# Patient Record
Sex: FEMALE | Race: WHITE | NOT HISPANIC OR LATINO | Employment: FULL TIME | URBAN - METROPOLITAN AREA
[De-identification: names, ages, dates, MRNs, and addresses within clinical notes are randomized per-mention and may not be internally consistent; named-entity substitution may affect disease eponyms.]

---

## 2017-01-03 ENCOUNTER — ALLSCRIPTS OFFICE VISIT (OUTPATIENT)
Dept: OTHER | Facility: OTHER | Age: 57
End: 2017-01-03

## 2017-01-03 DIAGNOSIS — Z12.31 ENCOUNTER FOR SCREENING MAMMOGRAM FOR MALIGNANT NEOPLASM OF BREAST: ICD-10-CM

## 2017-01-03 DIAGNOSIS — M17.0 PRIMARY OSTEOARTHRITIS OF BOTH KNEES: ICD-10-CM

## 2017-01-04 ENCOUNTER — GENERIC CONVERSION - ENCOUNTER (OUTPATIENT)
Dept: OTHER | Facility: OTHER | Age: 57
End: 2017-01-04

## 2017-01-04 ENCOUNTER — HOSPITAL ENCOUNTER (OUTPATIENT)
Dept: RADIOLOGY | Facility: MEDICAL CENTER | Age: 57
Discharge: HOME | End: 2017-01-04
Payer: COMMERCIAL

## 2017-01-04 ENCOUNTER — TRANSCRIBE ORDERS (OUTPATIENT)
Dept: RADIOLOGY | Facility: MEDICAL CENTER | Age: 57
End: 2017-01-04

## 2017-01-04 DIAGNOSIS — M17.0 PRIMARY OSTEOARTHRITIS OF BOTH KNEES: ICD-10-CM

## 2017-01-04 PROCEDURE — 71020 HB CHEST X-RAY 2VW FRONTAL&LATL: CPT

## 2017-01-04 PROCEDURE — 73562 X-RAY EXAM OF KNEE 3: CPT

## 2017-01-27 ENCOUNTER — ALLSCRIPTS OFFICE VISIT (OUTPATIENT)
Dept: OTHER | Facility: OTHER | Age: 57
End: 2017-01-27

## 2017-03-17 ENCOUNTER — ALLSCRIPTS OFFICE VISIT (OUTPATIENT)
Dept: OTHER | Facility: OTHER | Age: 57
End: 2017-03-17

## 2017-03-28 ENCOUNTER — ALLSCRIPTS OFFICE VISIT (OUTPATIENT)
Dept: OTHER | Facility: OTHER | Age: 57
End: 2017-03-28

## 2017-05-31 ENCOUNTER — ALLSCRIPTS OFFICE VISIT (OUTPATIENT)
Dept: OTHER | Facility: OTHER | Age: 57
End: 2017-05-31

## 2017-08-29 DIAGNOSIS — Z12.31 ENCOUNTER FOR SCREENING MAMMOGRAM FOR MALIGNANT NEOPLASM OF BREAST: ICD-10-CM

## 2017-10-11 ENCOUNTER — GENERIC CONVERSION - ENCOUNTER (OUTPATIENT)
Dept: OTHER | Facility: OTHER | Age: 57
End: 2017-10-11

## 2017-11-17 ENCOUNTER — ALLSCRIPTS OFFICE VISIT (OUTPATIENT)
Dept: OTHER | Facility: OTHER | Age: 57
End: 2017-11-17

## 2017-11-18 NOTE — PROGRESS NOTES
Assessment  1  Low back pain with right-sided sciatica (724 3) (M54 41)    Plan  Acute sinusitis    · Medrol 4 MG Oral Tablet Therapy Pack (MethylPREDNISolone)   · Montelukast Sodium 10 MG Oral Tablet  Low back pain with right-sided sciatica    · MethylPREDNISolone 4 MG Oral Tablet Therapy Pack; take as directed   · Naproxen 500 MG Oral Tablet; take 1 tablet every twelve hours  Rash    · Nystatin Powder  Sciatica    · Medrol 4 MG Oral Tablet Therapy Pack (MethylPREDNISolone)    Discussion/Summary  The patient was counseled regarding instructions for management,-- risk factor reductions,-- prognosis,-- patient and family education,-- risks and benefits of treatment options,-- importance of compliance with treatment  Possible side effects of new medications were reviewed with the patient/guardian today  The treatment plan was reviewed with the patient/guardian  The patient/guardian understands and agrees with the treatment plan      Chief Complaint    1  Back Pain  Joya Rogel is here today with complaints of returning R sided sciatica  She has had this multiple times in the past  She has never tried physical therapy as her insurance does not cover it well-enough for her to afford it  When her sciatica returns, she does well with steroids and she believed Vicodin, however she is willing to try an anti-inflammatory medication instead of the Vicodin  History of Present Illness  HPI: See chief complaint  Review of Systems   Constitutional: no fever-- and-- no chills  Cardiovascular: no chest pain-- and-- no palpitations  Respiratory: no shortness of breath  Gastrointestinal: no abdominal pain  Musculoskeletal: limb pain-- and-- R sided sciatica, but-- as noted in HPI  ROS reviewed  Active Problems  1  Acute sinusitis (461 9) (J01 90)   2  Brain aneurysm (437 3) (I67 1)   3  Colon cancer screening (V76 51) (Z12 11)   4  Depression screening (V79 0) (Z13 89)   5   Herpes zoster without complication (053  9) (B02 9)   6  Hypertension (401 9) (I10)   7  Pap smear for cervical cancer screening (V76 2) (Z12 4)   8  Primary osteoarthritis of both knees (715 16) (M17 0)   9  Rash (782 1) (R21)   10  Sciatica (724 3) (M54 30)   11  Screening for colorectal cancer (V76 51) (Z12 11,Z12 12)   12  Vaginal candidiasis (112 1) (B37 3)   13  Visit for screening mammogram (V76 12) (Z12 31)    Past Medical History  1  History of Acute recurrent maxillary sinusitis (461 0) (J01 01)   2  History of Acute recurrent maxillary sinusitis (461 0) (J01 01)   3  History of Acute recurrent maxillary sinusitis (461 0) (J01 01)   4  History of Acute tracheobronchitis (466 0) (J20 9)   5  Acute upper respiratory infection (465 9) (J06 9)   6  History of Colonoscopy (Fiberoptic) Screening   7  Common cold (460) (J00)   8  History of Headache (784 0) (R51)   9  History of common cold (V12 09) (Z86 19)   10  History of Reactive depression (300 4) (F32 9)   11  History of Sinusitis (473 9) (J32 9)   12  History of Soft Tissue Swelling (Non-joint) (782 3)   13  Sore throat (462) (J02 9)   14  History of Urticarial rash (708 9) (L50 9)   15  History of Vertigo (780 4) (R42)  Active Problems And Past Medical History Reviewed: The active problems and past medical history were reviewed and updated today  Family History  Mother    1  Family history of Coronary artery disease (414 00) (I25 10)  Father    2  Family history of Hodgkin's lymphoma (V16 7) (Z80 7)  Family History Reviewed: The family history was reviewed and updated today  Social History   · Being A Social Drinker   · Denied: Drug use (305 90) (F19 90)   · Former smoker (V15 82) (F59 932)   · Only smoked as a teenager   · No living will  The social history was reviewed and updated today  The social history was reviewed and is unchanged  Surgical History    1  History of Near-Total Thyroidectomy (06 4)  Surgical History Reviewed:    The surgical history was reviewed and updated today  Current Meds   1  Azelastine HCl - 0 15 % Nasal Solution; INSTILL 2 SQUIRT Twice daily; Therapy: 02ZIF2126 to (Last Rx:28Mar2017)  Requested for: 28Mar2017 Ordered   2  Betamethasone Sod Phos & Acet 6 (3-3) MG/ML Injection Suspension; USE AS DIRECTED; To Be Done: 56AAN7792; Status: HOLD FOR - Administration Ordered   3  Calcium 600 TABS; TAKE 2 TABLETS DAILY Recorded   4  Daily Value Multivitamin Oral Tablet; Take 1 tablet daily Recorded   5  HydroCHLOROthiazide 12 5 MG Oral Tablet; Take 1 tablet every 12 hours; Therapy: 28WUN0173 to (Last Rx:05Jan2017)  Requested for: 01KYT0616 Ordered   6  Medrol 4 MG Oral Tablet Therapy Pack; take as directed; Therapy: 11DUP4490 to (Last Rx:28Mar2017)  Requested for: 28Mar2017 Ordered   7  Medrol 4 MG Oral Tablet Therapy Pack; take as directed; Therapy: 22ZGX4894 to (Last Ricke Sluder)  Requested for: 98NAI8936 Ordered   8  Metoprolol Tartrate 25 MG Oral Tablet; take 1 tablet twice a day; Therapy: 17KCQ4683 to (Last Rx:26Jan2017)  Requested for: 26Jan2017 Ordered   9  Montelukast Sodium 10 MG Oral Tablet; TAKE 1 TABLET BY MOUTH ONCE DAILY; Therapy: 81TAO1416 to (Evaluate:29Uzb3092)  Requested for: 09TYJ4004; Last Rx:53Sno3347 Ordered   10  Nystatin Powder; to excoriated brandee rectal area bid prn; Therapy: 59ZVX1103 to (Last Rx:04Oct2017)  Requested for: 52UPQ6045 Ordered   11  Osteo Bi-Flex Regular Strength TABS; Take as directed Recorded   12  Vitamin C 250 MG Oral Tablet; Take 1 tablet daily Recorded   13  Vitamin D3 1000 UNIT Oral Capsule; TAKE ONE CAPSULE BY MOUTH EVERY  MORNING (SUPPLEMENT) Recorded    The medication list was reviewed and updated today  Allergies  1   No Known Drug Allergies    Vitals   Recorded: 24DBM4652 85:73GF   Systolic 486, LUE, Sitting   Diastolic 88, LUE, Sitting   Height 5 ft 4 in   Weight 153 lb 4 0 oz   BMI Calculated 26 31   BSA Calculated 1 75       Physical Exam   Constitutional  General appearance: No acute distress, well appearing and well nourished  Ears, Nose, Mouth, and Throat  External inspection of ears and nose: Normal    Otoscopic examination: Tympanic membranes translucent with normal light reflex  Canals patent without erythema  Pulmonary  Respiratory effort: No increased work of breathing or signs of respiratory distress  Auscultation of lungs: Clear to auscultation  Cardiovascular  Auscultation of heart: Normal rate and rhythm, normal S1 and S2, without murmurs  Abdomen  Abdomen: Non-tender, no masses  Liver and spleen: No hepatomegaly or splenomegaly  Musculoskeletal  Gait and station: Normal    Digits and nails: Normal without clubbing or cyanosis  Inspection/palpation of joints, bones, and muscles: Abnormal  -- R SI joint + TTP with pain extending into posterior R thigh    Psychiatric  Orientation to person, place, and time: Normal    Mood and affect: Normal          Signatures   Electronically signed by : Domenica Rosado, [facility]; Nov 17 2017  8:31AM EST                       (Author)    Electronically signed by : Milo Choudhary DO; Nov 17 2017  8:51AM EST                       (Author)

## 2017-12-05 ENCOUNTER — APPOINTMENT (OUTPATIENT)
Dept: RADIOLOGY | Facility: MEDICAL CENTER | Age: 57
End: 2017-12-05
Payer: COMMERCIAL

## 2017-12-05 ENCOUNTER — ALLSCRIPTS OFFICE VISIT (OUTPATIENT)
Dept: OTHER | Facility: OTHER | Age: 57
End: 2017-12-05

## 2017-12-05 DIAGNOSIS — M17.0 PRIMARY OSTEOARTHRITIS OF BOTH KNEES: ICD-10-CM

## 2017-12-05 DIAGNOSIS — M25.50 PAIN IN JOINT: ICD-10-CM

## 2017-12-05 DIAGNOSIS — M17.12 PRIMARY OSTEOARTHRITIS OF LEFT KNEE: ICD-10-CM

## 2017-12-05 DIAGNOSIS — M65.341 TRIGGER RING FINGER OF RIGHT HAND: ICD-10-CM

## 2017-12-05 PROCEDURE — 73562 X-RAY EXAM OF KNEE 3: CPT

## 2017-12-06 NOTE — PROGRESS NOTES
Assessment    1  Primary osteoarthritis of left knee (715 16) (M17 12)    Plan  Primary osteoarthritis of both knees    · * XR KNEE 3 VW LEFT NON INJURY; Status:Active - Retrospective By ProtocolAuthorization; Requested for:15Ktm3712;    · * XR KNEE 3 VW RIGHT NON INJURY; Status:Active - Retrospective Authorization; Requested for:66Grm9667;   Primary osteoarthritis of left knee    · Betamethasone Sod Phos & Acet 6 (3-3) MG/ML Injection Suspension   · *1 - [facility]  *  Status: Active  Requested for: 53KBK1075  Care Summary provided  : Yes   · Follow-up visit in 6 months Evaluation and Treatment  Follow-up  Status: Hold For -Scheduling  Requested for: 05ERM0332    Discussion/Summary    She has a knee pain due to bilateral knee osteoarthritis  Left knee symptomatic today with an effusion  Discussed treatment option with the patient and   Left knee aspirated and injected with steroid  Patient will count continue with over-the-counter pain medication  Ice and elevate as needed  Physical therapy for both knees  Patient will follow up p r n  Chief Complaint    1  Knee Pain  Left knee pain      History of Present Illness  HPI: 27-year-old with bilateral knee pain due to osteoarthritis  Patient had both knees injected steroid last year  Good relief of symptoms lasting almost 12 months  Patient noticed increasing pain and swelling for the last 3 days in the left knee only  Patient works as a cook  Having difficulty walking and ambulating  Pain mainly in the medial joint line  Worse with activity  Decreased with rest      Review of Systems   Constitutional: No fever, no chills, feels well, no tiredness, no recent weight gain or loss  Eyes: No complaints of eyesight problems, no red eyes  ENT: no loss of hearing, no nosebleeds, no sore throat  Cardiovascular: No complaints of chest pain, no palpitations, no leg claudication or lower extremity edema    Respiratory: no compliants of shortness of breath, no wheezing, no cough  Gastrointestinal: no complaints of abdominal pain, no constipation, no nausea or diarrhea, no vomiting, no bloody stools  Genitourinary: no complaints of dysuria, no incontinence  Musculoskeletal: as noted in HPI  Integumentary: no complaints of skin rash or lesion, no itching or dry skin, no skin wounds  Neurological: no complaints of headache, no confusion, no numbness or tingling, no dizziness  Endocrine: No complaints of muscle weakness, no feelings of weakness, no frequent urination, no excessive thirst   Psychiatric: No suicidal thoughts, no anxiety, no feelings of depression  Active Problems  1  Conjunctivitis (372 30) (H10 9)   2  Hypertension (401 9) (I10)   3  Low back pain with right-sided sciatica (724 3) (M54 41)   4  Primary osteoarthritis of both knees (715 16) (M17 0)   5   Sciatica (724 3) (M54 30)    Past Medical History   · History of Acute recurrent maxillary sinusitis (461 0) (J01 01)   · History of Acute recurrent maxillary sinusitis (461 0) (J01 01)   · History of Acute recurrent maxillary sinusitis (461 0) (J01 01)   · History of Acute tracheobronchitis (466 0) (J20 9)   · Acute upper respiratory infection (465 9) (J06 9)   · History of Colonoscopy (Fiberoptic) Screening   · Common cold (460) (J00)   · History of Headache (784 0) (R51)   · History of Herpes zoster without complication (782 4) (N10 7)   · History of acute sinusitis (V12 69) (Z87 09)   · History of common cold (V12 09) (Z86 19)   · History of intracranial aneurysm (V12 54) (Z86 79)   · History of Rash (782 1) (R21)   · History of Reactive depression (300 4) (F32 9)   · History of Sinusitis (473 9) (J32 9)   · History of Soft Tissue Swelling (Non-joint) (782 3)   · Sore throat (462) (J02 9)   · History of Urticarial rash (708 9) (L50 9)   · History of Vaginal candidiasis (112 1) (B37 3)   · History of Vertigo (780 4) (R42)    Surgical History   · History of Near-Total Thyroidectomy (06 4)    Family History  Mother    · Family history of Coronary artery disease (414 00) (I25 10)  Father    · Family history of Hodgkin's lymphoma (V16 7) (Z80 7)    Social History     · Being A Social Drinker   · Denied: Drug use (305 90) (F19 90)   · Former smoker (V15 82) (R61 361)   · Only smoked as a teenager   · No living will    Current Meds   1  Azelastine HCl - 0 15 % Nasal Solution; INSTILL 2 SQUIRT Twice daily; Therapy: 19XQH1913 to (Last Rx:28Mar2017)  Requested for: 28Mar2017 Ordered   2  Betamethasone Sod Phos & Acet 6 (3-3) MG/ML Injection Suspension; USE AS DIRECTED; To Be Done: 15ZVL3956; Status: HOLD FOR - Administration Ordered   3  Calcium 600 TABS; TAKE 2 TABLETS DAILY Recorded   4  Daily Value Multivitamin Oral Tablet; Take 1 tablet daily Recorded   5  HydroCHLOROthiazide 12 5 MG Oral Tablet; Take 1 tablet every 12 hours; Therapy: 95WXC9988 to (Last Rx:05Jan2017)  Requested for: 29AVX1750 Ordered   6  MethylPREDNISolone 4 MG Oral Tablet Therapy Pack; take as directed; Therapy: 08QSA8532 to (Last Rx:17Nov2017)  Requested for: 01RLK6082 Ordered   7  Metoprolol Tartrate 25 MG Oral Tablet; take 1 tablet twice a day; Therapy: 09KOE6241 to (Last Rx:26Jan2017)  Requested for: 26Jan2017 Ordered   8  Naproxen 500 MG Oral Tablet; take 1 tablet every twelve hours; Therapy: 52ITJ6451 to (Charly Abdi)  Requested for: 92DJO7193; Last Rx:17Nov2017 Ordered   9  Ofloxacin 0 3 % Ophthalmic Solution; APPLY 2 DROP 3 times daily in effected eye; Therapy: 46TJE1138 to (Last Rx:28Nov2017)  Requested for: 37HME5107 Ordered   10  Osteo Bi-Flex Regular Strength TABS; Take as directed Recorded   11  Vitamin C 250 MG Oral Tablet; Take 1 tablet daily Recorded   12  Vitamin D3 1000 UNIT Oral Capsule; TAKE ONE CAPSULE BY MOUTH EVERY  MORNING (SUPPLEMENT) Recorded    Allergies  1   No Known Drug Allergies    Vitals  Signs     Heart Rate: 77  Systolic: 379  Diastolic: 85  Height: 5 ft 4 in  Weight: 152 lb 4 00 oz  BMI Calculated: 26 13  BSA Calculated: 1 75    Physical Exam    Right Knee: Appearance: Normal except genu varum, but-- no effusion  Tenderness: None except the medial joint line  ROM: Full except as noted: Flexion: painful  Left Knee: Appearance: Normal except genu varum, but-- no effusion  Tenderness: None except the medial joint line  ROM: Full except as noted: Flexion: painful  Appearance: an effusion grade 2  Constitutional - General appearance: Normal   Musculoskeletal - Gait and station: Normal -- Digits and nails: Normal -- Muscle strength/tone: Normal   Cardiovascular - Pulses: Normal -- Examination of extremities for edema and/or varicosities: Normal   Skin - Skin and subcutaneous tissue: Normal   Neurologic - Sensation: Normal   Psychiatric - Orientation to person, place, and time: Normal -- Mood and affect: Normal   Eyes  Conjunctiva and lids: Normal    Pupils and irises: Normal        Procedure    Procedure: Aspiration Injection of the left knee joint  Indication:  Effusion,-- Inflammation-- and-- Osteoarthritis  Potential complications include bleeding,-- infection-- and-- allergic reaction  Risk, benefits and alternatives were discussed with the patient  Verbal consent was obtained prior to the procedure  Betadine was used to prep the area  ethyl chloride spray was used as a topical anesthetic  Using sterile technique, the aspiration/injection needle was then directed from a Anterolateral aspect  50 mL of yellow, clear fluid was aspirated with an 18-gauge  The syringe was changed and the same needle was left in place and was used to inject 4cc of 1% Lidocaine-- and-- 1mL of 4 mg/mL dexamethasone  A bandage was applied  the patient tolerated the procedure well  Complications: none  Future Appointments    Date/Time Provider Specialty Site   12/19/2017 01:10 PM GREGG Alexander   Orthopedic Surgery [address]        Signatures   Electronically signed by : GREGG Crytsal ; Dec 5 2017  3:50PM EST                       (Author)

## 2017-12-19 ENCOUNTER — GENERIC CONVERSION - ENCOUNTER (OUTPATIENT)
Dept: OTHER | Facility: OTHER | Age: 57
End: 2017-12-19

## 2017-12-19 ENCOUNTER — APPOINTMENT (OUTPATIENT)
Dept: RADIOLOGY | Facility: MEDICAL CENTER | Age: 57
End: 2017-12-19
Payer: COMMERCIAL

## 2017-12-19 DIAGNOSIS — M25.50 PAIN IN JOINT: ICD-10-CM

## 2017-12-19 PROCEDURE — 73130 X-RAY EXAM OF HAND: CPT

## 2018-01-12 ENCOUNTER — GENERIC CONVERSION - ENCOUNTER (OUTPATIENT)
Dept: OTHER | Facility: OTHER | Age: 58
End: 2018-01-12

## 2018-01-12 VITALS
DIASTOLIC BLOOD PRESSURE: 83 MMHG | SYSTOLIC BLOOD PRESSURE: 155 MMHG | HEART RATE: 84 BPM | WEIGHT: 158 LBS | BODY MASS INDEX: 26.98 KG/M2 | HEIGHT: 64 IN

## 2018-01-12 DIAGNOSIS — M17.12 PRIMARY OSTEOARTHRITIS OF LEFT KNEE: ICD-10-CM

## 2018-01-13 VITALS
SYSTOLIC BLOOD PRESSURE: 132 MMHG | HEIGHT: 64 IN | WEIGHT: 174.43 LBS | TEMPERATURE: 98.6 F | DIASTOLIC BLOOD PRESSURE: 84 MMHG | BODY MASS INDEX: 29.78 KG/M2

## 2018-01-13 VITALS
DIASTOLIC BLOOD PRESSURE: 88 MMHG | BODY MASS INDEX: 26.16 KG/M2 | WEIGHT: 153.25 LBS | SYSTOLIC BLOOD PRESSURE: 138 MMHG | HEIGHT: 64 IN

## 2018-01-13 VITALS
HEART RATE: 90 BPM | HEIGHT: 64 IN | SYSTOLIC BLOOD PRESSURE: 138 MMHG | WEIGHT: 172.44 LBS | BODY MASS INDEX: 29.44 KG/M2 | DIASTOLIC BLOOD PRESSURE: 84 MMHG

## 2018-01-14 VITALS
SYSTOLIC BLOOD PRESSURE: 124 MMHG | WEIGHT: 172.4 LBS | HEIGHT: 64 IN | BODY MASS INDEX: 29.43 KG/M2 | DIASTOLIC BLOOD PRESSURE: 82 MMHG

## 2018-01-14 VITALS
TEMPERATURE: 98 F | WEIGHT: 172 LBS | BODY MASS INDEX: 29.37 KG/M2 | DIASTOLIC BLOOD PRESSURE: 90 MMHG | HEIGHT: 64 IN | SYSTOLIC BLOOD PRESSURE: 144 MMHG

## 2018-01-14 NOTE — RESULT NOTES
Verified Results  * XR CHEST PA & LATERAL 54XTM0922 10:05AM Esme Asya Order Number: [id number]     Test Name Result Flag Reference   XR CHEST PA & LATERAL (Report)     CHEST     INDICATION: Congestion and wheezing  COMPARISON: February 27, 2007     VIEWS: PA and lateral; 2 images     FINDINGS:     The cardiomediastinal silhouette is unremarkable  The lungs are clear  No pleural effusions  Bony thorax unremarkable  IMPRESSION:     No active pulmonary disease          Workstation performed: [id number]     Signed by:   Ingrid Pennington MD   1/4/17     * XR KNEE 3 VW LEFT NON INJURY 39YIK7320 10:05AM Esme Asya Order Number: [id number]     Test Name Result Flag Reference   XR KNEE 3 VW LEFT (Report)     LEFT KNEE     INDICATION: Left knee pain  COMPARISON: December 12, 2007     VIEWS: AP, lateral and sunrise ; 3 images     FINDINGS:     There is no acute fracture or dislocation  There is no joint effusion  Medial tibiofemoral compartment; small to moderate periarticular osteophytes, mild to moderate joint space narrowing, no subchondral sclerosis, no subchondral cysts  Lateral tibiofemoral compartment; no periarticular osteophytes, no joint space narrowing, no subchondral sclerosis, no subchondral cysts  Patellofemoral compartment; small periarticular osteophytes, moderate joint space narrowing, no subchondral sclerosis, no subchondral cysts  There is no joint subluxation  No lytic or blastic lesions are seen  Small calcific/ossific density is noted superior to the patella suspicious for a small osteochondroma, new in the interval        IMPRESSION:     Degenerative changes of the medial compartment and patellofemoral joint  Osteochondroma/joint mouse, suprapatellar bursa  Workstation performed: [id number]     Signed by:   Ingrid Pennington MD   1/4/17     * XR KNEE 3 VW RIGHT NON INJURY 50QLY1850 10:05AM Sharonr[place]   TW Order Number: [id number]     Test Name Result Flag Reference   XR KNEE 3 VW RIGHT (Report)     RIGHT KNEE     INDICATION: Right knee pain  COMPARISON: None     VIEWS: AP, lateral and sunrise ; 3 images     FINDINGS:     There is no acute fracture or dislocation  There is no joint effusion  Medial tibiofemoral compartment; small periarticular osteophytes, mild joint space narrowing, no subchondral sclerosis, no subchondral cysts  Lateral tibiofemoral compartment; small periarticular osteophytes, mild joint space narrowing, no subchondral sclerosis, no subchondral cysts  Patellofemoral compartment; moderate periarticular osteophytes, moderately severe joint space narrowing, no subchondral sclerosis, no subchondral cysts  There is no joint subluxation  No lytic or blastic lesions are seen  Soft tissues are unremarkable  IMPRESSION:     Tricompartmental degenerative changes, most prominent at the patellofemoral joint  Workstation performed: [id number]     Signed by:   Tito Mendez MD   1/4/17

## 2018-01-22 VITALS
DIASTOLIC BLOOD PRESSURE: 85 MMHG | HEIGHT: 64 IN | BODY MASS INDEX: 25.99 KG/M2 | HEART RATE: 77 BPM | SYSTOLIC BLOOD PRESSURE: 154 MMHG | WEIGHT: 152.25 LBS

## 2018-01-24 VITALS
HEIGHT: 64 IN | SYSTOLIC BLOOD PRESSURE: 138 MMHG | RESPIRATION RATE: 16 BRPM | BODY MASS INDEX: 25.95 KG/M2 | DIASTOLIC BLOOD PRESSURE: 82 MMHG | WEIGHT: 152 LBS | HEART RATE: 88 BPM

## 2018-01-24 VITALS
DIASTOLIC BLOOD PRESSURE: 81 MMHG | HEIGHT: 64 IN | WEIGHT: 152 LBS | RESPIRATION RATE: 16 BRPM | BODY MASS INDEX: 25.95 KG/M2 | HEART RATE: 81 BPM | SYSTOLIC BLOOD PRESSURE: 145 MMHG

## 2018-02-05 ENCOUNTER — HOSPITAL ENCOUNTER (EMERGENCY)
Facility: HOSPITAL | Age: 58
Discharge: HOME | End: 2018-02-05
Attending: EMERGENCY MEDICINE
Payer: COMMERCIAL

## 2018-02-05 VITALS
DIASTOLIC BLOOD PRESSURE: 80 MMHG | SYSTOLIC BLOOD PRESSURE: 151 MMHG | BODY MASS INDEX: 26.12 KG/M2 | HEART RATE: 77 BPM | OXYGEN SATURATION: 100 % | RESPIRATION RATE: 18 BRPM | WEIGHT: 153 LBS | TEMPERATURE: 98.4 F | HEIGHT: 64 IN

## 2018-02-05 DIAGNOSIS — S61.219A FINGER LACERATION: Primary | ICD-10-CM

## 2018-02-05 PROCEDURE — 90471 IMMUNIZATION ADMIN: CPT

## 2018-02-05 PROCEDURE — 90715 TDAP VACCINE 7 YRS/> IM: CPT | Performed by: PHYSICIAN ASSISTANT

## 2018-02-05 PROCEDURE — 99282 EMERGENCY DEPT VISIT SF MDM: CPT

## 2018-02-05 RX ADMIN — TETANUS TOXOID, REDUCED DIPHTHERIA TOXOID AND ACELLULAR PERTUSSIS VACCINE, ADSORBED 0.5 ML: 5; 2.5; 8; 8; 2.5 SUSPENSION INTRAMUSCULAR at 18:58

## 2018-02-05 NOTE — ED PROVIDER NOTES
History  Chief Complaint   Patient presents with    Finger Laceration     sustained a laceration to mip on right thumb with a sharp knife  Patient presents to the emergency department today offering a chief complaint of a right thumb laceration that she sustained about an hour ago washing the dishes, cutting herself on a knife  Patient is bleeding was controlled with direct pressure  Denies range of motion or sensation deficits  Patient's last tetanus somewhere between 7 and 10 years ago however she is unsure therefore we will update  None       Past Medical History:   Diagnosis Date    Hypertension        Past Surgical History:   Procedure Laterality Date    THYROID SURGERY         History reviewed  No pertinent family history  I have reviewed and agree with the history as documented  Social History   Substance Use Topics    Smoking status: Never Smoker    Smokeless tobacco: Never Used    Alcohol use Yes      Comment: social        Review of Systems   Constitutional: Negative  HENT: Negative  Eyes: Negative  Respiratory: Negative  Cardiovascular: Negative  Gastrointestinal: Negative  Endocrine: Negative  Genitourinary: Negative  Musculoskeletal: Negative  Skin: Positive for wound  Allergic/Immunologic: Negative  Neurological: Negative  Hematological: Negative  Psychiatric/Behavioral: Negative  All other systems reviewed and are negative  Physical Exam  ED Triage Vitals [02/05/18 1824]   Temperature Pulse Respirations Blood Pressure SpO2   98 4 °F (36 9 °C) 77 18 151/80 100 %      Temp Source Heart Rate Source Patient Position - Orthostatic VS BP Location FiO2 (%)   Tympanic Monitor Sitting Right arm --      Pain Score       7           Orthostatic Vital Signs  Vitals:    02/05/18 1824   BP: 151/80   Pulse: 77   Patient Position - Orthostatic VS: Sitting       Physical Exam   Constitutional: She is oriented to person, place, and time   She appears well-developed and well-nourished  No distress  HENT:   Head: Normocephalic  Eyes: Pupils are equal, round, and reactive to light  Neck: Normal range of motion  Cardiovascular: Normal rate  Pulmonary/Chest: Effort normal    Musculoskeletal: Normal range of motion  Neurological: She is alert and oriented to person, place, and time  Skin: Capillary refill takes less than 2 seconds  She is not diaphoretic  Patient has a 1 5 cm linear laceration on the palmar aspect of the right 1st finger  No active bleeding  Patient exhibits full range of motion with normal sensation   Psychiatric: She has a normal mood and affect  Vitals reviewed  ED Medications  Medications   tetanus-diphtheria-acellular pertussis (BOOSTRIX) IM injection 0 5 mL (0 5 mL Intramuscular Given 2/5/18 1858)       Diagnostic Studies  Results Reviewed     None                 No orders to display              Procedures  Procedures       Phone Contacts  ED Phone Contact    ED Course  ED Course as of Feb 05 1920   Mon Feb 05, 2018   1840 Blood Pressure: 151/80   1841 Temperature: 98 4 °F (36 9 °C)   1842 Pulse: 77   1842 Respirations: 18   1842 SpO2: 100 %                               MDM  CritCare Time    Disposition  Final diagnoses:   Finger laceration     Time reflects when diagnosis was documented in both MDM as applicable and the Disposition within this note     Time User Action Codes Description Comment    2/5/2018  7:18 PM Michael Juarez Add [H76 167L] Finger laceration       ED Disposition     ED Disposition Condition Comment    Discharge  [address] discharge to home/self care      Condition at discharge: Good        Follow-up Information     Follow up With Specialties Details Why [address] Emergency Department Emergency Medicine  If symptoms worsen [address] [id number] [phone] [place] [facility], Donna Hagen [place], [address] [place] [address], DO Family Medicine Schedule an appointment as soon as possible for a visit  [address]  [phone]  [phone]           Patient's Medications    No medications on file     No discharge procedures on file      ED Provider  Electronically Signed by           Luli Dwyer PA-C  02/05/18 [id number]

## 2018-02-05 NOTE — ED ATTENDING ATTESTATION
Alka Veronica DO, saw and evaluated the patient  I have discussed the patient with the resident/non-physician practitioner and agree with the resident's/non-physician practitioner's findings, Plan of Care, and MDM as documented in the resident's/non-physician practitioner's note, except where noted  All available labs and Radiology studies were reviewed  At this point I agree with the current assessment done in the Emergency Department  I have conducted an independent evaluation of this patient a history and physical is as follows:      Critical Care Time  CritCare Time    Procedures   Patient seen examined  Patient with very superficial laceration to the right lateral thumb  Patient with intact flexion extension of the thumb    We will update her tetanus

## 2018-02-06 NOTE — ED PROCEDURE NOTE
Procedure  Lac Repair  Date/Time: 2/5/2018 7:16 PM  Performed by: Shakir Johnson  Authorized by: Ciera Guevara   Consent: Verbal consent obtained    Consent given by: patient  Patient understanding: patient states understanding of the procedure being performed  Patient identity confirmed: verbally with patient and arm band  Body area: upper extremity  Location details: right thumb  Laceration length: 1 5 cm  Foreign bodies: no foreign bodies  Tendon involvement: none  Nerve involvement: none  Vascular damage: no    Sedation:  Patient sedated: no    Wound Dehiscence:  Superficial Wound Dehiscence: simple closure      Procedure Details:  Irrigation solution: saline  Irrigation method: jet lavage  Amount of cleaning: standard  Debridement: none  Degree of undermining: none  Skin closure: glue  Approximation: close  Dressing: splint                       Kanu Ledesma PA-C  02/05/18 [id number]

## 2018-02-06 NOTE — DISCHARGE INSTRUCTIONS
Skin Adhesive Care   WHAT YOU NEED TO KNOW:   What is skin adhesive? Skin adhesive is medical glue used to close wounds  It is a substitute for staples and stitches  Skin adhesive wound closures take less time and do not require anesthesia  You have less pain and a lower risk of infection than with staples or stitches  Skin adhesive will fall off after the wound is healed  How do I care for the skin adhesive that covers my wound? · Keep your wound clean and dry  for 1 to 5 days  You can shower 24 hours after the skin adhesive is applied  Lightly pat your wound dry after you shower  · Do not soak  your wound in water, such as in a bath or hot tub  · Do not scrub  your wound or pick at the adhesive  This can make your wound reopen  · Do not apply ointments  to your wound  These include antibiotic and other ointments that contain petroleum jelly  These products will remove skin adhesive and reopen your wound  When should I contact my healthcare provider? · You have a fever  · Your wound is red, swollen, and warm to touch  · You have questions or concerns about your condition or care  When should I seek immediate care? · Your wound is draining pus  · Your wound opens  CARE AGREEMENT:   You have the right to help plan your care  Learn about your health condition and how it may be treated  Discuss treatment options with your caregivers to decide what care you want to receive  You always have the right to refuse treatment  The above information is an  only  It is not intended as medical advice for individual conditions or treatments  Talk to your doctor, nurse or pharmacist before following any medical regimen to see if it is safe and effective for you  © 2017 [address] [employer] Information is for End User's use only and may not be sold, redistributed or otherwise used for commercial purposes   All illustrations and images included in CareNotes® are the copyrighted property of [employer]  or Neymar Mayen  Skin Adhesive Care   WHAT YOU NEED TO KNOW:   Skin adhesive is medical glue used to close wounds  It is a substitute for staples and stitches  Skin adhesive wound closures take less time and do not require anesthesia  You have less pain and a lower risk of infection than with staples or stitches  Skin adhesive will fall off after the wound is healed  DISCHARGE INSTRUCTIONS:   Self-care:   · Keep your wound clean and dry  for 1 to 5 days  You can shower 24 hours after the skin adhesive is applied  Lightly pat your wound dry after you shower  · Do not soak  your wound in water, such as in a bath or hot tub  · Do not scrub  your wound or pick at the adhesive  This can make your wound reopen  · Do not apply ointments  to your wound  These include antibiotic and other ointments that contain petroleum jelly  These products will remove skin adhesive and reopen your wound  Follow up with your healthcare provider as directed:  Write down your questions so you remember to ask them during your visits  Contact your healthcare provider if:   · You have a fever  · Your wound is red and warm to touch  · You have questions or concerns about your condition or care  Seek care immediately if:   · Your wound has fluid draining from it  · Your wound opens  © 2017 [address]  Information is for End User's use only and may not be sold, redistributed or otherwise used for commercial purposes  All illustrations and images included in CareNotes® are the copyrighted property of [employer] M [employer]  or Neymar Mayen  The above information is an  only  It is not intended as medical advice for individual conditions or treatments  Talk to your doctor, nurse or pharmacist before following any medical regimen to see if it is safe and effective for you

## 2018-02-15 DIAGNOSIS — I10 HYPERTENSION, UNSPECIFIED TYPE: Primary | ICD-10-CM

## 2018-02-15 RX ORDER — IBUPROFEN 200 MG
2 CAPSULE ORAL DAILY
COMMUNITY

## 2018-02-15 RX ORDER — NAPROXEN 500 MG/1
1 TABLET ORAL
COMMUNITY
Start: 2017-11-17 | End: 2018-04-24

## 2018-02-15 RX ORDER — BIOTIN 1 MG
TABLET ORAL
COMMUNITY

## 2018-02-15 RX ORDER — HYDROCHLOROTHIAZIDE 12.5 MG/1
1 TABLET ORAL EVERY 12 HOURS
COMMUNITY
Start: 2014-08-05 | End: 2018-02-15 | Stop reason: SDUPTHER

## 2018-02-15 RX ORDER — ASCORBIC ACID 250 MG
1 TABLET,CHEWABLE ORAL DAILY
COMMUNITY

## 2018-02-15 RX ORDER — HYDROCHLOROTHIAZIDE 12.5 MG/1
12.5 TABLET ORAL EVERY 12 HOURS
Qty: 180 TABLET | Refills: 1 | Status: SHIPPED | OUTPATIENT
Start: 2018-02-15 | End: 2018-07-13 | Stop reason: SDUPTHER

## 2018-03-16 ENCOUNTER — OFFICE VISIT (OUTPATIENT)
Dept: OBGYN CLINIC | Facility: CLINIC | Age: 58
End: 2018-03-16
Payer: COMMERCIAL

## 2018-03-16 VITALS
DIASTOLIC BLOOD PRESSURE: 82 MMHG | HEIGHT: 64 IN | HEART RATE: 76 BPM | BODY MASS INDEX: 26.98 KG/M2 | SYSTOLIC BLOOD PRESSURE: 154 MMHG | WEIGHT: 158 LBS

## 2018-03-16 DIAGNOSIS — M19.032 CMC DJD(CARPOMETACARPAL DEGENERATIVE JOINT DISEASE), LOCALIZED PRIMARY, LEFT: Primary | ICD-10-CM

## 2018-03-16 PROCEDURE — 20600 DRAIN/INJ JOINT/BURSA W/O US: CPT | Performed by: ORTHOPAEDIC SURGERY

## 2018-03-16 PROCEDURE — 99213 OFFICE O/P EST LOW 20 MIN: CPT | Performed by: ORTHOPAEDIC SURGERY

## 2018-03-16 RX ORDER — BETAMETHASONE SODIUM PHOSPHATE AND BETAMETHASONE ACETATE 3; 3 MG/ML; MG/ML
3 INJECTION, SUSPENSION INTRA-ARTICULAR; INTRALESIONAL; INTRAMUSCULAR; SOFT TISSUE
Status: COMPLETED | OUTPATIENT
Start: 2018-03-16 | End: 2018-03-16

## 2018-03-16 RX ORDER — BUPIVACAINE HYDROCHLORIDE 2.5 MG/ML
1 INJECTION, SOLUTION INFILTRATION; PERINEURAL
Status: COMPLETED | OUTPATIENT
Start: 2018-03-16 | End: 2018-03-16

## 2018-03-16 RX ADMIN — BETAMETHASONE SODIUM PHOSPHATE AND BETAMETHASONE ACETATE 3 MG: 3; 3 INJECTION, SUSPENSION INTRA-ARTICULAR; INTRALESIONAL; INTRAMUSCULAR; SOFT TISSUE at 14:43

## 2018-03-16 RX ADMIN — BUPIVACAINE HYDROCHLORIDE 1 ML: 2.5 INJECTION, SOLUTION INFILTRATION; PERINEURAL at 14:43

## 2018-03-16 NOTE — PROGRESS NOTES
Assessment:       1  [facility] DJD(carpometacarpal degenerative joint disease), localized primary, left          Plan:    Left thumb CMC joint injected with steroid and local anesthetic  Patient will follow up in 4 months            Subjective:     Patient ID: Sebastian Ward is a 56 y o  female  Chief Complaint:  Left thumb pain    HPI  Patient works as a  at [employer]     Patient has a left thumb pain due to [facility] arthritis  Patient had injection at the last visit  Patient is using the brace and has also done physical therapy  Patient comes back with the recurrence of the pain  Social History     Occupational History    Not on file  Social History Main Topics    Smoking status: Never Smoker    Smokeless tobacco: Never Used    Alcohol use Yes      Comment: social    Drug use: No    Sexual activity: Not on file      Review of Systems        Objective:     Ortho ExamPhysical Exam    Left thumb slight prominence of the CMC joint  Tenderness of the CMC joint with crepitus  Grinding of the thumb reproduces symptoms  Left thumb pain due to [facility] joint arthritis      I have personally reviewed pertinent films in PACS     Small joint arthrocentesis  Date/Time: 3/16/2018 2:43 PM  Consent given by: patient and parent  Site marked: site marked  Timeout: Immediately prior to procedure a time out was called to verify the correct patient, procedure, equipment, support staff and site/side marked as required   Supporting Documentation  Indications: pain and diagnostic evaluation   Procedure Details  Location: thumb - L thumb CMC  Preparation: Patient was prepped and draped in the usual sterile fashion  Needle size: 25 G  Ultrasound guidance: no  Approach: lateral  Medications administered: 1 mL bupivacaine 0 25 %; 3 mg betamethasone acetate-betamethasone sodium phosphate 6 (3-3) mg/mL    Patient tolerance: patient tolerated the procedure well with no immediate complications  Dressing:  Sterile dressing applied

## 2018-03-16 NOTE — PATIENT INSTRUCTIONS
Thumb Arthroplasty   WHAT YOU NEED TO KNOW:   What do I need to know about thumb arthroplasty? Thumb arthroplasty is surgery to replace part or all of the joint at the base of your thumb  This joint is where your thumb bone and wrist bone meet  How do I prepare for thumb arthroplasty? Your healthcare provider will talk to you about how to prepare for surgery  He may tell you not to eat or drink anything after midnight on the day of your surgery  He will tell you what medicines to take or not take on the day of your surgery  What will happen during thumb arthroplasty? General or regional anesthesia will be given to keep you free from pain during the surgery  Your surgeon will make an incision on the skin over your thumb joint  He will remove part or all of your wrist bone  He may also remove part of your thumb bone  He will reconstruct your joint using cartilage, a tendon taken from your forearm, or an artificial implant  Your surgeon will close your incision with stitches and cover it with bandages  What are the risks of thumb arthroplasty? You may have stiffness in your thumb after surgery  You may bleed more than expected or get an infection  If you received an implant, the implant may get loose, break, or become unstable  CARE AGREEMENT:   You have the right to help plan your care  Learn about your health condition and how it may be treated  Discuss treatment options with your caregivers to decide what care you want to receive  You always have the right to refuse treatment  The above information is an  only  It is not intended as medical advice for individual conditions or treatments  Talk to your doctor, nurse or pharmacist before following any medical regimen to see if it is safe and effective for you  © 2017 [address] Information is for End User's use only and may not be sold, redistributed or otherwise used for commercial purposes   All illustrations and images included in [facility] are the copyrighted property of A D A M , Inc  or [address]

## 2018-03-30 LAB — SURGICAL PATHOLOGY (HISTORICAL): NORMAL

## 2018-04-19 ENCOUNTER — TELEPHONE (OUTPATIENT)
Dept: FAMILY MEDICINE CLINIC | Facility: CLINIC | Age: 58
End: 2018-04-19

## 2018-04-19 DIAGNOSIS — J20.9 ACUTE INFECTIVE TRACHEOBRONCHITIS: Primary | ICD-10-CM

## 2018-04-19 RX ORDER — AZITHROMYCIN 250 MG/1
TABLET, FILM COATED ORAL
Qty: 6 TABLET | Refills: 0 | Status: SHIPPED | OUTPATIENT
Start: 2018-04-19 | End: 2018-04-22

## 2018-04-19 NOTE — TELEPHONE ENCOUNTER
CC: KAVITA - Same S/S as daughter who was seen yesterday    Requesting antibiotic    Allergies: ARTIE    Pharm: [facility]

## 2018-04-24 ENCOUNTER — OFFICE VISIT (OUTPATIENT)
Dept: FAMILY MEDICINE CLINIC | Facility: CLINIC | Age: 58
End: 2018-04-24
Payer: COMMERCIAL

## 2018-04-24 VITALS
HEIGHT: 64 IN | BODY MASS INDEX: 27.28 KG/M2 | DIASTOLIC BLOOD PRESSURE: 92 MMHG | SYSTOLIC BLOOD PRESSURE: 144 MMHG | WEIGHT: 159.8 LBS

## 2018-04-24 DIAGNOSIS — J32.9 SINUSITIS, UNSPECIFIED CHRONICITY, UNSPECIFIED LOCATION: Primary | ICD-10-CM

## 2018-04-24 DIAGNOSIS — J40 BRONCHITIS: ICD-10-CM

## 2018-04-24 PROBLEM — M65.331 TRIGGER MIDDLE FINGER OF RIGHT HAND: Status: ACTIVE | Noted: 2017-12-19

## 2018-04-24 PROBLEM — M54.41 LOW BACK PAIN WITH RIGHT-SIDED SCIATICA: Status: ACTIVE | Noted: 2017-11-17

## 2018-04-24 PROBLEM — M18.12 PRIMARY OSTEOARTHRITIS OF FIRST CARPOMETACARPAL JOINT OF LEFT HAND: Status: ACTIVE | Noted: 2017-12-19

## 2018-04-24 PROBLEM — M17.0 PRIMARY OSTEOARTHRITIS OF BOTH KNEES: Status: ACTIVE | Noted: 2017-01-03

## 2018-04-24 PROCEDURE — 99213 OFFICE O/P EST LOW 20 MIN: CPT | Performed by: PHYSICIAN ASSISTANT

## 2018-04-24 RX ORDER — AMOXICILLIN 500 MG/1
500 CAPSULE ORAL EVERY 8 HOURS SCHEDULED
Qty: 30 CAPSULE | Refills: 0 | Status: SHIPPED | OUTPATIENT
Start: 2018-04-24 | End: 2018-05-04

## 2018-04-24 NOTE — PROGRESS NOTES
Assessment/Plan:         Diagnoses and all orders for this visit:    Sinusitis, unspecified chronicity, unspecified location  -     amoxicillin (AMOXIL) 500 mg capsule; Take 1 capsule (500 mg total) by mouth every 8 (eight) hours for 10 days    Bronchitis  -     amoxicillin (AMOXIL) 500 mg capsule; Take 1 capsule (500 mg total) by mouth every 8 (eight) hours for 10 days          Subjective:      Patient ID: Patricia Varner is a 56 y o  female  Cough   This is a new problem  The current episode started 1 to 4 weeks ago  The problem has been unchanged  The cough is productive of sputum  Associated symptoms include nasal congestion, postnasal drip, rhinorrhea and a sore throat  Pertinent negatives include no chest pain, chills, ear congestion, ear pain, fever, headaches, myalgias, rash, shortness of breath, sweats, weight loss or wheezing  Nothing aggravates the symptoms  Treatments tried: Zithromax  The treatment provided no relief  The following portions of the patient's history were reviewed and updated as appropriate:   She  has a past medical history of Hypertension; Intracranial aneurysm; and Reactive depression  She   Patient Active Problem List    Diagnosis Date Noted    Primary osteoarthritis of first carpometacarpal joint of left hand 12/19/2017    Trigger middle finger of right hand 12/19/2017    Low back pain with right-sided sciatica 11/17/2017    Primary osteoarthritis of both knees 01/03/2017    Hypertension 10/23/2012     She  has a past surgical history that includes Thyroid surgery  Her family history includes Arthritis in her father and mother; Cancer in her father; Heart disease in her mother; Lymphoma in her father  She  reports that she has never smoked  She has never used smokeless tobacco  She reports that she drinks alcohol  She reports that she does not use drugs    Current Outpatient Prescriptions   Medication Sig Dispense Refill    Ascorbic Acid (VITAMIN C) 250 MG CHEW Chew 1 tablet daily      Calcium 600 MG tablet Take 2 tablets by mouth daily      Cholecalciferol (VITAMIN D3) 1000 units CAPS Take by mouth      hydrochlorothiazide (HYDRODIURIL) 12 5 mg tablet Take 1 tablet (12 5 mg total) by mouth every 12 (twelve) hours (Patient taking differently: Take 12 5 mg by mouth daily  ) 180 tablet 1    metoprolol tartrate (LOPRESSOR) 25 mg tablet Take 1 tablet by mouth daily        Multiple Vitamin (DAILY VALUE MULTIVITAMIN PO) Take 1 tablet by mouth daily      amoxicillin (AMOXIL) 500 mg capsule Take 1 capsule (500 mg total) by mouth every 8 (eight) hours for 10 days 30 capsule 0     No current facility-administered medications for this visit  Current Outpatient Prescriptions on File Prior to Visit   Medication Sig    Ascorbic Acid (VITAMIN C) 250 MG CHEW Chew 1 tablet daily    Calcium 600 MG tablet Take 2 tablets by mouth daily    Cholecalciferol (VITAMIN D3) 1000 units CAPS Take by mouth    hydrochlorothiazide (HYDRODIURIL) 12 5 mg tablet Take 1 tablet (12 5 mg total) by mouth every 12 (twelve) hours (Patient taking differently: Take 12 5 mg by mouth daily  )    metoprolol tartrate (LOPRESSOR) 25 mg tablet Take 1 tablet by mouth daily      Multiple Vitamin (DAILY VALUE MULTIVITAMIN PO) Take 1 tablet by mouth daily    [DISCONTINUED] naproxen (NAPROSYN) 500 mg tablet Take 1 tablet by mouth     No current facility-administered medications on file prior to visit  She has No Known Allergies       Review of Systems   Constitutional: Negative for activity change, appetite change, chills, fatigue, fever and weight loss  HENT: Positive for postnasal drip, rhinorrhea and sore throat  Negative for congestion, ear pain, sinus pain and sinus pressure  Respiratory: Positive for cough  Negative for shortness of breath and wheezing  Cardiovascular: Negative for chest pain and palpitations     Gastrointestinal: Negative for abdominal pain, constipation, diarrhea, nausea and vomiting  Genitourinary: Negative for dysuria  Musculoskeletal: Negative for arthralgias and myalgias  Skin: Negative for rash  Neurological: Negative for dizziness, light-headedness and headaches  Objective:      /92 (BP Location: Left arm, Patient Position: Sitting)   Ht 5' 4" (1 626 m)   Wt 72 5 kg (159 lb 12 8 oz)   BMI 27 43 kg/m²          Physical Exam   Constitutional: She is oriented to person, place, and time  She appears well-developed and well-nourished  No distress  HENT:   Head: Normocephalic and atraumatic  Right Ear: Hearing, external ear and ear canal normal  Tympanic membrane is injected  Left Ear: Hearing, external ear and ear canal normal  Tympanic membrane is injected  Mouth/Throat: Uvula is midline and mucous membranes are normal  Posterior oropharyngeal erythema present  No oropharyngeal exudate, posterior oropharyngeal edema or tonsillar abscesses  Eyes: Conjunctivae are normal    Neck: Neck supple  No thyromegaly present  Cardiovascular: Normal rate, regular rhythm and normal heart sounds  Pulmonary/Chest: Effort normal  No respiratory distress  She has no wheezes  +coarse BS   Lymphadenopathy:     She has no cervical adenopathy  Neurological: She is alert and oriented to person, place, and time  Skin: Skin is warm and dry  No rash noted  She is not diaphoretic  No erythema  Psychiatric: She has a normal mood and affect  Her behavior is normal  Judgment and thought content normal    Vitals reviewed

## 2018-06-06 ENCOUNTER — OFFICE VISIT (OUTPATIENT)
Dept: FAMILY MEDICINE CLINIC | Facility: CLINIC | Age: 58
End: 2018-06-06
Payer: COMMERCIAL

## 2018-06-06 VITALS
HEIGHT: 64 IN | BODY MASS INDEX: 27.35 KG/M2 | WEIGHT: 160.2 LBS | DIASTOLIC BLOOD PRESSURE: 80 MMHG | SYSTOLIC BLOOD PRESSURE: 124 MMHG

## 2018-06-06 DIAGNOSIS — M54.31 RIGHT SIDED SCIATICA: Primary | ICD-10-CM

## 2018-06-06 PROCEDURE — 99214 OFFICE O/P EST MOD 30 MIN: CPT | Performed by: PHYSICIAN ASSISTANT

## 2018-06-06 RX ORDER — MELOXICAM 15 MG/1
15 TABLET ORAL DAILY
Qty: 10 TABLET | Refills: 0 | Status: SHIPPED | OUTPATIENT
Start: 2018-06-06 | End: 2018-07-31 | Stop reason: HOSPADM

## 2018-06-06 NOTE — PROGRESS NOTES
Assessment/Plan:         Diagnoses and all orders for this visit:    Right sided sciatica  -     meloxicam (MOBIC) 15 mg tablet; Take 1 tablet (15 mg total) by mouth daily          Subjective:      Patient ID: Leslie Jones is a 56 y o  female  Marshel Serum is here today due to a R sciatica flare x 1 week  Denies specific trauma/injury to her back  This happens to her approximately twice yearly  Pain radiates from R SI joint, down R leg into R foot  The following portions of the patient's history were reviewed and updated as appropriate:   She  has a past medical history of Hypertension; Intracranial aneurysm; and Reactive depression  She   Patient Active Problem List    Diagnosis Date Noted    Primary osteoarthritis of first carpometacarpal joint of left hand 12/19/2017    Trigger middle finger of right hand 12/19/2017    Low back pain with right-sided sciatica 11/17/2017    Primary osteoarthritis of both knees 01/03/2017    Hypertension 10/23/2012     She  has a past surgical history that includes Thyroid surgery  Her family history includes Arthritis in her father and mother; Cancer in her father; Heart disease in her mother; Lymphoma in her father  She  reports that she has never smoked  She has never used smokeless tobacco  She reports that she drinks alcohol  She reports that she does not use drugs    Current Outpatient Prescriptions   Medication Sig Dispense Refill    Ascorbic Acid (VITAMIN C) 250 MG CHEW Chew 1 tablet daily      Calcium 600 MG tablet Take 2 tablets by mouth daily      Cholecalciferol (VITAMIN D3) 1000 units CAPS Take by mouth      hydrochlorothiazide (HYDRODIURIL) 12 5 mg tablet Take 1 tablet (12 5 mg total) by mouth every 12 (twelve) hours (Patient taking differently: Take 12 5 mg by mouth daily  ) 180 tablet 1    metoprolol tartrate (LOPRESSOR) 25 mg tablet Take 1 tablet by mouth daily        Multiple Vitamin (DAILY VALUE MULTIVITAMIN PO) Take 1 tablet by mouth daily  meloxicam (MOBIC) 15 mg tablet Take 1 tablet (15 mg total) by mouth daily 10 tablet 0     No current facility-administered medications for this visit  Current Outpatient Prescriptions on File Prior to Visit   Medication Sig    Ascorbic Acid (VITAMIN C) 250 MG CHEW Chew 1 tablet daily    Calcium 600 MG tablet Take 2 tablets by mouth daily    Cholecalciferol (VITAMIN D3) 1000 units CAPS Take by mouth    hydrochlorothiazide (HYDRODIURIL) 12 5 mg tablet Take 1 tablet (12 5 mg total) by mouth every 12 (twelve) hours (Patient taking differently: Take 12 5 mg by mouth daily  )    metoprolol tartrate (LOPRESSOR) 25 mg tablet Take 1 tablet by mouth daily      Multiple Vitamin (DAILY VALUE MULTIVITAMIN PO) Take 1 tablet by mouth daily     No current facility-administered medications on file prior to visit  She has No Known Allergies       Review of Systems   Constitutional: Negative for chills, fatigue and fever  HENT: Negative for congestion, postnasal drip, rhinorrhea, sinus pain, sinus pressure and sore throat  Respiratory: Negative for cough, shortness of breath and wheezing  Cardiovascular: Negative for chest pain and palpitations  Gastrointestinal: Negative for abdominal pain, constipation, diarrhea, nausea and vomiting  Genitourinary: Negative for dysuria, frequency and urgency  Musculoskeletal: Positive for arthralgias and back pain  Negative for gait problem  Skin: Negative for rash  Neurological: Negative for dizziness, weakness, light-headedness, numbness and headaches  Objective:      /80 (BP Location: Left arm, Patient Position: Sitting)   Ht 5' 4" (1 626 m)   Wt 72 7 kg (160 lb 3 2 oz)   BMI 27 50 kg/m²          Physical Exam   Constitutional: She is oriented to person, place, and time  She appears well-developed and well-nourished  No distress  HENT:   Head: Normocephalic and atraumatic     Right Ear: Hearing, tympanic membrane, external ear and ear canal normal    Left Ear: Hearing, tympanic membrane, external ear and ear canal normal    Mouth/Throat: Uvula is midline, oropharynx is clear and moist and mucous membranes are normal    Eyes: Conjunctivae are normal  Right eye exhibits no discharge  Left eye exhibits no discharge  No scleral icterus  Neck: Neck supple  No thyromegaly present  Cardiovascular: Normal rate, regular rhythm and normal heart sounds  Pulmonary/Chest: Effort normal and breath sounds normal  No respiratory distress  She has no wheezes  Musculoskeletal:   +TTP along R SI joint, pain radiating down R leg into R foot  Lymphadenopathy:     She has no cervical adenopathy  Neurological: She is alert and oriented to person, place, and time  Skin: Skin is warm and dry  No rash noted  She is not diaphoretic  No erythema  Psychiatric: She has a normal mood and affect  Her behavior is normal  Judgment and thought content normal    Vitals reviewed

## 2018-07-03 ENCOUNTER — TELEPHONE (OUTPATIENT)
Dept: FAMILY MEDICINE CLINIC | Facility: CLINIC | Age: 58
End: 2018-07-03

## 2018-07-03 NOTE — TELEPHONE ENCOUNTER
She came to see you on June 6th with sciatic pain and it still hurts and wants to know what she can do

## 2018-07-13 DIAGNOSIS — I10 HYPERTENSION, UNSPECIFIED TYPE: ICD-10-CM

## 2018-07-13 DIAGNOSIS — I10 ESSENTIAL HYPERTENSION: Primary | ICD-10-CM

## 2018-07-13 RX ORDER — HYDROCHLOROTHIAZIDE 12.5 MG/1
12.5 TABLET ORAL DAILY
Qty: 90 TABLET | Refills: 1 | Status: SHIPPED | OUTPATIENT
Start: 2018-07-13 | End: 2019-01-18 | Stop reason: SDUPTHER

## 2018-07-17 ENCOUNTER — OFFICE VISIT (OUTPATIENT)
Dept: OBGYN CLINIC | Facility: CLINIC | Age: 58
End: 2018-07-17
Payer: COMMERCIAL

## 2018-07-17 VITALS
HEART RATE: 79 BPM | HEIGHT: 64 IN | BODY MASS INDEX: 27.14 KG/M2 | DIASTOLIC BLOOD PRESSURE: 82 MMHG | SYSTOLIC BLOOD PRESSURE: 123 MMHG | WEIGHT: 159 LBS

## 2018-07-17 DIAGNOSIS — M19.032 CMC DJD(CARPOMETACARPAL DEGENERATIVE JOINT DISEASE), LOCALIZED PRIMARY, LEFT: Primary | ICD-10-CM

## 2018-07-17 PROCEDURE — 99213 OFFICE O/P EST LOW 20 MIN: CPT | Performed by: ORTHOPAEDIC SURGERY

## 2018-07-17 RX ORDER — CHLORHEXIDINE GLUCONATE 4 G/100ML
SOLUTION TOPICAL DAILY PRN
Status: CANCELLED | OUTPATIENT
Start: 2018-07-17 | End: 2019-07-17

## 2018-07-17 NOTE — PROGRESS NOTES
Assessment:    1  [facility] DJD(carpometacarpal degenerative joint disease), localized primary, left      Plan:  After having a nick been explained risks and benefits of surgery patient elected to proceed with left thumb CMC many tight rope and trapezial resection  Consent was obtained in the office today by Dr Meeta Blanchard  All questions were answered  We will see the patient back postoperatively  She may take her metoprolol the day of surgery but hold other medications the day of surgery  Nothing to drink after midnight on the night prior to her surgery  Chief Complaint:  Left base of thumb pain    HPI  Ana Rosado is a 66 y o  female right hand dominant with continued discomfort left base of thumb  She has had 2 previous cortisone injections in the [facility] joint  She is contemplating surgical intervention  She works as the  at [employer] and is interested in getting this done next Thursday  She denies any numbness or tingling      Past Medical History:   Diagnosis Date    Hypertension     Intracranial aneurysm     resolved 11/17/17 - possible R P-comm region aneurysm measuring 3mm found on MRI results    Reactive depression     last assessed 12/15/15     Past Surgical History:   Procedure Laterality Date    THYROID SURGERY      near total thyroidectomy     No Known Allergies    Current Outpatient Prescriptions:     Ascorbic Acid (VITAMIN C) 250 MG CHEW, Chew 1 tablet daily, Disp: , Rfl:     Calcium 600 MG tablet, Take 2 tablets by mouth daily, Disp: , Rfl:     Cholecalciferol (VITAMIN D3) 1000 units CAPS, Take by mouth, Disp: , Rfl:     hydrochlorothiazide (HYDRODIURIL) 12 5 mg tablet, Take 1 tablet (12 5 mg total) by mouth daily, Disp: 90 tablet, Rfl: 1    meloxicam (MOBIC) 15 mg tablet, Take 1 tablet (15 mg total) by mouth daily, Disp: 10 tablet, Rfl: 0    metoprolol tartrate (LOPRESSOR) 25 mg tablet, Take 1 tablet (25 mg total) by mouth daily, Disp: 90 tablet, Rfl: 1    Multiple Vitamin (DAILY VALUE MULTIVITAMIN PO), Take 1 tablet by mouth daily, Disp: , Rfl:     Social History     Occupational History    Not on file  Social History Main Topics    Smoking status: Never Smoker    Smokeless tobacco: Never Used      Comment: only smoked as a teenager    Alcohol use Yes      Comment: social    Drug use: No    Sexual activity: Not on file     Review of Systems   Constitutional: Negative  HENT: Negative  Eyes: Negative  Respiratory: Negative  Cardiovascular: Negative  Gastrointestinal: Negative  Endocrine: Negative  Genitourinary: Negative  Musculoskeletal: Positive for arthralgias  Left thumb as HPI   Skin: Negative  Allergic/Immunologic: Negative  Neurological: Negative  Hematological: Negative  Psychiatric/Behavioral: Negative  Objective:  Blood pressure 123/82, pulse 79, height 5' 4" (1 626 m), weight 72 1 kg (159 lb)  Body mass index is 27 29 kg/m²  Physical Exam   Constitutional: Patient is oriented to person, place, and time  Patient appears well-developed and well-nourished  No distress  HENT:   Head: Normocephalic  Eyes: Conjunctivae are normal  Right eye exhibits no discharge  Left eye exhibits no discharge  No scleral icterus  Cardiovascular:  Regular rate and rhythm without murmur  Pulmonary/Chest:  Clear to auscultation bilaterally without wheeze  Neurological: Patient is alert and oriented to person, place, and time  Skin: Skin is warm and dry  No rash noted  Patient is not diaphoretic  No erythema  No pallor  Psychiatric: Patient has a normal mood and affect  Patient's behavior is normal  Judgment and thought content normal      Ortho Exam   left thumb without cutaneous lesions  There is slight palmar subluxation at the [facility] joint  There is tenderness with palpation at the [facility] joint  She is able oppose all fingers  Capillary refill is brisk at less than 2 sec  Radial pulse 4/4    She is grossly neurovascular intact    I have personally reviewed pertinent films in PACS  The patient has failed non operative measures and has opted for surgical intervention  Risks and benefits of the treatment options and surgery were discussed in detail  The risks of surgery including infection, bleeding, injury to nerves, injury to the vessels, risk of failure of the procedure, the possible need for further surgery, and potential risk of loss of limb and life  After weighing all the treatment options available, the patient has opted for surgical intervention and informed consent was obtained  We will schedule the patient to be seen back postoperatively  Ge Guillermo PA-C  Portions of the record may have been created with voice recognition software   Occasional wrong word or "sound a like" substitutions may have occurred due to the inherent limitations of voice recognition software

## 2018-07-17 NOTE — PATIENT INSTRUCTIONS
After having a nick been explained risks and benefits of surgery patient elected to proceed with left thumb CMC many tight rope and trapezial resection  Consent was obtained in the office today by Dr Giovany Collazo  All questions were answered  We will see the patient back postoperatively  She may take her metoprolol the day of surgery but hold other medications the day of surgery  Nothing to drink after midnight on the night prior to her surgery      No outpatient prescriptions have been marked as taking for the 7/17/18 encounter (Office Visit) with Niya Hastings MD

## 2018-07-17 NOTE — H&P
Assessment:    1  Aia 16 DJD(carpometacarpal degenerative joint disease), localized primary, left      Plan:  After having a nick been explained risks and benefits of surgery patient elected to proceed with left thumb CMC many tight rope and trapezial resection  Consent was obtained in the office today by Dr Lucía Monet  All questions were answered  We will see the patient back postoperatively  She may take her metoprolol the day of surgery but hold other medications the day of surgery  Nothing to drink after midnight on the night prior to her surgery  Chief Complaint:  Left base of thumb pain    VARGHESE Herman is a 66 y o  female right hand dominant with continued discomfort left base of thumb  She has had 2 previous cortisone injections in the Aia 16 joint  She is contemplating surgical intervention  She works as the  at [employer] and is interested in getting this done next Thursday  She denies any numbness or tingling      Past Medical History:   Diagnosis Date    Hypertension     Intracranial aneurysm     resolved 11/17/17 - possible R P-comm region aneurysm measuring 3mm found on MRI results    Reactive depression     last assessed 12/15/15     Past Surgical History:   Procedure Laterality Date    THYROID SURGERY      near total thyroidectomy     No Known Allergies    Current Outpatient Prescriptions:     Ascorbic Acid (VITAMIN C) 250 MG CHEW, Chew 1 tablet daily, Disp: , Rfl:     Calcium 600 MG tablet, Take 2 tablets by mouth daily, Disp: , Rfl:     Cholecalciferol (VITAMIN D3) 1000 units CAPS, Take by mouth, Disp: , Rfl:     hydrochlorothiazide (HYDRODIURIL) 12 5 mg tablet, Take 1 tablet (12 5 mg total) by mouth daily, Disp: 90 tablet, Rfl: 1    meloxicam (MOBIC) 15 mg tablet, Take 1 tablet (15 mg total) by mouth daily, Disp: 10 tablet, Rfl: 0    metoprolol tartrate (LOPRESSOR) 25 mg tablet, Take 1 tablet (25 mg total) by mouth daily, Disp: 90 tablet, Rfl: 1    Multiple Vitamin (DAILY VALUE MULTIVITAMIN PO), Take 1 tablet by mouth daily, Disp: , Rfl:     Social History     Occupational History    Not on file  Social History Main Topics    Smoking status: Never Smoker    Smokeless tobacco: Never Used      Comment: only smoked as a teenager    Alcohol use Yes      Comment: social    Drug use: No    Sexual activity: Not on file     Review of Systems   Constitutional: Negative  HENT: Negative  Eyes: Negative  Respiratory: Negative  Cardiovascular: Negative  Gastrointestinal: Negative  Endocrine: Negative  Genitourinary: Negative  Musculoskeletal: Positive for arthralgias  Left thumb as HPI   Skin: Negative  Allergic/Immunologic: Negative  Neurological: Negative  Hematological: Negative  Psychiatric/Behavioral: Negative  Objective:  Blood pressure 123/82, pulse 79, height 5' 4" (1 626 m), weight 72 1 kg (159 lb)  Body mass index is 27 29 kg/m²  Physical Exam   Constitutional: Patient is oriented to person, place, and time  Patient appears well-developed and well-nourished  No distress  HENT:   Head: Normocephalic  Eyes: Conjunctivae are normal  Right eye exhibits no discharge  Left eye exhibits no discharge  No scleral icterus  Cardiovascular:  Regular rate and rhythm without murmur  Pulmonary/Chest:  Clear to auscultation bilaterally without wheeze  Neurological: Patient is alert and oriented to person, place, and time  Skin: Skin is warm and dry  No rash noted  Patient is not diaphoretic  No erythema  No pallor  Psychiatric: Patient has a normal mood and affect  Patient's behavior is normal  Judgment and thought content normal      Ortho Exam   left thumb without cutaneous lesions  There is slight palmar subluxation at the [facility] joint  There is tenderness with palpation at the [facility] joint  She is able oppose all fingers  Capillary refill is brisk at less than 2 sec  Radial pulse 4/4    She is grossly neurovascular intact    I have personally reviewed pertinent films in PACS  The patient has failed non operative measures and has opted for surgical intervention  Risks and benefits of the treatment options and surgery were discussed in detail  The risks of surgery including infection, bleeding, injury to nerves, injury to the vessels, risk of failure of the procedure, the possible need for further surgery, and potential risk of loss of limb and life  After weighing all the treatment options available, the patient has opted for surgical intervention and informed consent was obtained  We will schedule the patient to be seen back postoperatively  Sampson Russ PA-C  Portions of the record may have been created with voice recognition software   Occasional wrong word or "sound a like" substitutions may have occurred due to the inherent limitations of voice recognition software

## 2018-07-20 ENCOUNTER — APPOINTMENT (OUTPATIENT)
Dept: LAB | Facility: MEDICAL CENTER | Age: 58
End: 2018-07-20
Payer: COMMERCIAL

## 2018-07-20 DIAGNOSIS — M19.032 CMC DJD(CARPOMETACARPAL DEGENERATIVE JOINT DISEASE), LOCALIZED PRIMARY, LEFT: ICD-10-CM

## 2018-07-20 LAB
ANION GAP SERPL CALCULATED.3IONS-SCNC: 7 MMOL/L (ref 4–13)
APTT PPP: 29 SECONDS (ref 24–36)
BUN SERPL-MCNC: 16 MG/DL (ref 5–25)
CALCIUM SERPL-MCNC: 9.4 MG/DL (ref 8.3–10.1)
CHLORIDE SERPL-SCNC: 106 MMOL/L (ref 100–108)
CO2 SERPL-SCNC: 27 MMOL/L (ref 21–32)
CREAT SERPL-MCNC: 0.87 MG/DL (ref 0.6–1.3)
ERYTHROCYTE [DISTWIDTH] IN BLOOD BY AUTOMATED COUNT: 12.5 % (ref 11.6–15.1)
GFR SERPL CREATININE-BSD FRML MDRD: 75 ML/MIN/1.73SQ M
GLUCOSE P FAST SERPL-MCNC: 89 MG/DL (ref 65–99)
HCT VFR BLD AUTO: 42.5 % (ref 34.8–46.1)
HGB BLD-MCNC: 14.7 G/DL (ref 11.5–15.4)
INR PPP: 0.96 (ref 0.86–1.17)
MCH RBC QN AUTO: 31.3 PG (ref 26.8–34.3)
MCHC RBC AUTO-ENTMCNC: 34.6 G/DL (ref 31.4–37.4)
MCV RBC AUTO: 90 FL (ref 82–98)
PLATELET # BLD AUTO: 284 THOUSANDS/UL (ref 149–390)
PMV BLD AUTO: 10.8 FL (ref 8.9–12.7)
POTASSIUM SERPL-SCNC: 3.6 MMOL/L (ref 3.5–5.3)
PROTHROMBIN TIME: 12.9 SECONDS (ref 11.8–14.2)
RBC # BLD AUTO: 4.7 MILLION/UL (ref 3.81–5.12)
SODIUM SERPL-SCNC: 140 MMOL/L (ref 136–145)
WBC # BLD AUTO: 5.57 THOUSAND/UL (ref 4.31–10.16)

## 2018-07-20 PROCEDURE — 36415 COLL VENOUS BLD VENIPUNCTURE: CPT

## 2018-07-20 PROCEDURE — 85730 THROMBOPLASTIN TIME PARTIAL: CPT

## 2018-07-20 PROCEDURE — 80048 BASIC METABOLIC PNL TOTAL CA: CPT

## 2018-07-20 PROCEDURE — 85027 COMPLETE CBC AUTOMATED: CPT

## 2018-07-20 PROCEDURE — 85610 PROTHROMBIN TIME: CPT

## 2018-07-25 NOTE — PRE-PROCEDURE INSTRUCTIONS
Pre-Surgery Instructions:   Medication Instructions    Ascorbic Acid (VITAMIN C) 250 MG CHEW Instructed patient per Anesthesia Guidelines   Calcium 600 MG tablet Instructed patient per Anesthesia Guidelines   Cholecalciferol (VITAMIN D3) 1000 units CAPS Instructed patient per Anesthesia Guidelines   hydrochlorothiazide (HYDRODIURIL) 12 5 mg tablet Instructed patient per Anesthesia Guidelines   metoprolol tartrate (LOPRESSOR) 25 mg tablet Instructed patient per Anesthesia Guidelines   Multiple Vitamin (DAILY VALUE MULTIVITAMIN PO) Instructed patient per Anesthesia Guidelines

## 2018-07-29 ENCOUNTER — ANESTHESIA EVENT (OUTPATIENT)
Dept: PERIOP | Facility: HOSPITAL | Age: 58
End: 2018-07-29
Payer: COMMERCIAL

## 2018-07-30 ENCOUNTER — APPOINTMENT (OUTPATIENT)
Dept: RADIOLOGY | Facility: HOSPITAL | Age: 58
End: 2018-07-30
Payer: COMMERCIAL

## 2018-07-30 ENCOUNTER — ANESTHESIA (OUTPATIENT)
Dept: PERIOP | Facility: HOSPITAL | Age: 58
End: 2018-07-30
Payer: COMMERCIAL

## 2018-07-30 ENCOUNTER — HOSPITAL ENCOUNTER (OUTPATIENT)
Facility: HOSPITAL | Age: 58
Setting detail: OUTPATIENT SURGERY
Discharge: HOME | End: 2018-07-30
Attending: ORTHOPAEDIC SURGERY | Admitting: ORTHOPAEDIC SURGERY
Payer: COMMERCIAL

## 2018-07-30 VITALS
WEIGHT: 159 LBS | SYSTOLIC BLOOD PRESSURE: 157 MMHG | RESPIRATION RATE: 18 BRPM | HEIGHT: 64 IN | TEMPERATURE: 97.5 F | HEART RATE: 76 BPM | OXYGEN SATURATION: 94 % | BODY MASS INDEX: 27.14 KG/M2 | DIASTOLIC BLOOD PRESSURE: 74 MMHG

## 2018-07-30 DIAGNOSIS — M19.032 CMC DJD(CARPOMETACARPAL DEGENERATIVE JOINT DISEASE), LOCALIZED PRIMARY, LEFT: Primary | ICD-10-CM

## 2018-07-30 PROCEDURE — 25447 ARTHRP NTRCRP/CRP/MTCR NTRPS: CPT | Performed by: ORTHOPAEDIC SURGERY

## 2018-07-30 PROCEDURE — 25447 ARTHRP NTRCRP/CRP/MTCR NTRPS: CPT | Performed by: PHYSICIAN ASSISTANT

## 2018-07-30 PROCEDURE — 73140 X-RAY EXAM OF FINGER(S): CPT

## 2018-07-30 PROCEDURE — C1713 ANCHOR/SCREW BN/BN,TIS/BN: HCPCS | Performed by: ORTHOPAEDIC SURGERY

## 2018-07-30 DEVICE — IMPLANTABLE DEVICE: Type: IMPLANTABLE DEVICE | Site: FINGER | Status: FUNCTIONAL

## 2018-07-30 RX ORDER — FENTANYL CITRATE 50 UG/ML
INJECTION, SOLUTION INTRAMUSCULAR; INTRAVENOUS AS NEEDED
Status: DISCONTINUED | OUTPATIENT
Start: 2018-07-30 | End: 2018-07-30 | Stop reason: SURG

## 2018-07-30 RX ORDER — BUPIVACAINE HYDROCHLORIDE 5 MG/ML
INJECTION, SOLUTION PERINEURAL AS NEEDED
Status: DISCONTINUED | OUTPATIENT
Start: 2018-07-30 | End: 2018-07-30 | Stop reason: HOSPADM

## 2018-07-30 RX ORDER — MIDAZOLAM HYDROCHLORIDE 1 MG/ML
INJECTION INTRAMUSCULAR; INTRAVENOUS AS NEEDED
Status: DISCONTINUED | OUTPATIENT
Start: 2018-07-30 | End: 2018-07-30 | Stop reason: SURG

## 2018-07-30 RX ORDER — SODIUM CHLORIDE, SODIUM LACTATE, POTASSIUM CHLORIDE, CALCIUM CHLORIDE 600; 310; 30; 20 MG/100ML; MG/100ML; MG/100ML; MG/100ML
125 INJECTION, SOLUTION INTRAVENOUS CONTINUOUS
Status: DISCONTINUED | OUTPATIENT
Start: 2018-07-30 | End: 2018-07-30

## 2018-07-30 RX ORDER — ONDANSETRON 2 MG/ML
INJECTION INTRAMUSCULAR; INTRAVENOUS AS NEEDED
Status: DISCONTINUED | OUTPATIENT
Start: 2018-07-30 | End: 2018-07-30 | Stop reason: SURG

## 2018-07-30 RX ORDER — ONDANSETRON 2 MG/ML
4 INJECTION INTRAMUSCULAR; INTRAVENOUS EVERY 6 HOURS PRN
Status: DISCONTINUED | OUTPATIENT
Start: 2018-07-30 | End: 2018-07-31 | Stop reason: HOSPADM

## 2018-07-30 RX ORDER — CHLORHEXIDINE GLUCONATE 4 G/100ML
SOLUTION TOPICAL DAILY PRN
Status: DISCONTINUED | OUTPATIENT
Start: 2018-07-30 | End: 2018-07-31 | Stop reason: HOSPADM

## 2018-07-30 RX ORDER — LIDOCAINE HYDROCHLORIDE 10 MG/ML
INJECTION, SOLUTION INFILTRATION; PERINEURAL AS NEEDED
Status: DISCONTINUED | OUTPATIENT
Start: 2018-07-30 | End: 2018-07-30 | Stop reason: SURG

## 2018-07-30 RX ORDER — MEPERIDINE HYDROCHLORIDE 25 MG/ML
12.5 INJECTION INTRAMUSCULAR; INTRAVENOUS; SUBCUTANEOUS ONCE AS NEEDED
Status: DISCONTINUED | OUTPATIENT
Start: 2018-07-30 | End: 2018-07-30 | Stop reason: HOSPADM

## 2018-07-30 RX ORDER — PROPOFOL 10 MG/ML
INJECTION, EMULSION INTRAVENOUS AS NEEDED
Status: DISCONTINUED | OUTPATIENT
Start: 2018-07-30 | End: 2018-07-30 | Stop reason: SURG

## 2018-07-30 RX ORDER — SODIUM CHLORIDE, SODIUM LACTATE, POTASSIUM CHLORIDE, CALCIUM CHLORIDE 600; 310; 30; 20 MG/100ML; MG/100ML; MG/100ML; MG/100ML
100 INJECTION, SOLUTION INTRAVENOUS CONTINUOUS
Status: DISCONTINUED | OUTPATIENT
Start: 2018-07-30 | End: 2018-07-31 | Stop reason: HOSPADM

## 2018-07-30 RX ORDER — OXYCODONE HYDROCHLORIDE AND ACETAMINOPHEN 5; 325 MG/1; MG/1
2 TABLET ORAL EVERY 4 HOURS PRN
Status: DISCONTINUED | OUTPATIENT
Start: 2018-07-30 | End: 2018-07-31 | Stop reason: HOSPADM

## 2018-07-30 RX ORDER — ONDANSETRON 2 MG/ML
4 INJECTION INTRAMUSCULAR; INTRAVENOUS ONCE AS NEEDED
Status: DISCONTINUED | OUTPATIENT
Start: 2018-07-30 | End: 2018-07-30 | Stop reason: HOSPADM

## 2018-07-30 RX ORDER — KETOROLAC TROMETHAMINE 30 MG/ML
INJECTION, SOLUTION INTRAMUSCULAR; INTRAVENOUS AS NEEDED
Status: DISCONTINUED | OUTPATIENT
Start: 2018-07-30 | End: 2018-07-30 | Stop reason: SURG

## 2018-07-30 RX ORDER — FENTANYL CITRATE/PF 50 MCG/ML
25 SYRINGE (ML) INJECTION
Status: COMPLETED | OUTPATIENT
Start: 2018-07-30 | End: 2018-07-30

## 2018-07-30 RX ORDER — OXYCODONE HYDROCHLORIDE AND ACETAMINOPHEN 5; 325 MG/1; MG/1
1 TABLET ORAL EVERY 4 HOURS PRN
Qty: 30 TABLET | Refills: 0 | Status: SHIPPED | OUTPATIENT
Start: 2018-07-30 | End: 2018-11-19 | Stop reason: ALTCHOICE

## 2018-07-30 RX ADMIN — KETOROLAC TROMETHAMINE 30 MG: 30 INJECTION, SOLUTION INTRAMUSCULAR at 09:38

## 2018-07-30 RX ADMIN — DEXAMETHASONE SODIUM PHOSPHATE 4 MG: 10 INJECTION INTRAMUSCULAR; INTRAVENOUS at 08:30

## 2018-07-30 RX ADMIN — FENTANYL CITRATE 25 MCG: 50 INJECTION, SOLUTION INTRAMUSCULAR; INTRAVENOUS at 10:43

## 2018-07-30 RX ADMIN — FENTANYL CITRATE 50 MCG: 50 INJECTION, SOLUTION INTRAMUSCULAR; INTRAVENOUS at 09:20

## 2018-07-30 RX ADMIN — CEFAZOLIN SODIUM 2000 MG: 2 SOLUTION INTRAVENOUS at 08:18

## 2018-07-30 RX ADMIN — PROPOFOL 200 MG: 10 INJECTION, EMULSION INTRAVENOUS at 08:23

## 2018-07-30 RX ADMIN — LIDOCAINE HYDROCHLORIDE 100 MG: 10 INJECTION, SOLUTION INFILTRATION; PERINEURAL at 08:23

## 2018-07-30 RX ADMIN — FENTANYL CITRATE 50 MCG: 50 INJECTION, SOLUTION INTRAMUSCULAR; INTRAVENOUS at 08:23

## 2018-07-30 RX ADMIN — MIDAZOLAM HYDROCHLORIDE 2 MG: 1 INJECTION, SOLUTION INTRAMUSCULAR; INTRAVENOUS at 08:18

## 2018-07-30 RX ADMIN — ONDANSETRON HYDROCHLORIDE 4 MG: 2 INJECTION, SOLUTION INTRAVENOUS at 08:30

## 2018-07-30 RX ADMIN — FENTANYL CITRATE 25 MCG: 50 INJECTION, SOLUTION INTRAMUSCULAR; INTRAVENOUS at 10:46

## 2018-07-30 RX ADMIN — HYDROMORPHONE HYDROCHLORIDE 0.4 MG: 1 INJECTION, SOLUTION INTRAMUSCULAR; INTRAVENOUS; SUBCUTANEOUS at 10:55

## 2018-07-30 RX ADMIN — ONDANSETRON 4 MG: 2 INJECTION INTRAMUSCULAR; INTRAVENOUS at 11:49

## 2018-07-30 RX ADMIN — SODIUM CHLORIDE, SODIUM LACTATE, POTASSIUM CHLORIDE, AND CALCIUM CHLORIDE 125 ML/HR: .6; .31; .03; .02 INJECTION, SOLUTION INTRAVENOUS at 07:44

## 2018-07-30 RX ADMIN — FENTANYL CITRATE 25 MCG: 50 INJECTION, SOLUTION INTRAMUSCULAR; INTRAVENOUS at 08:40

## 2018-07-30 RX ADMIN — FENTANYL CITRATE 25 MCG: 50 INJECTION, SOLUTION INTRAMUSCULAR; INTRAVENOUS at 10:49

## 2018-07-30 RX ADMIN — FENTANYL CITRATE 25 MCG: 50 INJECTION, SOLUTION INTRAMUSCULAR; INTRAVENOUS at 08:45

## 2018-07-30 RX ADMIN — FENTANYL CITRATE 25 MCG: 50 INJECTION, SOLUTION INTRAMUSCULAR; INTRAVENOUS at 10:40

## 2018-07-30 NOTE — ANESTHESIA POSTPROCEDURE EVALUATION
Post-Op Assessment Note      CV Status:  Stable    Mental Status:  Alert and awake    Hydration Status:  Euvolemic    PONV Controlled:  Controlled    Airway Patency:  Patent    Post Op Vitals Reviewed: Yes          Staff: Anesthesiologist, CRNA           BP  131/71   Temp   97 1   Pulse  74   Resp   16   SpO2   95%

## 2018-07-30 NOTE — OP NOTE
OPERATIVE REPORT  PATIENT NAME: Janki Guadarrama    :  1960  MRN: [id number]  Pt Location: [facility] OR ROOM 02    SURGERY DATE: 2018    Surgeon(s) and Role:     * Kayode Wu MD - Primary     * Maribel Christopher PA-C - Assisting  no qualified resident available    Preop Diagnosis:  [facility] DJD(carpometacarpal degenerative joint disease), localized primary, left [M19 032]    Post-Op Diagnosis Codes:     [facility] AND [facility] DJD(carpometacarpal degenerative joint disease), localized primary, left [M19 032]    Procedure(s) (LRB):  Thumb Arthrex mini tight rope suspension arthroplasty with trapezial resection left thumb (Left)    Specimen(s):  * No specimens in log *    Estimated Blood Loss:   Minimal    Drains:       Anesthesia Type:   General    Operative Indications:  [facility] DJD(carpometacarpal degenerative joint disease), localized primary, left [M19 032]      Operative Findings:  Severe DJD of the [facility] joint subluxation of the 1st metacarpal  Excision of trapezium and suspension arthroplasty with mini tightrope device    Complications:   None    Procedure and Technique: All treatment options were discussed with the patient including non-operative and operative treatment options  Patient has failed non-perative treatment and has opted for surgical intervention  Risks, complications and benefits of all treatment options were discussed in detail  The risks of surgical intervention including infection, injury to vessels and nerves  risk of failure to achieve desired results, risk of need for further procedures, potential risk of loss of life and limb were discussed with the patient  Informed consent was obtained from the patient  The operative site was marked and signed  A timeout was performed prior to the procedure  The patient was re-identified ,including name, date of birth, procedure, consent form reviewed, site and laterality    Appropriate antibiotics were administered preoperatively    The Physician Assistant was present for the entire case and provided essential assistance with patient positioning, prep and draping, wound closure, sterile dressing and splint application, all under my direct supervision(there was no resident available to assist with this case)        Left hand was prepared and draped in sterile fashion  Standard incision was placed over the centered over the [facility] joint the dorsal radial aspect of the left thumb  Superficial nerves were protected thick flaps were raised  First compartment released and moved dorsally  Larger incision in the capsule  [facility] joint entered  Joint is subluxed out laterally  Capsule released medially and laterally  Appears him identified and fluoroscopy to double check  The trapezium excised completely without any difficulty  Joint was checked ensure that no further loose bodies osteophytes in the joint  Fluoroscopy control guide was inserted the base of the 1st metacarpal to exit in the mid aspect of the 2nd metacarpal   Tightrope device was then passed and placed here  Second drill hole was made in the middle of the middle 2nd metacarpal radial to ulnar in the middle and the tightrope was passed through, the device was placed and loose knot placed  Under fluoroscopy this was then secured with the thumb in full AB duction to ensure that it was  not too tight and not too loose  Wound was irrigated local anesthetic was instilled capsule was closed layered closure was performed with nylon to skin sterile dressing splint applied  No complications encountered   Patient tolerated procedure well   I was present for the entire procedure    Patient Disposition:  PACU     SIGNATURE: Ferdinand Norton MD  DATE: July 30, 2018  TIME: 9:34 AM

## 2018-07-30 NOTE — H&P (VIEW-ONLY)
Assessment:    1  [facility] DJD(carpometacarpal degenerative joint disease), localized primary, left      Plan:  After having a nick been explained risks and benefits of surgery patient elected to proceed with left thumb CMC many tight rope and trapezial resection  Consent was obtained in the office today by Dr Lea Carranza  All questions were answered  We will see the patient back postoperatively  She may take her metoprolol the day of surgery but hold other medications the day of surgery  Nothing to drink after midnight on the night prior to her surgery  Chief Complaint:  Left base of thumb pain    HPI Anselmo Ormond is a 66 y o  female right hand dominant with continued discomfort left base of thumb  She has had 2 previous cortisone injections in the [facility] joint  She is contemplating surgical intervention  She works as the  at [employer] and is interested in getting this done next Thursday  She denies any numbness or tingling      Past Medical History:   Diagnosis Date    Hypertension     Intracranial aneurysm     resolved 11/17/17 - possible R P-comm region aneurysm measuring 3mm found on MRI results    Reactive depression     last assessed 12/15/15     Past Surgical History:   Procedure Laterality Date    THYROID SURGERY      near total thyroidectomy     No Known Allergies    Current Outpatient Prescriptions:     Ascorbic Acid (VITAMIN C) 250 MG CHEW, Chew 1 tablet daily, Disp: , Rfl:     Calcium 600 MG tablet, Take 2 tablets by mouth daily, Disp: , Rfl:     Cholecalciferol (VITAMIN D3) 1000 units CAPS, Take by mouth, Disp: , Rfl:     hydrochlorothiazide (HYDRODIURIL) 12 5 mg tablet, Take 1 tablet (12 5 mg total) by mouth daily, Disp: 90 tablet, Rfl: 1    meloxicam (MOBIC) 15 mg tablet, Take 1 tablet (15 mg total) by mouth daily, Disp: 10 tablet, Rfl: 0    metoprolol tartrate (LOPRESSOR) 25 mg tablet, Take 1 tablet (25 mg total) by mouth daily, Disp: 90 tablet, Rfl: 1    Multiple Vitamin (DAILY VALUE MULTIVITAMIN PO), Take 1 tablet by mouth daily, Disp: , Rfl:     Social History     Occupational History    Not on file  Social History Main Topics    Smoking status: Never Smoker    Smokeless tobacco: Never Used      Comment: only smoked as a teenager    Alcohol use Yes      Comment: social    Drug use: No    Sexual activity: Not on file     Review of Systems   Constitutional: Negative  HENT: Negative  Eyes: Negative  Respiratory: Negative  Cardiovascular: Negative  Gastrointestinal: Negative  Endocrine: Negative  Genitourinary: Negative  Musculoskeletal: Positive for arthralgias  Left thumb as HPI   Skin: Negative  Allergic/Immunologic: Negative  Neurological: Negative  Hematological: Negative  Psychiatric/Behavioral: Negative  Objective:  Blood pressure 123/82, pulse 79, height 5' 4" (1 626 m), weight 72 1 kg (159 lb)  Body mass index is 27 29 kg/m²  Physical Exam   Constitutional: Patient is oriented to person, place, and time  Patient appears well-developed and well-nourished  No distress  HENT:   Head: Normocephalic  Eyes: Conjunctivae are normal  Right eye exhibits no discharge  Left eye exhibits no discharge  No scleral icterus  Cardiovascular:  Regular rate and rhythm without murmur  Pulmonary/Chest:  Clear to auscultation bilaterally without wheeze  Neurological: Patient is alert and oriented to person, place, and time  Skin: Skin is warm and dry  No rash noted  Patient is not diaphoretic  No erythema  No pallor  Psychiatric: Patient has a normal mood and affect  Patient's behavior is normal  Judgment and thought content normal      Ortho Exam   left thumb without cutaneous lesions  There is slight palmar subluxation at the [facility] joint  There is tenderness with palpation at the [facility] joint  She is able oppose all fingers  Capillary refill is brisk at less than 2 sec  Radial pulse 4/4    She is grossly neurovascular intact    I have personally reviewed pertinent films in PACS  The patient has failed non operative measures and has opted for surgical intervention  Risks and benefits of the treatment options and surgery were discussed in detail  The risks of surgery including infection, bleeding, injury to nerves, injury to the vessels, risk of failure of the procedure, the possible need for further surgery, and potential risk of loss of limb and life  After weighing all the treatment options available, the patient has opted for surgical intervention and informed consent was obtained  We will schedule the patient to be seen back postoperatively  Irlanda Thomson PA-C  Portions of the record may have been created with voice recognition software   Occasional wrong word or "sound a like" substitutions may have occurred due to the inherent limitations of voice recognition software

## 2018-08-10 ENCOUNTER — OFFICE VISIT (OUTPATIENT)
Dept: OBGYN CLINIC | Facility: CLINIC | Age: 58
End: 2018-08-10

## 2018-08-10 VITALS
SYSTOLIC BLOOD PRESSURE: 142 MMHG | WEIGHT: 162 LBS | BODY MASS INDEX: 27.66 KG/M2 | HEART RATE: 79 BPM | HEIGHT: 64 IN | DIASTOLIC BLOOD PRESSURE: 105 MMHG

## 2018-08-10 DIAGNOSIS — Z98.890 H/O HAND SURGERY: Primary | ICD-10-CM

## 2018-08-10 PROCEDURE — 99024 POSTOP FOLLOW-UP VISIT: CPT | Performed by: ORTHOPAEDIC SURGERY

## 2018-08-10 NOTE — PROGRESS NOTES
66 y o female presents for  11 days postoperative visit status post left  Hand CMC arthroplasty with trapezial resection done on 07/30/2018  Patient notes very minimal discomfort in the hand  She had some initial hand swelling and tightness which is improving  She denies any numbness or tingling  She has not done any formal physical therapy as yet  Review of Systems  Review of systems negative unless otherwise specified in HPI    Past Medical History  Past Medical History:   Diagnosis Date    Hypertension     Intracranial aneurysm     resolved 11/17/17 - possible R P-comm region aneurysm measuring 3mm found on MRI results    Reactive depression     last assessed 12/15/15     Past Surgical History  Past Surgical History:   Procedure Laterality Date    IL REPAIR INTERCARP/CARP-METACARP JT Left 7/30/2018    Procedure: Thumb Arthrex mini tight rope suspension arthroplasty with trapezial resection left thumb;  Surgeon: Amparo Ponce MD;  Location: [facility];  Service: Orthopedics    THYROID SURGERY      near total thyroidectomy     Current Medications  Current Outpatient Prescriptions on File Prior to Visit   Medication Sig Dispense Refill    Ascorbic Acid (VITAMIN C) 250 MG CHEW Chew 1 tablet daily      Calcium 600 MG tablet Take 2 tablets by mouth daily      Cholecalciferol (VITAMIN D3) 1000 units CAPS Take by mouth      hydrochlorothiazide (HYDRODIURIL) 12 5 mg tablet Take 1 tablet (12 5 mg total) by mouth daily 90 tablet 1    metoprolol tartrate (LOPRESSOR) 25 mg tablet Take 1 tablet (25 mg total) by mouth daily 90 tablet 1    Multiple Vitamin (DAILY VALUE MULTIVITAMIN PO) Take 1 tablet by mouth daily      oxyCODONE-acetaminophen (PERCOCET) 5-325 mg per tablet Take 1 tablet by mouth every 4 (four) hours as needed for moderate pain for up to 30 doses Max Daily Amount: 6 tablets 30 tablet 0     No current facility-administered medications on file prior to visit        Recent Labs (HCT,HGB,PT,INR,ESR,CRP,GLU,HgA1C)    0  Lab Value Date/Time   HCT 42 5 07/20/2018 0921   HGB 14 7 07/20/2018 0921   WBC 5 57 07/20/2018 0921   INR 0 96 07/20/2018 0776     Physical exam    General:  Alert and oriented in good spirits  Lungs:  Respirations normal without wheeze  Cardiac:  Regular rate radial pulse 4/4  Abdomen:  Soft nontender  Left upper extremity:  Sutures removed by MA in office and Steri-Strips applied  There is no signs of infection  Wounds are healing nicely  1 cm area of ecchymosis over the base of the index finger  Light touch sensation intact throughout the left upper extremity and finger tips with brisk capillary refill  No pain with passive motion of the thumb which is limited in all planes  Mild discomfort with active thumb flexion which is limited to approximately 15 degrees  Imaging    None    Procedure   07/30/2018 left thumb trapezial resection and mini type rope suspension arthroplasty    Assessment:  11 days postop doing well    Plan: light duty at work starting 08/23/2018  Start physical therapy  Recheck in 6 weeks  Wear thumb spica brace until seen back  Ice and elevate as needed  May shower  Maintain Steri-Strips for another 5-7 days

## 2018-08-10 NOTE — PATIENT INSTRUCTIONS
light duty at work starting 08/23/2018  Start physical therapy  Recheck in 6 weeks  Wear thumb spica brace until seen back  Ice and elevate as needed  May shower  Maintain Steri-Strips for another 5-7 days

## 2018-08-10 NOTE — LETTER
August 14, 2018     Patient: Ravinder Barnes   YOB: 1960   Date of Visit: 8/10/2018       To Whom It May Concern: It is my medical opinion that Johnathon Zelaya may return to work on  08/22/2018  May return to work light duty on 08/23/2018  She is to have very limited use left hand and no lifting using the left hand and avoid repetitive motion  This will be re-evaluate in 6 weeks from today's visit  If you have any questions or concerns, please don't hesitate to call           Sincerely,         Mick Quijano PA-C  CC: No Recipients

## 2018-08-10 NOTE — LETTER
August 10, 2018     Patient: Janki Guadarrama   YOB: 1960   Date of Visit: 8/10/2018       To Whom It May Concern: It is my medical opinion that Isatu Herman should remain out of work until   08/22/2018  May return to work light duty on 08/23/2018  She is to have very limited use of the left hand with a 10 lb lifting restriction of that hand and avoiding repetitive use of that hand  We will be re-evaluated in 6 weeks from today's visit  If you have any questions or concerns, please don't hesitate to call           Sincerely,         Maribel Christopher PA-C  CC: No Recipients

## 2018-08-14 ENCOUNTER — EVALUATION (OUTPATIENT)
Dept: PHYSICAL THERAPY | Facility: CLINIC | Age: 58
End: 2018-08-14
Payer: COMMERCIAL

## 2018-08-14 DIAGNOSIS — Z98.890 H/O HAND SURGERY: Primary | ICD-10-CM

## 2018-08-14 PROCEDURE — G8987 SELF CARE CURRENT STATUS: HCPCS | Performed by: PHYSICAL THERAPIST

## 2018-08-14 PROCEDURE — G8988 SELF CARE GOAL STATUS: HCPCS | Performed by: PHYSICAL THERAPIST

## 2018-08-14 PROCEDURE — 97035 APP MDLTY 1+ULTRASOUND EA 15: CPT | Performed by: PHYSICAL THERAPIST

## 2018-08-14 PROCEDURE — 97535 SELF CARE MNGMENT TRAINING: CPT | Performed by: PHYSICAL THERAPIST

## 2018-08-14 PROCEDURE — 97110 THERAPEUTIC EXERCISES: CPT | Performed by: PHYSICAL THERAPIST

## 2018-08-14 PROCEDURE — 97162 PT EVAL MOD COMPLEX 30 MIN: CPT | Performed by: PHYSICAL THERAPIST

## 2018-08-14 PROCEDURE — 97140 MANUAL THERAPY 1/> REGIONS: CPT | Performed by: PHYSICAL THERAPIST

## 2018-08-14 PROCEDURE — 97112 NEUROMUSCULAR REEDUCATION: CPT | Performed by: PHYSICAL THERAPIST

## 2018-08-14 PROCEDURE — 97010 HOT OR COLD PACKS THERAPY: CPT | Performed by: PHYSICAL THERAPIST

## 2018-08-14 NOTE — PROGRESS NOTES
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  Pt will RTW no lifting, repetitive duty  Dominant- right; Involved-left      Goals  ST  Decrease pain to 3-4 in 4 weeks            2  Decrease swelling            3  Increase AROM to [employer] [place] in 8 weeks            4  Maintain clean wound and promote healing            5   Provide orthotic for protection  LT  Increase functional motion and strength for independence with ADL and self care by DC            2  Ability to RTW and recreational activity by DC    Plan  Frequency: 2x week  Duration in weeks: 4  Treatment plan discussed with: patient        Subjective Evaluation    History of Present Illness  Date of surgery: 2018  Mechanism of injury: Progressive worsening of pain, instability and inability to use her hand functionally  Pain  Current pain ratin  At best pain ratin  At worst pain ratin  Location: left thumb and hand    Treatments  Current treatment: physical therapy  Patient Goals  Patient goals for therapy: decreased edema, decreased pain, increased motion, increased strength and independence with ADLs/IADLs          Objective     General Comments     Wrist/Hand Comments  AROM left wrist E/F- 50/25; Thumb MP- 0/15; IP- 0/30  Strenght- Un-assessed  Inspection- pr wearing thumb spica; steri-strips in place  Circumference at wrist- 16 0 cm;; MPs- 17 0 cm;  Thumb P1- 5 8 cm  Sensation- intact to LT      Flowsheet Rows      Most Recent Value PT/OT G-Codes   Current Score  psfs-80   Projected Score  psfs-20   FOTO information reviewed  N/A   Assessment Type  Evaluation   G code set  Self-Care   Self Care Current Status ()  CM   Self Care Goal Status ()  CJ          Precautions: use orthosis for protection    Daily Treatment Diary     Manual  8/14            STM 15                                                   tubigrip E                Exercise Diary  8/14            Wrist AROM 2/5            Thumb MP/IP 2/5                                                                                                                                                                                                                                                          Modalities  8/14            HP/biphasic 15            US 3 mz 12            CP 15

## 2018-08-16 ENCOUNTER — OFFICE VISIT (OUTPATIENT)
Dept: PHYSICAL THERAPY | Facility: CLINIC | Age: 58
End: 2018-08-16
Payer: COMMERCIAL

## 2018-08-16 DIAGNOSIS — Z98.890 H/O HAND SURGERY: Primary | ICD-10-CM

## 2018-08-16 PROCEDURE — 97035 APP MDLTY 1+ULTRASOUND EA 15: CPT

## 2018-08-16 PROCEDURE — 97140 MANUAL THERAPY 1/> REGIONS: CPT

## 2018-08-16 PROCEDURE — 97110 THERAPEUTIC EXERCISES: CPT

## 2018-08-16 PROCEDURE — 97112 NEUROMUSCULAR REEDUCATION: CPT

## 2018-08-16 NOTE — PROGRESS NOTES
Daily Note     Today's date: 2018  Patient name: Angelica Jimenez  : 1960  MRN: [id number]  Referring provider: Stef Gutierrez PA-C  Dx:   Encounter Diagnosis     ICD-10-CM    1  H/O hand surgery Z98 890                   Subjective: Pt reports soreness around surgical sites and continued pain in thumb  Pt continues to perform AROM exercises at home and wears thumb spica daily for support  Objective: See treatment diary below    AROM left wrist E/F- 50/25; Thumb MP- 0/15; IP- 0/30  Strenght- Un-assessed  Inspection- pr wearing thumb spica; steri-strips in place  Circumference at wrist- 16 0 cm;; MPs- 17 0 cm; Thumb P1- 5 8 cm  Sensation- intact to LT    Manual                     STM 15  15                                                                                           tubigrip E                           Exercise Diary                      Wrist AROM 2/5  2/5                   Thumb MP/IP 2/5  2/5                                                                                                                                                                                                                                                                                                                                                                                                                                                                         Modalities                     HP/biphasic 15  15                   US 3 mz 12  12                   CP 15  15                               Assessment: Tolerated treatment well  Patient would benefit from continued PT      Plan: Continue per plan of care

## 2018-08-21 ENCOUNTER — OFFICE VISIT (OUTPATIENT)
Dept: PHYSICAL THERAPY | Facility: CLINIC | Age: 58
End: 2018-08-21
Payer: COMMERCIAL

## 2018-08-21 DIAGNOSIS — Z98.890 H/O HAND SURGERY: Primary | ICD-10-CM

## 2018-08-21 PROCEDURE — 97035 APP MDLTY 1+ULTRASOUND EA 15: CPT

## 2018-08-21 PROCEDURE — 97110 THERAPEUTIC EXERCISES: CPT

## 2018-08-21 PROCEDURE — 97140 MANUAL THERAPY 1/> REGIONS: CPT

## 2018-08-21 PROCEDURE — 97010 HOT OR COLD PACKS THERAPY: CPT

## 2018-08-21 PROCEDURE — 97112 NEUROMUSCULAR REEDUCATION: CPT

## 2018-08-21 NOTE — PROGRESS NOTES
Daily Note     Today's date: 2018  Patient name: Cali Doe  : 1960  MRN: [id number]  Referring provider: Erlin Brennan PA-C  Dx:   Encounter Diagnosis     ICD-10-CM    1  H/O hand surgery Z98 890                   Subjective: Pt notes L thumb is very sore today  Pt notes she performs exercises t/o the day and applies ice as needed for pain and edema  Objective: See treatment diary below    AROM left wrist E/F- 50/25; Thumb MP- 0/15; IP- 0/30  Strenght- Un-assessed  Inspection- pr wearing thumb spica; steri-strips in place  Circumference at wrist- 16 0 cm;; MPs- 17 0 cm; Thumb P1- 5 8 cm  Sensation- intact to LT     Manual                   STM 15  15  15                                                                                         tubigrip E                           Exercise Diary                   Wrist AROM 2/5  2/5  2/5                 Thumb MP/IP 2/5  2/5  2/5                                                                                                                                                                                                                                                                                                                                                                                                                                                                       Modalities                   HP/biphasic 15  15  15                 US 3 mz 12  12  12                 CP 15  15  15                                Assessment: Tolerated treatment well  Patient would benefit from continued PT      Plan: Continue per plan of care

## 2018-08-23 ENCOUNTER — OFFICE VISIT (OUTPATIENT)
Dept: PHYSICAL THERAPY | Facility: CLINIC | Age: 58
End: 2018-08-23
Payer: COMMERCIAL

## 2018-08-23 DIAGNOSIS — Z98.890 H/O HAND SURGERY: Primary | ICD-10-CM

## 2018-08-23 PROCEDURE — 97112 NEUROMUSCULAR REEDUCATION: CPT

## 2018-08-23 PROCEDURE — 97010 HOT OR COLD PACKS THERAPY: CPT

## 2018-08-23 PROCEDURE — 97035 APP MDLTY 1+ULTRASOUND EA 15: CPT

## 2018-08-23 PROCEDURE — 97140 MANUAL THERAPY 1/> REGIONS: CPT

## 2018-08-23 PROCEDURE — 97760 ORTHOTIC MGMT&TRAING 1ST ENC: CPT

## 2018-08-23 PROCEDURE — 97110 THERAPEUTIC EXERCISES: CPT

## 2018-08-23 NOTE — PROGRESS NOTES
Daily Note     Today's date: 2018  Patient name: Lena Sorensen  : 1960  MRN: [id number]  Referring provider: Alhaji Thomson PA-C  Dx:   Encounter Diagnosis     ICD-10-CM    1  H/O hand surgery Z98 890                   Subjective: Pt reports continued soreness in L thumb and noted that brace was rubbing around surgical site  Pt notes that she performs thumb motions at home and applies ice as needed for edema and pain management  Objective: See treatment diary below    AROM left wrist E/F- 50/25; Thumb MP- 0/15; IP- 0/30  Strenght- Un-assessed  Inspection- pr wearing thumb spica; steri-strips in place  Circumference at wrist- 16 0 cm;; MPs- 17 0 cm; Thumb P1- 5 8 cm  Sensation- intact to LT     Manual                 STM 15  15  15  15                                                                                       tubigrip E                           Exercise Diary                 Wrist AROM 2/5  2/5  2/5  2/5               Thumb MP/IP 2/5  2/5  2/5  2/5                                                                                                                                                                                                                                                                                                                                                                                                                                                                     Modalities                 HP/biphasic 15  15  15  15               US 3 mz 12  12  12  12               CP 15  15  15  15                      Assessment: Tolerated treatment well  Patient would benefit from continued PT  Thumb spica was padded for comfort  Plan: Continue per plan of care

## 2018-08-28 ENCOUNTER — OFFICE VISIT (OUTPATIENT)
Dept: PHYSICAL THERAPY | Facility: CLINIC | Age: 58
End: 2018-08-28
Payer: COMMERCIAL

## 2018-08-28 DIAGNOSIS — Z98.890 H/O HAND SURGERY: Primary | ICD-10-CM

## 2018-08-28 PROCEDURE — 97112 NEUROMUSCULAR REEDUCATION: CPT

## 2018-08-28 PROCEDURE — 97035 APP MDLTY 1+ULTRASOUND EA 15: CPT

## 2018-08-28 PROCEDURE — 97140 MANUAL THERAPY 1/> REGIONS: CPT

## 2018-08-28 PROCEDURE — 97110 THERAPEUTIC EXERCISES: CPT

## 2018-08-28 PROCEDURE — 97010 HOT OR COLD PACKS THERAPY: CPT

## 2018-08-28 NOTE — PROGRESS NOTES
Daily Note     Today's date: 2018  Patient name: Ravinder Barnes  : 1960  MRN: [id number]  Referring provider: Scottie Tello PA-C  Dx:   Encounter Diagnosis     ICD-10-CM    1  H/O hand surgery Z98 890                   Subjective: Pt reports increased soreness in the thumb today  Pt instructed on elroy time of splint to possibly alleviate symptoms of tightness  Objective:AROM left wrist E/F- 50/25; Thumb MP- 0/15; IP- 0/30  Strenght- Un-assessed  Inspection- pr wearing thumb spica; steri-strips in place  Circumference at wrist- 16 0 cm;; MPs- 17 0 cm; Thumb P1- 5 8 cm  Sensation- intact to LT     Manual               STM 15  15  15  15  15                                                                                     tubigrip E                           Exercise Diary               Wrist AROM 2/5  2/5  2/5  2/5  2/5             Thumb MP/IP 2/5  2/5  2/5  2/5  2/5                                                                                                                                                                                                                                                                                                                                                                                                                                                                   Modalities               HP/biphasic 15  15  15  15  15             US 3 mz 12  12  12  12  12             CP 15  15  15  15  15                      Assessment: Tolerated treatment well  Patient would benefit from continued PT    Thumb spica was padded for comfort last session and provided relief         Plan: Continue per plan of care

## 2018-08-30 ENCOUNTER — OFFICE VISIT (OUTPATIENT)
Dept: PHYSICAL THERAPY | Facility: CLINIC | Age: 58
End: 2018-08-30
Payer: COMMERCIAL

## 2018-08-30 DIAGNOSIS — Z98.890 H/O HAND SURGERY: Primary | ICD-10-CM

## 2018-08-30 PROCEDURE — 97110 THERAPEUTIC EXERCISES: CPT

## 2018-08-30 PROCEDURE — 97112 NEUROMUSCULAR REEDUCATION: CPT

## 2018-08-30 PROCEDURE — 97035 APP MDLTY 1+ULTRASOUND EA 15: CPT

## 2018-08-30 PROCEDURE — 97140 MANUAL THERAPY 1/> REGIONS: CPT

## 2018-08-30 PROCEDURE — 97010 HOT OR COLD PACKS THERAPY: CPT

## 2018-09-04 ENCOUNTER — OFFICE VISIT (OUTPATIENT)
Dept: PHYSICAL THERAPY | Facility: CLINIC | Age: 58
End: 2018-09-04
Payer: COMMERCIAL

## 2018-09-04 DIAGNOSIS — Z98.890 H/O HAND SURGERY: Primary | ICD-10-CM

## 2018-09-04 PROCEDURE — 97140 MANUAL THERAPY 1/> REGIONS: CPT

## 2018-09-04 PROCEDURE — 97112 NEUROMUSCULAR REEDUCATION: CPT

## 2018-09-04 PROCEDURE — 97035 APP MDLTY 1+ULTRASOUND EA 15: CPT

## 2018-09-04 PROCEDURE — 97110 THERAPEUTIC EXERCISES: CPT

## 2018-09-04 PROCEDURE — 97010 HOT OR COLD PACKS THERAPY: CPT

## 2018-09-04 NOTE — PROGRESS NOTES
Daily Note     Today's date: 2018  Patient name: Airam Wright  : 1960  MRN: [id number]  Referring provider: Ely Polo PA-C  Dx:   Encounter Diagnosis     ICD-10-CM    1  H/O hand surgery Z98 890                   Subjective: Pt reports her thumb is improving  but less sore"      Objective:AROM left wrist E/F- 50/25; Thumb MP- 0/15; IP- 0/30  Strenght- Un-assessed  Inspection- pr wearing thumb spica; steri-strips in place  Circumference at wrist- 16 0 cm;; MPs- 17 0 cm; Thumb P1- 5 8 cm  Sensation- intact to LT     Manual    9         STM 15  15  15  15  15  15  15                                                                                 tubigrip E                           Exercise Diary    9/4         Wrist AROM 2/5  2/5  2/5  2/5  2/5  2/5  2/5         Thumb MP/IP 2/5  2/5  2/5  2/5  2/5  2/5  2/5                                                                                                                                                                                                                                                                                                                                                                                                                                                               Modalities    9/4         HP/biphasic 15  15  15  15  15  15  15         US 3 mz 12  12  12  12  12  12  12         CP 15  15  15  15  15  15  15                       Assessment: Tolerated treatment well  Pt with less sensitivity during STM  Pt performing her HEP with compliance   Patient would benefit from continued PT        Plan: Continue per plan of care

## 2018-09-06 ENCOUNTER — OFFICE VISIT (OUTPATIENT)
Dept: PHYSICAL THERAPY | Facility: CLINIC | Age: 58
End: 2018-09-06
Payer: COMMERCIAL

## 2018-09-06 DIAGNOSIS — Z98.890 H/O HAND SURGERY: Primary | ICD-10-CM

## 2018-09-06 PROCEDURE — 97140 MANUAL THERAPY 1/> REGIONS: CPT

## 2018-09-06 PROCEDURE — 97010 HOT OR COLD PACKS THERAPY: CPT

## 2018-09-06 PROCEDURE — 97035 APP MDLTY 1+ULTRASOUND EA 15: CPT

## 2018-09-06 PROCEDURE — 97110 THERAPEUTIC EXERCISES: CPT

## 2018-09-06 PROCEDURE — 97112 NEUROMUSCULAR REEDUCATION: CPT

## 2018-09-06 NOTE — PROGRESS NOTES
Daily Note     Today's date: 2018  Patient name: Angelica Jimenez  : 1960  MRN: [id number]  Referring provider: Stef Gutierrez PA-C  Dx:   Encounter Diagnosis     ICD-10-CM    1  H/O hand surgery Z98 890                   Subjective: Pt notes some continued soreness in L thumb and mild stiffness d/t wearing SA wrist support for long periods of time while at work  Objective: See treatment diary below    AROM left wrist E/F- 50/25; Thumb MP- 0/15; IP- 030  Strenght- Un-assessed  Inspection- pr wearing thumb spica; steri-strips in place  Circumference at wrist- 16 0 cm;; MPs- 17 0 cm; Thumb P1- 5 8 cm  Sensation- intact to LT     Manual    9/4  9/6       STM 15  15  15  15  15  15  15  15                                                                               tubigrip E                           Exercise Diary    9  9/6       Wrist AROM 2/5  2/5  2/5  2/5  2/5  2/5  2/5  2/5       Thumb MP/IP 2/5  2/5  2/5  2/5  2/5  2/5  2/5  2/5                                                                                                                                                                                                                                                                                                                                                                                                                                                             Modalities    9  9/6       HP/biphasic 15  15  15  15  15  15  15  15       US 3 mz 12  12  12  12  12  12  12  12       CP 15  15  15  15  15  15  15  15                Assessment: Tolerated treatment well  Patient would benefit from continued PT      Plan: Continue per plan of care

## 2018-09-11 ENCOUNTER — OFFICE VISIT (OUTPATIENT)
Dept: PHYSICAL THERAPY | Facility: CLINIC | Age: 58
End: 2018-09-11
Payer: COMMERCIAL

## 2018-09-11 DIAGNOSIS — Z98.890 H/O HAND SURGERY: Primary | ICD-10-CM

## 2018-09-11 PROCEDURE — 97140 MANUAL THERAPY 1/> REGIONS: CPT | Performed by: PHYSICAL THERAPIST

## 2018-09-11 PROCEDURE — 97535 SELF CARE MNGMENT TRAINING: CPT | Performed by: PHYSICAL THERAPIST

## 2018-09-11 PROCEDURE — G8988 SELF CARE GOAL STATUS: HCPCS | Performed by: PHYSICAL THERAPIST

## 2018-09-11 PROCEDURE — 97110 THERAPEUTIC EXERCISES: CPT | Performed by: PHYSICAL THERAPIST

## 2018-09-11 PROCEDURE — G8987 SELF CARE CURRENT STATUS: HCPCS | Performed by: PHYSICAL THERAPIST

## 2018-09-11 PROCEDURE — 97010 HOT OR COLD PACKS THERAPY: CPT | Performed by: PHYSICAL THERAPIST

## 2018-09-11 PROCEDURE — 97035 APP MDLTY 1+ULTRASOUND EA 15: CPT | Performed by: PHYSICAL THERAPIST

## 2018-09-11 PROCEDURE — 97112 NEUROMUSCULAR REEDUCATION: CPT | Performed by: PHYSICAL THERAPIST

## 2018-09-11 NOTE — PROGRESS NOTES
PT Re-Evaluation     Today's date: 2018  Patient name: Kvng Madrigal  : 1960  MRN: [id number]  Referring provider: Luis Alberto Henderson PA-C  Dx:   Encounter Diagnosis     ICD-10-CM    1  H/O hand surgery Z98 890                   Assessment    Assessment details: Pt is a 67 YO female presenting to PT with pain, decreased AROM, strength and tolerance to activity  Pt would benefit from skilled intervention to adddress these issues and maximize overall function  Occupation-manager of high school cafeteria  Pt will RTW no lifting, repetitive duty  Dominant- right; Involved-left  Pt is 5 weeks post operative and has begun AROM with removal of her brace for exercise and hygiene    Goals  ST  Decrease pain to 3-4 in 4 weeks            2  Decrease swelling            3  Increase AROM to [facility] in 8 weeks            4  Maintain clean wound and promote healing            5   Provide orthotic for protection  LT  Increase functional motion and strength for independence with ADL and self care by DC            2  Ability to RTW and recreational activity by DC    Plan  Frequency: 2x week  Duration in weeks: 4  Treatment plan discussed with: patient        Subjective Evaluation    History of Present Illness  Date of surgery: 2018  Mechanism of injury: Progressive worsening of pain, instability and inability to use her hand functionally  Pain  Current pain rating: 3  At best pain ratin  At worst pain ratin  Location: left thumb and hand    Treatments  Current treatment: physical therapy  Patient Goals  Patient goals for therapy: decreased edema, decreased pain, increased motion, increased strength and independence with ADLs/IADLs          Objective     General Comments     Wrist/Hand Comments  AROM left wrist E/F- 50/30;  Thumb MP- 0/25; IP- 0/60; opposition -4 0 cm  Strenght- Un-assessed  Inspection- pr wearing thumb spica; steri-strips in place  Circumference at wrist- 16 0 cm;; MPs- 17 0 cm; Thumb P1- 5 8 cm  Sensation- intact to LT      Flowsheet Rows      Most Recent Value   PT/OT G-Codes   Current Score  psfs-62   Projected Score  psfs-20   FOTO information reviewed  N/A   Assessment Type  Re-evaluation   G code set  Self-Care   Self Care Current Status ()  CL   Self Care Goal Status ()  CJ          Precautions: use orthosis for protection    Daily Treatment Diary     Manual  8/14 8/16 8/21 8/23 8/28 8/30  9/4  9/6 9/11     STM 15  15  15  15  15  15  15  15  15                                                                             tubigrip E                           Exercise Diary  8/14 8/16 8/21 8/23 8/28 8/30  9/4  9/6  9/12     Wrist AROM 2/5  2/5  2/5  2/5  2/5  2/5  2/5  2/5  2/10     Thumb MP/IP 2/5  2/5  2/5  2/5  2/5  2/5  2/5  2/5  2/10      Circumduction                  10/10      ABD                  10/10      Tip-Tip                 4x                                                                                                                                                                                                                                                                                                                                                                                   Modalities  8/14 8/16 8/21 8/23 8/28 8/30  9/4  9/6  9/11     HP/biphasic 15  15  15  15  15  15  15  15  15     US 3 mz 12  12  12  12  12  12  12  12  12     CP 15  15  15  15  15  15  15  15  15

## 2018-09-13 ENCOUNTER — OFFICE VISIT (OUTPATIENT)
Dept: PHYSICAL THERAPY | Facility: CLINIC | Age: 58
End: 2018-09-13
Payer: COMMERCIAL

## 2018-09-13 DIAGNOSIS — Z98.890 H/O HAND SURGERY: Primary | ICD-10-CM

## 2018-09-13 PROCEDURE — 97010 HOT OR COLD PACKS THERAPY: CPT

## 2018-09-13 PROCEDURE — 97110 THERAPEUTIC EXERCISES: CPT

## 2018-09-13 PROCEDURE — 97140 MANUAL THERAPY 1/> REGIONS: CPT

## 2018-09-13 PROCEDURE — 97035 APP MDLTY 1+ULTRASOUND EA 15: CPT

## 2018-09-13 PROCEDURE — 97112 NEUROMUSCULAR REEDUCATION: CPT

## 2018-09-13 NOTE — PROGRESS NOTES
Daily Note     Today's date: 2018  Patient name: Hari Fierro  : 1960  MRN: [id number]  Referring provider: Cornelius Vogel PA-C  Dx:   Encounter Diagnosis     ICD-10-CM    1  H/O hand surgery Z98 890                   Subjective: Pt noted new exercises initiated the day before made L thumb a little sore, however pt noted she took 2 Tylenol and applied ice which helped decrease sx  Objective: See treatment diary below    AROM left wrist E/F- 50/30; Thumb MP- 0/25; IP- 0/60; opposition -4 0 cm  Strenght- Un-assessed  Inspection- pr wearing thumb spica; steri-strips in place  Circumference at wrist- 16 0 cm;; MPs- 17 0 cm;  Thumb P1- 5 8 cm  Sensation- intact to LT     Precautions: use orthosis for protection     Daily Treatment Diary      Manual  /  9/   STM 15  15  15  15  15  15  15  15  15  15                                                                           tubigrip E                           Exercise Diary    9/4  9/6     Wrist AROM 2/5  2/5  2/5  2/5  2/5  2/5  2/5  2/5  2/10  2/10   Thumb MP/IP 2/5  2/5  2/5  2/5  2/5  2/5  2/5  2/5  2/10  2/10    Circumduction                  10/10  10/10    ABD                  10/10  1010    Tip-Tip                 4x  5x                                                                                                                                                                                                                                                                                                                                                                                 Modalities    9/4  9/6  11     HP/biphasic 15  15  15  15  15  15  15  15  15  15   US 3 mz 12  12  12  12  12  12  12  12  12  12   CP 15  15  15  15  15  15  15  15  15  15             Assessment: Pt tolerated tx well, however pt noted exercises made L thumb a little sore d/t stiffness  Pt would further benefit from continued PT  Plan: Continue per plan of care

## 2018-09-18 ENCOUNTER — OFFICE VISIT (OUTPATIENT)
Dept: PHYSICAL THERAPY | Facility: CLINIC | Age: 58
End: 2018-09-18
Payer: COMMERCIAL

## 2018-09-18 DIAGNOSIS — Z98.890 H/O HAND SURGERY: Primary | ICD-10-CM

## 2018-09-18 PROCEDURE — 97760 ORTHOTIC MGMT&TRAING 1ST ENC: CPT

## 2018-09-18 PROCEDURE — 97112 NEUROMUSCULAR REEDUCATION: CPT

## 2018-09-18 PROCEDURE — 97110 THERAPEUTIC EXERCISES: CPT

## 2018-09-18 PROCEDURE — 97010 HOT OR COLD PACKS THERAPY: CPT

## 2018-09-18 PROCEDURE — 97035 APP MDLTY 1+ULTRASOUND EA 15: CPT

## 2018-09-18 PROCEDURE — 97140 MANUAL THERAPY 1/> REGIONS: CPT

## 2018-09-18 NOTE — PROGRESS NOTES
Daily Note     Today's date: 2018  Patient name: Ravinder Barnes  : 1960  MRN: [id number]  Referring provider: Scottie Tello PA-C  Dx:   Encounter Diagnosis     ICD-10-CM    1  H/O hand surgery Z98 890                   Subjective: Pt reports soreness in thumb and dorsal hand  Pt did report increased activities  Pt compliant with HEP  Objective: AROM left wrist E/F- 50/30; Thumb MP- 0/25; IP- 0/60; opposition -4 0 cm  Strenght- Un-assessed  Inspection- pr wearing thumb spica; steri-strips in place  Circumference at wrist- 16 0 cm;; MPs- 17 0 cm; Thumb P1- 5 8 cm  Sensation- intact to LT     Precautions: use orthosis for protection     Daily Treatment Diary      Manual              STM 15                                                                                    tubigrip E                           Exercise Diary              Wrist AROM 2/5            Thumb MP/IP 2/5             Circumduction  10/10             ABD  10/10             Tip-Tip  5x                                                                                                                                                                                                                                                                                                                                                                                          Modalities              HP/biphasic 15            US 3 mz 12            CP 15                        Assessment: Pt tolerated treatment well with the exception of  exercises making thumb a little sore and stiffness    Pt would further benefit from continued PT         Plan: Continue per plan of care

## 2018-09-20 ENCOUNTER — OFFICE VISIT (OUTPATIENT)
Dept: PHYSICAL THERAPY | Facility: CLINIC | Age: 58
End: 2018-09-20
Payer: COMMERCIAL

## 2018-09-20 DIAGNOSIS — Z98.890 H/O HAND SURGERY: Primary | ICD-10-CM

## 2018-09-20 PROCEDURE — 97035 APP MDLTY 1+ULTRASOUND EA 15: CPT

## 2018-09-20 PROCEDURE — 97010 HOT OR COLD PACKS THERAPY: CPT

## 2018-09-20 PROCEDURE — 97140 MANUAL THERAPY 1/> REGIONS: CPT

## 2018-09-20 PROCEDURE — 97110 THERAPEUTIC EXERCISES: CPT

## 2018-09-20 PROCEDURE — 97112 NEUROMUSCULAR REEDUCATION: CPT

## 2018-09-20 NOTE — PROGRESS NOTES
Daily Note     Today's date: 2018  Patient name: Leslie Jones  : 1960  MRN: [id number]  Referring provider: Linnea Gutierres PA-C  Dx:   Encounter Diagnosis     ICD-10-CM    1  H/O hand surgery Z98 890                   Subjective: Pt notes L thumb "feels good today" with decreased stiffness noted as well  Pt has a f/u with ortho on 18  Objective: See treatment diary below    AROM left wrist E/F- 50/30; Thumb MP- 0/25; IP- 0/60; opposition -4 0 cm  Strenght- Un-assessed  Inspection- pr wearing thumb spica; steri-strips in place  Circumference at wrist- 16 0 cm;; MPs- 17 0 cm; Thumb P1- 5 8 cm  Sensation- intact to LT     Precautions: use orthosis for protection     Daily Treatment Diary      Manual                     STM 15  15                                                                                           tubigrip E                           Exercise Diary                     Wrist AROM 2/5  2/5                   Thumb MP/IP 2/5  2/5                    Circumduction  10/10  10/10                    ABD  10/10  1010                    Tip-Tip  5x  5x                                                                                                                                                                                                                                                                                                                                                                                                 Modalities                     HP/biphasic 15  15                   US 3 mz 12  12                   CP 15  15                          Assessment: Tolerated treatment well  Patient would benefit from continued PT      Plan: Continue per plan of care

## 2018-09-24 ENCOUNTER — OFFICE VISIT (OUTPATIENT)
Dept: PHYSICAL THERAPY | Facility: CLINIC | Age: 58
End: 2018-09-24
Payer: COMMERCIAL

## 2018-09-24 DIAGNOSIS — Z98.890 H/O HAND SURGERY: Primary | ICD-10-CM

## 2018-09-24 PROCEDURE — 97110 THERAPEUTIC EXERCISES: CPT

## 2018-09-24 PROCEDURE — 97010 HOT OR COLD PACKS THERAPY: CPT

## 2018-09-24 PROCEDURE — 97112 NEUROMUSCULAR REEDUCATION: CPT

## 2018-09-24 PROCEDURE — 97035 APP MDLTY 1+ULTRASOUND EA 15: CPT

## 2018-09-24 PROCEDURE — 97140 MANUAL THERAPY 1/> REGIONS: CPT

## 2018-09-24 NOTE — PROGRESS NOTES
Daily Note     Today's date: 2018  Patient name: Suad Stubbs  : 1960  MRN: [id number]  Referring provider: Buster Lund PA-C  Dx:   Encounter Diagnosis     ICD-10-CM    1  H/O hand surgery Z98 890                   Subjective: Pt reports improved mobility of her thumb but soreness remains  Objective: AROM left wrist E/F- 50/30; Thumb MP- 0/25; IP- 0/60; opposition -4 0 cm  Strenght- Un-assessed  Inspection- pr wearing thumb spica; steri-strips in place  Circumference at wrist- 16 0 cm;; MPs- 17 0 cm; Thumb P1- 5 8 cm  Sensation- intact to LT     Precautions: use orthosis for protection     Daily Treatment Diary      Manual                   STM 15  15  15                                                                                         tubigrip E                           Exercise Diary                   Wrist AROM 2/5  2/5  2/5                 Thumb MP/IP 2/5  2/5  2/5                  Circumduction  10/10  1010  1010                  ABD  10/10  10/10  10/10                  Tip-Tip  5x  5x  10/10                                                                                                                                                                                                                                                                                                                                                                                               Modalities                   HP/biphasic 15  15  15                 US 3 mz 12  12  12                 CP 15  15  15                          Assessment: Tolerated treatment well  Patient would benefit from continued PT  Pt with increased AROM observed today         Plan: Continue per plan of care   Pt to see M/D tomorrow

## 2018-09-25 ENCOUNTER — OFFICE VISIT (OUTPATIENT)
Dept: OBGYN CLINIC | Facility: CLINIC | Age: 58
End: 2018-09-25

## 2018-09-25 VITALS
DIASTOLIC BLOOD PRESSURE: 83 MMHG | HEART RATE: 83 BPM | HEIGHT: 64 IN | SYSTOLIC BLOOD PRESSURE: 143 MMHG | WEIGHT: 167 LBS | BODY MASS INDEX: 28.51 KG/M2

## 2018-09-25 DIAGNOSIS — Z98.890 H/O HAND SURGERY: Primary | ICD-10-CM

## 2018-09-25 PROCEDURE — 99024 POSTOP FOLLOW-UP VISIT: CPT | Performed by: ORTHOPAEDIC SURGERY

## 2018-09-25 NOTE — PATIENT INSTRUCTIONS
Hand Exercises   What you need to know about carpal tunnel hand exercises:  Hand and finger exercises can help relieve pain and increase your range of motion  Your healthcare provider or physical therapist can tell you how often to do the exercises  Hand exercises:   · Finger extensions:  Hold your fingers and thumb close together  Keep them straight  Put a rubber band around the outside of your fingers and thumb  Spread your fingers apart  Then slowly bring them together without letting the rubber band fall off  Repeat 40 times  · Wrist flexor stretch:  Hold your arm out straight  Grasp your fingers with your other hand  Slowly bend the fingers back (palm facing away) until you feel a stretch in your wrist  Hold for 10 seconds  Repeat 5 times  · Wrist extensor stretch:  Hold your arm out straight  Grasp your fingers with your other hand  Slowly bend the fingers and hand down (palm facing you) until you feel a stretch on top of your hand  Hold for 10 seconds  Repeat 5 times  · Wrist curls without weights:  Sit in a chair with your forearm resting on your thigh or a table  With your palm facing down, flex your wrist up 3 inches and slowly lower it  Turn your forearm over and repeat with your palm facing up  Do each exercise 20 times  · Shrugs:  Stand with your arms by your side  Lift your shoulders up to your ears and hold for 1 second  Then pull your shoulders back, pinching your shoulder blades together  Hold for 1 second  Then relax your shoulders  Repeat 20 times  A     © 2017 [employer] Information is for End User's use only and may not be sold, redistributed or otherwise used for commercial purposes  All illustrations and images included in CareNotes® are the copyrighted property of [employer]  or Neymar Mayen  The above information is an  only  It is not intended as medical advice for individual conditions or treatments  Talk to your doctor, nurse or pharmacist before following any medical regimen to see if it is safe and effective for you

## 2018-09-25 NOTE — LETTER
September 25, 2018     Patient: Lloyd Geronimo   YOB: 1960   Date of Visit: 9/25/2018       To Whom It May Concern: It is my medical opinion that Xiomara Sosa may return to light duty immediately with the following restrictions: No lifting over 5 lb with left upper extremity  If you have any questions or concerns, please don't hesitate to call           Sincerely,        Mendez Meyer MD    CC: Lloyd Geronimo

## 2018-09-25 NOTE — PROGRESS NOTES
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      Current Medications  Current Outpatient Prescriptions   Medication Sig Dispense Refill    Ascorbic Acid (VITAMIN C) 250 MG CHEW Chew 1 tablet daily      Calcium 600 MG tablet Take 2 tablets by mouth daily      Cholecalciferol (VITAMIN D3) 1000 units CAPS Take by mouth      hydrochlorothiazide (HYDRODIURIL) 12 5 mg tablet Take 1 tablet (12 5 mg total) by mouth daily 90 tablet 1    metoprolol tartrate (LOPRESSOR) 25 mg tablet Take 1 tablet (25 mg total) by mouth daily 90 tablet 1    Multiple Vitamin (DAILY VALUE MULTIVITAMIN PO) Take 1 tablet by mouth daily      oxyCODONE-acetaminophen (PERCOCET) 5-325 mg per tablet Take 1 tablet by mouth every 4 (four) hours as needed for moderate pain for up to 30 doses Max Daily Amount: 6 tablets 30 tablet 0     No current facility-administered medications for this visit          Current Problems    Active Problems:   Patient Active Problem List    Diagnosis Date Noted    [facility] DJD(carpometacarpal degenerative joint disease), localized primary, left 07/17/2018    Primary osteoarthritis of first carpometacarpal joint of left hand 12/19/2017    Trigger middle finger of right hand 12/19/2017    Low back pain with right-sided sciatica 11/17/2017    Primary osteoarthritis of both knees 01/03/2017    Hypertension 10/23/2012         Review of Systems:    General: negative for - chills, fatigue, fever,  weight gain or weight loss  Psychological: negative for - anxiety, behavioral disorder, concentration difficulties    Musculoskeletal: see HPI  Dermatological: negative fo  rash and skin lesion changes  Neurological: negative for confusion, impaired coordination/balance, memory loss, numbness/tingling, seizures    Past Medical History:   Past Medical History:   Diagnosis Date    Hypertension     Intracranial aneurysm     resolved 11/17/17 - possible R P-comm region aneurysm measuring 3mm found on MRI results    Reactive depression     last assessed 12/15/15       Past Surgical History:   Past Surgical History:   Procedure Laterality Date    ND REPAIR INTERCARP/CARP-METACARP JT Left 7/30/2018    Procedure:  Thumb Arthrex mini tight rope suspension arthroplasty with trapezial resection left thumb;  Surgeon: Javi Maya MD;  Location: [facility];  Service: Orthopedics    THYROID SURGERY      near total thyroidectomy       Family History:  Family history reviewed and non-contributory  Family History   Problem Relation Age of Onset    Arthritis Mother     Heart disease Mother         CAD    Arthritis Father     Cancer Father     Lymphoma Father         Hodgkin's       Social History:  Social History     Social History    Marital status: /Civil Union     Spouse name: N/A    Number of children: N/A    Years of education: N/A     Social History Main Topics    Smoking status: Never Smoker    Smokeless tobacco: Never Used      Comment: only smoked as a teenager    Alcohol use Yes      Comment: social    Drug use: No    Sexual activity: Not Asked     Other Topics Concern    None     Social History Narrative    No living will           Allergies:   No Known Allergies        Physical ExaminationBP 143/83   Pulse 83   Ht 5' 4" (1 626 m)   Wt 75 8 kg (167 lb)   BMI 28 67 kg/m²   Gen: Alert and oriented to person, place, time      Orthopedic Exam  Left thumb incisions healed  Slight tenderness over both incisions especially over the site of the button  FPL strength maintained at 5/5  No distal motor or sensory deficits          Impression  Stable status post left thumb [facility] arthroplasty          Plan    Patient will need further strengthening before being allowed to return to work full duty  Patient can continue with light duty  Patient will continue with intensive physical therapy with strengthening and modalities  In 4 weeks x-ray left thumb on arrival    Rigo Davis MD        Portions of the record may have been created with voice recognition software   Occasional wrong word or "sound a like" substitutions may have occurred due to the inherent limitations of voice recognition software   Read the chart carefully and recognize, using context, where substitutions have occurred

## 2018-09-27 ENCOUNTER — OFFICE VISIT (OUTPATIENT)
Dept: PHYSICAL THERAPY | Facility: CLINIC | Age: 58
End: 2018-09-27
Payer: COMMERCIAL

## 2018-09-27 DIAGNOSIS — Z98.890 H/O HAND SURGERY: Primary | ICD-10-CM

## 2018-09-27 PROCEDURE — L3809 WHFO W/O JOINTS PRE OTS: HCPCS

## 2018-09-27 PROCEDURE — 97110 THERAPEUTIC EXERCISES: CPT

## 2018-09-27 PROCEDURE — 97112 NEUROMUSCULAR REEDUCATION: CPT

## 2018-09-27 PROCEDURE — 97035 APP MDLTY 1+ULTRASOUND EA 15: CPT

## 2018-09-27 PROCEDURE — 97010 HOT OR COLD PACKS THERAPY: CPT

## 2018-09-27 PROCEDURE — 97140 MANUAL THERAPY 1/> REGIONS: CPT

## 2018-09-27 NOTE — PROGRESS NOTES
Daily Note     Today's date: 2018  Patient name: Angelica Jimenez  : 1960  MRN: [id number]  Referring provider: Stef Gutierrez PA-C  Dx:   Encounter Diagnosis     ICD-10-CM    1  H/O hand surgery Z98 890                   Subjective: Pt notes L thumb is feeling good today and had f/u with ortho and pt was advised to start weaning off of spica while at home but to continue to don spica while at work  Objective: See treatment diary below    AROM left wrist E/F- 50/30; Thumb MP- 0/25; IP- 0/60; opposition -4 0 cm  Strenght- Un-assessed  Inspection- pr wearing thumb spica; steri-strips in place  Circumference at wrist- 16 0 cm;; MPs- 17 0 cm; Thumb P1- 5 8 cm  Sensation- intact to LT     Precautions: use orthosis for protection     Daily Treatment Diary      Manual                 STM 15  15  15  15                                                                                       tubigrip E                           Exercise Diary                 Wrist AROM 2/5  2/5  2/5  2/5               Thumb MP/IP 2/5  2/5  2/5  2/5                Circumduction  10/10  10/10  10/10  10/10                ABD  10/10  10/10  10/10  10/10                Tip-Tip  5x  5x  10/10  10/10                TP         T 2'                5 Finger        T 2/10                Varigrip        IP MP  2/10                DB E/F        3 2/10                                                                                                                                                                                                                                                                                             Modalities                   HP/biphasic 15  15  15                 US 3 mz 12  12  12                 CP 15  15  15                           Assessment: Pt tolerated exercise progression well w/o I ncreased pain sx   Pt would further benefit from continued PT  Pt was given Aia 16 Small comfort cool  Plan: Continue per plan of care

## 2018-10-02 ENCOUNTER — OFFICE VISIT (OUTPATIENT)
Dept: PHYSICAL THERAPY | Facility: CLINIC | Age: 58
End: 2018-10-02
Payer: COMMERCIAL

## 2018-10-02 DIAGNOSIS — Z98.890 H/O HAND SURGERY: Primary | ICD-10-CM

## 2018-10-02 PROCEDURE — 97035 APP MDLTY 1+ULTRASOUND EA 15: CPT

## 2018-10-02 PROCEDURE — 97140 MANUAL THERAPY 1/> REGIONS: CPT

## 2018-10-02 PROCEDURE — 97010 HOT OR COLD PACKS THERAPY: CPT

## 2018-10-02 PROCEDURE — 97112 NEUROMUSCULAR REEDUCATION: CPT

## 2018-10-02 PROCEDURE — 97110 THERAPEUTIC EXERCISES: CPT

## 2018-10-02 NOTE — PROGRESS NOTES
Daily Note     Today's date: 10/2/2018  Patient name: Ravi Mays  : 1960  MRN: [id number]  Referring provider: Marcus Glover PA-C  Dx:   Encounter Diagnosis     ICD-10-CM    1  H/O hand surgery Z98 890                   Subjective: Pt reports continued soreness but realizes increased activity may be the cause  Objective: AROM left wrist E/F- 50/30; Thumb MP- 0/25; IP- 0/60; opposition -4 0 cm  Strenght- Un-assessed  Inspection- pr wearing thumb spica; steri-strips in place  Circumference at wrist- 16 0 cm;; MPs- 17 0 cm; Thumb P1- 5 8 cm  Sensation- intact to LT     Precautions: use orthosis for protection     Daily Treatment Diary      Manual    10/2             STM 15  15  15  15  15                                                                                     tubigrip E                           Exercise Diary    10/2             Wrist AROM 2/5  2/5  2/5  2/5  2/5             Thumb MP/IP 2/5  2/5  2/5  2/5  2/5              Circumduction  10/10  10/10  10/10  10/10  10/10              ABD  10/10  10/10  10/10  10/10  10/10              Tip-Tip  5x  5x  10/10  10/10  10/10              TP         T 2'  T 2'              5 Finger        T 2/10  T 2/10              Varigrip        IP MP  2/10  IP/MP 2/10              DB E/F        3 2/10  3 2/10              Twister                                                                                                                                                                                                                                                                             Modalities    10/2               HP/biphasic 15  15  15  15               US 3 mz 12  12  12  15               CP 15  15  15  15                           Assessment: Pt tolerated exercise progression well   Pt with increased AROM  Geronimo Side  Pt would further benefit from continued PT           Plan: Continue per plan of care

## 2018-10-04 ENCOUNTER — OFFICE VISIT (OUTPATIENT)
Dept: PHYSICAL THERAPY | Facility: CLINIC | Age: 58
End: 2018-10-04
Payer: COMMERCIAL

## 2018-10-04 DIAGNOSIS — Z98.890 H/O HAND SURGERY: Primary | ICD-10-CM

## 2018-10-04 PROCEDURE — 97010 HOT OR COLD PACKS THERAPY: CPT

## 2018-10-04 PROCEDURE — 97035 APP MDLTY 1+ULTRASOUND EA 15: CPT

## 2018-10-04 PROCEDURE — 97140 MANUAL THERAPY 1/> REGIONS: CPT

## 2018-10-04 PROCEDURE — 97112 NEUROMUSCULAR REEDUCATION: CPT

## 2018-10-04 PROCEDURE — 97110 THERAPEUTIC EXERCISES: CPT

## 2018-10-04 NOTE — PROGRESS NOTES
Daily Note     Today's date: 10/4/2018  Patient name: Mara Fernandez  : 1960  MRN: [id number]  Referring provider: Helena Rodrigues PA-C  Dx:   Encounter Diagnosis     ICD-10-CM    1  H/O hand surgery Z98 890                   Subjective: Pt notes L wrist continues to improve in strength and notes pain has decreased as well  Objective: See treatment diary below    AROM left wrist E/F- 50/30; Thumb MP- 0/25; IP- 0/60; opposition -4 0 cm  Strenght- Un-assessed  Inspection- pr wearing thumb spica; steri-strips in place  Circumference at wrist- 16 0 cm;; MPs- 17 0 cm;  Thumb P1- 5 8 cm  Sensation- intact to LT    Precautions: use orthosis for protection     Daily Treatment Diary      Manual    10/  10/           STM 15  15  15  15  15  15                                                                                   tubigrip E                           Exercise Diary    10/2  10/4           Wrist AROM 2/5  2/5  2/5  2/5  2/5  2/5           Thumb MP/IP 2/  2/  2/  2/  2/5  2/5            Circumduction  10/10  10/10  10/10  10/10  10/10  10/10            ABD  10/10  10/10  10/10  10/10  10/10  1010            Tip-Tip  5x  5x  10/10  10/10  10/10  10/10            TP         T 2'  T 2'  T 2'            5 Finger        T 2/10  T 2/10  T 2/10            Varigrip        IP MP  2/10  IP/MP 2/10  IP/MP  2/10            DB E/F        3 2/10  3 2/10 3 2/10             Twister            20/20                                                                                                                                                                                                                                                                 Modalities  9/18  9/20  9/24  10/2  10/4             HP/biphasic 15  15  15  15  15             US 3 mz 12  12  12  12  12             CP 15  15  15  15  15                      Assessment: Tolerated treatment well  Patient would benefit from continued PT      Plan: Continue per plan of care

## 2018-10-09 ENCOUNTER — OFFICE VISIT (OUTPATIENT)
Dept: PHYSICAL THERAPY | Facility: CLINIC | Age: 58
End: 2018-10-09
Payer: COMMERCIAL

## 2018-10-09 DIAGNOSIS — Z98.890 H/O HAND SURGERY: Primary | ICD-10-CM

## 2018-10-09 PROCEDURE — G8988 SELF CARE GOAL STATUS: HCPCS | Performed by: PHYSICAL THERAPIST

## 2018-10-09 PROCEDURE — 97140 MANUAL THERAPY 1/> REGIONS: CPT

## 2018-10-09 PROCEDURE — 97112 NEUROMUSCULAR REEDUCATION: CPT

## 2018-10-09 PROCEDURE — G8987 SELF CARE CURRENT STATUS: HCPCS | Performed by: PHYSICAL THERAPIST

## 2018-10-09 PROCEDURE — 97010 HOT OR COLD PACKS THERAPY: CPT

## 2018-10-09 PROCEDURE — 97035 APP MDLTY 1+ULTRASOUND EA 15: CPT

## 2018-10-09 PROCEDURE — 97110 THERAPEUTIC EXERCISES: CPT

## 2018-10-09 NOTE — PROGRESS NOTES
PT Re-Evaluation     Today's date: 10/9/2018  Patient name: Dayan Chapman  : 1960  MRN: [id number]  Referring provider: Nestor Mercedes PA-C  Dx:   Encounter Diagnosis     ICD-10-CM    1  H/O hand surgery Z98 890        Start Time: 1448  Stop Time: 7240  Total time in clinic (min): 76 minutes    Assessment    Assessment details: Pt is a 67 YO female presenting to PT with pain, decreased AROM, strength and tolerance to activity  Pt would benefit from skilled intervention to adddress these issues and maximize overall function  Occupation-manager of high school cafeteria  Pt will RTW no lifting, repetitive duty  Dominant- right; Involved-left  Pt is has continued AROM  And light functional strengthening  She wears her brace at work but removes at home for ADL    Goals  ST  Decrease pain to 3-4 in 4 weeks            2  Decrease swelling            3  Increase AROM to [facility] in 8 weeks            4  Maintain clean wound and promote healing            5   Provide orthotic for protection  LT  Increase functional motion and strength for independence with ADL and self care by DC            2  Ability to RTW and recreational activity by DC    Plan  Frequency: 2x week  Duration in weeks: 4  Treatment plan discussed with: patient        Subjective Evaluation    History of Present Illness  Date of surgery: 2018  Mechanism of injury: Progressive worsening of pain, instability and inability to use her hand functionally  Pain  Current pain ratin  At best pain ratin  At worst pain rating: 3  Location: left thumb and hand    Treatments  Current treatment: physical therapy  Patient Goals  Patient goals for therapy: decreased edema, decreased pain, increased motion, increased strength and independence with ADLs/IADLs          Objective     General Comments     Wrist/Hand Comments  AROM left wrist E/F- 50/30;  Thumb MP- 0/25; IP- 0/60; opposition -4 0 cm  Strength  II- 25/40; 3jc - 6/10; key - 6/11  Inspection- pt wearing her brace at work- removes at home for ADL  Circumference at wrist- 16 0 cm;; MPs- 17 0 cm;  Thumb P1- 5 8 cm  Sensation- intact to LT         Flowsheet Rows      Most Recent Value   PT/OT G-Codes   Current Score  psfs-52   Projected Score  psfs-20   FOTO information reviewed  N/A   Assessment Type  Re-evaluation   G code set  Self-Care   Self Care Current Status ()  CK   Self Care Goal Status ()  CJ          Precautions: use orthosis for protection    Daily Treatment Diary     Manual  9/18  9/20  9/24  9/27  10/2  10/4  10/9         STM 15  15  15  15  15  15  15                                                                                 tubigrip E                           Exercise Diary  9/18  9/20  9/24  9/27  10/2  10/4  10/9         Wrist AROM 2/5  2/5  2/5  2/5  2/5  2/5  2/5         Thumb MP/IP 2/5  2/5  2/5  2/5 2/5 2/5 2/5          Circumduction  10/10  10/10  10/10  10/10  10/10  10/10  10/10          ABD  10/10  10/10  10/10  10/10  10/10  10/10  10/10          Tip-Tip  5x  5x  10/10  10/10  10/10  10/10  10/10          TP         T 2'  T 2'  T 2'  T 2'          5 Finger        T 2/10  T 2/10  T 2/10  T 2/10          Varigrip        IP MP  2/10  IP/MP 2/10  IP/MP  2/10  IP/MP  2/10          DB E/F        3 2/10  3 2/10 3 2/10  3 2/10           Twister          20/20  20/20 20/20                                                                                                                                                                                                                                                                Modalities  9/18  9/20  9/24  10/2  10/4  10/9           HP/biphasic 15  15  15  15  15  15           US 3 mz 12  12  12  12  12  12           CP 15  15  15  15  15  15

## 2018-10-09 NOTE — PROGRESS NOTES
Daily Note     Today's date: 10/9/2018  Patient name: Buzz Sprague  : 1960  MRN: [id number]  Referring provider: Gerald Sevilla PA-C  Dx:   Encounter Diagnosis     ICD-10-CM    1  H/O hand surgery Z98 890                   Subjective: Pt reports L thumb is feeling good, however pt noted some minor swelling in L wrist       Objective: See treatment diary below    AROM left wrist E/F- 50/30; Thumb MP- 0/25; IP- 0/60; opposition -4 0 cm  Strength  II- ; 3jc - 6/10; key -   Inspection- pr wearing thumb spica; steri-strips in place  Circumference at wrist- 16 0 cm;; MPs- 17 0 cm;  Thumb P1- 5 8 cm  Sensation- intact to LT     Precautions: use orthosis for protection     Daily Treatment Diary      Manual  9/18  9/20  9/24  9/27  10/2  10/4  10/9         STM 15  15  15  15  15  15  15                                                                                 tubigrip E                           Exercise Diary  9/18  9/20  9/24  9/27  10/2  10/4  10         Wrist AROM 2/5  2/5  2/5  2/5  2/5  2/5  2/5         Thumb MP/IP 2/5  2/5  2/5  2/5  2/5  2/5  2/5          Circumduction  10/10  10/10  10/10  10/10  10/10  10/10  10/10          ABD  10/10  10/10  10/10  10/10  10/10  10/10  10/10          Tip-Tip  5x  5x  10/10  10/10  10/10  10/10  10/10          TP         T 2'  T 2'  T 2'  T 2'          5 Finger        T 210  T 2/10  T 10  T 2/10          Varigrip        IP MP  2/10  IP/MP 2/10  IP/MP  2/10  IP/MP  2/10          DB E/F        3 2/10  3 2/10 3 2/10  3 2/10           Twister            20/ 20/                                                                                                                                                                                                                                                                Modalities  9/18  9/20  9/24  10/2  10/  10           HP/biphasic 15  15  15  15  15  15           US 3 mz 12  12  12  12  12  12           CP 15  15  15  15  15  15                    Assessment: Tolerated treatment well  Patient would benefit from continued PT      Plan: Continue per plan of care

## 2018-10-11 ENCOUNTER — APPOINTMENT (OUTPATIENT)
Dept: PHYSICAL THERAPY | Facility: CLINIC | Age: 58
End: 2018-10-11
Payer: COMMERCIAL

## 2018-10-16 ENCOUNTER — OFFICE VISIT (OUTPATIENT)
Dept: PHYSICAL THERAPY | Facility: CLINIC | Age: 58
End: 2018-10-16
Payer: COMMERCIAL

## 2018-10-16 DIAGNOSIS — Z98.890 H/O HAND SURGERY: Primary | ICD-10-CM

## 2018-10-16 PROCEDURE — 97110 THERAPEUTIC EXERCISES: CPT

## 2018-10-16 PROCEDURE — 97010 HOT OR COLD PACKS THERAPY: CPT

## 2018-10-16 PROCEDURE — 97035 APP MDLTY 1+ULTRASOUND EA 15: CPT

## 2018-10-16 PROCEDURE — 97140 MANUAL THERAPY 1/> REGIONS: CPT

## 2018-10-16 PROCEDURE — 97112 NEUROMUSCULAR REEDUCATION: CPT

## 2018-10-16 NOTE — PROGRESS NOTES
Daily Note     Today's date: 10/16/2018  Patient name: Jacqueline Maciel  : 1960  MRN: [id number]  Referring provider: Harvis Ahumada, PA-C  Dx:   Encounter Diagnosis     ICD-10-CM    1  H/O hand surgery Z98 890                   Subjective: Pt noted some mild pain in L wrist, however pt notes she no longer wears brace at night but continues to wear it during the day  Objective: See treatment diary below    AROM left wrist E/F- 50/30; Thumb MP- 0/25; IP- 0/60; opposition -4 0 cm  Strength  II- 25; 3jc - 6/10; key -   Inspection- pt wearing her brace at work- removes at home for ADL  Circumference at wrist- 16 0 cm;; MPs- 17 0 cm;  Thumb P1- 5 8 cm  Sensation- intact to LT      Precautions: use orthosis for protection     Daily Treatment Diary      Manual  9/18  9/20  9/24  9/27  10/2  10/4  10/9  10/16       STM 15  15  15  15  15  15  15  15                                                                               tubigrip E                           Exercise Diary  9/18  9/20  9/24  9/27  10/2  10/4  10/9  10/16       Wrist AROM 2/5  2/5  2/5  2/5  2/5  2/5  2/5  2/5       Thumb MP/IP 2/5  2/5  2/5  2/5  2/5  2/5  2/5  2/5        Circumduction  10/10  10/10  10/10  10/10  10/10  10/10  10/10  10/10        ABD  10/10  10/10  10/10  10/10  10/10  10/10  10/10  10/10        Tip-Tip  5x  5x  10/10  10/10  10/10  10/10  10/10  10/10        TP         T 2'  T 2'  T 2'  T 2'  T 2'        5 Finger        T 10  T 10  T 10  T 10  T 10        Varigrip        IP MP  2/10  IP/MP 2/10  IP/MP  2/10  IP/MP  /10 IP/MP  2/10        DB E/F        3 2/10  3 2/10 3 2/10  3 2/10  3 2/10         Twister          /  20/ 20/  20/                                                                                                                                                                                                                                                              Modalities 9/18  9/20  9/24  10/2  10/4  10/9  10/16         HP/biphasic 15  15  15  15  15  15  15         US 3 mz 12  12  12  12  12  12  12         CP 15  15  15  15  15  15  15                 Assessment: Tolerated treatment well  Patient would benefit from continued PT      Plan: Continue per plan of care

## 2018-10-18 ENCOUNTER — OFFICE VISIT (OUTPATIENT)
Dept: PHYSICAL THERAPY | Facility: CLINIC | Age: 58
End: 2018-10-18
Payer: COMMERCIAL

## 2018-10-18 DIAGNOSIS — Z98.890 H/O HAND SURGERY: Primary | ICD-10-CM

## 2018-10-18 PROCEDURE — 97112 NEUROMUSCULAR REEDUCATION: CPT

## 2018-10-18 PROCEDURE — 97140 MANUAL THERAPY 1/> REGIONS: CPT

## 2018-10-18 PROCEDURE — 97110 THERAPEUTIC EXERCISES: CPT

## 2018-10-18 PROCEDURE — 97035 APP MDLTY 1+ULTRASOUND EA 15: CPT

## 2018-10-18 PROCEDURE — 97010 HOT OR COLD PACKS THERAPY: CPT

## 2018-10-18 NOTE — PROGRESS NOTES
Daily Note     Today's date: 10/18/2018  Patient name: Dayan Chapman  : 1960  MRN: [id number]  Referring provider: Nestor Mercedes PA-C  Dx:   Encounter Diagnosis     ICD-10-CM    1  H/O hand surgery Z98 890                   Subjective: Pt notes L hand is still very weak, however pt notes it is improving slowly with continued therapy sessions  Objective: See treatment diary below    AROM left wrist E/F- 50/30; Thumb MP- 0/25; IP- 0/60; opposition -4 0 cm  Strength  II- 25/40; 3jc - 6/10; key -   Inspection- pt wearing her brace at work- removes at home for ADL  Circumference at wrist- 16 0 cm;; MPs- 17 0 cm;  Thumb P1- 5 8 cm  Sensation- intact to LT      Precautions: use orthosis for protection     Daily Treatment Diary      Manual  9/18  9/20  9/24  9/27  10/2  10/4  10/9  10/16  10/18     STM 15  15  15  15  15  15  15  15  15                                                                             tubigrip E                           Exercise Diary  9/18  9/20  9/24  9/27  10/2  10/4  10/9  10/16  10/18     Wrist AROM 2/5  2/5  2/5  2/5  2/5  2/5  2/5  2/5  2/5     Thumb MP/IP 2/5  2/5  2/5  2/5  2/5  2/5  2/5  2/5  2/5      Circumduction  10/10  10/10  10/10  10/10  10/10  10/10  10/10  10/10  10/10      ABD  10/10  10/10  10/10  10/10  10/10  10/10  10/10  10/10  10/10      Tip-Tip  5x  5x  10/10  10/10  10/10  10/10  10/10  10/10  10/10      TP         T 2'  T 2'  T 2'  T 2'  T 2'  T 2'      5 Finger        T 2/10  T 2/10  T 2/10  T 2/10  T 2/10  T 2/10      Varigrip        IP MP  10  IP/MP 10  IP/MP  10  IP/MP  10 IP/MP  /10 IP/MP  2/10       DB E/F        3 10  3 2/10 3 2/10  3 2/10  3 2/10  3 2/10       Twister          20/20  20/20 20/20  20/20  20/20                                                                                                                                                                                                                                                            Modalities  9/18  9/20  9/24  10/2  10/4  10/9  10/16  10/18       HP/biphasic 15  15  15  15  15  15  15  15       US 3 mz 12  12  12  12  12  12  12  12       CP 15  15  15  15  15  15  15  15                Assessment: Tolerated treatment well  Patient would benefit from continued PT      Plan: Continue per plan of care

## 2018-10-23 ENCOUNTER — OFFICE VISIT (OUTPATIENT)
Dept: PHYSICAL THERAPY | Facility: CLINIC | Age: 58
End: 2018-10-23
Payer: COMMERCIAL

## 2018-10-23 DIAGNOSIS — Z98.890 H/O HAND SURGERY: Primary | ICD-10-CM

## 2018-10-23 PROCEDURE — 97110 THERAPEUTIC EXERCISES: CPT | Performed by: PHYSICAL THERAPIST

## 2018-10-23 PROCEDURE — G8988 SELF CARE GOAL STATUS: HCPCS | Performed by: PHYSICAL THERAPIST

## 2018-10-23 PROCEDURE — 97140 MANUAL THERAPY 1/> REGIONS: CPT | Performed by: PHYSICAL THERAPIST

## 2018-10-23 PROCEDURE — 97112 NEUROMUSCULAR REEDUCATION: CPT | Performed by: PHYSICAL THERAPIST

## 2018-10-23 PROCEDURE — 97035 APP MDLTY 1+ULTRASOUND EA 15: CPT | Performed by: PHYSICAL THERAPIST

## 2018-10-23 PROCEDURE — 97010 HOT OR COLD PACKS THERAPY: CPT | Performed by: PHYSICAL THERAPIST

## 2018-10-23 PROCEDURE — G8987 SELF CARE CURRENT STATUS: HCPCS | Performed by: PHYSICAL THERAPIST

## 2018-10-23 NOTE — PROGRESS NOTES
PT Re-Evaluation     Today's date: 10/23/2018  Patient name: Joshua Jean  : 1960  MRN: [id number]  Referring provider: Theo Pizarro PA-C  Dx:   Encounter Diagnosis     ICD-10-CM    1  H/O hand surgery Z98 890                   Assessment    Assessment details: Pt is a 67 YO female presenting to PT with pain, decreased AROM, strength and tolerance to activity  Pt would benefit from skilled intervention to adddress these issues and maximize overall function  Occupation-manager of high school cafeteria  Pt will RTW no lifting, repetitive duty  Dominant- right; Involved-left  Pt is has continued AROM  and light functional strengthening  She wears her comfort cool at work and for ADL support    Goals  ST  Decrease pain to 3-4 in 4 weeks            2  Decrease swelling            3  Increase AROM to [facility] in 8 weeks            4  Maintain clean wound and promote healing            5   Provide orthotic for protection  LT  Increase functional motion and strength for independence with ADL and self care by DC            2  Ability to RTW and recreational activity by DC    Plan  Frequency: 2x week  Duration in weeks: 4  Treatment plan discussed with: patient        Subjective Evaluation    History of Present Illness  Date of surgery: 2018  Mechanism of injury: Progressive worsening of pain, instability and inability to use her hand functionally  Pain  Current pain ratin  At best pain ratin  At worst pain ratin  Location: left thumb and hand    Treatments  Current treatment: physical therapy  Patient Goals  Patient goals for therapy: decreased edema, decreased pain, increased motion, increased strength and independence with ADLs/IADLs          Objective     General Comments     Wrist/Hand Comments  AROM left wrist E/F- 70/52;  Thumb MP- 0/55; IP- 0/70; opposition -1 0 cm  Strength  II- 30/40; 3jc - 6/10; key -   Inspection- pt wearing her brace at work- removes at home for ADL  Circumference at wrist- 16 0 cm;; MPs- 17 0 cm;  Thumb P1- 5 8 cm  Sensation- intact to LT         Flowsheet Rows      Most Recent Value   PT/OT G-Codes   Current Score  psfs-42   Projected Score  psfs-20   FOTO information reviewed  N/A   Assessment Type  Re-evaluation   G code set  Self-Care   Self Care Current Status ()  CK   Self Care Goal Status ()  CJ          Precautions: use orthosis for protection    Daily Treatment Diary   Manual  9/18  9/20  9/24  9/27  10/2  10/4  10/9  10/16  10/18  10/23   STM 15  15  15  15  15  15  15  15  15  15                                                                           tubigrip E                           Exercise Diary  9/18  9/20  9/24  9/27  10/2  10/4  10/9  10/16  10/18  10/23   Wrist AROM 2/5 2/5  2/5  2/5 2/5  2/5 2/5 2/5 2/5 2/5   Genora Jun MP/IP 2/5 2/5 2/5 2/5 2/5 2/5 2/5 2/5 2/5 2/5    Circumduction  10/10  10/10  10/10  10/10  10/10  10/10  10/10  10/10  10/10  10/10    ABD  10/10  10/10  10/10  10/10  10/10  10/10  10/10  10/10  10/10  10/10    Tip-Tip  5x  5x  10/10  10/10  10/10  10/10  10/10  10/10  10/10  10/10    TP         T 2'  T 2'  T 2'  T 2'  T 2'  T 2'  Y 2'    5 Finger        T 2/10  T 2/10  T 2/10  T 2/10  T 2/10  T 2/10  Y 2/10    Varigrip        IP MP  2/10  IP/MP 2/10  IP/MP  2/10  IP/MP  2/10 IP/MP  2/10 IP/MP  2/10   MP/IP2/10    DB E/F        3 2/10  3 2/10 3 2/10  3 2/10  3 2/10  3 2/10   4 2/10    Twister          20/20  20/20 20/20  20/20  20/20 20/20    Flex bar                    R 2/10    Arvin (ulnar)                    20 2/10                                                                                                                                                                                                         Modalities  9/18 9/20 9/24 9/27  10/2  10/4  10/9  10/16  10/18  10/23   HP/biphasic 15  15  15  15  15  15  15  15  15  15   US 3 mz 12  12  12  12  12  12  12  12  12  12 CP 15  15  15  15  15  15  15  15  15  15

## 2018-10-24 ENCOUNTER — OFFICE VISIT (OUTPATIENT)
Dept: OBGYN CLINIC | Facility: CLINIC | Age: 58
End: 2018-10-24
Payer: COMMERCIAL

## 2018-10-24 ENCOUNTER — APPOINTMENT (OUTPATIENT)
Dept: RADIOLOGY | Facility: MEDICAL CENTER | Age: 58
End: 2018-10-24
Payer: COMMERCIAL

## 2018-10-24 VITALS
WEIGHT: 167 LBS | HEIGHT: 64 IN | DIASTOLIC BLOOD PRESSURE: 81 MMHG | BODY MASS INDEX: 28.51 KG/M2 | SYSTOLIC BLOOD PRESSURE: 122 MMHG | HEART RATE: 86 BPM

## 2018-10-24 DIAGNOSIS — M19.032 CMC DJD(CARPOMETACARPAL DEGENERATIVE JOINT DISEASE), LOCALIZED PRIMARY, LEFT: ICD-10-CM

## 2018-10-24 DIAGNOSIS — Z98.890 H/O HAND SURGERY: Primary | ICD-10-CM

## 2018-10-24 PROCEDURE — 73130 X-RAY EXAM OF HAND: CPT

## 2018-10-24 PROCEDURE — 99024 POSTOP FOLLOW-UP VISIT: CPT | Performed by: ORTHOPAEDIC SURGERY

## 2018-10-24 NOTE — LETTER
October 24, 2018     Patient: Elena Gusmanester   YOB: 1960   Date of Visit: 10/24/2018       To Whom It May Concern: It is my medical opinion that Niyah Aparicio may return to full duty immediately with no restrictions  If you have any questions or concerns, please don't hesitate to call           Sincerely,        Elpidio Roland MD    CC: Elena Rowland

## 2018-10-24 NOTE — PROGRESS NOTES
Chief Complaint  Left thumb [facility] arthroplasty    History Of Presenting Illness  Anca Crowley 1960 presents with thumb [facility] arthroplasty on July 30th, 2018  Patient is doing postop physical and occupation therapy  Patient has been able to return to job  Patient still complains of some discomfort at the surgical site and slight weakness of the hand      Current Medications  Current Outpatient Prescriptions   Medication Sig Dispense Refill    Ascorbic Acid (VITAMIN C) 250 MG CHEW Chew 1 tablet daily      Calcium 600 MG tablet Take 2 tablets by mouth daily      Cholecalciferol (VITAMIN D3) 1000 units CAPS Take by mouth      hydrochlorothiazide (HYDRODIURIL) 12 5 mg tablet Take 1 tablet (12 5 mg total) by mouth daily 90 tablet 1    metoprolol tartrate (LOPRESSOR) 25 mg tablet Take 1 tablet (25 mg total) by mouth daily 90 tablet 1    Multiple Vitamin (DAILY VALUE MULTIVITAMIN PO) Take 1 tablet by mouth daily      oxyCODONE-acetaminophen (PERCOCET) 5-325 mg per tablet Take 1 tablet by mouth every 4 (four) hours as needed for moderate pain for up to 30 doses Max Daily Amount: 6 tablets 30 tablet 0     No current facility-administered medications for this visit          Current Problems    Active Problems:   Patient Active Problem List    Diagnosis Date Noted    [facility] DJD(carpometacarpal degenerative joint disease), localized primary, left 07/17/2018    Primary osteoarthritis of first carpometacarpal joint of left hand 12/19/2017    Trigger middle finger of right hand 12/19/2017    Low back pain with right-sided sciatica 11/17/2017    Primary osteoarthritis of both knees 01/03/2017    Hypertension 10/23/2012         Review of Systems:    General: negative for - chills, fatigue, fever,  weight gain or weight loss  Psychological: negative for - anxiety, behavioral disorder, concentration difficulties      Past Medical History:   Past Medical History:   Diagnosis Date    Hypertension     Intracranial aneurysm     resolved 11/17/17 - possible R P-comm region aneurysm measuring 3mm found on MRI results    Reactive depression     last assessed 12/15/15       Past Surgical History:   Past Surgical History:   Procedure Laterality Date    ME REPAIR INTERCARP/CARP-METACARP JT Left 7/30/2018    Procedure:  Thumb Arthrex mini tight rope suspension arthroplasty with trapezial resection left thumb;  Surgeon: Barrington Castrejon MD;  Location: [facility];  Service: Orthopedics    THYROID SURGERY      near total thyroidectomy       Family History:  Family history reviewed and non-contributory  Family History   Problem Relation Age of Onset    Arthritis Mother     Heart disease Mother         CAD    Arthritis Father     Cancer Father     Lymphoma Father         Hodgkin's       Social History:  Social History     Social History    Marital status: /Civil Union     Spouse name: N/A    Number of children: N/A    Years of education: N/A     Social History Main Topics    Smoking status: Never Smoker    Smokeless tobacco: Never Used      Comment: only smoked as a teenager    Alcohol use Yes      Comment: social    Drug use: No    Sexual activity: Not Asked     Other Topics Concern    None     Social History Narrative    No living will           Allergies:   No Known Allergies        Physical ExaminationBP 122/81   Pulse 86   Ht 5' 4" (1 626 m)   Wt 75 8 kg (167 lb)   BMI 28 67 kg/m²   Gen: Alert and oriented to person, place, time  HEENT: EOMI, eyes clear, moist mucus membranes, hearing intact        Orthopedic Exam  Left hand incision healed  Range of pain-free motion of the thumb  Flexor extensor tendons intact  Radial nerve intact          Impression  Stable status post left thumb [facility] arthroplasty            Plan    Continue physical and occupational therapy  All activities as tolerated  Follow-up in 3 months x-ray left thumb on arrival    Barrington Castrejon MD        Portions of the record may have been created with voice recognition software   Occasional wrong word or "sound a like" substitutions may have occurred due to the inherent limitations of voice recognition software   Read the chart carefully and recognize, using context, where substitutions have occurred

## 2018-10-25 ENCOUNTER — OFFICE VISIT (OUTPATIENT)
Dept: PHYSICAL THERAPY | Facility: CLINIC | Age: 58
End: 2018-10-25
Payer: COMMERCIAL

## 2018-10-25 DIAGNOSIS — Z98.890 H/O HAND SURGERY: Primary | ICD-10-CM

## 2018-10-25 PROCEDURE — 97110 THERAPEUTIC EXERCISES: CPT | Performed by: PHYSICAL THERAPIST

## 2018-10-25 PROCEDURE — 97112 NEUROMUSCULAR REEDUCATION: CPT | Performed by: PHYSICAL THERAPIST

## 2018-10-25 PROCEDURE — 97035 APP MDLTY 1+ULTRASOUND EA 15: CPT | Performed by: PHYSICAL THERAPIST

## 2018-10-25 PROCEDURE — 97010 HOT OR COLD PACKS THERAPY: CPT | Performed by: PHYSICAL THERAPIST

## 2018-10-25 PROCEDURE — 97140 MANUAL THERAPY 1/> REGIONS: CPT | Performed by: PHYSICAL THERAPIST

## 2018-10-25 NOTE — PROGRESS NOTES
Daily Note     Today's date: 10/25/2018  Patient name: Ecoh Thomas  : 1960  MRN: [id number]  Referring provider: Jamie Payan PA-C  Dx:   Encounter Diagnosis     ICD-10-CM    1  H/O hand surgery Z98 890                   Subjective: Pt was seen by her physician; she will be released for full duty but continue therapy for residua;l symproms and strengthenibg      Objective: See treatment diary below    AROM left wrist E/F- /; Thumb MP- 055; IP- 0; opposition -1 0 cm  Strength  II- ; 3jc - ; key - 8/10  Inspection- pt wearing her brace at work- removes at home for ADL  Circumference at wrist- 16 0 cm;; MPs- 17 0 cm; Thumb P1- 5 8 cm  Sensation- intact to LT    Manual  10/25            STM 15                                                                                    tubigrip E                           Exercise Diary  10/25            Wrist AROM 2/5            Thumb MP/IP 2/5             Circumduction  10/10             ABD  10/10             Tip-Tip  5x             TP   Y 2'             5 Finger  Y 2/10             Varigrip  MP/IP 2/10             DB E/F  4 2/10               Twister  20/20               Flex bar  R 2/10               Arvin (ulnar)  15 2/10               Blue  III /10                                                                                                                                                                                                   Modalities  10/25            HP/biphasic 15            US 3 mz 12            CP 15                  Assessment: Tolerated treatment well  Patient would benefit from continued PT      Plan: Continue per plan of care

## 2018-10-30 ENCOUNTER — OFFICE VISIT (OUTPATIENT)
Dept: PHYSICAL THERAPY | Facility: CLINIC | Age: 58
End: 2018-10-30
Payer: COMMERCIAL

## 2018-10-30 DIAGNOSIS — Z98.890 H/O HAND SURGERY: Primary | ICD-10-CM

## 2018-10-30 PROCEDURE — 97112 NEUROMUSCULAR REEDUCATION: CPT

## 2018-10-30 PROCEDURE — 97110 THERAPEUTIC EXERCISES: CPT

## 2018-10-30 PROCEDURE — 97010 HOT OR COLD PACKS THERAPY: CPT

## 2018-10-30 PROCEDURE — 97140 MANUAL THERAPY 1/> REGIONS: CPT

## 2018-10-30 PROCEDURE — 97035 APP MDLTY 1+ULTRASOUND EA 15: CPT

## 2018-10-30 NOTE — PROGRESS NOTES
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  Objective: See treatment diary below    AROM left wrist E/F- ; Thumb MP- 0; IP- 0; opposition -1 0 cm  Strength  II- ; 3jc - ; key - 8/10  Inspection- pt wearing her brace at work- removes at home for ADL  Circumference at wrist- 16 0 cm;; MPs- 17 0 cm; Thumb P1- 5 8 cm  Sensation- intact to LT     Manual  10/25  10/30                   STM 15  15                                                                                           tubigrip E                           Exercise Diary  10/25  10/30                   Wrist AROM 2/5 2/5                   Thumb MP/IP 2/5 2/5                    Circumduction  10/10 10/10                    ABD  10/10 10/10                    Tip-Tip  5x 5x                    TP   Y 2' Y 2'                    5 Finger  Y /10 Y /10                    Varigrip  MP/IP 2/10 MP/IP  2/10                    DB E/F  4 2/10 4 2/10                     Twister  20/20 20/20                     Flex bar  R 2/10 R 2/10                     Arvin (ulnar) 15 2/10 25 2/10                     Blue  III /10 III /10                     Zottman's   4 /10                                                                                                                                                                          Modalities  10/25  10/30                   HP/biphasic 15  15                   US 3 mz 12  12                   CP 15  15                          Assessment: Pt tolerated exercise session well with no increase pain with new exercises  Patient would further benefit from continued PT to further increase UE strength and return to function  Plan: Continue per plan of care

## 2018-11-01 ENCOUNTER — APPOINTMENT (OUTPATIENT)
Dept: PHYSICAL THERAPY | Facility: CLINIC | Age: 58
End: 2018-11-01
Payer: COMMERCIAL

## 2018-11-08 ENCOUNTER — OFFICE VISIT (OUTPATIENT)
Dept: PHYSICAL THERAPY | Facility: CLINIC | Age: 58
End: 2018-11-08
Payer: COMMERCIAL

## 2018-11-08 DIAGNOSIS — Z98.890 H/O HAND SURGERY: Primary | ICD-10-CM

## 2018-11-08 PROCEDURE — 97110 THERAPEUTIC EXERCISES: CPT

## 2018-11-08 PROCEDURE — 97010 HOT OR COLD PACKS THERAPY: CPT

## 2018-11-08 PROCEDURE — 97035 APP MDLTY 1+ULTRASOUND EA 15: CPT

## 2018-11-08 PROCEDURE — 97112 NEUROMUSCULAR REEDUCATION: CPT

## 2018-11-08 PROCEDURE — 97140 MANUAL THERAPY 1/> REGIONS: CPT

## 2018-11-08 NOTE — PROGRESS NOTES
Daily Note     Today's date: 2018  Patient name: Jimenez Gonzalez  : 1960  MRN: [id number]  Referring provider: Hyun Herrera PA-C  Dx:   Encounter Diagnosis     ICD-10-CM    1  H/O hand surgery Z98 890                   Subjective: Pt noted she is now gradually doing more with L hand, however pt noted L hand is a little sore  Pt notes that she is only able to come one day for therapy next week because her boss is going to be away and she cannot leave work on her normal therapy day  Objective: See treatment diary below    AROM left wrist E/F- ; Thumb MP- 055; IP- 0; opposition -1 0 cm  Strength  II- ; 3jc - ; key - 8/10  Inspection- pt wearing her brace at work- removes at home for ADL  Circumference at wrist- 16 0 cm;; MPs- 17 0 cm;  Thumb P1- 5 8 cm  Sensation- intact to LT     Manual  10/25  10/30  11/8                 STM 15  15  15                                                                                         tubigrip E                           Exercise Diary  10/25  10/30  11/8                 Wrist AROM 2/5 2/5  2/5                 Thumb MP/IP 2/5 2/5  2/5                  Circumduction  10/10 10/10  10/10                  ABD  10/10 10/10  10/10                  Tip-Tip  5x 5x  5x                  TP   Y 2' Y 2'  Y 2'                  5 Finger  Y 2/10 Y 2/10  Y 2/10                  Varigrip  MP/IP 2/10 MP/IP  2/10 MP/IP 2/10                   DB E/F  4 2/10 4 2/10  4 2/10                   Twister  20/20 20/20  20/20                   Flex bar  R 2/10 R 2/10  R 2/10                   Arvin (ulnar) 15 2/10 25 210  25 2/10                   Blue  III 2/10 III 2/10  III 2/10                   Zottman's   4 10  4 2/10                                                                                                                                                                        Modalities  10/25  10/30  11/8                 HP/biphasic 15  15  15                 US 3 mz 12  12  12                 CP 15  15  15                        Assessment: Tolerated treatment well  Patient would benefit from continued PT      Plan: Continue per plan of care

## 2018-11-15 ENCOUNTER — APPOINTMENT (OUTPATIENT)
Dept: PHYSICAL THERAPY | Facility: CLINIC | Age: 58
End: 2018-11-15
Payer: COMMERCIAL

## 2018-11-19 ENCOUNTER — APPOINTMENT (OUTPATIENT)
Dept: PHYSICAL THERAPY | Facility: CLINIC | Age: 58
End: 2018-11-19
Payer: COMMERCIAL

## 2018-11-19 ENCOUNTER — OFFICE VISIT (OUTPATIENT)
Dept: FAMILY MEDICINE CLINIC | Facility: CLINIC | Age: 58
End: 2018-11-19
Payer: COMMERCIAL

## 2018-11-19 VITALS
TEMPERATURE: 97.1 F | SYSTOLIC BLOOD PRESSURE: 128 MMHG | DIASTOLIC BLOOD PRESSURE: 86 MMHG | WEIGHT: 168 LBS | HEIGHT: 64 IN | BODY MASS INDEX: 28.68 KG/M2

## 2018-11-19 DIAGNOSIS — B02.9 HERPES ZOSTER WITHOUT COMPLICATION: Primary | ICD-10-CM

## 2018-11-19 PROCEDURE — 3008F BODY MASS INDEX DOCD: CPT | Performed by: FAMILY MEDICINE

## 2018-11-19 PROCEDURE — 1036F TOBACCO NON-USER: CPT | Performed by: FAMILY MEDICINE

## 2018-11-19 PROCEDURE — 99213 OFFICE O/P EST LOW 20 MIN: CPT | Performed by: FAMILY MEDICINE

## 2018-11-19 RX ORDER — VALACYCLOVIR HYDROCHLORIDE 1 G/1
1000 TABLET, FILM COATED ORAL 3 TIMES DAILY
Qty: 30 TABLET | Refills: 1 | Status: SHIPPED | OUTPATIENT
Start: 2018-11-19 | End: 2018-12-18 | Stop reason: ALTCHOICE

## 2018-11-19 RX ORDER — NYSTATIN 100000 [USP'U]/G
POWDER TOPICAL
Status: ON HOLD | COMMUNITY
Start: 2018-09-25 | End: 2020-05-19

## 2018-11-19 NOTE — PROGRESS NOTES
Assessment/Plan:  Patient will be given 1 g valacyclovir t i d  For acute shingles outbreak    No problem-specific Assessment & Plan notes found for this encounter  Diagnoses and all orders for this visit:    Herpes zoster without complication  -     valACYclovir (VALTREX) 1,000 mg tablet; Take 1 tablet (1,000 mg total) by mouth 3 (three) times a day for 10 days    Other orders  -     nystatin (MYCOSTATIN) powder;           Subjective:      Patient ID: Echo Thomas is a 66 y o  female  Patient here with a area of shingles outbreak over the right sacroiliac area on she has had it in the same area on previous episodes has already started some valacyclovir      Rash   Pertinent negatives include no cough, diarrhea, fatigue, fever, shortness of breath or vomiting  The following portions of the patient's history were reviewed and updated as appropriate:   She  has a past medical history of Hypertension; Intracranial aneurysm; and Reactive depression  She   Patient Active Problem List    Diagnosis Date Noted    [facility] DJD(carpometacarpal degenerative joint disease), localized primary, left 07/17/2018    Primary osteoarthritis of first carpometacarpal joint of left hand 12/19/2017    Trigger middle finger of right hand 12/19/2017    Low back pain with right-sided sciatica 11/17/2017    Primary osteoarthritis of both knees 01/03/2017    Hypertension 10/23/2012     She  has a past surgical history that includes Thyroid surgery and pr repair intercarp/carp-metacarp jt (Left, 7/30/2018)  Her family history includes Arthritis in her father and mother; Cancer in her father; Heart disease in her mother; Lymphoma in her father  She  reports that she has never smoked  She has never used smokeless tobacco  She reports that she drinks alcohol  She reports that she does not use drugs    Current Outpatient Prescriptions   Medication Sig Dispense Refill    Ascorbic Acid (VITAMIN C) 250 MG CHEW Chew 1 tablet daily      Calcium 600 MG tablet Take 2 tablets by mouth daily      Cholecalciferol (VITAMIN D3) 1000 units CAPS Take by mouth      hydrochlorothiazide (HYDRODIURIL) 12 5 mg tablet Take 1 tablet (12 5 mg total) by mouth daily 90 tablet 1    metoprolol tartrate (LOPRESSOR) 25 mg tablet Take 1 tablet (25 mg total) by mouth daily 90 tablet 1    Multiple Vitamin (DAILY VALUE MULTIVITAMIN PO) Take 1 tablet by mouth daily      nystatin (MYCOSTATIN) powder       valACYclovir (VALTREX) 1,000 mg tablet Take 1 tablet (1,000 mg total) by mouth 3 (three) times a day for 10 days 30 tablet 1     No current facility-administered medications for this visit  Current Outpatient Prescriptions on File Prior to Visit   Medication Sig    Ascorbic Acid (VITAMIN C) 250 MG CHEW Chew 1 tablet daily    Calcium 600 MG tablet Take 2 tablets by mouth daily    Cholecalciferol (VITAMIN D3) 1000 units CAPS Take by mouth    hydrochlorothiazide (HYDRODIURIL) 12 5 mg tablet Take 1 tablet (12 5 mg total) by mouth daily    metoprolol tartrate (LOPRESSOR) 25 mg tablet Take 1 tablet (25 mg total) by mouth daily    Multiple Vitamin (DAILY VALUE MULTIVITAMIN PO) Take 1 tablet by mouth daily    [DISCONTINUED] oxyCODONE-acetaminophen (PERCOCET) 5-325 mg per tablet Take 1 tablet by mouth every 4 (four) hours as needed for moderate pain for up to 30 doses Max Daily Amount: 6 tablets     No current facility-administered medications on file prior to visit  She has No Known Allergies       Review of Systems   Constitutional: Negative for activity change, appetite change, diaphoresis, fatigue and fever  HENT: Negative  Eyes: Negative  Respiratory: Negative for apnea, cough, chest tightness, shortness of breath and wheezing  Cardiovascular: Negative for chest pain, palpitations and leg swelling  Gastrointestinal: Negative for abdominal distention, abdominal pain, anal bleeding, constipation, diarrhea, nausea and vomiting  Endocrine: Negative for cold intolerance, heat intolerance, polydipsia, polyphagia and polyuria  Genitourinary: Negative for difficulty urinating, dysuria, flank pain, hematuria and urgency  Musculoskeletal: Negative for arthralgias, back pain, gait problem, joint swelling and myalgias  Skin: Positive for rash  Negative for color change and wound  Allergic/Immunologic: Negative for environmental allergies, food allergies and immunocompromised state  Neurological: Negative for dizziness, seizures, syncope, speech difficulty, numbness and headaches  Hematological: Negative for adenopathy  Does not bruise/bleed easily  Psychiatric/Behavioral: Negative for agitation, behavioral problems, hallucinations, sleep disturbance and suicidal ideas  Objective:      /86 (BP Location: Left arm, Patient Position: Sitting, Cuff Size: Standard)   Temp (!) 97 1 °F (36 2 °C) (Tympanic)   Ht 5' 4" (1 626 m)   Wt 76 2 kg (168 lb)   BMI 28 84 kg/m²          Physical Exam   Constitutional: She is oriented to person, place, and time  She appears well-developed and well-nourished  No distress  HENT:   Head: Normocephalic  Right Ear: External ear normal    Left Ear: External ear normal    Nose: Nose normal    Mouth/Throat: Oropharynx is clear and moist    Eyes: Pupils are equal, round, and reactive to light  Conjunctivae and EOM are normal  Right eye exhibits no discharge  Left eye exhibits no discharge  No scleral icterus  Neck: Normal range of motion  No tracheal deviation present  No thyromegaly present  Cardiovascular: Normal rate, regular rhythm and normal heart sounds  Exam reveals no gallop and no friction rub  No murmur heard  Pulmonary/Chest: Effort normal and breath sounds normal  No respiratory distress  She has no wheezes  Abdominal: Soft  Bowel sounds are normal  She exhibits no mass  There is no tenderness  There is no guarding     Musculoskeletal: She exhibits no edema or deformity  Lymphadenopathy:     She has no cervical adenopathy  Neurological: She is alert and oriented to person, place, and time  No cranial nerve deficit  Skin: Skin is warm and dry  Rash noted  She is not diaphoretic  No erythema  Psychiatric: She has a normal mood and affect   Thought content normal

## 2018-11-27 ENCOUNTER — OFFICE VISIT (OUTPATIENT)
Dept: PHYSICAL THERAPY | Facility: CLINIC | Age: 58
End: 2018-11-27
Payer: COMMERCIAL

## 2018-11-27 DIAGNOSIS — Z98.890 H/O HAND SURGERY: Primary | ICD-10-CM

## 2018-11-27 PROCEDURE — 97010 HOT OR COLD PACKS THERAPY: CPT

## 2018-11-27 PROCEDURE — G8988 SELF CARE GOAL STATUS: HCPCS | Performed by: PHYSICAL THERAPIST

## 2018-11-27 PROCEDURE — 97110 THERAPEUTIC EXERCISES: CPT

## 2018-11-27 PROCEDURE — 97112 NEUROMUSCULAR REEDUCATION: CPT

## 2018-11-27 PROCEDURE — 97035 APP MDLTY 1+ULTRASOUND EA 15: CPT

## 2018-11-27 PROCEDURE — G8987 SELF CARE CURRENT STATUS: HCPCS | Performed by: PHYSICAL THERAPIST

## 2018-11-27 PROCEDURE — 97140 MANUAL THERAPY 1/> REGIONS: CPT

## 2018-11-27 NOTE — PROGRESS NOTES
Daily Note     Today's date: 2018  Patient name: Spike Trevino  : 1960  MRN: [id number]  Referring provider: Christiano Garcia PA-C  Dx:   Encounter Diagnosis     ICD-10-CM    1  H/O hand surgery Z98 890                   Subjective: Pt reports increased soreness and shooting pain along thumb and ulnar side wrist  Pt does not use her hard brace but will attempt usage especially at night  Objective: See treatment diary below     AROM left wrist E/F- /; Thumb MP- 055; IP- 0; opposition -1 0 cm  Strength  II- ; 3jc - ; key - 8/10  Inspection- pt wearing her brace at work- removes at home for ADL  Circumference at wrist- 16 0 cm;; MPs- 17 0 cm;  Thumb P1- 5 8 cm  Sensation- intact to LT     Manual  10/25  10/30  11/8  11/27               STM 15  15  15  15                                                                                       tubigrip E                           Exercise Diary  10/25  10/30  11/8  11/27               Wrist AROM 2/5 2/5  2/5  2/5               Thumb MP/IP 2/5 2/5  2/5  2/5                Circumduction  10/10 10/10  10/10  10/10                ABD  10/10 10/10  10/10  10/10                Tip-Tip  5x 5x  5x  5x                TP   Y 2' Y 2'  Y 2'  Y 2'                5 Finger  Y 2/10 Y 2/10  Y 2/10  Y 2/10                Varigrip  MP/IP 2/10 MP/IP  2/10 MP/IP 2/10   MP/IP 2/10                DB E/F  4 2/10 4 2/10  4 2/10   4 2/10                Twister  20/20 20/20  20/20   20/20                Flex bar  R 2/10 R 2/10  R 2/10   R 2/10                Arvin (ulnar) 15 2/10 25 210  25 2/10   25 210                Blue  III 2/10 III 2/10  III 2/10   III 2/10                Zottman's   4 2/10  4 2/10   4 2/10                                                                                                                                                                     Modalities  10/25  10/30  11/8  11/27               HP/biphasic 15  15  15  15               US 3 mz 12  12  12  12               CP 15  15  15  15                       Assessment: Tolerated treatment well  Pt advised to elroy her hard brace especially in the pm  Pt performed all activities with modification but no significant complaints    Patient would benefit from continued PT        Plan: Continue per plan of care

## 2018-11-28 ENCOUNTER — TRANSCRIBE ORDERS (OUTPATIENT)
Dept: PHYSICAL THERAPY | Facility: CLINIC | Age: 58
End: 2018-11-28

## 2018-11-28 NOTE — PROGRESS NOTES
PT Re-Evaluation     Today's date: 2018  Patient name: Aaliyah Connor  : 1960  MRN: [id number]  Referring provider: Hank Messina PA-C  Dx:   Encounter Diagnosis     ICD-10-CM    1  H/O hand surgery Z98 890        Start Time: 0  Stop Time: 1600  Total time in clinic (min): 90 minutes    Assessment  Assessment details: Pt is a 65 YO female presenting to PT with pain, decreased AROM, strength and tolerance to activity  Pt would benefit from skilled intervention to adddress these issues and maximize overall function  Occupation-manager of high school cafeteria  Pt will RTW no lifting, repetitive duty  Dominant- right; Involved-left  Pt is has continued AROM  and light functional strengthening  She wears her comfort cool at work and for ADL support  Pt has notes discomfor with activities required at work, especially repetitive lifting tasks  She was instructed to use braces for support as needed  Goals  ST  Decrease pain to 3-4 in 4 weeks            2  Decrease swelling            3  [facility]OM to [facility] in 8 weeks            4  Maintain clean wound and promote healing            5   Provide orthotic for protection  LT  Increase functional motion and strength for independence with ADL and self care by DC            2   Ability to RTW and recreational activity by DC    Plan  Frequency: 2x week  Duration in weeks: 4  Treatment plan discussed with: patient        Subjective Evaluation    History of Present Illness  Date of surgery: 2018  Mechanism of injury: Progressive worsening of pain, instability and inability to use her hand functionally  Pain  Current pain ratin  At best pain ratin  At worst pain ratin  Location: left thumb and hand    Treatments  Current treatment: physical therapy  Patient Goals  Patient goals for therapy: decreased edema, decreased pain, increased motion, increased strength and independence with ADLs/IADLs          Objective     General Comments     Wrist/Hand Comments  AROM left wrist E/F- 70/52; Thumb MP- 0/55; IP- 0/70; opposition (-0 5 cm)  Strength  II- 35/40; 3jc - 7/10; key - 7/11  Inspection- pt wearing her brace at work- removes at home for ADL  Circumference at wrist- 16 0 cm;; MPs- 17 0 cm;  Thumb P1- 5 8 cm  Sensation- intact to LT         Flowsheet Rows      Most Recent Value   PT/OT G-Codes   Current Score  psfs-37   Projected Score  psfs-20   FOTO information reviewed  N/A   Assessment Type  Re-evaluation   G code set  Self-Care   Self Care Current Status ()  CJ   Self Care Goal Status ()  CJ          Precautions: use orthosis for protection    Daily Treatment Diary     Manual  10/25  10/30  11/8  11/27               STM 15  15  15  15                                                                                       tubigrip E                           Exercise Diary  10/25  10/30  11/8  11/27               Wrist AROM 2/5 2/5  2/5  2/5               Thumb MP/IP 2/5 2/5  2/5  2/5                Circumduction  10/10 10/10  10/10  10/10                ABD  10/10 10/10  10/10  10/10                Tip-Tip  5x 5x  5x  5x                TP   Y 2' Y 2'  Y 2'  Y 2'                5 Finger  Y 2/10 Y 2/10  Y 2/10  Y 2/10                Varigrip  MP/IP 2/10 MP/IP  2/10 MP/IP 2/10   MP/IP 2/10                DB E/F  4 2/10 4 2/10  4 2/10   4 2/10                Twister  20/20 20/20  20/20   20/20                Flex bar  R 2/10 R 2/10  R 2/10   R 2/10                Arvin (ulnar) 15 2/10 25 2/10  25 2/10   25 2/10                Blue  III 2/10 III 2/10  III 2/10   III 2/10                Zottman's   4 2/10  4 2/10   4 2/10                                                                                                                                                                     Modalities  10/25  10/30  11/8  11/27               HP/biphasic 15  15  15  15               US 3 mz 12  12  12  12               CP 15  15  15  15

## 2018-11-29 ENCOUNTER — TELEPHONE (OUTPATIENT)
Dept: FAMILY MEDICINE CLINIC | Facility: CLINIC | Age: 58
End: 2018-11-29

## 2018-11-29 DIAGNOSIS — J01.01 ACUTE RECURRENT MAXILLARY SINUSITIS: Primary | ICD-10-CM

## 2018-11-29 RX ORDER — AMOXICILLIN AND CLAVULANATE POTASSIUM 875; 125 MG/1; MG/1
1 TABLET, FILM COATED ORAL EVERY 12 HOURS SCHEDULED
Qty: 14 TABLET | Refills: 0 | Status: SHIPPED | OUTPATIENT
Start: 2018-11-29 | End: 2018-12-06

## 2018-11-29 NOTE — TELEPHONE ENCOUNTER
CC: UTI - Burning with urination - bladder pressure    Asking for antibiotic    NKDA    Pharm: [facility]

## 2018-12-04 ENCOUNTER — OFFICE VISIT (OUTPATIENT)
Dept: PHYSICAL THERAPY | Facility: CLINIC | Age: 58
End: 2018-12-04
Payer: COMMERCIAL

## 2018-12-04 DIAGNOSIS — Z98.890 H/O HAND SURGERY: Primary | ICD-10-CM

## 2018-12-04 PROCEDURE — 97110 THERAPEUTIC EXERCISES: CPT

## 2018-12-04 PROCEDURE — 97140 MANUAL THERAPY 1/> REGIONS: CPT

## 2018-12-04 PROCEDURE — 97035 APP MDLTY 1+ULTRASOUND EA 15: CPT

## 2018-12-04 PROCEDURE — 97112 NEUROMUSCULAR REEDUCATION: CPT

## 2018-12-04 PROCEDURE — 97010 HOT OR COLD PACKS THERAPY: CPT

## 2018-12-04 NOTE — PROGRESS NOTES
Daily Note     Today's date: 2018  Patient name: Aaliyah Connor  : 1960  MRN: [id number]  Referring provider: Hank Messina PA-C  Dx:   Encounter Diagnosis     ICD-10-CM    1  H/O hand surgery Z98 890                Subjective: Pt reports continued soreness in her thumb  "I am really using it at work and home  Pt uses her hard cast more often to possibly avoid aggravation  "      Objective:AROM left wrist E/F- 70/52; Thumb MP- 0/55; IP- 0/70; opposition -1 0 cm  Strength  II- 25/40; 3jc - ; key - 8/10  Inspection- pt wearing her brace at work- removes at home for ADL  Circumference at wrist- 16 0 cm;; MPs- 17 0 cm; Thumb P1- 5 8 cm  Sensation- intact to LT      Manual                       STM 15                                                                                             tubigrip                            Exercise Diary                       Wrist AROM 2/5                     Thumb MP/IP 2/5                      Circumduction  10/10                      ABD  10/10                      Tip-Tip  5x                      TP   Y 2'                      5 Finger  Y 2/10                      Varigrip  MP/IP 2/10                      DB E/F  4 2/10                      Twister  20/20                      Flex bar  R 2/10                      Arvin (ulnar) 25 2/10                      Blue  III 2/10                      Zottmans  4# 2/10                                                                                                                                                                           Modalities                       HP/biphasic 15                     US 3 mz 12                     CP 15                               Assessment: Tolerated treatment well today   No complaints during session with exercises  Plan: Progress treatment as tolerated

## 2018-12-06 ENCOUNTER — OFFICE VISIT (OUTPATIENT)
Dept: PHYSICAL THERAPY | Facility: CLINIC | Age: 58
End: 2018-12-06
Payer: COMMERCIAL

## 2018-12-06 DIAGNOSIS — Z98.890 H/O HAND SURGERY: Primary | ICD-10-CM

## 2018-12-06 PROCEDURE — 97140 MANUAL THERAPY 1/> REGIONS: CPT

## 2018-12-06 PROCEDURE — 97035 APP MDLTY 1+ULTRASOUND EA 15: CPT

## 2018-12-06 PROCEDURE — 97110 THERAPEUTIC EXERCISES: CPT

## 2018-12-06 PROCEDURE — 97112 NEUROMUSCULAR REEDUCATION: CPT

## 2018-12-06 NOTE — PROGRESS NOTES
Daily Note     Today's date: 2018  Patient name: Spenser Crump  : 1960  MRN: [id number]  Referring provider: Ted Cole PA-C  Dx:   Encounter Diagnosis     ICD-10-CM    1  H/O hand surgery Z98 890                   Subjective: Pt admits to continued soreness "probably due to my workflow"      Objective:AROM left wrist E/F- 70/52; Thumb MP- 0/55; IP- 0/70; opposition -1 0 cm  Strength  II- 2540; 3jc - ; key - 8/10  Inspection- pt wearing her brace at work- removes at home for ADL  Circumference at wrist- 16 0 cm;; MPs- 17 0 cm; Thumb P1- 5 8 cm  Sensation- intact to LT      Manual                     STM 15                                                                                             tubigrip                             Exercise Diary                     Wrist AROM 2/5  2/5                   Thumb MP/IP 2/5  2/5                    Circumduction  10/10  10/10                    ABD  10/10  10/10                    Tip-Tip  5x  5x                    TP   Y 2'  Y 2'                    5 Finger  Y 2/10  Y 2/10                    Varigrip  MP/IP 2/10  MP/IP 2/10                    DB E/F  4 2/10  4 2/10                    Twister  20/20  20/20                    Flex bar  R 2/10  R 2/10                    Arvin (ulnar) 25 2/10  25 2/10                    Blue  III 2/10  III 2/10                    Zottmans  4# 2/10  4# 2/10                                                                                                                                                                         Modalities                     HP/biphasic 15  15                   US 3 mz 12  12                   CP 15  15                                   Assessment: Tolerated treatment well  No progresses today due to soreness but continued with modalities for symptom control t     Plan: Progress treatment as tolerated

## 2018-12-13 ENCOUNTER — APPOINTMENT (OUTPATIENT)
Dept: PHYSICAL THERAPY | Facility: CLINIC | Age: 58
End: 2018-12-13
Payer: COMMERCIAL

## 2018-12-17 ENCOUNTER — OFFICE VISIT (OUTPATIENT)
Dept: PHYSICAL THERAPY | Facility: CLINIC | Age: 58
End: 2018-12-17
Payer: COMMERCIAL

## 2018-12-17 DIAGNOSIS — Z98.890 H/O HAND SURGERY: Primary | ICD-10-CM

## 2018-12-17 PROCEDURE — 97035 APP MDLTY 1+ULTRASOUND EA 15: CPT

## 2018-12-17 PROCEDURE — 97110 THERAPEUTIC EXERCISES: CPT

## 2018-12-17 PROCEDURE — 97112 NEUROMUSCULAR REEDUCATION: CPT

## 2018-12-17 PROCEDURE — 97010 HOT OR COLD PACKS THERAPY: CPT

## 2018-12-17 PROCEDURE — 97140 MANUAL THERAPY 1/> REGIONS: CPT

## 2018-12-17 NOTE — PROGRESS NOTES
Daily Note     Today's date: 2018  Patient name: Anca Crowley  : 1960  MRN: [id number]  Referring provider: Celia Ortiz PA-C  Dx:   Encounter Diagnosis     ICD-10-CM    1  H/O hand surgery Z98 890                   Subjective: Pt reports she had a fall but her thumb appears to be ok  "My thumb actually feels pretty good today"      Objective:AROM left wrist E/F- 70/52; Thumb MP- 055; IP- 070; opposition -1 0 cm  Strength  II- 25; 3jc - ; key - 8/10  Inspection- pt wearing her brace at work- removes at home for ADL  Circumference at wrist- 16 0 cm;; MPs- 17 0 cm; Thumb P1- 5 8 cm  Sensation- intact to LT      Manual                   STM 15    15                                                                                         tubigrip                             Exercise Diary                   Wrist AROM 2/5  2/5  HEP                 Thumb MP/IP 2/5  2/5  HEP                  Circumduction  10/10  10/10  HEP                  ABD  10/10  1010  HEP                  Tip-Tip  5x  5x  HEP                  TP   Y 2'  Y 2'  Y 2'                  5 Finger  Y 2/10  Y 2/10  y 2/10                  Varigrip  MP/IP 2/10  MP/IP 2/10  MP/IP 2/10                  DB E/F  4 2/10  4 2/10  4 2/10                  Twister  20/20  20/20  20/20                  Flex bar  R 2/10  R 2/10  R 2/10                  Arvin (ulnar) 25 2/10  25 2/10  25 2/10                  Blue  III 2/10  III 2/10  III 2/10                  Zottmans  4# 2/10  4# 2/10  4# 2/10                                                                                                                                                                       Modalities                   HP/biphasic 15  15  15                 US 3 mz 12  12  12                 CP 15  15  15                                   Assessment: Tolerated treatment well   Session went well without increase in symptoms       Plan: Progress treatment as tolerated

## 2018-12-18 ENCOUNTER — APPOINTMENT (OUTPATIENT)
Dept: RADIOLOGY | Facility: MEDICAL CENTER | Age: 58
End: 2018-12-18
Payer: OTHER MISCELLANEOUS

## 2018-12-18 ENCOUNTER — OFFICE VISIT (OUTPATIENT)
Dept: FAMILY MEDICINE CLINIC | Facility: CLINIC | Age: 58
End: 2018-12-18
Payer: OTHER MISCELLANEOUS

## 2018-12-18 VITALS
DIASTOLIC BLOOD PRESSURE: 88 MMHG | HEIGHT: 64 IN | BODY MASS INDEX: 28.61 KG/M2 | SYSTOLIC BLOOD PRESSURE: 140 MMHG | WEIGHT: 167.6 LBS

## 2018-12-18 DIAGNOSIS — S80.01XA CONTUSION OF RIGHT KNEE, INITIAL ENCOUNTER: ICD-10-CM

## 2018-12-18 DIAGNOSIS — S80.01XA CONTUSION OF RIGHT KNEE, INITIAL ENCOUNTER: Primary | ICD-10-CM

## 2018-12-18 DIAGNOSIS — S60.221A CONTUSION OF RIGHT HAND, INITIAL ENCOUNTER: ICD-10-CM

## 2018-12-18 DIAGNOSIS — S40.011A CONTUSION OF RIGHT SHOULDER, INITIAL ENCOUNTER: ICD-10-CM

## 2018-12-18 PROCEDURE — 73030 X-RAY EXAM OF SHOULDER: CPT

## 2018-12-18 PROCEDURE — 73564 X-RAY EXAM KNEE 4 OR MORE: CPT

## 2018-12-18 PROCEDURE — 99214 OFFICE O/P EST MOD 30 MIN: CPT | Performed by: FAMILY MEDICINE

## 2018-12-18 PROCEDURE — 73130 X-RAY EXAM OF HAND: CPT

## 2018-12-18 NOTE — PROGRESS NOTES
Assessment/Plan:  X-rays of involved areas have been ordered and once results have been obtained further therapy will be rendered patient will use ibuprofen at the present time for anti inflammatory she will rest and ice the appropriate areas    No problem-specific Assessment & Plan notes found for this encounter  Diagnoses and all orders for this visit:    Contusion of right knee, initial encounter  -     XR knee 4+ vw right injury; Future    Contusion of right shoulder, initial encounter  -     XR shoulder 2+ vw right; Future    Contusion of right hand, initial encounter  -     XR hand 3+ vw right; Future          Subjective:      Patient ID: Dagmar Grullon is a 66 y o  female  Leeann Ortiz is here today she yesterday she was at work she stepped on a mat which was on a wet floor and it totally when out from underneath her she fell forward and landed on her knees and her hands them blunt of the trauma was taken by the right knee the right shoulder and the right hand and she protected herself on the left side because she has a a prior injury there        The following portions of the patient's history were reviewed and updated as appropriate:   She  has a past medical history of Hypertension; Intracranial aneurysm; and Reactive depression  She   Patient Active Problem List    Diagnosis Date Noted    [facility] DJD(carpometacarpal degenerative joint disease), localized primary, left 07/17/2018    Primary osteoarthritis of first carpometacarpal joint of left hand 12/19/2017    Trigger middle finger of right hand 12/19/2017    Low back pain with right-sided sciatica 11/17/2017    Primary osteoarthritis of both knees 01/03/2017    Hypertension 10/23/2012     She  has a past surgical history that includes Thyroid surgery and pr repair intercarp/carp-metacarp jt (Left, 7/30/2018)    Her family history includes Arthritis in her father and mother; Cancer in her father; Heart disease in her mother; Lymphoma in her father  She  reports that she has never smoked  She has never used smokeless tobacco  She reports that she drinks alcohol  She reports that she does not use drugs  Current Outpatient Prescriptions   Medication Sig Dispense Refill    Ascorbic Acid (VITAMIN C) 250 MG CHEW Chew 1 tablet daily      Calcium 600 MG tablet Take 2 tablets by mouth daily      Cholecalciferol (VITAMIN D3) 1000 units CAPS Take by mouth      hydrochlorothiazide (HYDRODIURIL) 12 5 mg tablet Take 1 tablet (12 5 mg total) by mouth daily 90 tablet 1    metoprolol tartrate (LOPRESSOR) 25 mg tablet Take 1 tablet (25 mg total) by mouth daily 90 tablet 1    Multiple Vitamin (DAILY VALUE MULTIVITAMIN PO) Take 1 tablet by mouth daily      nystatin (MYCOSTATIN) powder        No current facility-administered medications for this visit  Current Outpatient Prescriptions on File Prior to Visit   Medication Sig    Ascorbic Acid (VITAMIN C) 250 MG CHEW Chew 1 tablet daily    Calcium 600 MG tablet Take 2 tablets by mouth daily    Cholecalciferol (VITAMIN D3) 1000 units CAPS Take by mouth    hydrochlorothiazide (HYDRODIURIL) 12 5 mg tablet Take 1 tablet (12 5 mg total) by mouth daily    metoprolol tartrate (LOPRESSOR) 25 mg tablet Take 1 tablet (25 mg total) by mouth daily    Multiple Vitamin (DAILY VALUE MULTIVITAMIN PO) Take 1 tablet by mouth daily    nystatin (MYCOSTATIN) powder     [DISCONTINUED] valACYclovir (VALTREX) 1,000 mg tablet Take 1 tablet (1,000 mg total) by mouth 3 (three) times a day for 10 days     No current facility-administered medications on file prior to visit  She has No Known Allergies       Review of Systems   Constitutional: Negative for activity change, appetite change, diaphoresis, fatigue and fever  HENT: Negative  Eyes: Negative  Respiratory: Negative for apnea, cough, chest tightness, shortness of breath and wheezing  Cardiovascular: Negative for chest pain, palpitations and leg swelling  Gastrointestinal: Negative for abdominal distention, abdominal pain, anal bleeding, constipation, diarrhea, nausea and vomiting  Endocrine: Negative for cold intolerance, heat intolerance, polydipsia, polyphagia and polyuria  Genitourinary: Negative for difficulty urinating, dysuria, flank pain, hematuria and urgency  Musculoskeletal: Positive for joint swelling  Negative for arthralgias, back pain, gait problem and myalgias  Pain with palpation of the right knee Quill joint effusion and soft specific tenderness on the medial side of the patella   Skin: Negative for color change, rash and wound  Allergic/Immunologic: Negative for environmental allergies, food allergies and immunocompromised state  Neurological: Negative for dizziness, seizures, syncope, speech difficulty, numbness and headaches  Hematological: Negative for adenopathy  Does not bruise/bleed easily  Psychiatric/Behavioral: Negative for agitation, behavioral problems, hallucinations, sleep disturbance and suicidal ideas  Objective:      /88 (BP Location: Left arm, Patient Position: Sitting, Cuff Size: Standard)   Ht 5' 4" (1 626 m)   Wt 76 kg (167 lb 9 6 oz)   BMI 28 77 kg/m²          Physical Exam   Constitutional: She is oriented to person, place, and time  She appears well-developed and well-nourished  No distress  HENT:   Head: Normocephalic  Right Ear: External ear normal    Left Ear: External ear normal    Nose: Nose normal    Mouth/Throat: Oropharynx is clear and moist    Eyes: Pupils are equal, round, and reactive to light  Conjunctivae and EOM are normal  Right eye exhibits no discharge  Left eye exhibits no discharge  No scleral icterus  Neck: Normal range of motion  No tracheal deviation present  No thyromegaly present  Cardiovascular: Normal rate, regular rhythm and normal heart sounds  Exam reveals no gallop and no friction rub  No murmur heard    Pulmonary/Chest: Effort normal and breath sounds normal  No respiratory distress  She has no wheezes  Abdominal: Soft  Bowel sounds are normal  She exhibits no mass  There is no tenderness  There is no guarding  Musculoskeletal: She exhibits tenderness  She exhibits no edema or deformity  Pain with palpation of the lateral right shoulder in the area of the subdeltoid bursa also pain in the right knee with palpation and patient has a Quill joint effusion she has particular tenderness along the medial aspect of the patella   Lymphadenopathy:     She has no cervical adenopathy  Neurological: She is alert and oriented to person, place, and time  No cranial nerve deficit  Skin: Skin is warm and dry  No rash noted  She is not diaphoretic  No erythema  Psychiatric: She has a normal mood and affect   Thought content normal

## 2018-12-18 NOTE — LETTER
December 18, 2018     Patient: Ravi Mays   YOB: 1960   Date of Visit: 12/18/2018       To Whom it May Concern:    Samuel Vela is under my professional care  She was seen in my office on 12/18/2018  She may return to work on December 19th of 2 restricted duties she will have a 10 lb lifting restriction and if required to stand should be limited in standing due to right knee contusion  If you have any questions or concerns, please don't hesitate to call           Sincerely,          Gay Katz DO        CC: No Recipients

## 2018-12-20 NOTE — PROGRESS NOTES
Call patient to notify normal results no fracture of right shoulder again patient does have moderately severe arthritis

## 2018-12-20 NOTE — PROGRESS NOTES
Call patient to notify normal results no fracture of right wrist or hand patient does have moderately severe osteoarthritis of the wrist joint

## 2018-12-20 NOTE — PROGRESS NOTES
Call patient to notify normal results no fracture of right knee patient does have moderately severe arthritis

## 2019-01-02 ENCOUNTER — TELEPHONE (OUTPATIENT)
Dept: FAMILY MEDICINE CLINIC | Facility: CLINIC | Age: 59
End: 2019-01-02

## 2019-01-02 NOTE — TELEPHONE ENCOUNTER
She was here in mid December and fell at work and had a note with restrictions due to knee contusion    She now needs a note to return without any limitations

## 2019-01-02 NOTE — LETTER
January 2, 2019     Patient: Jyothi Capone   YOB: 1960           To Whom it May Concern:    Justus Ko is under my professional care    She may return to work on 01/02/2019 with out any restrictions  If you have any questions or concerns, please don't hesitate to call           Sincerely,          Nya ARAUJO       CC: No Recipients

## 2019-01-09 DIAGNOSIS — I10 ESSENTIAL HYPERTENSION: ICD-10-CM

## 2019-01-18 DIAGNOSIS — I10 HYPERTENSION, UNSPECIFIED TYPE: ICD-10-CM

## 2019-01-18 DIAGNOSIS — M53.86 SCIATICA ASSOCIATED WITH DISORDER OF LUMBAR SPINE: Primary | ICD-10-CM

## 2019-01-18 RX ORDER — HYDROCHLOROTHIAZIDE 12.5 MG/1
TABLET ORAL
Qty: 90 TABLET | Refills: 1 | Status: SHIPPED | OUTPATIENT
Start: 2019-01-18 | End: 2019-07-17 | Stop reason: SDUPTHER

## 2019-01-18 RX ORDER — GABAPENTIN 100 MG/1
CAPSULE ORAL
Qty: 60 CAPSULE | Refills: 0 | Status: SHIPPED | OUTPATIENT
Start: 2019-01-18 | End: 2019-02-08 | Stop reason: ALTCHOICE

## 2019-01-18 NOTE — TELEPHONE ENCOUNTER
Requesting something for her sciatic, she knows she is full of arthritis and has an appt with her arthritis doctor on march 15th     She is not sleeping at night

## 2019-02-08 ENCOUNTER — OFFICE VISIT (OUTPATIENT)
Dept: FAMILY MEDICINE CLINIC | Facility: CLINIC | Age: 59
End: 2019-02-08
Payer: COMMERCIAL

## 2019-02-08 ENCOUNTER — APPOINTMENT (OUTPATIENT)
Dept: RADIOLOGY | Facility: MEDICAL CENTER | Age: 59
End: 2019-02-08
Payer: COMMERCIAL

## 2019-02-08 VITALS
SYSTOLIC BLOOD PRESSURE: 138 MMHG | BODY MASS INDEX: 28.17 KG/M2 | WEIGHT: 165 LBS | HEIGHT: 64 IN | DIASTOLIC BLOOD PRESSURE: 84 MMHG

## 2019-02-08 DIAGNOSIS — G89.29 CHRONIC RIGHT-SIDED LOW BACK PAIN WITH RIGHT-SIDED SCIATICA: ICD-10-CM

## 2019-02-08 DIAGNOSIS — G89.29 CHRONIC RIGHT-SIDED LOW BACK PAIN WITH RIGHT-SIDED SCIATICA: Primary | ICD-10-CM

## 2019-02-08 DIAGNOSIS — M54.41 CHRONIC RIGHT-SIDED LOW BACK PAIN WITH RIGHT-SIDED SCIATICA: Primary | ICD-10-CM

## 2019-02-08 DIAGNOSIS — M54.41 CHRONIC RIGHT-SIDED LOW BACK PAIN WITH RIGHT-SIDED SCIATICA: ICD-10-CM

## 2019-02-08 PROCEDURE — 99213 OFFICE O/P EST LOW 20 MIN: CPT | Performed by: FAMILY MEDICINE

## 2019-02-08 PROCEDURE — 72114 X-RAY EXAM L-S SPINE BENDING: CPT

## 2019-02-08 PROCEDURE — 73502 X-RAY EXAM HIP UNI 2-3 VIEWS: CPT

## 2019-02-08 RX ORDER — GABAPENTIN 300 MG/1
600 CAPSULE ORAL 3 TIMES DAILY
Qty: 60 CAPSULE | Refills: 3 | Status: SHIPPED | OUTPATIENT
Start: 2019-02-08 | End: 2019-12-04 | Stop reason: ALTCHOICE

## 2019-02-08 NOTE — PROGRESS NOTES
Assessment/Plan:    No problem-specific Assessment & Plan notes found for this encounter  Diagnoses and all orders for this visit:    Chronic right-sided low back pain with right-sided sciatica  -     XR spine lumbar complete w bending minimum 6 views; Future  -     XR hip/pelv 2-3 vws right if performed; Future  -     gabapentin (NEURONTIN) 300 mg capsule; Take 2 capsules (600 mg total) by mouth 3 (three) times a day  -     MRI lumbar spine wo contrast; Future          Subjective:      Patient ID: Mara Fernandez is a 66 y o  female  Patient is having worsening of her right-sided sciatica and her right sided lumbar pain is currently on 200 mg daily of gabapentin not getting relief        The following portions of the patient's history were reviewed and updated as appropriate:   She  has a past medical history of Hypertension; Intracranial aneurysm; and Reactive depression  She   Patient Active Problem List    Diagnosis Date Noted    [facility] DJD(carpometacarpal degenerative joint disease), localized primary, left 07/17/2018    Primary osteoarthritis of first carpometacarpal joint of left hand 12/19/2017    Trigger middle finger of right hand 12/19/2017    Low back pain with right-sided sciatica 11/17/2017    Primary osteoarthritis of both knees 01/03/2017    Hypertension 10/23/2012     She  has a past surgical history that includes Thyroid surgery and pr repair intercarp/carp-metacarp jt (Left, 7/30/2018)  Her family history includes Arthritis in her father and mother; Cancer in her father; Heart disease in her mother; Lymphoma in her father  She  reports that she has never smoked  She has never used smokeless tobacco  She reports that she drinks alcohol  She reports that she does not use drugs    Current Outpatient Prescriptions   Medication Sig Dispense Refill    Ascorbic Acid (VITAMIN C) 250 MG CHEW Chew 1 tablet daily      Calcium 600 MG tablet Take 2 tablets by mouth daily      Cholecalciferol (VITAMIN D3) 1000 units CAPS Take by mouth      hydrochlorothiazide (HYDRODIURIL) 12 5 mg tablet TAKE 1 TABLET DAILY 90 tablet 1    metoprolol tartrate (LOPRESSOR) 25 mg tablet TAKE 1 TABLET DAILY 90 tablet 1    Multiple Vitamin (DAILY VALUE MULTIVITAMIN PO) Take 1 tablet by mouth daily      nystatin (MYCOSTATIN) powder       gabapentin (NEURONTIN) 300 mg capsule Take 2 capsules (600 mg total) by mouth 3 (three) times a day 60 capsule 3     No current facility-administered medications for this visit  Current Outpatient Prescriptions on File Prior to Visit   Medication Sig    Ascorbic Acid (VITAMIN C) 250 MG CHEW Chew 1 tablet daily    Calcium 600 MG tablet Take 2 tablets by mouth daily    Cholecalciferol (VITAMIN D3) 1000 units CAPS Take by mouth    hydrochlorothiazide (HYDRODIURIL) 12 5 mg tablet TAKE 1 TABLET DAILY    metoprolol tartrate (LOPRESSOR) 25 mg tablet TAKE 1 TABLET DAILY    Multiple Vitamin (DAILY VALUE MULTIVITAMIN PO) Take 1 tablet by mouth daily    nystatin (MYCOSTATIN) powder     [DISCONTINUED] gabapentin (NEURONTIN) 100 mg capsule 1 at bedtime for the 1st week then increase to 2 at bedtime     No current facility-administered medications on file prior to visit  She has No Known Allergies       Review of Systems   Constitutional: Positive for activity change  Negative for appetite change, diaphoresis, fatigue and fever  HENT: Negative  Eyes: Negative  Respiratory: Negative for apnea, cough, chest tightness, shortness of breath and wheezing  Cardiovascular: Negative for chest pain, palpitations and leg swelling  Gastrointestinal: Negative for abdominal distention, abdominal pain, anal bleeding, constipation, diarrhea, nausea and vomiting  Endocrine: Negative for cold intolerance, heat intolerance, polydipsia, polyphagia and polyuria  Genitourinary: Negative for difficulty urinating, dysuria, flank pain, hematuria and urgency     Musculoskeletal: Positive for arthralgias and back pain  Negative for gait problem, joint swelling and myalgias  Skin: Negative for color change, rash and wound  Allergic/Immunologic: Negative for environmental allergies, food allergies and immunocompromised state  Neurological: Negative for dizziness, seizures, syncope, speech difficulty, numbness and headaches  Right-sided sciatica   Hematological: Negative for adenopathy  Does not bruise/bleed easily  Psychiatric/Behavioral: Negative for agitation, behavioral problems, hallucinations, sleep disturbance and suicidal ideas  Objective:      /84 (BP Location: Left arm, Patient Position: Sitting, Cuff Size: Standard)   Ht 5' 4" (1 626 m)   Wt 74 8 kg (165 lb)   BMI 28 32 kg/m²          Physical Exam   Constitutional: She is oriented to person, place, and time  She appears well-developed and well-nourished  No distress  HENT:   Head: Normocephalic  Right Ear: External ear normal    Left Ear: External ear normal    Nose: Nose normal    Mouth/Throat: Oropharynx is clear and moist    Eyes: Pupils are equal, round, and reactive to light  Conjunctivae and EOM are normal  Right eye exhibits no discharge  Left eye exhibits no discharge  No scleral icterus  Neck: Normal range of motion  No tracheal deviation present  No thyromegaly present  Cardiovascular: Normal rate, regular rhythm and normal heart sounds  Exam reveals no gallop and no friction rub  No murmur heard  Pulmonary/Chest: Effort normal and breath sounds normal  No respiratory distress  She has no wheezes  Abdominal: Soft  Bowel sounds are normal  She exhibits no mass  There is no tenderness  There is no guarding  Musculoskeletal: She exhibits no edema or deformity  Pain with palpation of the paravertebral area to the right of the lower lumbar spine and with palpation of the sacroiliac area   Lymphadenopathy:     She has no cervical adenopathy     Neurological: She is alert and oriented to person, place, and time  No cranial nerve deficit  Skin: Skin is warm and dry  No rash noted  She is not diaphoretic  No erythema  Psychiatric: She has a normal mood and affect   Thought content normal

## 2019-02-12 ENCOUNTER — APPOINTMENT (OUTPATIENT)
Dept: PHYSICAL THERAPY | Facility: CLINIC | Age: 59
End: 2019-02-12
Payer: COMMERCIAL

## 2019-02-13 NOTE — RESULT ENCOUNTER NOTE
Please call the patient regarding her abnormal result    Dedicated hip x-ray does show osteoarthritis of both hips

## 2019-02-13 NOTE — RESULT ENCOUNTER NOTE
Please call the patient regarding her abnormal result    Patient has degenerative disc disease of the at several levels of the low back nothing on the hip that is of concern patient does have of a possible distended gallbladder on if she has had symptoms I would recommend a gallbladder ultrasound

## 2019-02-14 ENCOUNTER — OFFICE VISIT (OUTPATIENT)
Dept: PHYSICAL THERAPY | Facility: CLINIC | Age: 59
End: 2019-02-14
Payer: COMMERCIAL

## 2019-02-14 DIAGNOSIS — M18.12 PRIMARY OSTEOARTHRITIS OF FIRST CARPOMETACARPAL JOINT OF LEFT HAND: Primary | ICD-10-CM

## 2019-02-14 PROCEDURE — 97110 THERAPEUTIC EXERCISES: CPT | Performed by: PHYSICAL THERAPIST

## 2019-02-14 PROCEDURE — 97112 NEUROMUSCULAR REEDUCATION: CPT | Performed by: PHYSICAL THERAPIST

## 2019-02-14 PROCEDURE — 97035 APP MDLTY 1+ULTRASOUND EA 15: CPT | Performed by: PHYSICAL THERAPIST

## 2019-02-14 PROCEDURE — 97140 MANUAL THERAPY 1/> REGIONS: CPT | Performed by: PHYSICAL THERAPIST

## 2019-02-14 NOTE — PROGRESS NOTES
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  Pt will RTW no lifting, repetitive duty  Dominant- right; Involved-left  Pt is has continued AROM  and light functional strengthening  She wears her comfort cool at work and for ADL support  Pt has notes discomfor with activities required at work, especially repetitive lifting tasks  She was instructed to use braces for support as needed  Pt will be DC to HEP    Goals  ST  Decrease pain to 3-4 in 4 weeks            2  Decrease swelling            3  Increase AROM to [facility] in 8 weeks            4  Maintain clean wound and promote healing            5   Provide orthotic for protection  LT  Increase functional motion and strength for independence with ADL and self care by DC            2   Ability to RTW and recreational activity by DC    Plan  Frequency: 2x week  Duration in weeks: 4  Treatment plan discussed with: patient        Subjective Evaluation    History of Present Illness  Date of surgery: 2018  Mechanism of injury: Progressive worsening of pain, instability and inability to use her hand functionally  Pain  Current pain ratin  At best pain ratin  At worst pain ratin  Location: left thumb and hand    Treatments  Current treatment: physical therapy  Patient Goals  Patient goals for therapy: decreased edema, decreased pain, increased motion, increased strength and independence with ADLs/IADLs          Objective     General Comments:      Wrist/Hand Comments  AROM left wrist E/F- 70/52; Thumb MP- 0/55; IP- 0/70; opposition -1 0 cm  Strength  II- 30/40; 3jc - 9/10;  key - 10/11  Inspection- pt wearing her brace at work- removes at home for ADL  Circumference at wrist- 16 0 cm;; MPs- 17 0 cm;  Thumb P1- 5 8 cm  Sensation- intact to LT              Precautions: use orthosis for protection    Daily Treatment Diary       Manual  12/4 12/6 12/17 2/14               STM 15  15  15  15                                                                                       tubigrip                             Exercise Diary  12/4 12/6 12/17 2/14               Wrist AROM 2/5  2/5  HEP                 Thumb MP/IP 2/5  2/5  HEP                  Circumduction  10/10  10/10  HEP                  ABD  10/10  10/10  HEP                  Tip-Tip  5x  5x  HEP                  TP   Y 2'  Y 2'  Y 2'  Y 2'                5 Finger  Y 2/10  Y 2/10  y 2/10  Y 2/10                Varigrip  MP/IP 2/10  MP/IP 2/10  MP/IP 2/10  2/10                DB E/F  4 2/10  4 2/10  4 2/10  4 2/10                Twister  20/20  20/20  20/20  20/20                Flex bar  R 2/10  R 2/10  R 2/10  R 2/10                Arvin (ulnar) 25 2/10  25 2/10  25 2/10  25 2/10                Blue  III 2/10  III 2/10  III 2/10  III 2/10                Zottmans  4# 2/10  4# 2/10  4# 2/10  4 2/10                                                                                                                                                                     Modalities  12/4 12/6 12/17 2/14               HP/biphasic 15  15  15  15               US 3 mz 12 12 12  12               CP 15  15  15  15

## 2019-02-28 ENCOUNTER — OFFICE VISIT (OUTPATIENT)
Dept: FAMILY MEDICINE CLINIC | Facility: CLINIC | Age: 59
End: 2019-02-28
Payer: COMMERCIAL

## 2019-02-28 VITALS
HEIGHT: 64 IN | DIASTOLIC BLOOD PRESSURE: 78 MMHG | WEIGHT: 167 LBS | SYSTOLIC BLOOD PRESSURE: 128 MMHG | TEMPERATURE: 98.6 F | BODY MASS INDEX: 28.51 KG/M2

## 2019-02-28 DIAGNOSIS — J10.1 INFLUENZA A: Primary | ICD-10-CM

## 2019-02-28 PROCEDURE — 99213 OFFICE O/P EST LOW 20 MIN: CPT | Performed by: FAMILY MEDICINE

## 2019-02-28 PROCEDURE — 3008F BODY MASS INDEX DOCD: CPT | Performed by: FAMILY MEDICINE

## 2019-02-28 PROCEDURE — 1036F TOBACCO NON-USER: CPT | Performed by: FAMILY MEDICINE

## 2019-02-28 RX ORDER — OSELTAMIVIR PHOSPHATE 75 MG/1
75 CAPSULE ORAL EVERY 12 HOURS SCHEDULED
Qty: 10 CAPSULE | Refills: 0 | Status: SHIPPED | OUTPATIENT
Start: 2019-02-28 | End: 2019-03-05

## 2019-02-28 RX ORDER — AMOXICILLIN 500 MG/1
1000 CAPSULE ORAL EVERY 12 HOURS SCHEDULED
Qty: 40 CAPSULE | Refills: 0 | Status: SHIPPED | OUTPATIENT
Start: 2019-02-28 | End: 2019-03-10

## 2019-02-28 NOTE — LETTER
February 28, 2019     Patient: Mario Camejo   YOB: 1960   Date of Visit: 2/28/2019       To Whom it May Concern:    William Hancock is under my professional care  She was seen in my office on 2/28/2019  She may return to work on March 4, 2019  Patient was absent from work February 28th due to influenza       If you have any questions or concerns, please don't hesitate to call           Sincerely,          Gloria Nice, DO        CC: No Recipients

## 2019-02-28 NOTE — PROGRESS NOTES
Assessment/Plan:    No problem-specific Assessment & Plan notes found for this encounter  Diagnoses and all orders for this visit:    Influenza A  -     oseltamivir (TAMIFLU) 75 mg capsule; Take 1 capsule (75 mg total) by mouth every 12 (twelve) hours for 5 days  -     amoxicillin (AMOXIL) 500 mg capsule; Take 2 capsules (1,000 mg total) by mouth every 12 (twelve) hours for 10 days          Subjective:      Patient ID: Spike Trevino is a 66 y o  female  Caryyolanda Campoman here with what I believe is influenza she has achy muscles upper respiratory symptoms and a cough her mother who was recently hospitalized for pneumonia has been around her also      The following portions of the patient's history were reviewed and updated as appropriate: She  has a past medical history of Hypertension, Intracranial aneurysm, and Reactive depression  She   Patient Active Problem List    Diagnosis Date Noted    [facility] DJD(carpometacarpal degenerative joint disease), localized primary, left 07/17/2018    Primary osteoarthritis of first carpometacarpal joint of left hand 12/19/2017    Trigger middle finger of right hand 12/19/2017    Low back pain with right-sided sciatica 11/17/2017    Primary osteoarthritis of both knees 01/03/2017    Hypertension 10/23/2012     She  has a past surgical history that includes Thyroid surgery and pr repair intercarp/carp-metacarp jt (Left, 7/30/2018)  Her family history includes Arthritis in her father and mother; Cancer in her father; Heart disease in her mother; Lymphoma in her father  She  reports that she has never smoked  She has never used smokeless tobacco  She reports that she drinks alcohol  She reports that she does not use drugs    Current Outpatient Medications   Medication Sig Dispense Refill    Ascorbic Acid (VITAMIN C) 250 MG CHEW Chew 1 tablet daily      Calcium 600 MG tablet Take 2 tablets by mouth daily      Cholecalciferol (VITAMIN D3) 1000 units CAPS Take by mouth      gabapentin (NEURONTIN) 300 mg capsule Take 2 capsules (600 mg total) by mouth 3 (three) times a day 60 capsule 3    hydrochlorothiazide (HYDRODIURIL) 12 5 mg tablet TAKE 1 TABLET DAILY 90 tablet 1    metoprolol tartrate (LOPRESSOR) 25 mg tablet TAKE 1 TABLET DAILY 90 tablet 1    Multiple Vitamin (DAILY VALUE MULTIVITAMIN PO) Take 1 tablet by mouth daily      nystatin (MYCOSTATIN) powder       amoxicillin (AMOXIL) 500 mg capsule Take 2 capsules (1,000 mg total) by mouth every 12 (twelve) hours for 10 days 40 capsule 0    oseltamivir (TAMIFLU) 75 mg capsule Take 1 capsule (75 mg total) by mouth every 12 (twelve) hours for 5 days 10 capsule 0     No current facility-administered medications for this visit  Current Outpatient Medications on File Prior to Visit   Medication Sig    Ascorbic Acid (VITAMIN C) 250 MG CHEW Chew 1 tablet daily    Calcium 600 MG tablet Take 2 tablets by mouth daily    Cholecalciferol (VITAMIN D3) 1000 units CAPS Take by mouth    gabapentin (NEURONTIN) 300 mg capsule Take 2 capsules (600 mg total) by mouth 3 (three) times a day    hydrochlorothiazide (HYDRODIURIL) 12 5 mg tablet TAKE 1 TABLET DAILY    metoprolol tartrate (LOPRESSOR) 25 mg tablet TAKE 1 TABLET DAILY    Multiple Vitamin (DAILY VALUE MULTIVITAMIN PO) Take 1 tablet by mouth daily    nystatin (MYCOSTATIN) powder      No current facility-administered medications on file prior to visit  She has No Known Allergies       Review of Systems   Constitutional: Positive for activity change and fatigue  Negative for appetite change, diaphoresis and fever  HENT: Positive for sinus pressure, sinus pain and sore throat  Eyes: Negative  Respiratory: Positive for cough  Negative for apnea, chest tightness, shortness of breath and wheezing  Cardiovascular: Negative for chest pain, palpitations and leg swelling     Gastrointestinal: Negative for abdominal distention, abdominal pain, anal bleeding, constipation, diarrhea, nausea and vomiting  Endocrine: Negative for cold intolerance, heat intolerance, polydipsia, polyphagia and polyuria  Genitourinary: Negative for difficulty urinating, dysuria, flank pain, hematuria and urgency  Musculoskeletal: Negative for arthralgias, back pain, gait problem, joint swelling and myalgias  Skin: Negative for color change, rash and wound  Allergic/Immunologic: Negative for environmental allergies, food allergies and immunocompromised state  Neurological: Negative for dizziness, seizures, syncope, speech difficulty, numbness and headaches  Hematological: Negative for adenopathy  Does not bruise/bleed easily  Psychiatric/Behavioral: Negative for agitation, behavioral problems, hallucinations, sleep disturbance and suicidal ideas  Objective:      /78 (BP Location: Left arm, Patient Position: Sitting, Cuff Size: Standard)   Temp 98 6 °F (37 °C) (Tympanic) Comment (Src): Right Ear  Ht 5' 4" (1 626 m)   Wt 75 8 kg (167 lb)   BMI 28 67 kg/m²          Physical Exam   Constitutional: She is oriented to person, place, and time  She appears well-developed and well-nourished  No distress  HENT:   Head: Normocephalic  Right Ear: External ear normal    Left Ear: External ear normal    Mouth/Throat: Oropharyngeal exudate present  Eyes: Pupils are equal, round, and reactive to light  Conjunctivae and EOM are normal  Right eye exhibits no discharge  Left eye exhibits no discharge  No scleral icterus  Neck: Normal range of motion  No tracheal deviation present  No thyromegaly present  Cardiovascular: Normal rate, regular rhythm and normal heart sounds  Exam reveals no gallop and no friction rub  No murmur heard  Pulmonary/Chest: Effort normal  No respiratory distress  She has wheezes  Abdominal: Soft  Bowel sounds are normal  She exhibits no mass  There is no tenderness  There is no guarding  Musculoskeletal: She exhibits no edema or deformity  Lymphadenopathy:     She has no cervical adenopathy  Neurological: She is alert and oriented to person, place, and time  No cranial nerve deficit  Skin: Skin is warm and dry  No rash noted  She is not diaphoretic  No erythema  Psychiatric: She has a normal mood and affect   Thought content normal

## 2019-03-07 ENCOUNTER — TELEPHONE (OUTPATIENT)
Dept: FAMILY MEDICINE CLINIC | Facility: CLINIC | Age: 59
End: 2019-03-07

## 2019-03-07 DIAGNOSIS — M53.86 SCIATICA ASSOCIATED WITH DISORDER OF LUMBAR SPINE: ICD-10-CM

## 2019-03-07 DIAGNOSIS — M18.12 PRIMARY OSTEOARTHRITIS OF FIRST CARPOMETACARPAL JOINT OF LEFT HAND: Primary | ICD-10-CM

## 2019-03-07 NOTE — TELEPHONE ENCOUNTER
She has an appoint next Friday with dr Colton Hendrickson and they were told to bring documents from you, that you feel she needs this appt    (not sure what to print)

## 2019-03-15 ENCOUNTER — APPOINTMENT (OUTPATIENT)
Dept: LAB | Facility: MEDICAL CENTER | Age: 59
End: 2019-03-15
Payer: COMMERCIAL

## 2019-03-15 ENCOUNTER — TRANSCRIBE ORDERS (OUTPATIENT)
Dept: ADMINISTRATIVE | Facility: HOSPITAL | Age: 59
End: 2019-03-15

## 2019-03-15 DIAGNOSIS — M25.50 CHRONIC PAIN OF MULTIPLE JOINTS: ICD-10-CM

## 2019-03-15 DIAGNOSIS — G89.29 CHRONIC PAIN OF MULTIPLE JOINTS: ICD-10-CM

## 2019-03-15 DIAGNOSIS — G89.29 CHRONIC PAIN OF MULTIPLE JOINTS: Primary | ICD-10-CM

## 2019-03-15 DIAGNOSIS — M25.50 CHRONIC PAIN OF MULTIPLE JOINTS: Primary | ICD-10-CM

## 2019-03-15 LAB
ALBUMIN SERPL BCP-MCNC: 4 G/DL (ref 3.5–5)
ALP SERPL-CCNC: 89 U/L (ref 46–116)
ALT SERPL W P-5'-P-CCNC: 21 U/L (ref 12–78)
ANION GAP SERPL CALCULATED.3IONS-SCNC: 6 MMOL/L (ref 4–13)
AST SERPL W P-5'-P-CCNC: 14 U/L (ref 5–45)
BACTERIA UR QL AUTO: ABNORMAL /HPF
BASOPHILS # BLD AUTO: 0.05 THOUSANDS/ΜL (ref 0–0.1)
BASOPHILS NFR BLD AUTO: 1 % (ref 0–1)
BILIRUB SERPL-MCNC: 0.37 MG/DL (ref 0.2–1)
BILIRUB UR QL STRIP: NEGATIVE
BUN SERPL-MCNC: 15 MG/DL (ref 5–25)
CALCIUM SERPL-MCNC: 9.4 MG/DL (ref 8.3–10.1)
CHLORIDE SERPL-SCNC: 106 MMOL/L (ref 100–108)
CK SERPL-CCNC: 71 U/L (ref 26–192)
CLARITY UR: CLEAR
CO2 SERPL-SCNC: 29 MMOL/L (ref 21–32)
COLOR UR: YELLOW
CREAT SERPL-MCNC: 0.9 MG/DL (ref 0.6–1.3)
CRP SERPL QL: <3 MG/L
EOSINOPHIL # BLD AUTO: 0.14 THOUSAND/ΜL (ref 0–0.61)
EOSINOPHIL NFR BLD AUTO: 2 % (ref 0–6)
ERYTHROCYTE [DISTWIDTH] IN BLOOD BY AUTOMATED COUNT: 12.2 % (ref 11.6–15.1)
ERYTHROCYTE [SEDIMENTATION RATE] IN BLOOD: 16 MM/HOUR (ref 0–20)
GFR SERPL CREATININE-BSD FRML MDRD: 72 ML/MIN/1.73SQ M
GLUCOSE SERPL-MCNC: 94 MG/DL (ref 65–140)
GLUCOSE UR STRIP-MCNC: NEGATIVE MG/DL
HCT VFR BLD AUTO: 43 % (ref 34.8–46.1)
HGB BLD-MCNC: 14.5 G/DL (ref 11.5–15.4)
HGB UR QL STRIP.AUTO: NEGATIVE
HYALINE CASTS #/AREA URNS LPF: ABNORMAL /LPF
IMM GRANULOCYTES # BLD AUTO: 0.03 THOUSAND/UL (ref 0–0.2)
IMM GRANULOCYTES NFR BLD AUTO: 0 % (ref 0–2)
KETONES UR STRIP-MCNC: NEGATIVE MG/DL
LEUKOCYTE ESTERASE UR QL STRIP: ABNORMAL
LYMPHOCYTES # BLD AUTO: 2.02 THOUSANDS/ΜL (ref 0.6–4.47)
LYMPHOCYTES NFR BLD AUTO: 24 % (ref 14–44)
MCH RBC QN AUTO: 30.7 PG (ref 26.8–34.3)
MCHC RBC AUTO-ENTMCNC: 33.7 G/DL (ref 31.4–37.4)
MCV RBC AUTO: 91 FL (ref 82–98)
MONOCYTES # BLD AUTO: 0.45 THOUSAND/ΜL (ref 0.17–1.22)
MONOCYTES NFR BLD AUTO: 5 % (ref 4–12)
NEUTROPHILS # BLD AUTO: 5.63 THOUSANDS/ΜL (ref 1.85–7.62)
NEUTS SEG NFR BLD AUTO: 68 % (ref 43–75)
NITRITE UR QL STRIP: NEGATIVE
NON-SQ EPI CELLS URNS QL MICRO: ABNORMAL /HPF
NRBC BLD AUTO-RTO: 0 /100 WBCS
PH UR STRIP.AUTO: 5.5 [PH]
PLATELET # BLD AUTO: 318 THOUSANDS/UL (ref 149–390)
PMV BLD AUTO: 10.8 FL (ref 8.9–12.7)
POTASSIUM SERPL-SCNC: 3.2 MMOL/L (ref 3.5–5.3)
PROT SERPL-MCNC: 7.6 G/DL (ref 6.4–8.2)
PROT UR STRIP-MCNC: NEGATIVE MG/DL
RBC # BLD AUTO: 4.73 MILLION/UL (ref 3.81–5.12)
RBC #/AREA URNS AUTO: ABNORMAL /HPF
SODIUM SERPL-SCNC: 141 MMOL/L (ref 136–145)
SP GR UR STRIP.AUTO: 1.02 (ref 1–1.03)
URATE SERPL-MCNC: 6.9 MG/DL (ref 2–6.8)
UROBILINOGEN UR QL STRIP.AUTO: 0.2 E.U./DL
WBC # BLD AUTO: 8.32 THOUSAND/UL (ref 4.31–10.16)
WBC #/AREA URNS AUTO: ABNORMAL /HPF

## 2019-03-15 PROCEDURE — 85025 COMPLETE CBC W/AUTO DIFF WBC: CPT

## 2019-03-15 PROCEDURE — 86235 NUCLEAR ANTIGEN ANTIBODY: CPT

## 2019-03-15 PROCEDURE — 86430 RHEUMATOID FACTOR TEST QUAL: CPT

## 2019-03-15 PROCEDURE — 36415 COLL VENOUS BLD VENIPUNCTURE: CPT

## 2019-03-15 PROCEDURE — 86140 C-REACTIVE PROTEIN: CPT

## 2019-03-15 PROCEDURE — 87340 HEPATITIS B SURFACE AG IA: CPT

## 2019-03-15 PROCEDURE — 86803 HEPATITIS C AB TEST: CPT

## 2019-03-15 PROCEDURE — 86200 CCP ANTIBODY: CPT

## 2019-03-15 PROCEDURE — 85652 RBC SED RATE AUTOMATED: CPT

## 2019-03-15 PROCEDURE — 86618 LYME DISEASE ANTIBODY: CPT

## 2019-03-15 PROCEDURE — 84550 ASSAY OF BLOOD/URIC ACID: CPT

## 2019-03-15 PROCEDURE — 82550 ASSAY OF CK (CPK): CPT

## 2019-03-15 PROCEDURE — 81001 URINALYSIS AUTO W/SCOPE: CPT | Performed by: PHYSICIAN ASSISTANT

## 2019-03-15 PROCEDURE — 86038 ANTINUCLEAR ANTIBODIES: CPT

## 2019-03-15 PROCEDURE — 82085 ASSAY OF ALDOLASE: CPT

## 2019-03-15 PROCEDURE — 80053 COMPREHEN METABOLIC PANEL: CPT

## 2019-03-16 LAB
ENA SS-A AB SER-ACNC: <0.2 AI (ref 0–0.9)
ENA SS-B AB SER-ACNC: <0.2 AI (ref 0–0.9)
HBV SURFACE AG SER QL: NORMAL
HCV AB SER QL: NORMAL

## 2019-03-17 LAB
B BURGDOR IGG SER IA-ACNC: 0.32
B BURGDOR IGM SER IA-ACNC: 0.2
RHEUMATOID FACT SER QL LA: NEGATIVE

## 2019-03-18 LAB
CCP IGA+IGG SERPL IA-ACNC: 5 UNITS (ref 0–19)
RYE IGE QN: NEGATIVE

## 2019-03-19 LAB — ALDOLASE SERPL-CCNC: 3.9 U/L (ref 3.3–10.3)

## 2019-04-23 DIAGNOSIS — Z12.39 SCREENING FOR BREAST CANCER: Primary | ICD-10-CM

## 2019-05-06 ENCOUNTER — TELEPHONE (OUTPATIENT)
Dept: FAMILY MEDICINE CLINIC | Facility: CLINIC | Age: 59
End: 2019-05-06

## 2019-05-06 DIAGNOSIS — R93.89: Primary | ICD-10-CM

## 2019-07-08 DIAGNOSIS — I10 ESSENTIAL HYPERTENSION: ICD-10-CM

## 2019-07-17 DIAGNOSIS — I10 HYPERTENSION, UNSPECIFIED TYPE: ICD-10-CM

## 2019-07-17 RX ORDER — HYDROCHLOROTHIAZIDE 12.5 MG/1
TABLET ORAL
Qty: 90 TABLET | Refills: 1 | Status: SHIPPED | OUTPATIENT
Start: 2019-07-17 | End: 2020-01-13

## 2019-10-07 ENCOUNTER — OFFICE VISIT (OUTPATIENT)
Dept: FAMILY MEDICINE CLINIC | Facility: CLINIC | Age: 59
End: 2019-10-07
Payer: COMMERCIAL

## 2019-10-07 VITALS
DIASTOLIC BLOOD PRESSURE: 92 MMHG | SYSTOLIC BLOOD PRESSURE: 158 MMHG | WEIGHT: 169 LBS | BODY MASS INDEX: 28.85 KG/M2 | HEIGHT: 64 IN

## 2019-10-07 DIAGNOSIS — M54.16 RIGHT LUMBAR RADICULOPATHY: ICD-10-CM

## 2019-10-07 DIAGNOSIS — Z12.4 CERVICAL CANCER SCREENING: Primary | ICD-10-CM

## 2019-10-07 PROCEDURE — 99213 OFFICE O/P EST LOW 20 MIN: CPT | Performed by: FAMILY MEDICINE

## 2019-10-07 PROCEDURE — 3008F BODY MASS INDEX DOCD: CPT | Performed by: FAMILY MEDICINE

## 2019-10-07 RX ORDER — CELECOXIB 200 MG/1
200 CAPSULE ORAL DAILY
COMMUNITY
Start: 2019-09-18

## 2019-10-07 NOTE — PROGRESS NOTES
Assessment/Plan:  Patient has 5 months of unrelenting lumbar radiculopathy not responsive to chiropractic care it is affecting her ability to sleep and her activities both at work and at home patient needs further imaging which will include an MRI of the lumbar spine    Problem List Items Addressed This Visit     None      Visit Diagnoses     Cervical cancer screening    -  Primary    Relevant Orders    Ambulatory referral to Gynecology    Right lumbar radiculopathy               Diagnoses and all orders for this visit:    Cervical cancer screening  -     Ambulatory referral to Gynecology; Future    Right lumbar radiculopathy    Other orders  -     celecoxib (CeleBREX) 200 mg capsule; Take 200 mg by mouth daily         No problem-specific Assessment & Plan notes found for this encounter  Subjective:      Patient ID: Cathy Barrientos is a 64 y o  female  Mrs Shari dye is here chief complaint is a chronic right-sided lumbar radiculopathy she had x-rays done she has had 5 months of chiropractic care through Dr Hemalatha Gutiérrez with no relief in her symptoms Dr Klaus Sanchez has provided me with a letter outlining his treatment and the lack of response to same  He is recommending an MRI of the lumbar spine to determine the anatomy of the lumbar spine spine and the possibility of a herniated disc  The following portions of the patient's history were reviewed and updated as appropriate:   She has a past medical history of Hypertension, Intracranial aneurysm, and Reactive depression  ,  does not have any pertinent problems on file  ,   has a past surgical history that includes Thyroid surgery and pr repair intercarp/carp-metacarp jt (Left, 7/30/2018)  ,  family history includes Arthritis in her father and mother; Cancer in her father; Heart disease in her mother; Lymphoma in her father  ,   reports that she has never smoked  She has never used smokeless tobacco  She reports that she drinks alcohol   She reports that she does not use drugs  ,  has No Known Allergies     Current Outpatient Medications   Medication Sig Dispense Refill    Ascorbic Acid (VITAMIN C) 250 MG CHEW Chew 1 tablet daily      Calcium 600 MG tablet Take 2 tablets by mouth daily      celecoxib (CeleBREX) 200 mg capsule Take 200 mg by mouth daily       Cholecalciferol (VITAMIN D3) 1000 units CAPS Take by mouth      hydrochlorothiazide (HYDRODIURIL) 12 5 mg tablet TAKE 1 TABLET DAILY 90 tablet 1    metoprolol tartrate (LOPRESSOR) 25 mg tablet TAKE 1 TABLET DAILY 90 tablet 1    Multiple Vitamin (DAILY VALUE MULTIVITAMIN PO) Take 1 tablet by mouth daily      nystatin (MYCOSTATIN) powder       gabapentin (NEURONTIN) 300 mg capsule Take 2 capsules (600 mg total) by mouth 3 (three) times a day (Patient not taking: Reported on 10/7/2019) 60 capsule 3     No current facility-administered medications for this visit  Review of Systems   Constitutional: Positive for activity change  Negative for appetite change, diaphoresis, fatigue and fever  HENT: Negative  Eyes: Negative  Respiratory: Negative for apnea, cough, chest tightness, shortness of breath and wheezing  Cardiovascular: Negative for chest pain, palpitations and leg swelling  Gastrointestinal: Negative for abdominal distention, abdominal pain, anal bleeding, constipation, diarrhea, nausea and vomiting  Endocrine: Negative for cold intolerance, heat intolerance, polydipsia, polyphagia and polyuria  Genitourinary: Negative for difficulty urinating, dysuria, flank pain, hematuria and urgency  Musculoskeletal: Positive for back pain  Negative for arthralgias, gait problem, joint swelling and myalgias  Skin: Negative for color change, rash and wound  Allergic/Immunologic: Negative for environmental allergies, food allergies and immunocompromised state  Neurological: Negative for dizziness, seizures, syncope, speech difficulty, numbness and headaches          Right lumbar radiculopathy patient is having difficulty with ambulation sometimes feels like the right leg is not going to hold her up also has a marked sleep disturbance because of chronic pain in the right leg when she lies in bed at night   Hematological: Negative for adenopathy  Does not bruise/bleed easily  Psychiatric/Behavioral: Negative for agitation, behavioral problems, hallucinations, sleep disturbance and suicidal ideas  Objective:  Vitals:    10/07/19 1631 10/07/19 1639   BP: 162/82 158/92   BP Location: Left arm Right arm   Patient Position: Sitting Sitting   Cuff Size: Standard Standard   Weight: 76 7 kg (169 lb)    Height: 5' 4" (1 626 m)      Body mass index is 29 01 kg/m²  Physical Exam   Constitutional: She is oriented to person, place, and time  She appears well-developed and well-nourished  No distress  HENT:   Head: Normocephalic  Right Ear: External ear normal    Left Ear: External ear normal    Nose: Nose normal    Mouth/Throat: Oropharynx is clear and moist    Eyes: Pupils are equal, round, and reactive to light  Conjunctivae and EOM are normal  Right eye exhibits no discharge  Left eye exhibits no discharge  No scleral icterus  Neck: Normal range of motion  No tracheal deviation present  No thyromegaly present  Cardiovascular: Normal rate, regular rhythm and normal heart sounds  Exam reveals no gallop and no friction rub  No murmur heard  Pulmonary/Chest: Effort normal and breath sounds normal  No respiratory distress  She has no wheezes  Abdominal: Soft  Bowel sounds are normal  She exhibits no mass  There is no tenderness  There is no guarding  Musculoskeletal: She exhibits no edema or deformity  Lymphadenopathy:     She has no cervical adenopathy  Neurological: She is alert and oriented to person, place, and time  No cranial nerve deficit  She exhibits abnormal muscle tone  Skin: Skin is warm and dry  No rash noted  She is not diaphoretic  No erythema  Psychiatric: She has a normal mood and affect   Thought content normal

## 2019-10-23 ENCOUNTER — HOSPITAL ENCOUNTER (OUTPATIENT)
Dept: MRI IMAGING | Facility: HOSPITAL | Age: 59
Discharge: HOME | End: 2019-10-23
Attending: FAMILY MEDICINE
Payer: COMMERCIAL

## 2019-10-23 DIAGNOSIS — M54.16 RIGHT LUMBAR RADICULOPATHY: ICD-10-CM

## 2019-10-23 PROCEDURE — 72148 MRI LUMBAR SPINE W/O DYE: CPT

## 2019-10-24 DIAGNOSIS — M54.41 CHRONIC RIGHT-SIDED LOW BACK PAIN WITH RIGHT-SIDED SCIATICA: Primary | ICD-10-CM

## 2019-10-24 DIAGNOSIS — G89.29 CHRONIC RIGHT-SIDED LOW BACK PAIN WITH RIGHT-SIDED SCIATICA: Primary | ICD-10-CM

## 2019-10-24 NOTE — RESULT ENCOUNTER NOTE
Please call the patient regarding her abnormal result    Patient on has 1 bulging disc she also has some bone spurs refer to Pain Management for possible injections

## 2019-10-28 ENCOUNTER — APPOINTMENT (OUTPATIENT)
Dept: LAB | Facility: MEDICAL CENTER | Age: 59
End: 2019-10-28
Payer: COMMERCIAL

## 2019-10-28 ENCOUNTER — TRANSCRIBE ORDERS (OUTPATIENT)
Dept: ADMINISTRATIVE | Facility: HOSPITAL | Age: 59
End: 2019-10-28

## 2019-10-28 DIAGNOSIS — M15.9 PRIMARY OSTEOARTHRITIS INVOLVING MULTIPLE JOINTS: ICD-10-CM

## 2019-10-28 DIAGNOSIS — M15.9 PRIMARY OSTEOARTHRITIS INVOLVING MULTIPLE JOINTS: Primary | ICD-10-CM

## 2019-10-28 LAB
ALBUMIN SERPL BCP-MCNC: 4.3 G/DL (ref 3.5–5)
ALP SERPL-CCNC: 86 U/L (ref 46–116)
ALT SERPL W P-5'-P-CCNC: 23 U/L (ref 12–78)
ANION GAP SERPL CALCULATED.3IONS-SCNC: 7 MMOL/L (ref 4–13)
AST SERPL W P-5'-P-CCNC: 14 U/L (ref 5–45)
BASOPHILS # BLD AUTO: 0.05 THOUSANDS/ΜL (ref 0–0.1)
BASOPHILS NFR BLD AUTO: 1 % (ref 0–1)
BILIRUB SERPL-MCNC: 0.44 MG/DL (ref 0.2–1)
BUN SERPL-MCNC: 21 MG/DL (ref 5–25)
CALCIUM SERPL-MCNC: 9.9 MG/DL (ref 8.3–10.1)
CHLORIDE SERPL-SCNC: 104 MMOL/L (ref 100–108)
CO2 SERPL-SCNC: 28 MMOL/L (ref 21–32)
CREAT SERPL-MCNC: 1.22 MG/DL (ref 0.6–1.3)
CRP SERPL QL: 3.6 MG/L
EOSINOPHIL # BLD AUTO: 0.08 THOUSAND/ΜL (ref 0–0.61)
EOSINOPHIL NFR BLD AUTO: 1 % (ref 0–6)
ERYTHROCYTE [DISTWIDTH] IN BLOOD BY AUTOMATED COUNT: 12.3 % (ref 11.6–15.1)
ERYTHROCYTE [SEDIMENTATION RATE] IN BLOOD: 12 MM/HOUR (ref 0–20)
GFR SERPL CREATININE-BSD FRML MDRD: 49 ML/MIN/1.73SQ M
GLUCOSE SERPL-MCNC: 88 MG/DL (ref 65–140)
HCT VFR BLD AUTO: 42.8 % (ref 34.8–46.1)
HGB BLD-MCNC: 14.6 G/DL (ref 11.5–15.4)
IMM GRANULOCYTES # BLD AUTO: 0.01 THOUSAND/UL (ref 0–0.2)
IMM GRANULOCYTES NFR BLD AUTO: 0 % (ref 0–2)
LYMPHOCYTES # BLD AUTO: 2.07 THOUSANDS/ΜL (ref 0.6–4.47)
LYMPHOCYTES NFR BLD AUTO: 25 % (ref 14–44)
MCH RBC QN AUTO: 30.9 PG (ref 26.8–34.3)
MCHC RBC AUTO-ENTMCNC: 34.1 G/DL (ref 31.4–37.4)
MCV RBC AUTO: 91 FL (ref 82–98)
MONOCYTES # BLD AUTO: 0.56 THOUSAND/ΜL (ref 0.17–1.22)
MONOCYTES NFR BLD AUTO: 7 % (ref 4–12)
NEUTROPHILS # BLD AUTO: 5.59 THOUSANDS/ΜL (ref 1.85–7.62)
NEUTS SEG NFR BLD AUTO: 66 % (ref 43–75)
NRBC BLD AUTO-RTO: 0 /100 WBCS
PLATELET # BLD AUTO: 285 THOUSANDS/UL (ref 149–390)
PMV BLD AUTO: 10.9 FL (ref 8.9–12.7)
POTASSIUM SERPL-SCNC: 3.6 MMOL/L (ref 3.5–5.3)
PROT SERPL-MCNC: 7.5 G/DL (ref 6.4–8.2)
RBC # BLD AUTO: 4.73 MILLION/UL (ref 3.81–5.12)
SODIUM SERPL-SCNC: 139 MMOL/L (ref 136–145)
WBC # BLD AUTO: 8.36 THOUSAND/UL (ref 4.31–10.16)

## 2019-10-28 PROCEDURE — 85025 COMPLETE CBC W/AUTO DIFF WBC: CPT

## 2019-10-28 PROCEDURE — 85652 RBC SED RATE AUTOMATED: CPT

## 2019-10-28 PROCEDURE — 36415 COLL VENOUS BLD VENIPUNCTURE: CPT

## 2019-10-28 PROCEDURE — 80053 COMPREHEN METABOLIC PANEL: CPT

## 2019-10-28 PROCEDURE — 86140 C-REACTIVE PROTEIN: CPT

## 2019-11-15 ENCOUNTER — CONSULT (OUTPATIENT)
Dept: PAIN MEDICINE | Facility: CLINIC | Age: 59
End: 2019-11-15
Payer: COMMERCIAL

## 2019-11-15 VITALS
DIASTOLIC BLOOD PRESSURE: 88 MMHG | SYSTOLIC BLOOD PRESSURE: 139 MMHG | BODY MASS INDEX: 28.65 KG/M2 | HEIGHT: 64 IN | WEIGHT: 167.8 LBS

## 2019-11-15 DIAGNOSIS — M54.41 CHRONIC RIGHT-SIDED LOW BACK PAIN WITH RIGHT-SIDED SCIATICA: ICD-10-CM

## 2019-11-15 DIAGNOSIS — M54.16 LUMBAR RADICULOPATHY: Primary | ICD-10-CM

## 2019-11-15 DIAGNOSIS — G89.29 CHRONIC RIGHT-SIDED LOW BACK PAIN WITH RIGHT-SIDED SCIATICA: ICD-10-CM

## 2019-11-15 DIAGNOSIS — G89.4 CHRONIC PAIN SYNDROME: ICD-10-CM

## 2019-11-15 DIAGNOSIS — M51.26 LUMBAR DISC HERNIATION: ICD-10-CM

## 2019-11-15 PROCEDURE — 99244 OFF/OP CNSLTJ NEW/EST MOD 40: CPT | Performed by: ANESTHESIOLOGY

## 2019-11-15 NOTE — PROGRESS NOTES
Assessment:  1  Lumbar radiculopathy    2  Chronic right-sided low back pain with right-sided sciatica    3  Lumbar disc herniation    4  Chronic pain syndrome        Plan:  Patient is a 81-year-old female with complaints of low back pain and right leg pain with a history significant for a small protrusion type disc herniation at L4-5 presents office for follow-up visit  Patient reports she has been experiencing these symptoms for about 1 year started atraumatically  Patient has been seen a chiropractor started in May 2019 on for her low back pain which she reports provides moderate relief  MRI of lumbar spine was reviewed with patient using PACS to assess for both the small protrusion type herniation at L4-5 with some mild encroachment on the right exiting L4 nerve root on in addition that we also examined the muscles with noted on atrophy of both the multifidus and the spine Allis muscles of the lumbar spine from L3-L4 through L5-S1   1  We will provide patient with a home exercise regimen for lumbar core strengthening   2  We will schedule patient for a right L4-5 transforaminal epidural steroid injection to alleviate her right lower extremity radicular symptoms  3  Follow-up 1 month after injection if needed    [place] Prescription Drug Monitoring Program report was reviewed and was appropriate     Complete risks and benefits including bleeding, infection, tissue reaction, nerve injury and allergic reaction were discussed  The approach was demonstrated using models and literature was provided  Verbal and written consent was obtained  History of Present Illness: The patient is a 64 y o  female who presents for consultation in regards to Leg Pain and Knee Pain  Symptoms have been present for 1 year  Symptoms began following a non work related injury  Pain is reported to be 7 on the numeric rating scale  Symptoms are felt nearly constantly and worst in the no typical pattern    Symptoms are characterized as burning, cramping, shooting, numbing, tingling and throbbing  Symptoms are associated with no weakness  Aggravating factors include lying down, standing, bending, leaning forward, leaning bckward, sitting and walking  Relieving factors include nothing  No change in symptoms with relaxation, coughing/sneezing and bowel movements  Treatments that have been helpful include TENS unit and heat/ice  Medications to relieve symptoms include nothing  Review of Systems:    Review of Systems   Musculoskeletal: Positive for arthralgias  All other systems reviewed and are negative  Past Medical History:   Diagnosis Date    Hypertension     Intracranial aneurysm     resolved 11/17/17 - possible R P-comm region aneurysm measuring 3mm found on MRI results    Reactive depression     last assessed 12/15/15       Past Surgical History:   Procedure Laterality Date    OH REPAIR INTERCARP/CARP-METACARP JT Left 7/30/2018    Procedure:  Thumb Arthrex mini tight rope suspension arthroplasty with trapezial resection left thumb;  Surgeon: Fito Frederick MD;  Location: [facility];  Service: Orthopedics    THYROID SURGERY      near total thyroidectomy       Family History   Problem Relation Age of Onset    Arthritis Mother     Heart disease Mother         CAD    Arthritis Father     Cancer Father     Lymphoma Father         Hodgkin's       Social History     Occupational History    Not on file   Tobacco Use    Smoking status: Never Smoker    Smokeless tobacco: Never Used    Tobacco comment: only smoked as a teenager   Substance and Sexual Activity    Alcohol use: Yes     Frequency: Monthly or less     Drinks per session: 1 or 2     Binge frequency: Never     Comment: social    Drug use: Never    Sexual activity: Yes     Partners: Male         Current Outpatient Medications:     Ascorbic Acid (VITAMIN C) 250 MG CHEW, Chew 1 tablet daily, Disp: , Rfl:     Calcium 600 MG tablet, Take 2 tablets by mouth daily, Disp: , Rfl:     celecoxib (CeleBREX) 200 mg capsule, Take 200 mg by mouth daily , Disp: , Rfl:     Cholecalciferol (VITAMIN D3) 1000 units CAPS, Take by mouth, Disp: , Rfl:     gabapentin (NEURONTIN) 300 mg capsule, Take 2 capsules (600 mg total) by mouth 3 (three) times a day (Patient not taking: Reported on 10/7/2019), Disp: 60 capsule, Rfl: 3    hydrochlorothiazide (HYDRODIURIL) 12 5 mg tablet, TAKE 1 TABLET DAILY, Disp: 90 tablet, Rfl: 1    metoprolol tartrate (LOPRESSOR) 25 mg tablet, TAKE 1 TABLET DAILY, Disp: 90 tablet, Rfl: 1    Multiple Vitamin (DAILY VALUE MULTIVITAMIN PO), Take 1 tablet by mouth daily, Disp: , Rfl:     nystatin (MYCOSTATIN) powder, , Disp: , Rfl:     No Known Allergies    Physical Exam:    /88 (BP Location: Right arm, Patient Position: Sitting, Cuff Size: Large)   Ht 5' 4" (1 626 m)   Wt 76 1 kg (167 lb 12 8 oz)   BMI 28 80 kg/m²     Constitutional: normal, well developed, well nourished, alert, in no distress and non-toxic and no overt pain behavior  Eyes: anicteric  HEENT: grossly intact  Neck: supple, symmetric, trachea midline and no masses   Pulmonary:even and unlabored  Cardiovascular:No edema or pitting edema present  Skin:Normal without rashes or lesions and well hydrated  Psychiatric:Mood and affect appropriate  Neurologic:Cranial Nerves II-XII grossly intact  Musculoskeletal:antalgic     Lumbar/Sacral Spine examination demonstrates  Increased range of motion lumbar spine with pain upon: flexion, lateral rotation to the left/right, and bending to the left/right  Bilateral lumbar paraspinals tender to palpation  Muscle spasms noted in the lumbar area bilaterally  4/5 lower extremity strength in all muscle groups bilaterally  Positive seated straight leg raise for bilateral lower extremities  Sensitivity to light touch intact bilateral lower extremities  2+ reflexes in the patella and Achilles    No ankle clonus       Imaging  MRI LUMBAR SPINE WITHOUT CONTRAST     INDICATION: M54 [id number]: Radiculopathy, lumbar region      COMPARISON:  None      TECHNIQUE:  Sagittal T1, sagittal T2, sagittal inversion recovery, axial T1 and axial T2, coronal T2     IMAGE QUALITY:  Diagnostic     FINDINGS:     VERTEBRAL BODIES:  Normal alignment of the lumbar spine  No spondylolysis or spondylolisthesis  No scoliosis  No compression fracture  Scattered endplate degenerative marrow signal particularly L3-4 and L4-5      SACRUM:  Normal signal within the sacrum  No evidence of insufficiency or stress fracture      DISTAL CORD AND CONUS:  Normal size and signal within the distal cord and conus        PARASPINAL SOFT TISSUES:  Paraspinal soft tissues are unremarkable      LOWER THORACIC DISC SPACES:  Normal disc height and signal   No disc herniation, canal stenosis or foraminal narrowing      LUMBAR DISC SPACES:     L1-L2:  Normal      L2-L3:  Minimal annular bulge with small marginal osteophytes  No significant central or foraminal narrowing      L3-L4:  Small marginal osteophyte on the right  No significant central or foraminal narrowing      L4-L5:  Mild annular bulge with small broad-based central protrusion type disc herniation  No significant central or foraminal narrowing      L5-S1:  Small marginal osteophytes    No central or foraminal narrowing      IMPRESSION:     Mild noncompressive lumbar degenerative change

## 2019-12-04 ENCOUNTER — OFFICE VISIT (OUTPATIENT)
Dept: FAMILY MEDICINE CLINIC | Facility: CLINIC | Age: 59
End: 2019-12-04
Payer: COMMERCIAL

## 2019-12-04 VITALS
DIASTOLIC BLOOD PRESSURE: 82 MMHG | WEIGHT: 170 LBS | SYSTOLIC BLOOD PRESSURE: 132 MMHG | BODY MASS INDEX: 29.02 KG/M2 | HEIGHT: 64 IN

## 2019-12-04 DIAGNOSIS — Z12.31 ENCOUNTER FOR SCREENING MAMMOGRAM FOR BREAST CANCER: Primary | ICD-10-CM

## 2019-12-04 DIAGNOSIS — J22 NOSOCOMIAL INFECTION OF LOWER RESPIRATORY TRACT: ICD-10-CM

## 2019-12-04 PROCEDURE — 99213 OFFICE O/P EST LOW 20 MIN: CPT | Performed by: FAMILY MEDICINE

## 2019-12-04 PROCEDURE — 1036F TOBACCO NON-USER: CPT | Performed by: FAMILY MEDICINE

## 2019-12-04 PROCEDURE — 3008F BODY MASS INDEX DOCD: CPT | Performed by: FAMILY MEDICINE

## 2019-12-04 RX ORDER — CEFUROXIME AXETIL 500 MG/1
500 TABLET ORAL EVERY 12 HOURS SCHEDULED
Qty: 20 TABLET | Refills: 0 | Status: SHIPPED | OUTPATIENT
Start: 2019-12-04 | End: 2019-12-14

## 2019-12-04 NOTE — PROGRESS NOTES
BMI Counseling: Body mass index is 29 18 kg/m²  The BMI is above normal  Nutrition recommendations include decreasing portion sizes, encouraging healthy choices of fruits and vegetables, decreasing fast food intake, consuming healthier snacks, moderation in carbohydrate intake and increasing intake of lean protein  Exercise recommendations include exercising 3-5 times per week  No pharmacotherapy was ordered  Patient referred to PCP due to patient being overweight  Assessment/Plan:    Problem List Items Addressed This Visit     None      Visit Diagnoses     Encounter for screening mammogram for breast cancer    -  Primary    Relevant Orders    Mammo screening bilateral w 3d & cad    Nosocomial infection of lower respiratory tract               Diagnoses and all orders for this visit:    Encounter for screening mammogram for breast cancer  -     Mammo screening bilateral w 3d & cad; Future    Nosocomial infection of lower respiratory tract    Other orders  -     Misc Natural Products (T-RELIEF CBD+13) SUBL; Place under the tongue Agape 1000mg 0 25 BID        No problem-specific Assessment & Plan notes found for this encounter  Subjective:      Patient ID: Faiza Matos is a 64 y o  female  Mrs  Present care lower respiratory tract infection her  was in earlier today with a similar infection she has sore throat nasal congestion and a cough no fever chills or signs of sepsis      The following portions of the patient's history were reviewed and updated as appropriate:   She has a past medical history of Hypertension, Intracranial aneurysm, and Reactive depression  ,  does not have any pertinent problems on file  ,   has a past surgical history that includes Thyroid surgery and pr repair intercarp/carp-metacarp jt (Left, 7/30/2018)  ,  family history includes Arthritis in her father and mother; Cancer in her father; Heart disease in her mother; Lymphoma in her father  ,   reports that she has never smoked  She has never used smokeless tobacco  She reports that she drinks alcohol  She reports that she does not use drugs  ,  has No Known Allergies     Current Outpatient Medications   Medication Sig Dispense Refill    Ascorbic Acid (VITAMIN C) 250 MG CHEW Chew 1 tablet daily      Calcium 600 MG tablet Take 2 tablets by mouth daily      celecoxib (CeleBREX) 200 mg capsule Take 200 mg by mouth daily       Cholecalciferol (VITAMIN D3) 1000 units CAPS Take by mouth      hydrochlorothiazide (HYDRODIURIL) 12 5 mg tablet TAKE 1 TABLET DAILY 90 tablet 1    metoprolol tartrate (LOPRESSOR) 25 mg tablet TAKE 1 TABLET DAILY 90 tablet 1    Misc Natural Products (T-RELIEF CBD+13) SUBL Place under the tongue Agape 1000mg 0 25 BID      Multiple Vitamin (DAILY VALUE MULTIVITAMIN PO) Take 1 tablet by mouth daily      nystatin (MYCOSTATIN) powder        No current facility-administered medications for this visit  Review of Systems   Constitutional: Negative for activity change, appetite change, diaphoresis, fatigue and fever  HENT: Positive for ear pain, sinus pressure, sinus pain and sore throat  Eyes: Negative  Respiratory: Positive for cough  Negative for apnea, chest tightness, shortness of breath and wheezing  Cardiovascular: Negative for chest pain, palpitations and leg swelling  Gastrointestinal: Negative for abdominal distention, abdominal pain, anal bleeding, constipation, diarrhea, nausea and vomiting  Endocrine: Negative for cold intolerance, heat intolerance, polydipsia, polyphagia and polyuria  Genitourinary: Negative for difficulty urinating, dysuria, flank pain, hematuria and urgency  Musculoskeletal: Negative for arthralgias, back pain, gait problem, joint swelling and myalgias  Skin: Negative for color change, rash and wound  Allergic/Immunologic: Negative for environmental allergies, food allergies and immunocompromised state     Neurological: Negative for dizziness, seizures, syncope, speech difficulty, numbness and headaches  Hematological: Negative for adenopathy  Does not bruise/bleed easily  Psychiatric/Behavioral: Negative for agitation, behavioral problems, hallucinations, sleep disturbance and suicidal ideas  Objective:  Vitals:    12/04/19 1232   BP: 132/82   BP Location: Left arm   Patient Position: Sitting   Cuff Size: Standard   Weight: 77 1 kg (170 lb)   Height: 5' 4" (1 626 m)     Body mass index is 29 18 kg/m²  Physical Exam   Constitutional: She is oriented to person, place, and time  She appears well-developed and well-nourished  No distress  HENT:   Head: Normocephalic  Right Ear: External ear normal    Left Ear: External ear normal    Nose: Nose normal    Mouth/Throat: Oropharyngeal exudate and posterior oropharyngeal edema present  Eyes: Pupils are equal, round, and reactive to light  Conjunctivae and EOM are normal  Right eye exhibits no discharge  Left eye exhibits no discharge  No scleral icterus  Neck: Normal range of motion  No tracheal deviation present  No thyromegaly present  Cardiovascular: Normal rate, regular rhythm and normal heart sounds  Exam reveals no gallop and no friction rub  No murmur heard  Pulmonary/Chest: Effort normal and breath sounds normal  No respiratory distress  She has no wheezes  Abdominal: Soft  Bowel sounds are normal  She exhibits no mass  There is no tenderness  There is no guarding  Musculoskeletal: She exhibits no edema or deformity  Lymphadenopathy:     She has no cervical adenopathy  Neurological: She is alert and oriented to person, place, and time  No cranial nerve deficit  Skin: Skin is warm and dry  No rash noted  She is not diaphoretic  No erythema  Psychiatric: She has a normal mood and affect   Thought content normal

## 2019-12-30 ENCOUNTER — OFFICE VISIT (OUTPATIENT)
Dept: FAMILY MEDICINE CLINIC | Facility: CLINIC | Age: 59
End: 2019-12-30
Payer: COMMERCIAL

## 2019-12-30 VITALS
BODY MASS INDEX: 28.85 KG/M2 | DIASTOLIC BLOOD PRESSURE: 96 MMHG | WEIGHT: 169 LBS | SYSTOLIC BLOOD PRESSURE: 132 MMHG | HEIGHT: 64 IN

## 2019-12-30 DIAGNOSIS — T07.XXXA MULTIPLE ABRASIONS: ICD-10-CM

## 2019-12-30 DIAGNOSIS — Z12.4 ENCOUNTER FOR SCREENING FOR CERVICAL CANCER: Primary | ICD-10-CM

## 2019-12-30 PROCEDURE — 99213 OFFICE O/P EST LOW 20 MIN: CPT | Performed by: FAMILY MEDICINE

## 2019-12-30 PROCEDURE — 1036F TOBACCO NON-USER: CPT | Performed by: FAMILY MEDICINE

## 2019-12-30 PROCEDURE — 3008F BODY MASS INDEX DOCD: CPT | Performed by: FAMILY MEDICINE

## 2019-12-30 NOTE — PROGRESS NOTES
Assessment/Plan:    Problem List Items Addressed This Visit     None      Visit Diagnoses     Encounter for screening for cervical cancer     -  Primary    Relevant Orders    Ambulatory referral to Gynecology    Multiple abrasions               Diagnoses and all orders for this visit:    Encounter for screening for cervical cancer   -     Ambulatory referral to Gynecology; Future    Multiple abrasions        No problem-specific Assessment & Plan notes found for this encounter  Subjective:      Patient ID: Barbara Rogers is a 64 y o  female  Mrs Shari dye is here chief complaint is she has some kind of abrasions on her left lower abdomen they actually look more like a scratch from an animal but she was concerned about the possibility of shingles and she has a small infant at home I reassured her that I do not think this shingles and what I think it is some type of an abrasion possibly from animals on going to treat her with mupirocin topically going to try to avoid giving her systemic antibiotics because they always cause issues with vaginal moniliasis and also with diarrhea      The following portions of the patient's history were reviewed and updated as appropriate:   She has a past medical history of Hypertension, Intracranial aneurysm, and Reactive depression  ,  does not have any pertinent problems on file  ,   has a past surgical history that includes Thyroid surgery and pr repair intercarp/carp-metacarp jt (Left, 7/30/2018)  ,  family history includes Arthritis in her father and mother; Cancer in her father; Heart disease in her mother; Lymphoma in her father  ,   reports that she has never smoked  She has never used smokeless tobacco  She reports that she drinks alcohol  She reports that she does not use drugs  ,  has No Known Allergies     Current Outpatient Medications   Medication Sig Dispense Refill    Ascorbic Acid (VITAMIN C) 250 MG CHEW Chew 1 tablet daily      Calcium 600 MG tablet Take 2 tablets by mouth daily      celecoxib (CeleBREX) 200 mg capsule Take 200 mg by mouth daily       Cholecalciferol (VITAMIN D3) 1000 units CAPS Take by mouth      hydrochlorothiazide (HYDRODIURIL) 12 5 mg tablet TAKE 1 TABLET DAILY 90 tablet 1    metoprolol tartrate (LOPRESSOR) 25 mg tablet TAKE 1 TABLET DAILY 90 tablet 1    Misc Natural Products (T-RELIEF CBD+13) SUBL Place under the tongue Agape 1000mg 0 25 BID      Multiple Vitamin (DAILY VALUE MULTIVITAMIN PO) Take 1 tablet by mouth daily      nystatin (MYCOSTATIN) powder        No current facility-administered medications for this visit  Review of Systems   Constitutional: Negative for activity change, appetite change, diaphoresis, fatigue and fever  HENT: Negative  Eyes: Negative  Respiratory: Negative for apnea, cough, chest tightness, shortness of breath and wheezing  Cardiovascular: Negative for chest pain, palpitations and leg swelling  Gastrointestinal: Negative for abdominal distention, abdominal pain, anal bleeding, constipation, diarrhea, nausea and vomiting  Endocrine: Negative for cold intolerance, heat intolerance, polydipsia, polyphagia and polyuria  Genitourinary: Negative for difficulty urinating, dysuria, flank pain, hematuria and urgency  Musculoskeletal: Negative for arthralgias, back pain, gait problem, joint swelling and myalgias  Skin: Positive for rash  Negative for color change and wound  Allergic/Immunologic: Negative for environmental allergies, food allergies and immunocompromised state  Neurological: Negative for dizziness, seizures, syncope, speech difficulty, numbness and headaches  Hematological: Negative for adenopathy  Does not bruise/bleed easily  Psychiatric/Behavioral: Negative for agitation, behavioral problems, hallucinations, sleep disturbance and suicidal ideas           Objective:  Vitals:    12/30/19 1324   BP: 132/96   BP Location: Left arm   Patient Position: Standing Cuff Size: Standard   Weight: 76 7 kg (169 lb)   Height: 5' 4" (1 626 m)     Body mass index is 29 01 kg/m²  Physical Exam   Constitutional: She is oriented to person, place, and time  She appears well-developed and well-nourished  No distress  HENT:   Head: Normocephalic  Right Ear: External ear normal    Left Ear: External ear normal    Nose: Nose normal    Mouth/Throat: Oropharynx is clear and moist    Eyes: Pupils are equal, round, and reactive to light  Conjunctivae and EOM are normal  Right eye exhibits no discharge  Left eye exhibits no discharge  No scleral icterus  Neck: Normal range of motion  No tracheal deviation present  No thyromegaly present  Cardiovascular: Normal rate, regular rhythm and normal heart sounds  Exam reveals no gallop and no friction rub  No murmur heard  Pulmonary/Chest: Effort normal and breath sounds normal  No respiratory distress  She has no wheezes  Abdominal: Soft  Bowel sounds are normal  She exhibits no mass  There is no tenderness  There is no guarding  Musculoskeletal: She exhibits no edema or deformity  Lymphadenopathy:     She has no cervical adenopathy  Neurological: She is alert and oriented to person, place, and time  No cranial nerve deficit  Skin: Skin is warm and dry  Rash noted  She is not diaphoretic  No erythema  Psychiatric: She has a normal mood and affect   Thought content normal

## 2020-01-04 DIAGNOSIS — I10 ESSENTIAL HYPERTENSION: ICD-10-CM

## 2020-01-07 ENCOUNTER — HOSPITAL ENCOUNTER (OUTPATIENT)
Facility: HOSPITAL | Age: 60
Setting detail: OUTPATIENT SURGERY
Discharge: HOME | End: 2020-01-07
Attending: ANESTHESIOLOGY | Admitting: ANESTHESIOLOGY
Payer: COMMERCIAL

## 2020-01-07 ENCOUNTER — APPOINTMENT (OUTPATIENT)
Dept: RADIOLOGY | Facility: HOSPITAL | Age: 60
End: 2020-01-07
Payer: COMMERCIAL

## 2020-01-07 VITALS
SYSTOLIC BLOOD PRESSURE: 154 MMHG | BODY MASS INDEX: 28.51 KG/M2 | HEIGHT: 64 IN | RESPIRATION RATE: 20 BRPM | OXYGEN SATURATION: 99 % | HEART RATE: 84 BPM | DIASTOLIC BLOOD PRESSURE: 94 MMHG | TEMPERATURE: 98 F | WEIGHT: 167 LBS

## 2020-01-07 PROCEDURE — 64483 NJX AA&/STRD TFRM EPI L/S 1: CPT | Performed by: ANESTHESIOLOGY

## 2020-01-07 RX ORDER — DEXAMETHASONE SODIUM PHOSPHATE 10 MG/ML
INJECTION, SOLUTION INTRAMUSCULAR; INTRAVENOUS AS NEEDED
Status: DISCONTINUED | OUTPATIENT
Start: 2020-01-07 | End: 2020-01-07 | Stop reason: HOSPADM

## 2020-01-07 RX ORDER — LIDOCAINE HYDROCHLORIDE 10 MG/ML
INJECTION, SOLUTION EPIDURAL; INFILTRATION; INTRACAUDAL; PERINEURAL AS NEEDED
Status: DISCONTINUED | OUTPATIENT
Start: 2020-01-07 | End: 2020-01-07 | Stop reason: HOSPADM

## 2020-01-07 NOTE — H&P
Assessment:  1  Lumbar radiculopathy    2  Chronic right-sided low back pain with right-sided sciatica    3  Lumbar disc herniation    4  Chronic pain syndrome          Plan:  Patient is a 79-year-old female with complaints of low back pain and right leg pain with a history significant for a small protrusion type disc herniation at L4-5 presents to surgical sent for procedure  1  We will perform a right L4-5 transforaminal epidural steroid injection to alleviate her right lower extremity radicular symptoms       [place] Prescription Drug Monitoring Program report was reviewed and was appropriate      Complete risks and benefits including bleeding, infection, tissue reaction, nerve injury and allergic reaction were discussed  The approach was demonstrated using models and literature was provided  Verbal and written consent was obtained         History of Present Illness: The patient is a 64 y o  female who presents for consultation in regards to Leg Pain and Knee Pain  Symptoms have been present for 1 year  Symptoms began following a non work related injury  Pain is reported to be 7 on the numeric rating scale  Symptoms are felt nearly constantly and worst in the no typical pattern  Symptoms are characterized as burning, cramping, shooting, numbing, tingling and throbbing  Symptoms are associated with no weakness  Aggravating factors include lying down, standing, bending, leaning forward, leaning bckward, sitting and walking  Relieving factors include nothing  No change in symptoms with relaxation, coughing/sneezing and bowel movements  Treatments that have been helpful include TENS unit and heat/ice  Medications to relieve symptoms include nothing      Review of Systems:     Review of Systems   Musculoskeletal: Positive for arthralgias     All other systems reviewed and are negative            Medical History        Past Medical History:   Diagnosis Date    Hypertension      Intracranial aneurysm     resolved 11/17/17 - possible R P-comm region aneurysm measuring 3mm found on MRI results    Reactive depression       last assessed 12/15/15            Surgical History   Past Surgical History:   Procedure Laterality Date    NE REPAIR INTERCARP/CARP-METACARP JT Left 7/30/2018     Procedure: Thumb Arthrex mini tight rope suspension arthroplasty with trapezial resection left thumb;  Surgeon: Rosmery Garcia MD;  Location: [facility];  Service: Orthopedics    THYROID SURGERY         near total thyroidectomy                  Family History   Problem Relation Age of Onset    Arthritis Mother      Heart disease Mother           CAD   Purvi Slipper Arthritis Father      Cancer Father      Lymphoma Father           Hodgkin's         Social History            Occupational History    Not on file   Tobacco Use    Smoking status: Never Smoker    Smokeless tobacco: Never Used    Tobacco comment: only smoked as a teenager   Substance and Sexual Activity    Alcohol use:  Yes       Frequency: Monthly or less       Drinks per session: 1 or 2       Binge frequency: Never       Comment: social    Drug use: Never    Sexual activity: Yes       Partners: Male            Current Outpatient Medications:     Ascorbic Acid (VITAMIN C) 250 MG CHEW, Chew 1 tablet daily, Disp: , Rfl:     Calcium 600 MG tablet, Take 2 tablets by mouth daily, Disp: , Rfl:     celecoxib (CeleBREX) 200 mg capsule, Take 200 mg by mouth daily , Disp: , Rfl:     Cholecalciferol (VITAMIN D3) 1000 units CAPS, Take by mouth, Disp: , Rfl:     gabapentin (NEURONTIN) 300 mg capsule, Take 2 capsules (600 mg total) by mouth 3 (three) times a day (Patient not taking: Reported on 10/7/2019), Disp: 60 capsule, Rfl: 3    hydrochlorothiazide (HYDRODIURIL) 12 5 mg tablet, TAKE 1 TABLET DAILY, Disp: 90 tablet, Rfl: 1    metoprolol tartrate (LOPRESSOR) 25 mg tablet, TAKE 1 TABLET DAILY, Disp: 90 tablet, Rfl: 1    Multiple Vitamin (DAILY VALUE MULTIVITAMIN PO), Take 1 tablet by mouth daily, Disp: , Rfl:     nystatin (MYCOSTATIN) powder, , Disp: , Rfl:      No Known Allergies     Physical Exam:     Vitals:    01/07/20 0823   BP: 164/85   Pulse: 95   Resp: 20   Temp: 98 °F (36 7 °C)   SpO2: 99%           Constitutional: normal, well developed, well nourished, alert, in no distress and non-toxic and no overt pain behavior  Eyes: anicteric  HEENT: grossly intact  Neck: supple, symmetric, trachea midline and no masses   Pulmonary:even and unlabored  Cardiovascular:No edema or pitting edema present  Skin:Normal without rashes or lesions and well hydrated  Psychiatric:Mood and affect appropriate  Neurologic:Cranial Nerves II-XII grossly intact  Musculoskeletal:antalgic      Lumbar/Sacral Spine examination demonstrates  Increased range of motion lumbar spine with pain upon: flexion, lateral rotation to the left/right, and bending to the left/right  Bilateral lumbar paraspinals tender to palpation  Muscle spasms noted in the lumbar area bilaterally  4/5 lower extremity strength in all muscle groups bilaterally  Positive seated straight leg raise for bilateral lower extremities  Sensitivity to light touch intact bilateral lower extremities  2+ reflexes in the patella and Achilles  No ankle clonus         Imaging  MRI LUMBAR SPINE WITHOUT CONTRAST     INDICATION: M54 16: Radiculopathy, lumbar region      COMPARISON:  None      TECHNIQUE:  Sagittal T1, sagittal T2, sagittal inversion recovery, axial T1 and axial T2, coronal T2     IMAGE QUALITY:  Diagnostic     FINDINGS:     VERTEBRAL BODIES:  Normal alignment of the lumbar spine   No spondylolysis or spondylolisthesis  No scoliosis   No compression fracture     Scattered endplate degenerative marrow signal particularly L3-4 and L4-5      SACRUM:  Normal signal within the sacrum   No evidence of insufficiency or stress fracture      DISTAL CORD AND CONUS:  Normal size and signal within the distal cord and conus        PARASPINAL SOFT TISSUES:  Paraspinal soft tissues are unremarkable      LOWER THORACIC DISC SPACES:  Normal disc height and signal   No disc herniation, canal stenosis or foraminal narrowing      LUMBAR DISC SPACES:     L1-L2:  Normal      L2-L3:  Minimal annular bulge with small marginal osteophytes   No significant central or foraminal narrowing      L3-L4:  Small marginal osteophyte on the right   No significant central or foraminal narrowing      L4-L5:  Mild annular bulge with small broad-based central protrusion type disc herniation   No significant central or foraminal narrowing      L5-S1:  Small marginal osteophytes   No central or foraminal narrowing      IMPRESSION:     Mild noncompressive lumbar degenerative change

## 2020-01-07 NOTE — DISCHARGE INSTRUCTIONS
Epidural Steroid Injection   WHAT YOU NEED TO KNOW:   An epidural steroid injection (OSCAR) is a procedure to inject steroid medicine into the epidural space  The epidural space is between your spinal cord and vertebrae  Steroids reduce inflammation and fluid buildup in your spine that may be causing pain  You may be given pain medicine along with the steroids  ACTIVITY  · Do not drive or operate machinery today  · No strenuous activity today - bending, lifting, etc   · You may resume normal activites starting tomorrow - start slowly and as tolerated  · You may shower today, but no tub baths or hot tubs  · You may have numbness for several hours from the local anesthetic  Please use caution and common sense, especially with weight-bearing activities  CARE OF THE INJECTION SITE  · If you have soreness or pain, apply ice to the area today (20 minutes on/20 minutes off)  · Starting tomorrow, you may use warm, moist heat or ice if needed  · You may have an increase or change in your discomfort for 36-48 hours after your treatment  · Apply ice and continue with any pain medication you have been prescribed  · [facility]ify [facility] if you have any of the following: redness, drainage, swelling, headache, stiff neck or fever above 100°F     SPECIAL INSTRUCTIONS  · Our office will contact you in approximately 7 days for a progress report  MEDICATIONS  · Continue to take all routine medications  · Our office may have instructed you to hold some medications  If you have a problem specifically related to your procedure, please call our office at [phone]  Problems not related to your procedure should be directed to your primary care physician

## 2020-01-07 NOTE — OP NOTE
OPERATIVE REPORT  PATIENT NAME: Kellie Judd    :  1960  MRN: [id number]  Pt Location: [facility] 01    SURGERY DATE: 2020    Surgeon(s) and Role:     * Debora Rojo MD - Primary    Preop Diagnosis:  Lumbar radiculopathy [M54 16]    Post-Op Diagnosis Codes:     * Lumbar radiculopathy [M54 16]    Procedure(s) (LRB):  Right L4-5 transforaminal epidural steroid injection 61632 (Right)    Specimen(s):  * No specimens in log *    Estimated Blood Loss:   Minimal    Drains:  * No LDAs found *    Anesthesia Type:   Local    Operative Indications:  Lumbar radiculopathy [M54 16]      Operative Findings:  same    Complications:   None    Procedure and Technique:  Fluoroscopically-guided right L4-5 transforaminal epidural steroid injection under fluoroscopy      After discussing the risks, benefits, and alternatives to the procedure, the patient expressed understanding and wished to proceed  The patient was brought to the fluoroscopy suite and placed in the prone position  A procedural pause was conducted to verify:  correct patient identity, procedure to be performed and as applicable, correct side and site, correct patient position, and availability of implants, special equipment and special requirements  After identifying the right L4 pedicle fluoroscopically with an oblique view, the skin was sterilely prepped and draped in the usual fashion using Chloraprep skin prep  The skin and subcutaneous tissue were anesthetized with 0 5% lidocaine  A 5 inch 22 gauge spinal needle was then advanced under fluoroscopic guidance to the posterior aspect of the right L4-5 neural foramen  Appropriate foraminal depth was determined with a lateral fluoroscopic view, and AP visualization confirmed needle positioning at approximately the 6 oclock position relative to the pedicle    After negative aspiration, 1 mL Omnipaque 300 contrast was injected using live fluoroscopy/digital subtraction angiography, confirming appropriate transforaminal spread without evidence of intravascular or intrathecal uptake  Next, a local anesthetic test dose consisting of 1 mL of 2% lidocaine was injected through the needle  After an appropriate period of observation, a directed neurological exam was performed which revealed no new neurologic deficits  Next, a 1 5 ml solution consisting of 7 5 mg of dexamethasone in sterile saline was injected slowly and incrementally into the epidural space  Following the injection the needle was withdrawn slightly and flushed with lidocaine as it was fully extracted  The patient tolerated the procedure well and there were no apparent complications  The patient did not develop any new neurologic deficits  After appropriate observation, the patient was dismissed from the clinic in good condition under their own power  COMMENTS  The patient received a total steroid dose of 7 5 mg of dexamethasone     I was present for the entire procedure    Patient Disposition:  hemodynamically stable    SIGNATURE: Robert Cutler MD  DATE: January 7, 2020  TIME: 10:22 AM

## 2020-01-13 DIAGNOSIS — I10 HYPERTENSION, UNSPECIFIED TYPE: ICD-10-CM

## 2020-01-13 RX ORDER — HYDROCHLOROTHIAZIDE 12.5 MG/1
TABLET ORAL
Qty: 90 TABLET | Refills: 0 | Status: SHIPPED | OUTPATIENT
Start: 2020-01-13 | End: 2020-04-13

## 2020-01-14 ENCOUNTER — TELEPHONE (OUTPATIENT)
Dept: PAIN MEDICINE | Facility: CLINIC | Age: 60
End: 2020-01-14

## 2020-01-17 NOTE — ANESTHESIA PREPROCEDURE EVALUATION
calling, says they need refill of the medication that doctor gives her for nausea. He doesn't know the name of the med and doesn't have the bottle. Review of Systems/Medical History          Cardiovascular  Hypertension ,   Comment: Metoprolol this am,  Pulmonary  Not a smoker , No recent URI , No sleep apnea ,        GI/Hepatic    No GERD ,             Endo/Other     GYN       Hematology   Musculoskeletal    Arthritis     Neurology      Comment: Intracranial Aneurysm Psychology   Depression ,              Physical Exam    Airway    Mallampati score: I  TM Distance: >3 FB  Neck ROM: full     Dental   No notable dental hx     Cardiovascular      Pulmonary      Other Findings        Anesthesia Plan  ASA Score- 2     Anesthesia Type- general with ASA Monitors  Additional Monitors:   Airway Plan: LMA  Plan Factors-Patient not instructed to abstain from smoking on day of procedure  Patient did not smoke on day of surgery  Induction- intravenous  Postoperative Plan-     Informed Consent- Anesthetic plan and risks discussed with patient  I personally reviewed this patient with the CRNA  Discussed and agreed on the Anesthesia Plan with the CRNA  Kayce Reynolds

## 2020-02-10 ENCOUNTER — HOSPITAL ENCOUNTER (OUTPATIENT)
Dept: MAMMOGRAPHY | Facility: HOSPITAL | Age: 60
Discharge: HOME | End: 2020-02-10
Attending: FAMILY MEDICINE
Payer: COMMERCIAL

## 2020-02-10 VITALS — WEIGHT: 167 LBS | HEIGHT: 64 IN | BODY MASS INDEX: 28.51 KG/M2

## 2020-02-10 DIAGNOSIS — Z12.31 ENCOUNTER FOR SCREENING MAMMOGRAM FOR BREAST CANCER: ICD-10-CM

## 2020-02-10 PROCEDURE — 77067 SCR MAMMO BI INCL CAD: CPT

## 2020-02-10 PROCEDURE — 77063 BREAST TOMOSYNTHESIS BI: CPT

## 2020-03-13 ENCOUNTER — OFFICE VISIT (OUTPATIENT)
Dept: PAIN MEDICINE | Facility: CLINIC | Age: 60
End: 2020-03-13
Payer: COMMERCIAL

## 2020-03-13 VITALS
WEIGHT: 165.8 LBS | BODY MASS INDEX: 28.31 KG/M2 | DIASTOLIC BLOOD PRESSURE: 90 MMHG | HEIGHT: 64 IN | SYSTOLIC BLOOD PRESSURE: 142 MMHG

## 2020-03-13 DIAGNOSIS — M54.41 CHRONIC RIGHT-SIDED LOW BACK PAIN WITH RIGHT-SIDED SCIATICA: ICD-10-CM

## 2020-03-13 DIAGNOSIS — G89.4 CHRONIC PAIN SYNDROME: ICD-10-CM

## 2020-03-13 DIAGNOSIS — G89.29 CHRONIC RIGHT-SIDED LOW BACK PAIN WITH RIGHT-SIDED SCIATICA: ICD-10-CM

## 2020-03-13 DIAGNOSIS — M54.16 LUMBAR RADICULOPATHY: Primary | ICD-10-CM

## 2020-03-13 DIAGNOSIS — M51.26 LUMBAR DISC HERNIATION: ICD-10-CM

## 2020-03-13 PROCEDURE — 99214 OFFICE O/P EST MOD 30 MIN: CPT | Performed by: ANESTHESIOLOGY

## 2020-03-13 PROCEDURE — 1036F TOBACCO NON-USER: CPT | Performed by: ANESTHESIOLOGY

## 2020-03-13 PROCEDURE — 3080F DIAST BP >= 90 MM HG: CPT | Performed by: ANESTHESIOLOGY

## 2020-03-13 PROCEDURE — 3077F SYST BP >= 140 MM HG: CPT | Performed by: ANESTHESIOLOGY

## 2020-03-13 PROCEDURE — 3008F BODY MASS INDEX DOCD: CPT | Performed by: ANESTHESIOLOGY

## 2020-03-13 NOTE — PROGRESS NOTES
Assessment:  1  Lumbar radiculopathy    2  Chronic right-sided low back pain with right-sided sciatica    3  Lumbar disc herniation    4  Chronic pain syndrome        Plan:  Patient is a 53-year-old female with complaints of low back pain and right leg pain with a history significant for small disc type protrusion herniation at L4-5 presents to office for follow-up visit  Patient is status post right L4-5 transforaminal epidural steroid injection performed on 01/07/2020 to which she reported 60% relief lasting 2 months  1  We will perform a repeat right L4-5 transforaminal epidural steroid injection to alleviate right lower extremity radicular symptoms  2  Patient will continue home exercise regimen for core strengthening  3  Follow-up 1 month after injection      Complete risks and benefits including bleeding, infection, tissue reaction, nerve injury and allergic reaction were discussed  The approach was demonstrated using models and literature was provided  Verbal and written consent was obtained  History of Present Illness: The patient is a 64 y o  female who presents for a follow up office visit in regards to Leg Pain and Knee Pain  The patients current symptoms include 8/10 constant sharp, throbbing, shooting pain in the right lower extremity which is worse at nighttime  Current pain medications includes:  None  I have personally reviewed and/or updated the patient's past medical history, past surgical history, family history, social history, current medications, allergies, and vital signs today  Review of Systems  Review of Systems   Musculoskeletal: Positive for gait problem  Decreased range of motion  Joint stiffness  Pain in extremity - leg & back   All other systems reviewed and are negative          Past Medical History:   Diagnosis Date    Hypertension     Intracranial aneurysm     resolved 11/17/17 - possible R P-comm region aneurysm measuring 3mm found on MRI results    Reactive depression     last assessed 12/15/15       Past Surgical History:   Procedure Laterality Date    ENDOMETRIAL ABLATION  2012    EPIDURAL BLOCK INJECTION Right 1/7/2020    Procedure: Right L4-5 transforaminal epidural steroid injection 25337;  Surgeon: Anna Loya MD;  Location: [facility];  Service: Pain Management     FL GUIDED NEEDLE PLAC BX/ASP/INJ  1/7/2020    [place] REPAIR INTERCARP/CARP-METACARP JT Left 7/30/2018    Procedure:  Thumb Arthrex mini tight rope suspension arthroplasty with trapezial resection left thumb;  Surgeon: Lashaun Diaz MD;  Location: [facility];  Service: Orthopedics    THYROID SURGERY      near total thyroidectomy       Family History   Problem Relation Age of Onset    Arthritis Mother     Heart disease Mother         CAD    Arthritis Father     Lymphoma Father         Hodgkin's    No Known Problems Sister     No Known Problems Daughter     No Known Problems Maternal Grandmother     No Known Problems Maternal Grandfather     No Known Problems Paternal Grandmother     No Known Problems Paternal Grandfather     No Known Problems Sister     No Known Problems Daughter     No Known Problems Maternal Aunt     No Known Problems Paternal Aunt        Social History     Occupational History    Not on file   Tobacco Use    Smoking status: Never Smoker    Smokeless tobacco: Never Used    Tobacco comment: only smoked as a teenager   Substance and Sexual Activity    Alcohol use: Yes     Frequency: Monthly or less     Drinks per session: 1 or 2     Binge frequency: Never     Comment: social    Drug use: Never    Sexual activity: Yes     Partners: Male         Current Outpatient Medications:     Ascorbic Acid (VITAMIN C) 250 MG CHEW, Chew 1 tablet daily, Disp: , Rfl:     Calcium 600 MG tablet, Take 2 tablets by mouth daily, Disp: , Rfl:     celecoxib (CeleBREX) 200 mg capsule, Take 200 mg by mouth daily , Disp: , Rfl:     Cholecalciferol (VITAMIN D3) 1000 units CAPS, Take by mouth, Disp: , Rfl:     hydrochlorothiazide (HYDRODIURIL) 12 5 mg tablet, TAKE 1 TABLET DAILY, Disp: 90 tablet, Rfl: 0    metoprolol tartrate (LOPRESSOR) 25 mg tablet, TAKE 1 TABLET DAILY, Disp: 90 tablet, Rfl: 0    Misc Natural Products (T-RELIEF CBD+13) SUBL, Place under the tongue Agape 1000mg 0 25 BID, Disp: , Rfl:     Multiple Vitamin (DAILY VALUE MULTIVITAMIN PO), Take 1 tablet by mouth daily, Disp: , Rfl:     mupirocin (BACTROBAN) 2 % ointment, Apply topically 3 (three) times a day, Disp: 22 g, Rfl: 0    nystatin (MYCOSTATIN) powder, , Disp: , Rfl:     No Known Allergies    Physical Exam:    /90 (BP Location: Left arm, Patient Position: Sitting, Cuff Size: Standard)   Ht 5' 4" (1 626 m)   Wt 75 2 kg (165 lb 12 8 oz)   BMI 28 46 kg/m²     Constitutional:normal, well developed, well nourished, alert, in no distress and non-toxic and no overt pain behavior  Eyes:anicteric  HEENT:grossly intact  Neck:supple, symmetric, trachea midline and no masses   Pulmonary:even and unlabored  Cardiovascular:No edema or pitting edema present  Skin:Normal without rashes or lesions and well hydrated  Psychiatric:Mood and affect appropriate  Neurologic:Cranial Nerves II-XII grossly intact  Musculoskeletal:antalgic    Imaging  No orders to display       No orders of the defined types were placed in this encounter

## 2020-04-05 DIAGNOSIS — I10 ESSENTIAL HYPERTENSION: ICD-10-CM

## 2020-04-09 ENCOUNTER — TELEMEDICINE (OUTPATIENT)
Dept: FAMILY MEDICINE CLINIC | Facility: CLINIC | Age: 60
End: 2020-04-09
Payer: COMMERCIAL

## 2020-04-09 VITALS — TEMPERATURE: 97.5 F | WEIGHT: 165 LBS | HEIGHT: 64 IN | BODY MASS INDEX: 28.17 KG/M2

## 2020-04-09 DIAGNOSIS — M54.41 CHRONIC RIGHT-SIDED LOW BACK PAIN WITH RIGHT-SIDED SCIATICA: Primary | ICD-10-CM

## 2020-04-09 DIAGNOSIS — G89.29 CHRONIC RIGHT-SIDED LOW BACK PAIN WITH RIGHT-SIDED SCIATICA: Primary | ICD-10-CM

## 2020-04-09 PROCEDURE — 99213 OFFICE O/P EST LOW 20 MIN: CPT | Performed by: FAMILY MEDICINE

## 2020-04-09 RX ORDER — TRAMADOL HYDROCHLORIDE 50 MG/1
TABLET ORAL
Qty: 15 TABLET | Refills: 0 | Status: SHIPPED | OUTPATIENT
Start: 2020-04-09 | End: 2021-02-05 | Stop reason: ALTCHOICE

## 2020-04-09 RX ORDER — GABAPENTIN 100 MG/1
CAPSULE ORAL
Qty: 100 CAPSULE | Refills: 1 | Status: SHIPPED | OUTPATIENT
Start: 2020-04-09 | End: 2020-09-17

## 2020-04-12 DIAGNOSIS — I10 HYPERTENSION, UNSPECIFIED TYPE: ICD-10-CM

## 2020-04-13 ENCOUNTER — TELEPHONE (OUTPATIENT)
Dept: ADMINISTRATIVE | Facility: OTHER | Age: 60
End: 2020-04-13

## 2020-04-13 RX ORDER — HYDROCHLOROTHIAZIDE 12.5 MG/1
TABLET ORAL
Qty: 90 TABLET | Refills: 3 | Status: SHIPPED | OUTPATIENT
Start: 2020-04-13 | End: 2021-04-08

## 2020-05-19 ENCOUNTER — HOSPITAL ENCOUNTER (OUTPATIENT)
Facility: HOSPITAL | Age: 60
Setting detail: OUTPATIENT SURGERY
Discharge: HOME | End: 2020-05-19
Attending: ANESTHESIOLOGY | Admitting: ANESTHESIOLOGY
Payer: COMMERCIAL

## 2020-05-19 ENCOUNTER — APPOINTMENT (OUTPATIENT)
Dept: RADIOLOGY | Facility: HOSPITAL | Age: 60
End: 2020-05-19
Payer: COMMERCIAL

## 2020-05-19 VITALS
DIASTOLIC BLOOD PRESSURE: 88 MMHG | SYSTOLIC BLOOD PRESSURE: 149 MMHG | TEMPERATURE: 97.8 F | HEART RATE: 77 BPM | RESPIRATION RATE: 18 BRPM | OXYGEN SATURATION: 99 %

## 2020-05-19 PROCEDURE — 64483 NJX AA&/STRD TFRM EPI L/S 1: CPT | Performed by: ANESTHESIOLOGY

## 2020-05-19 RX ORDER — DEXAMETHASONE SODIUM PHOSPHATE 10 MG/ML
INJECTION, SOLUTION INTRAMUSCULAR; INTRAVENOUS AS NEEDED
Status: DISCONTINUED | OUTPATIENT
Start: 2020-05-19 | End: 2020-05-19 | Stop reason: HOSPADM

## 2020-05-19 RX ORDER — LIDOCAINE HYDROCHLORIDE 10 MG/ML
INJECTION, SOLUTION EPIDURAL; INFILTRATION; INTRACAUDAL; PERINEURAL AS NEEDED
Status: DISCONTINUED | OUTPATIENT
Start: 2020-05-19 | End: 2020-05-19 | Stop reason: HOSPADM

## 2020-05-26 ENCOUNTER — TELEPHONE (OUTPATIENT)
Dept: PAIN MEDICINE | Facility: CLINIC | Age: 60
End: 2020-05-26

## 2020-06-18 ENCOUNTER — OFFICE VISIT (OUTPATIENT)
Dept: PAIN MEDICINE | Facility: CLINIC | Age: 60
End: 2020-06-18
Payer: COMMERCIAL

## 2020-06-18 VITALS
HEIGHT: 64 IN | SYSTOLIC BLOOD PRESSURE: 132 MMHG | DIASTOLIC BLOOD PRESSURE: 82 MMHG | RESPIRATION RATE: 18 BRPM | WEIGHT: 165 LBS | BODY MASS INDEX: 28.17 KG/M2 | TEMPERATURE: 97.4 F

## 2020-06-18 DIAGNOSIS — G57.01 PIRIFORMIS SYNDROME OF RIGHT SIDE: ICD-10-CM

## 2020-06-18 DIAGNOSIS — M51.26 LUMBAR DISC HERNIATION: ICD-10-CM

## 2020-06-18 DIAGNOSIS — M54.41 CHRONIC BILATERAL LOW BACK PAIN WITH RIGHT-SIDED SCIATICA: ICD-10-CM

## 2020-06-18 DIAGNOSIS — G89.29 CHRONIC BILATERAL LOW BACK PAIN WITH RIGHT-SIDED SCIATICA: ICD-10-CM

## 2020-06-18 DIAGNOSIS — G89.4 CHRONIC PAIN SYNDROME: Primary | ICD-10-CM

## 2020-06-18 DIAGNOSIS — M54.16 LUMBAR RADICULOPATHY: ICD-10-CM

## 2020-06-18 PROCEDURE — 99213 OFFICE O/P EST LOW 20 MIN: CPT | Performed by: NURSE PRACTITIONER

## 2020-06-18 PROCEDURE — 3075F SYST BP GE 130 - 139MM HG: CPT | Performed by: NURSE PRACTITIONER

## 2020-06-18 PROCEDURE — 3008F BODY MASS INDEX DOCD: CPT | Performed by: NURSE PRACTITIONER

## 2020-06-18 PROCEDURE — 3079F DIAST BP 80-89 MM HG: CPT | Performed by: NURSE PRACTITIONER

## 2020-06-18 PROCEDURE — 1036F TOBACCO NON-USER: CPT | Performed by: NURSE PRACTITIONER

## 2020-06-18 RX ORDER — TIZANIDINE 4 MG/1
4 TABLET ORAL EVERY 8 HOURS PRN
Qty: 90 TABLET | Refills: 0 | Status: SHIPPED | OUTPATIENT
Start: 2020-06-18 | End: 2021-02-05 | Stop reason: ALTCHOICE

## 2020-07-15 ENCOUNTER — PROCEDURE VISIT (OUTPATIENT)
Dept: PAIN MEDICINE | Facility: CLINIC | Age: 60
End: 2020-07-15
Payer: COMMERCIAL

## 2020-07-15 DIAGNOSIS — M79.18 PIRIFORMIS MUSCLE PAIN: Primary | ICD-10-CM

## 2020-07-15 PROCEDURE — 20552 NJX 1/MLT TRIGGER POINT 1/2: CPT | Performed by: ANESTHESIOLOGY

## 2020-07-15 PROCEDURE — 76942 ECHO GUIDE FOR BIOPSY: CPT | Performed by: ANESTHESIOLOGY

## 2020-07-15 RX ORDER — BUPIVACAINE HYDROCHLORIDE 2.5 MG/ML
2 INJECTION, SOLUTION EPIDURAL; INFILTRATION; INTRACAUDAL ONCE
Status: COMPLETED | OUTPATIENT
Start: 2020-07-15 | End: 2020-07-15

## 2020-07-15 RX ORDER — METHYLPREDNISOLONE ACETATE 40 MG/ML
40 INJECTION, SUSPENSION INTRA-ARTICULAR; INTRALESIONAL; INTRAMUSCULAR; SOFT TISSUE ONCE
Status: COMPLETED | OUTPATIENT
Start: 2020-07-15 | End: 2020-07-15

## 2020-07-15 RX ADMIN — METHYLPREDNISOLONE ACETATE 40 MG: 40 INJECTION, SUSPENSION INTRA-ARTICULAR; INTRALESIONAL; INTRAMUSCULAR; SOFT TISSUE at 07:36

## 2020-07-15 RX ADMIN — BUPIVACAINE HYDROCHLORIDE 2 ML: 2.5 INJECTION, SOLUTION EPIDURAL; INFILTRATION; INTRACAUDAL at 07:36

## 2020-07-15 NOTE — PATIENT INSTRUCTIONS
1  Do not apply heat to any area that is numb  If you have discomfort or soreness at the injection site, you may apply ice today, 20 minutes on and 20 minutes off  Tomorrow you may use ice or warm, moist heat  Do not apply ice or heat directly to the skin  2  If you experience severe shortness of breath, go to the Emergency Room  3  You may have numbness for several hours from the local anesthetic  Please use caution and common sense, especially with weight-bearing activities  4  You may have an increase or change in the discomfort for 36-48 hours after your treatment  Apply ice and continue with any pain medicine you have been prescribed  5  Do not do anything strenuous today  You may shower, but no tub baths or hot tubs today  You may resume your normal activities tomorrow, but do not overdo it  Resume normal activities slowly when you are feeling better  6  If you experience redness, drainage or swelling at the injection site, or if you develop a fever above 100 degrees, please call [facility] at [phone] or go to the Emergency Room  7  Continue to take all routine medicines prescribed by your primary care physician unless otherwise instructed by our staff  Most blood thinners should be started again according to your regularly scheduled dosing  If you have any questions, please give our office a call  If you have a problem specifically related to your procedure, please call our office at [phone]  Problems not related to your procedure should be directed to your primary care physician

## 2020-07-15 NOTE — PROGRESS NOTES
Inj Trigger Point Single/Multiple     Date/Time 7/15/2020 7:30 AM     Performed by  Dev Mcintosh MD     Authorized by Dev Mcintosh MD      Universal Protocol Consent: Verbal consent obtained  Written consent obtained  Risks and benefits: risks, benefits and alternatives were discussed  Consent given by: patient  Patient understanding: patient states understanding of the procedure being performed  Patient consent: the patient's understanding of the procedure matches consent given  Procedure consent: procedure consent matches procedure scheduled  Relevant documents: relevant documents present and verified  Test results: test results available and properly labeled  Site marked: the operative site was marked  Radiology Images displayed and confirmed  If images not available, report reviewed: imaging studies available  Patient identity confirmed: verbally with patient        Preparation: Patient was prepped and draped in the usual sterile fashion  Site preparation: Chlorhexidine    Local anesthesia used: yes      Anesthesia: local infiltration     Anesthesia   Local anesthesia used: yes  Local Anesthetic: lidocaine 1% without epinephrine     Sedation   Patient sedated: no        Specimen: no    Culture: no   Procedure Details   Procedure Notes: Ultrasound-guided injection of the right piriformis muscle steroid injection     After discussing the risks, benefits, and alternatives to the procedure, the patient expressed understanding and wished to proceed  The patient was brought to the in office suite and placed in the prone position  Procedural pause conducted to verify:  correct patient identity, procedure to be performed and as applicable, correct side and site, correct patient position, and availability of implants, special equipment and special requirements  A surgical drain was used to hold the sterilize equipment    Prior to the procedure, the posterior inferior iliac spine was examined with a 3 75 MHz curvilinear transducer to visualize the right piriformis muscle and determine the optimal needle  Following this, lumbosacral region was prepared with a ChloraPrep scrub, then reexamined using the same transducer, sterile ultrasound transducer cover, and sterile ultrasound transducer gel  Thereafter, using ultrasound guidance, a 3 5 inch 25-gauge spinal needle was advanced into the right piriformis muscle  After visualization of the tip in the target area and negative aspiration of blood and other bodily fluids, a mixture of 40 mg of Depo-Medrol in 2 mL of 0 25% bupivacaine was injected into the sacroiliac joint  Needles removed and a sterile bandage was placed over the entry site  The patient tolerated the procedure well and there were no apparent complications  After an appropriate amount of observation, the patient was dismissed from the recovery area under their own power  The patient received a total steroid dose of 40 mg of Depo-Medrol              Patient tolerance: Patient tolerated the procedure well with no immediate complications

## 2020-07-22 ENCOUNTER — TELEPHONE (OUTPATIENT)
Dept: PAIN MEDICINE | Facility: CLINIC | Age: 60
End: 2020-07-22

## 2020-07-28 ENCOUNTER — TELEPHONE (OUTPATIENT)
Dept: FAMILY MEDICINE CLINIC | Facility: CLINIC | Age: 60
End: 2020-07-28

## 2020-07-28 NOTE — TELEPHONE ENCOUNTER
She is seeing Dr Abdelrahman Landin and had 3 shots in her back for her sciatica and it is not getting better and Dr Abdelrahman Landin suggested Dr Greg Mallory or Dr Opal Collins    She wants your opinion and would need a referral

## 2020-07-31 NOTE — TELEPHONE ENCOUNTER
Patient has been scheduled for procedure with Dr Aylin Mcintosh at the [address] [place] [address] 08/28/2020  Procedure instructions were reviewed with patient as follows:  Nothing to eat or drink one hour before  Wear appropriate clothing  Transportation is needed   If sick, on an antibiotic or have an open wound must call office  Refrain from vaccinations 2 weeks before and after  If any questions call the office    Patient verbalized understanding and appreciation

## 2020-08-28 ENCOUNTER — HOSPITAL ENCOUNTER (OUTPATIENT)
Facility: HOSPITAL | Age: 60
Setting detail: OUTPATIENT SURGERY
Discharge: HOME | End: 2020-08-28
Attending: ANESTHESIOLOGY | Admitting: ANESTHESIOLOGY
Payer: COMMERCIAL

## 2020-08-28 ENCOUNTER — APPOINTMENT (OUTPATIENT)
Dept: RADIOLOGY | Facility: HOSPITAL | Age: 60
End: 2020-08-28
Payer: COMMERCIAL

## 2020-08-28 VITALS
SYSTOLIC BLOOD PRESSURE: 141 MMHG | OXYGEN SATURATION: 98 % | HEART RATE: 88 BPM | DIASTOLIC BLOOD PRESSURE: 81 MMHG | RESPIRATION RATE: 18 BRPM | TEMPERATURE: 97.3 F

## 2020-08-28 DIAGNOSIS — M54.16 LUMBAR RADICULOPATHY: ICD-10-CM

## 2020-08-28 DIAGNOSIS — G57.01 PIRIFORMIS SYNDROME, RIGHT: ICD-10-CM

## 2020-08-28 PROCEDURE — 27096 INJECT SACROILIAC JOINT: CPT | Performed by: ANESTHESIOLOGY

## 2020-08-28 PROCEDURE — 77002 NEEDLE LOCALIZATION BY XRAY: CPT | Performed by: ANESTHESIOLOGY

## 2020-08-28 PROCEDURE — 20552 NJX 1/MLT TRIGGER POINT 1/2: CPT | Performed by: ANESTHESIOLOGY

## 2020-08-28 PROCEDURE — 77002 NEEDLE LOCALIZATION BY XRAY: CPT

## 2020-08-28 RX ORDER — BUPIVACAINE HYDROCHLORIDE 2.5 MG/ML
INJECTION, SOLUTION EPIDURAL; INFILTRATION; INTRACAUDAL AS NEEDED
Status: DISCONTINUED | OUTPATIENT
Start: 2020-08-28 | End: 2020-08-28 | Stop reason: HOSPADM

## 2020-08-28 RX ORDER — METHYLPREDNISOLONE ACETATE 80 MG/ML
INJECTION, SUSPENSION INTRA-ARTICULAR; INTRALESIONAL; INTRAMUSCULAR; SOFT TISSUE AS NEEDED
Status: DISCONTINUED | OUTPATIENT
Start: 2020-08-28 | End: 2020-08-28 | Stop reason: HOSPADM

## 2020-08-28 RX ORDER — LIDOCAINE HYDROCHLORIDE 10 MG/ML
INJECTION, SOLUTION EPIDURAL; INFILTRATION; INTRACAUDAL; PERINEURAL AS NEEDED
Status: DISCONTINUED | OUTPATIENT
Start: 2020-08-28 | End: 2020-08-28 | Stop reason: HOSPADM

## 2020-08-28 NOTE — H&P
Assessment:  Lumbar radiculopathy [M54 16]  Piriformis syndrome, right [G57 01]    Plan:  Thuy Martini is a 64 y o  female with complaints of right-sided hip pain presents to surgical center for procedure  1  We will perform a Sacroiliac joint injection and piriformis muscle steroid Injection  2  2  Follow-up 1 month after injection    Complete risks and benefits including bleeding, infection, tissue reaction, nerve injury and allergic reaction were discussed  The approach was demonstrated using models and literature was provided  Verbal and written consent was obtained  My impressions and treatment recommendations were discussed in detail with the patient who verbalized understanding and had no further questions  Discharge instructions were provided  I personally saw and examined the patient and I agree with the above discussed plan of care  No orders of the defined types were placed in this encounter  No orders of the defined types were placed in this encounter  History of Present Illness:  Thuy Martini is a 64 y o  female who presents for a follow up office visit in regards to right-sided hip pain, right lumbosacral pain  The patients current symptoms include 7/10 constant sharp, stabbing, throbbing pain without any particular time pattern  I have personally reviewed and/or updated the patient's past medical history, past surgical history, family history, social history, current medications, allergies, and vital signs today  Review of Systems   Musculoskeletal: Positive for arthralgias and back pain  All other systems reviewed and are negative        Patient Active Problem List   Diagnosis    Hypertension    Chronic bilateral low back pain with right-sided sciatica    Primary osteoarthritis of both knees    Primary osteoarthritis of first carpometacarpal joint of left hand    Trigger middle finger of right hand    CMC DJD(carpometacarpal degenerative joint disease), localized primary, left    Chronic pain syndrome    Lumbar disc herniation    Lumbar radiculopathy    Piriformis muscle pain       Past Medical History:   Diagnosis Date    Hypertension     Intracranial aneurysm     resolved 11/17/17 - possible R P-comm region aneurysm measuring 3mm found on MRI results    Reactive depression     last assessed 12/15/15       Past Surgical History:   Procedure Laterality Date    ENDOMETRIAL ABLATION  2012    EPIDURAL BLOCK INJECTION Right 1/7/2020    Procedure: Right L4-5 transforaminal epidural steroid injection 74658;  Surgeon: Jeff De Leon MD;  [facility]: [facility];  Service: Pain Management     EPIDURAL BLOCK INJECTION Right 5/19/2020    Procedure: Right L4-L5 Transforaminal Epidural Steroid Injection (88060); Surgeon: Jeff De Leon MD;  [facility];  Service: Pain Management     FL GUIDED NEEDLE PLAC BX/ASP/INJ  1/7/2020    FL GUIDED NEEDLE PLAC BX/ASP/INJ  5/19/2020    OR REPAIR INTERCARP/CARP-METACARP JT Left 7/30/2018    Procedure:  Thumb Arthrex mini tight rope suspension arthroplasty with trapezial resection left thumb;  Surgeon: Beatriz Renee MD;  Location: [facility];  Service: Orthopedics    THYROID SURGERY      near total thyroidectomy       Family History   Problem Relation Age of Onset    Arthritis Mother     Heart disease Mother         CAD    Arthritis Father     Lymphoma Father         Hodgkin's    No Known Problems Sister     No Known Problems Daughter     No Known Problems Maternal Grandmother     No Known Problems Maternal Grandfather     No Known Problems Paternal Grandmother     No Known Problems Paternal Grandfather     No Known Problems Sister     No Known Problems Daughter     No Known Problems Maternal Aunt     No Known Problems Paternal Aunt        Social History     Occupational History    Not on file   Tobacco Use    Smoking status: Never Smoker    Smokeless tobacco: Never Used    Tobacco comment: only smoked as a teenager   Substance and Sexual Activity    Alcohol use: Yes     Frequency: Monthly or less     Drinks per session: 1 or 2     Binge frequency: Never     Comment: social    Drug use: Never     Comment: CBD    Sexual activity: Yes     Partners: Male       No current facility-administered medications on file prior to encounter  Current Outpatient Medications on File Prior to Encounter   Medication Sig    Ascorbic Acid (VITAMIN C) 250 MG CHEW Chew 1 tablet daily    Calcium 600 MG tablet Take 2 tablets by mouth daily    celecoxib (CeleBREX) 200 mg capsule Take 200 mg by mouth daily     Cholecalciferol (VITAMIN D3) 1000 units CAPS Take by mouth    gabapentin (NEURONTIN) 100 mg capsule 1 tablet 1 hour before bedtime increase by 1 tablet per week until pain is relieved or a maximum dosage of 4 tablets is attained    hydrochlorothiazide (HYDRODIURIL) 12 5 mg tablet TAKE 1 TABLET DAILY    metoprolol tartrate (LOPRESSOR) 25 mg tablet TAKE 1 TABLET DAILY    Misc Natural Products (T-RELIEF CBD+13) SUBL Place under the tongue Agape 1000mg 0 25 BID    Multiple Vitamin (DAILY VALUE MULTIVITAMIN PO) Take 1 tablet by mouth daily    tiZANidine (ZANAFLEX) 4 mg tablet Take 1 tablet (4 mg total) by mouth every 8 (eight) hours as needed for muscle spasms    traMADol (ULTRAM) 50 mg tablet 1 tablet twice daily if needed for severe pain       No Known Allergies    Physical Exam:    /86   Pulse 88   Temp (!) 97 3 °F (36 3 °C) (Tympanic)   Resp 18   SpO2 98%     Constitutional:normal, well developed, well nourished, alert, in no distress and non-toxic and no overt pain behavior    Eyes:anicteric  HEENT:grossly intact  Neck:supple, symmetric, trachea midline and no masses   Pulmonary:even and unlabored  Cardiovascular:No edema or pitting edema present  Skin:Normal without rashes or lesions and well hydrated  Psychiatric:Mood and affect appropriate  Neurologic:Cranial Nerves II-XII grossly intact  Musculoskeletal:normal

## 2020-08-28 NOTE — DISCHARGE INSTRUCTIONS
Epidural Steroid Injection   WHAT YOU NEED TO KNOW:   An epidural steroid injection (OSCAR) is a procedure to inject steroid medicine into the epidural space  The epidural space is between your spinal cord and vertebrae  Steroids reduce inflammation and fluid buildup in your spine that may be causing pain  You may be given pain medicine along with the steroids  ACTIVITY  · Do not drive or operate machinery today  · No strenuous activity today - bending, lifting, etc   · You may resume normal activites starting tomorrow - start slowly and as tolerated  · You may shower today, but no tub baths or hot tubs  · You may have numbness for several hours from the local anesthetic  Please use caution and common sense, especially with weight-bearing activities  CARE OF THE INJECTION SITE  · If you have soreness or pain, apply ice to the area today (20 minutes on/20 minutes off)  · Starting tomorrow, you may use warm, moist heat or ice if needed  · You may have an increase or change in your discomfort for 36-48 hours after your treatment  · Apply ice and continue with any pain medication you have been prescribed  · Notify [facility] if you have any of the following: redness, drainage, swelling, headache, stiff neck or fever above 100°F     SPECIAL INSTRUCTIONS  · Our office will contact you in approximately 7 days for a progress report  MEDICATIONS  · Continue to take all routine medications  · Our office may have instructed you to hold some medications  If you have a problem specifically related to your procedure, please call our office at [phone]  Problems not related to your procedure should be directed to your primary care physician

## 2020-09-01 NOTE — OP NOTE
OPERATIVE REPORT  PATIENT NAME: Deanna Casper    :  1960  MRN: [id number]  Pt Location: [facility]    SURGERY DATE: 2020    Surgeon(s) and Role:     * Osmel Ochoa MD - Primary    Preop Diagnosis:  Lumbar radiculopathy [M54 16]  Piriformis syndrome, right [G57 01]    Post-Op Diagnosis Codes:     * Lumbar radiculopathy [M54 16]     * Piriformis syndrome, right [G57 01]    Procedure(s) (LRB):  Sacroiliac joint injection and piriformis muscle steroid Injection (Right)    Specimen(s):  * No specimens in log *    Estimated Blood Loss:   Minimal    Drains:  * No LDAs found *    Anesthesia Type:   Local    Operative Indications:  Lumbar radiculopathy [M54 16]  Piriformis syndrome, right [G57 01]      Operative Findings:  same    Complications:   None    Procedure and Technique:  Fluoroscopically-guided injection of the right sacroiliac joint(s)    After discussing the risks, benefits, and alternatives to the procedure, the patient expressed understanding and wished to proceed  The patient was brought to the fluoroscopy suite and placed in the prone position  Procedural pause conducted to verify:  correct patient identity, procedure to be performed and as applicable, correct side and site, correct patient position, and availability of implants, special equipment and special requirements  Using fluoroscopy, the inferior portion of the right sacroiliac joint was identified  The skin was sterilely prepped and draped in the usual fashion using Chloraprep skin prep  The skin and subcutaneous tissue were anesthetized with 0 5% lidocaine  Using fluoroscopic guidance, a 3 5 inch 22 gauge spinal needle was advanced into joint  Proper needle positioning was confirmed using multiple fluoroscopic views  After negative aspiration, 0 5 mL Omnipaque 100 contrast was injected, showing intraarticular spread of contrast without any evidence of intravascular uptake    A 2 5 mL solution consisting of 40 mg of Depo-Medrol in 0 25% bupivacaine was injected slowly and incrementally into the joint  Following the injection, the needle was withdrawn slightly and flushed with lidocaine as it was fully extracted  The patient tolerated the procedure well and there were no apparent complications  After appropriate observation, the patient was dismissed from the clinic in good condition under their own power  COMMENTS  The patient received a total steroid dose of 40 mg of Depo-Medrol  Fluoroscopically-guided right piriformis muscle trigger point injection(s)  After discussing the risks, benefits, and alternatives to the procedure, patient expressed understanding and wished to proceed  Patient was brought to fluoroscopy suite and placed in the prone position  Procedural pause conducted to verify: correct patient identity, procedure to be performed, and as applicable, correct side and site, correct patient position, and availability of implants, special equipment and special requirements  Patient was placed in prone position  Fluoroscopically, the right sacral margin adjacent to approximately the S3 neural foramen was identified and marked  The area was cleansed in the usual fashion with Chloraprep skin prep and then sterilely draped  Then 0 5 % lidocaine was used for skin wheal and subcutaneous infiltration  Following this, a 3 5 inch 22 gauge needle was walked down to the level of the sacral margin to gauge depth  The needle was then redirected off of the sacral margin laterally and advanced slightly  1 mL Omnipaque 300 contrast was used showing excellent spread along the piriformis muscle  At this point, the definitive injection was made with 2 mL mL of clear% bupivacaine (local anesthetic) containing 40 mg of Depo-Medrol (steroid)  The needle was withdrawn from the skin while being flushed with Lidocaine  The patient tolerated the procedure well, and there were no apparent complications    After appropriate observation, the patient was dismissed from the clinic in good condition under their own power  Comment:   The patient received a total steroid dose of 40 mg of Depo-Medrol       I was present for the entire procedure    Patient Disposition:  hemodynamically stable    SIGNATURE: Jose Mazariegos MD  DATE: September 1, 2020  TIME: 1:50 PM

## 2020-09-04 ENCOUNTER — TELEPHONE (OUTPATIENT)
Dept: PAIN MEDICINE | Facility: CLINIC | Age: 60
End: 2020-09-04

## 2020-09-17 ENCOUNTER — OFFICE VISIT (OUTPATIENT)
Dept: PAIN MEDICINE | Facility: CLINIC | Age: 60
End: 2020-09-17
Payer: COMMERCIAL

## 2020-09-17 VITALS
BODY MASS INDEX: 28.51 KG/M2 | SYSTOLIC BLOOD PRESSURE: 128 MMHG | TEMPERATURE: 98 F | WEIGHT: 167 LBS | DIASTOLIC BLOOD PRESSURE: 80 MMHG | HEIGHT: 64 IN

## 2020-09-17 DIAGNOSIS — G89.29 CHRONIC BILATERAL LOW BACK PAIN WITH RIGHT-SIDED SCIATICA: ICD-10-CM

## 2020-09-17 DIAGNOSIS — M79.18 PIRIFORMIS MUSCLE PAIN: ICD-10-CM

## 2020-09-17 DIAGNOSIS — G89.29 CHRONIC RIGHT-SIDED LOW BACK PAIN WITH RIGHT-SIDED SCIATICA: ICD-10-CM

## 2020-09-17 DIAGNOSIS — M54.16 LUMBAR RADICULOPATHY: ICD-10-CM

## 2020-09-17 DIAGNOSIS — M51.26 LUMBAR DISC HERNIATION: ICD-10-CM

## 2020-09-17 DIAGNOSIS — M54.41 CHRONIC RIGHT-SIDED LOW BACK PAIN WITH RIGHT-SIDED SCIATICA: ICD-10-CM

## 2020-09-17 DIAGNOSIS — G89.4 CHRONIC PAIN SYNDROME: Primary | ICD-10-CM

## 2020-09-17 DIAGNOSIS — M54.41 CHRONIC BILATERAL LOW BACK PAIN WITH RIGHT-SIDED SCIATICA: ICD-10-CM

## 2020-09-17 PROCEDURE — 99213 OFFICE O/P EST LOW 20 MIN: CPT | Performed by: NURSE PRACTITIONER

## 2020-09-17 RX ORDER — GABAPENTIN 300 MG/1
300 CAPSULE ORAL 3 TIMES DAILY
Qty: 90 CAPSULE | Refills: 1 | Status: SHIPPED | OUTPATIENT
Start: 2020-09-17 | End: 2021-02-05 | Stop reason: ALTCHOICE

## 2020-09-17 NOTE — PROGRESS NOTES
Assessment:  1  Chronic pain syndrome    2  Chronic bilateral low back pain with right-sided sciatica    3  Lumbar radiculopathy    4  Lumbar disc herniation    5  Piriformis muscle pain    6  Chronic right-sided low back pain with right-sided sciatica        Plan:   While the patient was in the office today, I did have a thorough conversation regarding their chronic pain syndrome, medication management, and treatment plan options  Patient is a 82-year-old female with chronic pain syndrome related to chronic low back pain, lumbar radiculopathy, lumbar disc herniation, piriformis muscle pain  She recently underwent a right sacroiliac joint and piriformis injection on 08/28/2020 she denies significant improvement  She was evaluated by a neurosurgeon at [facility] on 09/08/2020 who ordered updated MRI of the lumbar spine as well as an EMG  The EMG is scheduled for November  Patient tried gabapentin 100 mg 3 times daily in the past without significant improvement  I would like to restart gabapentin to address the neuropathic component of her pain and plan to titrate to a therapeutic dose  Will start gabapentin 300 mg titrating to 300 mg 3 times daily to start  She may continue with tizanidine 4 mg 3 times daily as needed for muscle spasms  We will follow-up with her after she has had a follow-up visit with the neurosurgeon  History of Present Illness: The patient is a 64 y o  female who presents for a follow up office visit in regards to Leg Pain; Knee Pain; and Foot Pain  The patients current symptoms include   Right buttock and right leg pain  Current pain level is a 10/10  Pain is described as sharp, throbbing, cramping    Current pain medications includes: tizanidine 4 mg as needed    The patient reports that this regimen is providing 20% pain relief at bedtime  The patient is reporting no side effects from this pain medication regimen      I have personally reviewed and/or updated the patient's past medical history, past surgical history, family history, social history, current medications, allergies, and vital signs today  Review of Systems  Review of Systems   Musculoskeletal: Positive for arthralgias, gait problem and myalgias  Decreased range of motion  Joint stiffness  Pain in extremity - right leg   All other systems reviewed and are negative  Past Medical History:   Diagnosis Date    Hypertension     Intracranial aneurysm     resolved 11/17/17 - possible R P-comm region aneurysm measuring 3mm found on MRI results    Reactive depression     last assessed 12/15/15       Past Surgical History:   Procedure Laterality Date    ENDOMETRIAL ABLATION  2012    EPIDURAL BLOCK INJECTION Right 1/7/2020    Procedure: Right L4-5 transforaminal epidural steroid injection 28601;  Surgeon: Gian June MD;  [facility];  Service: Pain Management     EPIDURAL BLOCK INJECTION Right 5/19/2020    Procedure: Right L4-L5 Transforaminal Epidural Steroid Injection (46001); Surgeon: Gian June MD;  [facility];  Service: Pain Management     FL GUIDED NEEDLE PLAC BX/ASP/INJ  1/7/2020    FL GUIDED NEEDLE PLAC BX/ASP/INJ  5/19/2020    FL GUIDED NEEDLE PLAC BX/ASP/INJ  8/28/2020    AZ INJECTION,SACROILIAC JOINT Right 8/28/2020    Procedure: Sacroiliac joint injection and piriformis muscle steroid Injection;  Surgeon: Gian June MD;  [facility];  Service: Pain Management     [place] REPAIR INTERCARP/CARP-METACARP JT Left 7/30/2018    Procedure:  Thumb Arthrex mini tight rope suspension arthroplasty with trapezial resection left thumb;  Surgeon: Dayan Blanton MD;  [facility];  Service: Orthopedics    THYROID SURGERY      near total thyroidectomy       Family History   Problem Relation Age of Onset    Arthritis Mother     Heart disease Mother         CAD    Arthritis Father     Lymphoma Father Hodgkin's    No Known Problems Sister     No Known Problems Daughter     No Known Problems Maternal Grandmother     No Known Problems Maternal Grandfather     No Known Problems Paternal Grandmother     No Known Problems Paternal Grandfather     No Known Problems Sister     No Known Problems Daughter     No Known Problems Maternal Aunt     No Known Problems Paternal Aunt        Social History     Occupational History    Not on file   Tobacco Use    Smoking status: Never Smoker    Smokeless tobacco: Never Used    Tobacco comment: only smoked as a teenager   Substance and Sexual Activity    Alcohol use: Yes     Frequency: Monthly or less     Drinks per session: 1 or 2     Binge frequency: Never     Comment: social    Drug use: Never     Comment: CBD    Sexual activity: Yes     Partners: Male         Current Outpatient Medications:     Ascorbic Acid (VITAMIN C) 250 MG CHEW, Chew 1 tablet daily, Disp: , Rfl:     Calcium 600 MG tablet, Take 2 tablets by mouth daily, Disp: , Rfl:     celecoxib (CeleBREX) 200 mg capsule, Take 200 mg by mouth daily , Disp: , Rfl:     Cholecalciferol (VITAMIN D3) 1000 units CAPS, Take by mouth, Disp: , Rfl:     hydrochlorothiazide (HYDRODIURIL) 12 5 mg tablet, TAKE 1 TABLET DAILY, Disp: 90 tablet, Rfl: 3    metoprolol tartrate (LOPRESSOR) 25 mg tablet, TAKE 1 TABLET DAILY, Disp: 90 tablet, Rfl: 3    Multiple Vitamin (DAILY VALUE MULTIVITAMIN PO), Take 1 tablet by mouth daily, Disp: , Rfl:     tiZANidine (ZANAFLEX) 4 mg tablet, Take 1 tablet (4 mg total) by mouth every 8 (eight) hours as needed for muscle spasms, Disp: 90 tablet, Rfl: 0    traMADol (ULTRAM) 50 mg tablet, 1 tablet twice daily if needed for severe pain, Disp: 15 tablet, Rfl: 0    Turmeric (QC TUMERIC COMPLEX PO), Take by mouth, Disp: , Rfl:     gabapentin (NEURONTIN) 300 mg capsule, Take 1 capsule (300 mg total) by mouth 3 (three) times a day 1 PO QHS x 1 day, then 1 PO BID x1 day, then 1 PO TID, Disp: 90 capsule, Rfl: 1    Misc Natural Products (T-RELIEF CBD+13) SUBL, Place under the tongue Agape 1000mg 0 25 BID, Disp: , Rfl:     No Known Allergies    Physical Exam:    /80 (BP Location: Left arm, Patient Position: Sitting, Cuff Size: Standard)   Temp 98 °F (36 7 °C)   Ht 5' 4" (1 626 m)   Wt 75 8 kg (167 lb)   BMI 28 67 kg/m²     Constitutional:normal, well developed, well nourished, alert, in no distress and non-toxic and no overt pain behavior  Eyes:anicteric  HEENT:grossly intact  Neck:supple, symmetric, trachea midline and no masses   Pulmonary:even and unlabored  Cardiovascular:No edema or pitting edema present  Skin:Normal without rashes or lesions and well hydrated  Psychiatric:Mood and affect appropriate  Neurologic:Cranial Nerves II-XII grossly intact  Musculoskeletal:normal    Imaging  No orders to display       No orders of the defined types were placed in this encounter

## 2020-12-09 ENCOUNTER — APPOINTMENT (OUTPATIENT)
Dept: RADIOLOGY | Facility: MEDICAL CENTER | Age: 60
End: 2020-12-09
Payer: COMMERCIAL

## 2020-12-09 DIAGNOSIS — M25.551 RIGHT HIP PAIN: ICD-10-CM

## 2020-12-09 PROCEDURE — 73502 X-RAY EXAM HIP UNI 2-3 VIEWS: CPT

## 2021-01-19 ENCOUNTER — APPOINTMENT (OUTPATIENT)
Dept: LAB | Facility: MEDICAL CENTER | Age: 61
End: 2021-01-19
Payer: COMMERCIAL

## 2021-01-19 ENCOUNTER — TRANSCRIBE ORDERS (OUTPATIENT)
Dept: LAB | Facility: MEDICAL CENTER | Age: 61
End: 2021-01-19

## 2021-01-19 DIAGNOSIS — Z51.81 ENCOUNTER FOR THERAPEUTIC DRUG MONITORING: Primary | ICD-10-CM

## 2021-01-19 DIAGNOSIS — Z11.59 SCREENING EXAMINATION FOR POLIOMYELITIS: ICD-10-CM

## 2021-01-19 DIAGNOSIS — Z51.81 ENCOUNTER FOR THERAPEUTIC DRUG MONITORING: ICD-10-CM

## 2021-01-19 DIAGNOSIS — Z01.818 OTHER SPECIFIED PRE-OPERATIVE EXAMINATION: ICD-10-CM

## 2021-01-19 DIAGNOSIS — M16.11 PRIMARY OSTEOARTHRITIS OF RIGHT HIP: ICD-10-CM

## 2021-01-19 LAB
ALBUMIN SERPL BCP-MCNC: 4.3 G/DL (ref 3.5–5)
ALP SERPL-CCNC: 90 U/L (ref 46–116)
ALT SERPL W P-5'-P-CCNC: 23 U/L (ref 12–78)
ANION GAP SERPL CALCULATED.3IONS-SCNC: 6 MMOL/L (ref 4–13)
APTT PPP: 28 SECONDS (ref 23–37)
AST SERPL W P-5'-P-CCNC: 11 U/L (ref 5–45)
BASOPHILS # BLD AUTO: 0.03 THOUSANDS/ΜL (ref 0–0.1)
BASOPHILS NFR BLD AUTO: 1 % (ref 0–1)
BILIRUB SERPL-MCNC: 0.42 MG/DL (ref 0.2–1)
BUN SERPL-MCNC: 19 MG/DL (ref 5–25)
CALCIUM SERPL-MCNC: 9.9 MG/DL (ref 8.3–10.1)
CHLORIDE SERPL-SCNC: 110 MMOL/L (ref 100–108)
CO2 SERPL-SCNC: 26 MMOL/L (ref 21–32)
CREAT SERPL-MCNC: 0.84 MG/DL (ref 0.6–1.3)
EOSINOPHIL # BLD AUTO: 0.06 THOUSAND/ΜL (ref 0–0.61)
EOSINOPHIL NFR BLD AUTO: 1 % (ref 0–6)
ERYTHROCYTE [DISTWIDTH] IN BLOOD BY AUTOMATED COUNT: 12.5 % (ref 11.6–15.1)
ERYTHROCYTE [SEDIMENTATION RATE] IN BLOOD: 11 MM/HOUR (ref 0–29)
GFR SERPL CREATININE-BSD FRML MDRD: 77 ML/MIN/1.73SQ M
GLUCOSE P FAST SERPL-MCNC: 91 MG/DL (ref 65–99)
HCT VFR BLD AUTO: 44.1 % (ref 34.8–46.1)
HGB BLD-MCNC: 14.5 G/DL (ref 11.5–15.4)
IMM GRANULOCYTES # BLD AUTO: 0.01 THOUSAND/UL (ref 0–0.2)
IMM GRANULOCYTES NFR BLD AUTO: 0 % (ref 0–2)
INR PPP: 0.96 (ref 0.84–1.19)
LYMPHOCYTES # BLD AUTO: 1.37 THOUSANDS/ΜL (ref 0.6–4.47)
LYMPHOCYTES NFR BLD AUTO: 21 % (ref 14–44)
MCH RBC QN AUTO: 29.7 PG (ref 26.8–34.3)
MCHC RBC AUTO-ENTMCNC: 32.9 G/DL (ref 31.4–37.4)
MCV RBC AUTO: 90 FL (ref 82–98)
MONOCYTES # BLD AUTO: 0.4 THOUSAND/ΜL (ref 0.17–1.22)
MONOCYTES NFR BLD AUTO: 6 % (ref 4–12)
NEUTROPHILS # BLD AUTO: 4.67 THOUSANDS/ΜL (ref 1.85–7.62)
NEUTS SEG NFR BLD AUTO: 71 % (ref 43–75)
NRBC BLD AUTO-RTO: 0 /100 WBCS
PLATELET # BLD AUTO: 262 THOUSANDS/UL (ref 149–390)
PMV BLD AUTO: 10.8 FL (ref 8.9–12.7)
POTASSIUM SERPL-SCNC: 3.9 MMOL/L (ref 3.5–5.3)
PROT SERPL-MCNC: 7.5 G/DL (ref 6.4–8.2)
PROTHROMBIN TIME: 12.8 SECONDS (ref 11.6–14.5)
RBC # BLD AUTO: 4.89 MILLION/UL (ref 3.81–5.12)
SODIUM SERPL-SCNC: 142 MMOL/L (ref 136–145)
WBC # BLD AUTO: 6.54 THOUSAND/UL (ref 4.31–10.16)

## 2021-01-19 PROCEDURE — 80053 COMPREHEN METABOLIC PANEL: CPT

## 2021-01-19 PROCEDURE — 85610 PROTHROMBIN TIME: CPT

## 2021-01-19 PROCEDURE — 36415 COLL VENOUS BLD VENIPUNCTURE: CPT

## 2021-01-19 PROCEDURE — 85025 COMPLETE CBC W/AUTO DIFF WBC: CPT

## 2021-01-19 PROCEDURE — 85652 RBC SED RATE AUTOMATED: CPT

## 2021-01-19 PROCEDURE — 85730 THROMBOPLASTIN TIME PARTIAL: CPT

## 2021-01-22 ENCOUNTER — TRANSCRIBE ORDERS (OUTPATIENT)
Dept: ADMINISTRATIVE | Facility: HOSPITAL | Age: 61
End: 2021-01-22

## 2021-01-22 ENCOUNTER — HOSPITAL ENCOUNTER (OUTPATIENT)
Dept: NON INVASIVE DIAGNOSTICS | Facility: HOSPITAL | Age: 61
Discharge: HOME | End: 2021-01-22
Payer: COMMERCIAL

## 2021-01-22 DIAGNOSIS — Z01.818 OTHER SPECIFIED PRE-OPERATIVE EXAMINATION: ICD-10-CM

## 2021-01-22 DIAGNOSIS — Z11.59 SCREENING FOR VIRAL DISEASE: ICD-10-CM

## 2021-01-22 DIAGNOSIS — M16.11 PRIMARY OSTEOARTHRITIS OF RIGHT HIP: ICD-10-CM

## 2021-01-22 DIAGNOSIS — Z51.81 THERAPEUTIC DRUG MONITORING: ICD-10-CM

## 2021-01-22 DIAGNOSIS — M16.11 PRIMARY OSTEOARTHRITIS OF RIGHT HIP: Primary | ICD-10-CM

## 2021-01-22 PROCEDURE — 93005 ELECTROCARDIOGRAM TRACING: CPT

## 2021-01-25 LAB
ATRIAL RATE: 72 BPM
P AXIS: 33 DEGREES
PR INTERVAL: 176 MS
QRS AXIS: -3 DEGREES
QRSD INTERVAL: 88 MS
QT INTERVAL: 404 MS
QTC INTERVAL: 442 MS
T WAVE AXIS: 19 DEGREES
VENTRICULAR RATE: 72 BPM

## 2021-01-25 PROCEDURE — 93010 ELECTROCARDIOGRAM REPORT: CPT | Performed by: INTERNAL MEDICINE

## 2021-02-05 ENCOUNTER — CONSULT (OUTPATIENT)
Dept: FAMILY MEDICINE CLINIC | Facility: CLINIC | Age: 61
End: 2021-02-05
Payer: COMMERCIAL

## 2021-02-05 VITALS
HEART RATE: 69 BPM | OXYGEN SATURATION: 99 % | HEIGHT: 64 IN | WEIGHT: 160 LBS | RESPIRATION RATE: 16 BRPM | SYSTOLIC BLOOD PRESSURE: 152 MMHG | BODY MASS INDEX: 27.31 KG/M2 | TEMPERATURE: 96.2 F | DIASTOLIC BLOOD PRESSURE: 98 MMHG

## 2021-02-05 DIAGNOSIS — Z01.818 PREOPERATIVE CLEARANCE: Primary | ICD-10-CM

## 2021-02-05 DIAGNOSIS — M16.11 PRIMARY OSTEOARTHRITIS OF RIGHT HIP: ICD-10-CM

## 2021-02-05 DIAGNOSIS — I10 ESSENTIAL HYPERTENSION: ICD-10-CM

## 2021-02-05 PROCEDURE — 3725F SCREEN DEPRESSION PERFORMED: CPT | Performed by: FAMILY MEDICINE

## 2021-02-05 PROCEDURE — 3008F BODY MASS INDEX DOCD: CPT | Performed by: FAMILY MEDICINE

## 2021-02-05 PROCEDURE — 99214 OFFICE O/P EST MOD 30 MIN: CPT | Performed by: FAMILY MEDICINE

## 2021-02-05 PROCEDURE — 1036F TOBACCO NON-USER: CPT | Performed by: FAMILY MEDICINE

## 2021-02-22 NOTE — PROGRESS NOTES
PT Evaluation     Today's date: 2021  Patient name: Cara Scales  : 1960  MRN: [id number]  Referring provider: Ryan Turk MD  Dx:   Encounter Diagnosis     ICD-10-CM    1  Primary osteoarthritis of right hip  M16 11    z               Assessment  Assessment details:   CURRENT FUNCTIONAL STATUS    Standing/ADL tolerance 5-10 minutes  Walking tolerance 100 feet with a RW  Ascends stairs step by step/descends stairs step by step  Difficulty arising from sitting: Moderate  Ability to dress lower extremities: Assist of one  SHORT TERM GOALS (2 WEEKS)    Increase hip AROM 10 degrees  Increase hip and knee strength 2-3 lbs in all weak areas  Decrease pain to 1-3/10  Standing/ADL tolerance 30 minutes  Walking tolerance 300 feet with a SPC  Ascends stairs reciprocally/descends stairs step by step  Difficulty arising from sitting: Minimal  Ability to dress lower extremities: Independent  LONG TERM GOALS (DISCHARGE)    Hip AROM: F=90 deg, ABD=30 deg, EXT=10 deg  Hip Strength: F=22 lbs, ABD=22 lbs  Knee Strength: E=32 lbs, F=23 lbs  Decrease pain to 0-2/10  Standing/ADL tolerance 2 hours      Walking tolerance 1/2 mile without an AD  Ascends/descends stairs reciprocally  Difficulty arising from sitting: None  Ability to dress lower extremities: Independent  Understanding of Dx/Px/POC: good   Prognosis: good    Goals  See assessment details above  Plan  Plan details: Cara Scales is a 64 y o  female presenting to PT with pain, decreased range of motion, decreased strength, and decreased tolerance to activity  This patient would benefit from skilled PT services to address these issues and to maximize function  A home exercise program was provided and all questions were answered   Thank you for the referral     Patient would benefit from: skilled physical therapy  Planned modality interventions: cryotherapy  Planned therapy interventions: manual therapy, neuromuscular re-education, therapeutic exercise, therapeutic activities and self care  Frequency: 2x week  Duration in weeks: 4        Subjective Evaluation    History of Present Illness  Mechanism of injury: CC: Right hip pain, stiffness, and weakness  HPI: The patient's right hip problem has been present for several years  She had a THR performed on 2021  She uses a walker for ambulation, and she is performing a HEP  She lives in a 2 story home with her   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  Pain  Current pain ratin  At best pain rating: 3  At worst pain ratin    Patient Goals  Patient goals for therapy: decreased pain, increased motion, increased strength, return to work and independence with ADLs/IADLs          Objective     Observations     Additional Observation Details  Patient ambulates with a RW, with limited extension of the right hip  Incision is healing well without redness or discharge  General Comments:      Hip Comments   CURRENT OBJECTIVE MEASUREMENTS    Hip AROM: F=80 deg, ABD=20 deg, EXT=0 deg  Hip Strength: F=11 lbs, ABD=8 lbs  Knee Strength: E=15 lbs, F=23 lbs                                Precautions: Per Protocol: No Flexion > 90 degrees      Manuals         PROM         Flexor Stretch                  Neuro Re-Ed         SLS         Rocker Board                  Ther Ex         NuStep:   S , A         QS 10x TID HEP        SLR Supine 10x TID HEP        Heel Slides 10x TID HEP        Bridging 10x TID HEP        Ball Squeeze         TB Clamshells         TB ABD         TB TKE         Hip Patterns Standing 10x TID HEP        Multi Hip:FLEX  F             EXT  P             ABD                 ADD         Leg Press: S         XO TKE         PYR QUAD:  S , P, C         PYR HAM:  S , P , C                  Ther Activity         Mini Squats         Marching         Side Stepping         Steps Forward         Steps Lateral         Steps Downward         Lunging         Lifting          Carrying                  Gait Training                           Modalities         CP sitting 15'

## 2021-02-24 ENCOUNTER — TRANSCRIBE ORDERS (OUTPATIENT)
Dept: PHYSICAL THERAPY | Facility: CLINIC | Age: 61
End: 2021-02-24

## 2021-02-24 ENCOUNTER — EVALUATION (OUTPATIENT)
Dept: PHYSICAL THERAPY | Facility: CLINIC | Age: 61
End: 2021-02-24
Payer: COMMERCIAL

## 2021-02-24 DIAGNOSIS — M16.11 PRIMARY OSTEOARTHRITIS OF RIGHT HIP: Primary | ICD-10-CM

## 2021-02-24 PROCEDURE — 97535 SELF CARE MNGMENT TRAINING: CPT | Performed by: PHYSICAL THERAPIST

## 2021-02-24 PROCEDURE — 97162 PT EVAL MOD COMPLEX 30 MIN: CPT | Performed by: PHYSICAL THERAPIST

## 2021-02-24 NOTE — LETTER
2021    Alex Brunner MD  C/Saulo Hargrove    Patient: Suzie Gibbs   YOB: 1960   Date of Visit: 2021     Encounter Diagnosis     ICD-10-CM    1  Primary osteoarthritis of right hip  M16 11        Dear Dr Murcia Factor:    Thank you for your recent referral of Suzie Gibbs  Please review the attached evaluation summary from Deanna's recent visit  Please verify that you agree with the plan of care by signing the attached order  If you have any questions or concerns, please do not hesitate to call  I sincerely appreciate the opportunity to share in the care of one of your patients and hope to have another opportunity to work with you in the near future  Sincerely,    Isabel Arana, PT      Referring Provider:      I certify that I have read the below Plan of Care and certify the need for these services furnished under this plan of treatment while under my care  Alex Brunner MD  [facility]  [phone]  Via Fax: [phone]          PT Evaluation     Today's date: 2021  Patient name: Suzie Gibbs  : 1960  MRN: [id number]  Referring provider: Oksana Angelucci, MD  Dx:   Encounter Diagnosis     ICD-10-CM    1  Primary osteoarthritis of right hip  M16 11    z               Assessment  Assessment details:   CURRENT FUNCTIONAL STATUS    Standing/ADL tolerance 5-10 minutes  Walking tolerance 100 feet with a RW  Ascends stairs step by step/descends stairs step by step  Difficulty arising from sitting: Moderate  Ability to dress lower extremities: Assist of one  SHORT TERM GOALS (2 WEEKS)    Increase hip AROM 10 degrees  Increase hip and knee strength 2-3 lbs in all weak areas  Decrease pain to 1-3/10  Standing/ADL tolerance 30 minutes  Walking tolerance 300 feet with a SPC      Ascends stairs reciprocally/descends stairs step by step   Difficulty arising from sitting: Minimal  Ability to dress lower extremities: Independent  LONG TERM GOALS (DISCHARGE)    Hip AROM: F=90 deg, ABD=30 deg, EXT=10 deg  Hip Strength: F=22 lbs, ABD=22 lbs  Knee Strength: E=32 lbs, F=23 lbs  Decrease pain to 0-2/10  Standing/ADL tolerance 2 hours      Walking tolerance 1/2 mile without an AD  Ascends/descends stairs reciprocally  Difficulty arising from sitting: None  Ability to dress lower extremities: Independent  Understanding of Dx/Px/POC: good   Prognosis: good    Goals  See assessment details above  Plan  Plan details: Donell Irwin is a 64 y o  female presenting to PT with pain, decreased range of motion, decreased strength, and decreased tolerance to activity  This patient would benefit from skilled PT services to address these issues and to maximize function  A home exercise program was provided and all questions were answered  Thank you for the referral     Patient would benefit from: skilled physical therapy  Planned modality interventions: cryotherapy  Planned therapy interventions: manual therapy, neuromuscular re-education, therapeutic exercise, therapeutic activities and self care  Frequency: 2x week  Duration in weeks: 4        Subjective Evaluation    History of Present Illness  Mechanism of injury: CC: Right hip pain, stiffness, and weakness  HPI: The patient's right hip problem has been present for several years  She had a THR performed on 2021  She uses a walker for ambulation, and she is performing a HEP  She lives in a 2 story home with her   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    Pain  Current pain ratin  At best pain rating: 3  At worst pain ratin    Patient Goals  Patient goals for therapy: decreased pain, increased motion, increased strength, return to work and independence with ADLs/IADLs          Objective     Observations     Additional Observation Details  Patient ambulates with a RW, with limited extension of the right hip  Incision is healing well without redness or discharge  General Comments:      Hip Comments   CURRENT OBJECTIVE MEASUREMENTS    Hip AROM: F=80 deg, ABD=20 deg, EXT=0 deg  Hip Strength: F=11 lbs, ABD=8 lbs  Knee Strength: E=15 lbs, F=23 lbs                                Precautions: Per Protocol: No Flexion > 90 degrees      Manuals 2/24        PROM         Flexor Stretch                  Neuro Re-Ed         SLS         Rocker Board                  Ther Ex         NuStep:   S , A         QS 10x TID HEP        SLR Supine 10x TID HEP        Heel Slides 10x TID HEP        Bridging 10x TID HEP        Ball Squeeze         TB Clamshells         TB ABD         TB TKE         Hip Patterns Standing 10x TID HEP        Multi Hip:FLEX  F             EXT  P             ABD                 ADD         Leg Press: S         XO TKE         PYR QUAD:  S , P, C         PYR HAM:  S , P , C                  Ther Activity         Mini Squats         Marching         Side Stepping         Steps Forward         Steps Lateral         Steps Downward         Lunging         Lifting          Carrying                  Gait Training                           Modalities         CP sitting 15'

## 2021-03-02 ENCOUNTER — OFFICE VISIT (OUTPATIENT)
Dept: PHYSICAL THERAPY | Facility: CLINIC | Age: 61
End: 2021-03-02
Payer: COMMERCIAL

## 2021-03-02 DIAGNOSIS — M16.11 PRIMARY OSTEOARTHRITIS OF RIGHT HIP: Primary | ICD-10-CM

## 2021-03-02 PROCEDURE — 97530 THERAPEUTIC ACTIVITIES: CPT

## 2021-03-02 PROCEDURE — 97110 THERAPEUTIC EXERCISES: CPT

## 2021-03-02 NOTE — PROGRESS NOTES
Daily Note     Today's date: 3/2/2021  Patient name: Christie Singh  : 1960  MRN: [id number]  Referring provider: Memo Arciniega MD  Dx:   Encounter Diagnosis     ICD-10-CM    1  Primary osteoarthritis of right hip  M16 11                   Subjective: Pt  reports her hip is a little sore  Pt  has been doing her HEP  Objective: See treatment diary below      Assessment: Tolerated treatment well  Patient demonstrated fatigue post treatment, exhibited good technique with therapeutic exercises and would benefit from continued PT  Adjusted RW for more upright posture  Pt  With good understanding of precautions  Plan: Progress treament per protocol             Precautions: Per Protocol: No Flexion > 90 degrees      Manuals  3/2       PROM         Flexor Stretch                  Neuro Re-Ed         SLS  15"x3       Rocker Board                  Ther Ex         NuStep:   S 9, A 11  L2 10'       QS 10x TID HEP 2/10       SLR Supine 10x TID HEP 2/10       Heel Slides 10x TID HEP 2/10       Bridging 10x TID HEP 2/10       Ball Squeeze  3"2/10       TB Clamshells  NV       TB ABD         TB TKE         Hip Patterns Standing 10x TID HEP 2/10 ea       Multi Hip:FLEX  F             EXT  P             ABD                 ADD         Leg Press: S         XO TKE         PYR QUAD:  S , P, C         PYR HAM:  S , P , C                  Ther Activity         Mini Squats         Marching  2/10       Side Stepping  10x       Steps Forward  4"2/10       Steps Lateral  4"10x       Steps Downward         Lunging         Lifting          Carrying                  Gait Training                           Modalities         CP sitting 15' 15'  supine

## 2021-03-04 ENCOUNTER — OFFICE VISIT (OUTPATIENT)
Dept: PHYSICAL THERAPY | Facility: CLINIC | Age: 61
End: 2021-03-04
Payer: COMMERCIAL

## 2021-03-04 DIAGNOSIS — M16.11 PRIMARY OSTEOARTHRITIS OF RIGHT HIP: Primary | ICD-10-CM

## 2021-03-04 PROCEDURE — 97116 GAIT TRAINING THERAPY: CPT | Performed by: PHYSICAL THERAPIST

## 2021-03-04 PROCEDURE — 97140 MANUAL THERAPY 1/> REGIONS: CPT | Performed by: PHYSICAL THERAPIST

## 2021-03-04 PROCEDURE — 97530 THERAPEUTIC ACTIVITIES: CPT | Performed by: PHYSICAL THERAPIST

## 2021-03-04 PROCEDURE — 97110 THERAPEUTIC EXERCISES: CPT | Performed by: PHYSICAL THERAPIST

## 2021-03-04 NOTE — PROGRESS NOTES
Daily Note     Today's date: 3/4/2021  Patient name: Kellie Judd  : 1960  MRN: [id number]  Referring provider: Char Jones MD  Dx:   Encounter Diagnosis     ICD-10-CM    1  Primary osteoarthritis of right hip  M16 11                   Subjective: Patient notes 5/10 pain around her incision  Objective: See treatment diary below      Assessment: Tolerated treatment well  Patient demonstrated fatigue post treatment and exhibited good technique with therapeutic exercises  Pain was decreased to 3/10 after treatment  Plan: Progress treament per protocol             Precautions: Per Protocol: No Flexion > 90 degrees      Manuals  3/2 3/4      Adductor Stretch   RK      Flexor Stretch   RK               Neuro Re-Ed         SLS  15"x3 3" x 10      Rocker Board                  Ther Ex         NuStep:   S 9, A 11  L2 10' L3 12'      QS 10x TID HEP 2/10       SLR Supine 10x TID HEP 2/10       Heel Slides 10x TID HEP 2/10       Bridging 10x TID HEP 2/10       Flexor stretch supine   10x TID HEP      Ball Squeeze  3"2/10       TB Clamshells  NV NV      TB ABD         TB TKE         Hip Patterns Standing 10x TID HEP 2/10 ea 2/10 ea      Multi Hip:FLEX  F             EXT  P             ABD                 ADD         Leg Press: S         XO TKE         PYR QUAD:  S , P, C         PYR HAM:  S , P , C                  Ther Activity         Mini Squats         Marching  2/10       Side Stepping  10x 10x      Steps Forward  4"2/10 4" 2/10      Steps Lateral  4"10x 4" 2/10      Steps Downward         Lunging         Lifting          Carrying                  Gait Training         Levels with SPC   Independent RK               Modalities         CP sitting 15' 15'  supine 15' sitting

## 2021-03-08 ENCOUNTER — OFFICE VISIT (OUTPATIENT)
Dept: PHYSICAL THERAPY | Facility: CLINIC | Age: 61
End: 2021-03-08
Payer: COMMERCIAL

## 2021-03-08 DIAGNOSIS — M16.11 PRIMARY OSTEOARTHRITIS OF RIGHT HIP: Primary | ICD-10-CM

## 2021-03-08 PROCEDURE — 97140 MANUAL THERAPY 1/> REGIONS: CPT

## 2021-03-08 PROCEDURE — 97110 THERAPEUTIC EXERCISES: CPT

## 2021-03-08 PROCEDURE — 97530 THERAPEUTIC ACTIVITIES: CPT

## 2021-03-08 NOTE — PROGRESS NOTES
Daily Note     Today's date: 3/8/2021  Patient name: Christie Singh  : 1960  MRN: [id number]  Referring provider: Memo Arciniega MD  Dx:   Encounter Diagnosis     ICD-10-CM    1  Primary osteoarthritis of right hip  M16 11                   Subjective: Pt reports having some soreness along R lateral hip today  Notes she has been trying to practice walking outside utilizing [facility]  Has follow up with MD this Friday  Objective: See treatment diary below  Mini squats and TB clamshell in hook-lying added to program today  Assessment: Tolerated treatment well  Continued with program as outlined below  Is most challenged with SLS balance  Tends to compensate with lateral trunk bend/lean to R when balancing on RLE  Was able to perform new exercises added to program with no complaints of increase of pain and with good form/intensity  Patient would benefit from continued PT to further improve strength and maximize function with ADL's  Plan: Continue per plan of care  Progress as able              Precautions: Per Protocol: No Flexion > 90 degrees      Manuals  3 3/ 3/8     Adductor Stretch   RK AFB     Flexor Stretch   RK AFB              Neuro Re-Ed         SLS  15"x3 3" x 10 10"x4     Rocker Board                  Ther Ex         NuStep:   S 9, A 11  L2 10' L3 12' L3 10'     QS 10x TID HEP 2/10       SLR Supine 10x TID HEP 2/10       Heel Slides 10x TID HEP 2/10       Bridging 10x TID HEP 2/10       Flexor stretch supine   10x TID HEP      Ball Squeeze  3"2/10       TB Clamshells  NV NV L2  2x10     TB ABD         TB TKE         Hip Patterns Standing 10x TID HEP 2/10 ea 2/10 ea 2x10 ea     Multi Hip:FLEX  F             EXT  P             ABD                 ADD         Leg Press: S         XO TKE         PYR QUAD:  S , P, C         PYR HAM:  S , P , C                  Ther Activity         Mini Squats    10x     Marching  2/10       Side Stepping  10x 10x 10x     Steps Forward 4"2/10 4" 2/10 4" 3x10     Steps Lateral  4"10x 4" 2/10 4" 3x10     Steps Downward         Lunging         Lifting          Carrying                  Gait Training         Levels with SPC   Independent RK [employer]B  4 min              Modalities         CP sitting 15' 15'  supine 15' sitting 15' sitting

## 2021-03-10 ENCOUNTER — OFFICE VISIT (OUTPATIENT)
Dept: PHYSICAL THERAPY | Facility: CLINIC | Age: 61
End: 2021-03-10
Payer: COMMERCIAL

## 2021-03-10 DIAGNOSIS — M16.11 PRIMARY OSTEOARTHRITIS OF RIGHT HIP: Primary | ICD-10-CM

## 2021-03-10 PROCEDURE — 97140 MANUAL THERAPY 1/> REGIONS: CPT | Performed by: PHYSICAL THERAPIST

## 2021-03-10 PROCEDURE — 97110 THERAPEUTIC EXERCISES: CPT | Performed by: PHYSICAL THERAPIST

## 2021-03-10 PROCEDURE — 97530 THERAPEUTIC ACTIVITIES: CPT | Performed by: PHYSICAL THERAPIST

## 2021-03-10 NOTE — PROGRESS NOTES
Daily Note     Today's date: 3/10/2021  Patient name: Mallory Tracey  : 1960  MRN: [id number]  Referring provider: Cammy Skiff, MD  Dx:   Encounter Diagnosis     ICD-10-CM    1  Primary osteoarthritis of right hip  M16 11                   Subjective: Patient notes some discomfort in the right buttock and thigh when walking  Objective: Lumbar extension and R SB in standing reproduce right buttock and thigh symptoms  Assessment: Tolerated treatment well  Patient exhibited good technique with therapeutic exercises      Plan: Progress treatment as tolerated              Precautions: Per Protocol: No Flexion > 90 degrees      Manuals  3/2 3/ 3 3/10    Adductor Stretch   RK AFB RK    Flexor Stretch   RK AFB RK             Neuro Re-Ed         SLS  15"x3 3" x 10 10"x4 10"x5    Rocker Board                  Ther Ex         NuStep:   S 9, A 11  L2 10' L3 12' L3 10' L3 12'    QS 10x TID HEP 2/10       SLR Supine 10x TID HEP 2/10       Heel Slides 10x TID HEP 2/10       Bridging 10x TID HEP 2/10       Flexor stretch supine   10x TID HEP      Ball Squeeze  3"2/10   3" 20x    TB Clamshells  NV NV L2  2x10 L3 2/10    TB ABD         TB TKE         Hip Patterns Standing 10x TID HEP 2/10 ea 2/10 ea 2x10 ea 1# 2/10             Ther Activity         Mini Squats Doorway    10x 20x    Marching  2/10       Side Stepping  10x 10x 10x 1# 10x    Steps Forward  4"2/10 4" 2/10 4" 3x10 6" 2/10    Steps Lateral  4"10x 4" 2/10 4" 3x10 DC    Steps Downward         Lunging         Lifting          Carrying                  Gait Training         Levels with SPC   Independent RK [employer]B  4 min              Modalities         CP sitting 15' 15'  supine 15' sitting 15' sitting 15'  sitting

## 2021-03-15 ENCOUNTER — OFFICE VISIT (OUTPATIENT)
Dept: PHYSICAL THERAPY | Facility: CLINIC | Age: 61
End: 2021-03-15
Payer: COMMERCIAL

## 2021-03-15 DIAGNOSIS — M16.11 PRIMARY OSTEOARTHRITIS OF RIGHT HIP: Primary | ICD-10-CM

## 2021-03-15 PROCEDURE — 97140 MANUAL THERAPY 1/> REGIONS: CPT | Performed by: PHYSICAL THERAPIST

## 2021-03-15 PROCEDURE — 97110 THERAPEUTIC EXERCISES: CPT | Performed by: PHYSICAL THERAPIST

## 2021-03-15 NOTE — PROGRESS NOTES
Daily Note     Today's date: 3/15/2021  Patient name: Cathy Barrientos  : 1960  MRN: [id number]  Referring provider: Tony Espinoza MD  Dx:   Encounter Diagnosis     ICD-10-CM    1  Primary osteoarthritis of right hip  M16 11                   Subjective: Patient reports that she has d/c her cane but notes that the arthritis in her knees is bothering her today  Objective: Lumbar extension and R SB in standing reproduce right buttock and thigh symptoms  Assessment: Pt was able to progress aspects of program today without increases in pain  She did require verbal cues in order to performing squatting with correct form without anterior tibial translation past her toes  She additionally is challenged with SLS and required reaching for external support to maintain upright balance  Plan: Progress treatment as tolerated              Precautions: Per Protocol: No Flexion > 90 degrees      Manuals  3 3 3 3/10 315   Adductor Stretch   RK AFB RK KB   Flexor Stretch   RK AFB RK KB            Neuro Re-Ed         SLS  15"x3 3" x 10 10"x4 10"x5 10"x5 ea   Rocker Board                  Ther Ex         NuStep:   S 9, A 11  L2 10' L3 12' L3 10' L3 12' L4 13'   QS 10x TID HEP 2/10       SLR Supine 10x TID HEP 2/10       Heel Slides 10x TID HEP 2/10       Bridging 10x TID HEP 2/10       Flexor stretch supine   10x TID HEP      Ball Squeeze  3"2/10   3" 20x 3" 20x   TB Clamshells  NV NV L2  2x10 L3 2/10 L3 2/10 supine   TB ABD         TB TKE         Hip Patterns Standing 10x TID HEP 2/10 ea 2/10 ea 2x10 ea 1# 2/10 1# 3/10 x3 ways            Ther Activity         Mini Squats Doorway    10x 20x 20x   Marching  2/10       Side Stepping  10x 10x 10x 1# 10x 1# 10x   Steps Forward  4"2/10 4" 2/10 4" 3x10 6" 2/10 6" 2/10   Steps Lateral  4"10x 4" 2/10 4" 3x10 DC    Steps Downward         Lunging         Lifting          Carrying                  Gait Training         Levels with SPC   Independent RK [employer]  4 min              Modalities         CP sitting 15' 15'  supine 15' sitting 15' sitting 15'  sitting 15'  sitting

## 2021-03-17 ENCOUNTER — OFFICE VISIT (OUTPATIENT)
Dept: PHYSICAL THERAPY | Facility: CLINIC | Age: 61
End: 2021-03-17
Payer: COMMERCIAL

## 2021-03-17 DIAGNOSIS — M16.11 PRIMARY OSTEOARTHRITIS OF RIGHT HIP: Primary | ICD-10-CM

## 2021-03-17 PROCEDURE — 97530 THERAPEUTIC ACTIVITIES: CPT

## 2021-03-17 PROCEDURE — 97110 THERAPEUTIC EXERCISES: CPT

## 2021-03-17 PROCEDURE — 97140 MANUAL THERAPY 1/> REGIONS: CPT

## 2021-03-17 PROCEDURE — 97112 NEUROMUSCULAR REEDUCATION: CPT

## 2021-03-17 NOTE — PROGRESS NOTES
Daily Note     Today's date: 3/17/2021  Patient name: Mallory Tracey  : 1960  MRN: [id number]  Referring provider: Cammy Skiff, MD  Dx:   Encounter Diagnosis     ICD-10-CM    1  Primary osteoarthritis of right hip  M16 11                   Subjective: Patient reports she has soreness in R hip today, but her pain levels are decreasing  She is walking with no assistive device at home  Objective: See treatment diary below  Increased reps on TE today  Assessment: Tolerated treatment well  Patient demonstrated fatigue post treatment, exhibited good technique with therapeutic exercises and would benefit from continued PT to incrase R hip strength  Plan: Continue per plan of care           Precautions: Per Protocol: No Flexion > 90 degrees, Updated 3-13-21      Manuals 3/17 3 3 3/8 3/10 3/15   Adductor Stretch CM  RK AFB RK KB   Flexor Stretch CM  RK AFB RK KB            Neuro Re-Ed         SLS 30"x3 15"x3 3" x 10 10"x4 10"x5 10"x5 ea   Rocker Board                  Ther Ex         NuStep:   S 9, A 11 L4 14' L2 10' L3 12' L3 10' L3 12' L4 13'   QS  2/10       SLR Supine  2/10       Heel Slides  2/10       Bridging  2/10       Flexor stretch supine   10x TID HEP      Ball Squeeze 3"x30 3"2/10   3" 20x 3" 20x   TB Clamshells L3 3x10 NV NV L2  2x10 L3 2/10 L3 2/10 supine   TB ABD         TB TKE         Hip Patterns Standing x3 1# 3x10  2/10 ea 2/10 ea 2x10 ea 1# 2/10 1# 3/10 x3 ways            Ther Activity         Mini Squats Doorway 2x10   10x 20x 20x   Marching  2/10       Side Stepping L2 10x 10x 10x 10x 1# 10x 1# 10x   Steps Forward 6" 3x10 4"2/10 4" 2/10 4" 3x10 6" 2/10 6" 2/10   Steps Lateral  4"10x 4" 2/10 4" 3x10 DC    Steps Downward         Lunging         Lifting          Carrying                  Gait Training         Levels with SPC   Independent RK [employer]  4 min              Modalities         CP sitting 15' 15'  supine 15' sitting 15' sitting 15'  sitting 15'  sitting

## 2021-03-19 NOTE — PROGRESS NOTES
PT Re-Evaluation     Today's date: 3/22/2021  Patient name: hSaan Lozano  : 1960  MRN: [id number]  Referring provider: Fermin Stephenson MD  Dx:   Encounter Diagnosis     ICD-10-CM    1  Primary osteoarthritis of right hip  M16 11                   Assessment  Assessment details:   CURRENT FUNCTIONAL STATUS    Standing/ADL tolerance 60 minutes  Walking tolerance 1/2 mile  Ascends stairs reciprocally/descends stairs step by step  Difficulty arising from sitting: None  Ability to dress lower extremities: Independent  Lifting tolerance 15 lbs  Work Status: Off    SHORT TERM GOALS (2 WEEKS)    Increase hip and knee strength 2-3 lbs in all weak areas  Decrease pain to 0-1/10  Standing/ADL tolerance 90 minutes  Walking tolerance 3/4 mile without an AD  Ascends/descends stairs reciprocally  Lifting tolerance 20-25 lbs  Work Status: TBD by surgeon    LONG TERM GOALS (DISCHARGE)    Hip AROM: F=90 deg, ABD=30 deg, EXT=10 deg -met  Hip Strength: F=22 lbs, ABD=22 lbs -partially met  Knee Strength: E=32 lbs, F=23 lbs -partially met   Decrease pain to 0-1/10 -partially met  Standing/ADL tolerance 2 hours  -partially met   Walking tolerance 1 mile without an AD  -partially met    Ascends/descends stairs reciprocally  -partially met  Difficulty arising from sitting: None-met  Ability to dress lower extremities: Independent -met  Lifting tolerance 30 lbs  -partially met  Work Status: Full Duty-not met  Understanding of Dx/Px/POC: good   Prognosis: good    Goals  See assessment details above  Plan  Plan details: The patient has shown improvement in PT demonstrating decreased pain, increased range of motion, increased strength, and increased tolerance to activity  The patient continues to present with pain, decreased strength, and decreased tolerance to activity  The patient would benefit from continued skilled PT services to address these issues and to maximize function   The patient will also continue performing their HEP  Patient would benefit from: skilled physical therapy  Planned modality interventions: cryotherapy  Planned therapy interventions: manual therapy, neuromuscular re-education, therapeutic exercise, therapeutic activities and self care  Frequency: 2x week  Duration in weeks: 4        Subjective Evaluation    History of Present Illness  Mechanism of injury: Subjective: The patient's right hip pain is no longer constant, and it has decreased in intensity  She has an improved tolerance for standing and walking, actually being more limited by knee pain than hip pain  She is wondering how she will do lifting 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 work  Pain  Current pain ratin  At best pain ratin  At worst pain ratin    Patient Goals  Patient goals for therapy: decreased pain, increased motion, increased strength, return to work and independence with ADLs/IADLs          Objective     Observations     Additional Observation Details  Patient ambulates without an AD, with good extension of the right hip  Patient does limp slightly, but she states that it is due to her knees and not her hip  General Comments:      Hip Comments   CURRENT OBJECTIVE MEASUREMENTS    Hip AROM: F=100 deg, ABD=35 deg, EXT=10 deg  Hip Strength: F=22 lbs, ABD=21 lbs  Knee Strength: E=25 lbs, F=31 lbs                                Precautions: None, Updated 3-22-21        Manuals 3/17 3/22 3 3/8 3/10 3/15   Adductor Stretch CM  RK RK AFB RK KB   Flexor Stretch CM  RK RK AFB RK KB                   Neuro Re-Ed               SLS 30"x3 30"x3 3" x 10 10"x4 10"x5 10"x5 ea   Rocker Board                               Ther Ex               NuStep:   S 9, A 11 L4 14' L4 14' L3 12' L3 10' L3 12' L4 13'   Flexor stretch supine     10x TID HEP         Ball Squeeze 3"x30 3"3/10     3" 20x 3" 20x   TB Clamshells L3 3x10 L3 3/10 NV L2  2x10 L3 2/10 L3 2/10 supine   TB ABD               TB TKE               Hip Patterns Standing x3 1# 3x10  1# 3/10 2/10 ea 2x10 ea 1# 2/10 1# 3/10 x3 ways                   Ther Activity               Mini Squats Doorway 2x10  2/10   10x 20x 20x   Marching              Side Stepping L2 10x L2 10x 10x 10x 1# 10x 1# 10x   Steps Forward 6" 3x10 6" 2/10 4" 2/10 4" 3x10 6" 2/10 6" 2/10   Steps Lateral    4" 2/10 4" 3x10 DC     Steps Downward               Lunging               Lifting: Knee to waist level    15# 10x           Carrying                               Gait Training               Levels with SPC     Independent RK [employer]  4 min                       Modalities               CP sitting 15' 15'  sitting 15' sitting 15' sitting 15'  sitting 15'  sitting

## 2021-03-22 ENCOUNTER — TRANSCRIBE ORDERS (OUTPATIENT)
Dept: PHYSICAL THERAPY | Facility: CLINIC | Age: 61
End: 2021-03-22

## 2021-03-22 ENCOUNTER — EVALUATION (OUTPATIENT)
Dept: PHYSICAL THERAPY | Facility: CLINIC | Age: 61
End: 2021-03-22
Payer: COMMERCIAL

## 2021-03-22 DIAGNOSIS — M16.11 PRIMARY OSTEOARTHRITIS OF RIGHT HIP: Primary | ICD-10-CM

## 2021-03-22 PROCEDURE — 97110 THERAPEUTIC EXERCISES: CPT | Performed by: PHYSICAL THERAPIST

## 2021-03-22 PROCEDURE — 97530 THERAPEUTIC ACTIVITIES: CPT | Performed by: PHYSICAL THERAPIST

## 2021-03-22 PROCEDURE — 97140 MANUAL THERAPY 1/> REGIONS: CPT | Performed by: PHYSICAL THERAPIST

## 2021-03-22 NOTE — LETTER
2021    MD ZEE Sims/Saulo Jonesnilla    Patient: Max Arguello   YOB: 1960   Date of Visit: 3/22/2021     Encounter Diagnosis     ICD-10-CM    1  Primary osteoarthritis of right hip  M16 11        Dear Dr Bakari Escamilla:    Thank you for your recent referral of Max Arguello  Please review the attached evaluation summary from Deanna's recent visit  Please verify that you agree with the plan of care by signing the attached order  If you have any questions or concerns, please do not hesitate to call  I sincerely appreciate the opportunity to share in the care of one of your patients and hope to have another opportunity to work with you in the near future  Sincerely,    Timothy Franco, PT      Referring Provider:      I certify that I have read the below Plan of Care and certify the need for these services furnished under this plan of treatment while under my care  Gilda Hurley MD  [facility] [address] [phone]  Via Fax: [phone]          PT Re-Evaluation     Today's date: 3/22/2021  Patient name: Max Arguello  : 1960  MRN: [id number]  Referring provider: Johana Barahona MD  Dx:   Encounter Diagnosis     ICD-10-CM    1  Primary osteoarthritis of right hip  M16 11                   Assessment  Assessment details:   CURRENT FUNCTIONAL STATUS    Standing/ADL tolerance 60 minutes  Walking tolerance 1/2 mile  Ascends stairs reciprocally/descends stairs step by step  Difficulty arising from sitting: None  Ability to dress lower extremities: Independent  Lifting tolerance 15 lbs  Work Status: Off    SHORT TERM GOALS (2 WEEKS)    Increase hip and knee strength 2-3 lbs in all weak areas  Decrease pain to 0-1/10  Standing/ADL tolerance 90 minutes  Walking tolerance 3/4 mile without an AD  Ascends/descends stairs reciprocally    Lifting tolerance 20-25 lbs  Work Status: TBD by surgeon    LONG TERM GOALS (DISCHARGE)    Hip AROM: F=90 deg, ABD=30 deg, EXT=10 deg -met  Hip Strength: F=22 lbs, ABD=22 lbs -partially met  Knee Strength: E=32 lbs, F=23 lbs -partially met   Decrease pain to 0-1/10 -partially met  Standing/ADL tolerance 2 hours  -partially met   Walking tolerance 1 mile without an AD  -partially met    Ascends/descends stairs reciprocally  -partially met  Difficulty arising from sitting: None-met  Ability to dress lower extremities: Independent -met  Lifting tolerance 30 lbs  -partially met  Work Status: Full Duty-not met  Understanding of Dx/Px/POC: good   Prognosis: good    Goals  See assessment details above  Plan  Plan details: The patient has shown improvement in PT demonstrating decreased pain, increased range of motion, increased strength, and increased tolerance to activity  The patient continues to present with pain, decreased strength, and decreased tolerance to activity  The patient would benefit from continued skilled PT services to address these issues and to maximize function  The patient will also continue performing their HEP  Patient would benefit from: skilled physical therapy  Planned modality interventions: cryotherapy  Planned therapy interventions: manual therapy, neuromuscular re-education, therapeutic exercise, therapeutic activities and self care  Frequency: 2x week  Duration in weeks: 4        Subjective Evaluation    History of Present Illness  Mechanism of injury: Subjective: The patient's right hip pain is no longer constant, and it has decreased in intensity  She has an improved tolerance for standing and walking, actually being more limited by knee pain than hip pain  She is wondering how she will do lifting 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 work    Pain  Current pain ratin  At best pain ratin  At worst pain ratin    Patient Goals  Patient goals for therapy: decreased pain, increased motion, increased strength, return to work and independence with ADLs/IADLs          Objective     Observations     Additional Observation Details  Patient ambulates without an AD, with good extension of the right hip  Patient does limp slightly, but she states that it is due to her knees and not her hip  General Comments:      Hip Comments   CURRENT OBJECTIVE MEASUREMENTS    Hip AROM: F=100 deg, ABD=35 deg, EXT=10 deg  Hip Strength: F=22 lbs, ABD=21 lbs  Knee Strength: E=25 lbs, F=31 lbs                                Precautions: None, Updated 3-22-21        Manuals 3/17 3/22 3/4 3/8 3/10 3/15   Adductor Stretch CM  RK RK AFB RK KB   Flexor Stretch CM  RK RK AFB RK KB                   Neuro Re-Ed               SLS 30"x3 30"x3 3" x 10 10"x4 10"x5 10"x5 ea   Rocker Board                               Ther Ex               NuStep:   S 9, A 11 L4 14' L4 14' L3 12' L3 10' L3 12' L4 13'   Flexor stretch supine     10x TID HEP         Ball Squeeze 3"x30 3"3/10     3" 20x 3" 20x   TB Clamshells L3 3x10 L3 3/10 NV L2  2x10 L3 2/10 L3 2/10 supine   TB ABD               TB TKE               Hip Patterns Standing x3 1# 3x10  1# 3/10 2/10 ea 2x10 ea 1# 2/10 1# 3/10 x3 ways                   Ther Activity               Mini Squats Doorway 2x10  2/10   10x 20x 20x   Marching              Side Stepping L2 10x L2 10x 10x 10x 1# 10x 1# 10x   Steps Forward 6" 3x10 6" 2/10 4" 2/10 4" 3x10 6" 2/10 6" 2/10   Steps Lateral    4" 2/10 4" 3x10 DC     Steps Downward               Lunging               Lifting: Knee to waist level    15# 10x           Carrying                               Gait Training               Levels with SPC     Independent RK AFB  4 min                       Modalities               CP sitting 15' 15'  sitting 15' sitting 15' sitting 15'  sitting 15'  sitting

## 2021-03-24 ENCOUNTER — OFFICE VISIT (OUTPATIENT)
Dept: PHYSICAL THERAPY | Facility: CLINIC | Age: 61
End: 2021-03-24
Payer: COMMERCIAL

## 2021-03-24 DIAGNOSIS — M16.11 PRIMARY OSTEOARTHRITIS OF RIGHT HIP: Primary | ICD-10-CM

## 2021-03-24 PROCEDURE — 97140 MANUAL THERAPY 1/> REGIONS: CPT | Performed by: PHYSICAL THERAPIST

## 2021-03-24 PROCEDURE — 97530 THERAPEUTIC ACTIVITIES: CPT | Performed by: PHYSICAL THERAPIST

## 2021-03-24 PROCEDURE — 97110 THERAPEUTIC EXERCISES: CPT | Performed by: PHYSICAL THERAPIST

## 2021-03-24 NOTE — PROGRESS NOTES
Daily Note     Today's date: 3/24/2021  Patient name: Hubert Meyer  : 1960  MRN: [id number]  Referring provider: Justine Jones MD  Dx:   Encounter Diagnosis     ICD-10-CM    1  Primary osteoarthritis of right hip  M16 11                   Subjective: The patient states that her hip is doing good today but her knees are sore from the rainy weather  Objective: See treatment diary below      Assessment: Good tolerance to all TE today  Some verbal cues are needed for correct form with completing TE  No increase in pain noted during session today  CP x 15 minutes to end session and she reports no hip pain to end  Continued PT would be beneficial to improve function  Plan: Continue per plan of care  Progress as able in upcoming visit            Precautions: None, Updated 3-22-21        Manuals 3/17 3/22 3/24 3/8 3/10 3/15   Adductor Stretch CM  RK ML AFB RK KB   Flexor Stretch CM  RK ML AFB RK KB                   Neuro Re-Ed               SLS 30"x3 30"x3 30" x 3 10"x4 10"x5 10"x5 ea   Rocker Board                               Ther Ex               NuStep:   S 9, A 11 L4 14' L4 14' L5 15' L3 10' L3 12' L4 13'   Flexor stretch supine              Ball Squeeze 3"x30 3"3/10 3" 3 x 10   3" 20x 3" 20x   TB Clamshells L3 3x10 L3 3/10 L3 3 x 10 L2  2x10 L3 2/10 L3 2/10 supine   TB ABD               TB TKE               Hip Patterns Standing x3 1# 3x10  1# 3/10 2# 3 x 10 each 2x10 ea 1# 2/10 1# 3/10 x3 ways                   Ther Activity               Mini Squats Doorway 2x10  2/10 2 x 10 10x 20x 20x   Marching              Side Stepping L2 10x L2 10x 2# 10x 10x 1# 10x 1# 10x   Steps Forward 6" 3x10 6" 2/10 6" 2/10 Varun 4" 3x10 6" 2/10 6" 2/10   Steps Lateral     4" 3x10 DC     Steps Downward               Lunging               Lifting: Knee to waist level    15# 10x 15#   2 x 10          Carrying                               Gait Training               Levels with SPC      AFB  4 min                     Modalities               CP sitting 15' 15'  sitting 15' sitting 15' sitting 15'  sitting 15'  sitting

## 2021-03-29 ENCOUNTER — APPOINTMENT (OUTPATIENT)
Dept: PHYSICAL THERAPY | Facility: CLINIC | Age: 61
End: 2021-03-29
Payer: COMMERCIAL

## 2021-03-31 ENCOUNTER — OFFICE VISIT (OUTPATIENT)
Dept: PHYSICAL THERAPY | Facility: CLINIC | Age: 61
End: 2021-03-31
Payer: COMMERCIAL

## 2021-03-31 DIAGNOSIS — I10 ESSENTIAL HYPERTENSION: ICD-10-CM

## 2021-03-31 DIAGNOSIS — M16.11 PRIMARY OSTEOARTHRITIS OF RIGHT HIP: Primary | ICD-10-CM

## 2021-03-31 PROCEDURE — 97110 THERAPEUTIC EXERCISES: CPT | Performed by: PHYSICAL THERAPIST

## 2021-03-31 PROCEDURE — 97140 MANUAL THERAPY 1/> REGIONS: CPT | Performed by: PHYSICAL THERAPIST

## 2021-03-31 PROCEDURE — 97530 THERAPEUTIC ACTIVITIES: CPT | Performed by: PHYSICAL THERAPIST

## 2021-03-31 NOTE — PROGRESS NOTES
Daily Note     Today's date: 3/31/2021  Patient name: America Gayle  : 1960  MRN: [id number]  Referring provider: Suzanne Ha MD  Dx:   Encounter Diagnosis     ICD-10-CM    1  Primary osteoarthritis of right hip  M16 11                   Subjective: Patient states that the hip is feeling stronger and not as tight  She does not have any pain, just tenderness over the incision  Objective: See treatment diary below      Assessment: Tolerated treatment well  Patient demonstrated fatigue post treatment      Plan: Continue per plan of care           Precautions: None, Updated 3-22-21        Manuals 3/17 3/22 3/24 3/31 3/10 3/15   Adductor Stretch CM  RK ML RK RK KB   Flexor Stretch CM  RK ML RK RK KB                   Neuro Re-Ed               SLS 30"x3 30"x3 30" x 3 10"x4 10"x5 10"x5 ea   Rocker Board                               Ther Ex               NuStep:   S 9, A 11 L4 14' L4 14' L5 15' L5 15' L3 12' L4 13'   Flexor stretch supine              Ball Squeeze 3"x30 3"3/10 3" 3 x 10 3" 3/10 3" 20x 3" 20x   TB Clamshells L3 3x10 L3 3/10 L3 3 x 10 L3 3/10 L3 2/10 L3 2/10 supine   TB ABD               TB TKE               Hip Patterns Standing x3 1# 3x10  1# 3/10 2# 3 x 10 each 2# 3/10 1# 2/10 1# 3/10 x3 ways                   Ther Activity               Mini Squats Doorway 2x10  2/10 2 x 10 10x 20x 20x   Marching              Side Stepping L2 10x L2 10x 2# 10x L3 10x 1# 10x 1# 10x   Steps Forward 6" 3x10 6" 2/10 6" 2/10 Varun 6" 2x10 6" 2/10 6" 2/10   Steps Lateral      DC     Steps Downward               Lunging               Lifting: Knee to waist level    15# 10x 15#   2 x 10   15# 2/10       Carrying                               Gait Training               Levels with [facility]                             Modalities               CP sitting 15' 15'  sitting 15' sitting 15'  15'  sitting 15'  sitting

## 2021-04-02 NOTE — PROGRESS NOTES
PT Re-Evaluation     Today's date: 2021  Patient name: Abdoul Corbin  : 1960  MRN: [id number]  Referring provider: Aylin Meza MD  Dx:   No diagnosis found  Assessment  Assessment details:   CURRENT FUNCTIONAL STATUS    Standing/ADL tolerance 60 minutes  Walking tolerance 1/2 mile  Ascends stairs reciprocally/descends stairs step by step  Difficulty arising from sitting: None  Ability to dress lower extremities: Independent  Lifting tolerance 15 lbs  Work Status: Off    SHORT TERM GOALS (2 WEEKS)    Increase hip and knee strength 2-3 lbs in all weak areas  Decrease pain to 0-1/10  Standing/ADL tolerance 90 minutes  Walking tolerance 3/4 mile without an AD  Ascends/descends stairs reciprocally  Lifting tolerance 20-25 lbs  Work Status: TBD by surgeon    LONG TERM GOALS (DISCHARGE)    Hip AROM: F=90 deg, ABD=30 deg, EXT=10 deg -met  Hip Strength: F=22 lbs, ABD=22 lbs -partially met  Knee Strength: E=32 lbs, F=23 lbs -partially met   Decrease pain to 0-1/10 -partially met  Standing/ADL tolerance 2 hours  -partially met   Walking tolerance 1 mile without an AD  -partially met    Ascends/descends stairs reciprocally  -partially met  Difficulty arising from sitting: None-met  Ability to dress lower extremities: Independent -met  Lifting tolerance 30 lbs  -partially met  Work Status: Full Duty-not met  Understanding of Dx/Px/POC: good   Prognosis: good    Goals  See assessment details above  Plan  Plan details:       Patient would benefit from: skilled physical therapy  Planned modality interventions: cryotherapy  Planned therapy interventions: manual therapy, neuromuscular re-education, therapeutic exercise, therapeutic activities and self care  Frequency: 2x week  Duration in weeks: 4        Subjective Evaluation    History of Present Illness  Mechanism of injury: Subjective: The patient's right hip pain is no longer constant, and it has decreased in intensity  She has an improved tolerance for standing and walking, actually being more limited by knee pain than hip pain  She is wondering how she will do lifting 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 work  Pain  Current pain ratin  At best pain ratin  At worst pain ratin    Patient Goals  Patient goals for therapy: decreased pain, increased motion, increased strength, return to work and independence with ADLs/IADLs          Objective     Observations     Additional Observation Details  Patient ambulates without an AD, with good extension of the right hip  Patient does limp slightly, but she states that it is due to her knees and not her hip  General Comments:      Hip Comments   CURRENT OBJECTIVE MEASUREMENTS    Hip AROM: F=100 deg, ABD=35 deg, EXT=10 deg  Hip Strength: F=22 lbs, ABD=21 lbs  Knee Strength: E=25 lbs, F=31 lbs                                Precautions: None, Updated 3-22-21        Manuals 3/17 3/22 3/24 3/31 4/5 3/15   Adductor Stretch CM  RK ML RK RK KB   Flexor Stretch CM  RK ML RK RK KB                   Neuro Re-Ed               SLS 30"x3 30"x3 30" x 3 10"x4 10"x4 10"x5 ea   Rocker Board                               Ther Ex               NuStep:   S 9, A 11 L4 14' L4 14' L5 15' L5 15' L5 15' L4 13'   Flexor stretch supine               Ball Squeeze 3"x30 3"3/10 3" 3 x 10 3" 3/10 3" 30x 3" 20x   TB Clamshells L3 3x10 L3 3/10 L3 3 x 10 L3 3/10 L3 3/10 L3 2/10 supine   TB ABD               TB TKE               Hip Patterns Standing x3 1# 3x10  1# 3/10 2# 3 x 10 each 2# 3/10 2# 3/10 1# 3/10 x3 ways                   Ther Activity               Mini Squats Doorway 2x10  2/10 2 x 10 10x 20x 20x   Marching               Side Stepping L2 10x L2 10x 2# 10x L3 10x L3 10x 1# 10x   Steps Forward 6" 3x10 6" 2/10 6" 2/10 Varun 6" 2x10 6" 2/10 6" 2/10   Steps Lateral              Steps Downward               Lunging               Lifting: Knee to waist level    15# 10x 15#   2 x 10   15# 2/10 15# 2/10     Carrying                               Gait Training               Levels with [facility]                               Modalities               CP sitting 15' 15'  sitting 15' sitting 15'  15'   15'  sitting

## 2021-04-05 ENCOUNTER — EVALUATION (OUTPATIENT)
Dept: PHYSICAL THERAPY | Facility: CLINIC | Age: 61
End: 2021-04-05
Payer: COMMERCIAL

## 2021-04-05 DIAGNOSIS — M16.11 PRIMARY OSTEOARTHRITIS OF RIGHT HIP: Primary | ICD-10-CM

## 2021-04-05 PROCEDURE — 97530 THERAPEUTIC ACTIVITIES: CPT

## 2021-04-05 PROCEDURE — 97110 THERAPEUTIC EXERCISES: CPT

## 2021-04-05 PROCEDURE — 97140 MANUAL THERAPY 1/> REGIONS: CPT

## 2021-04-05 NOTE — PROGRESS NOTES
Daily Note     Today's date: 2021  Patient name: Jacqueline Maciel  : 1960  MRN: [id number]  Referring provider: Isabelle Chopra MD  Dx:   Encounter Diagnosis     ICD-10-CM    1  Primary osteoarthritis of right hip  M16 11                   Subjective: Patient stated that her hip is feeling better, some sore spots on the outside of the hip  Objective: See treatment diary below      Assessment: Tolerated treatment well  Patient required cuing to correct lifting technique, as form deteriorated with fatigue  Continues to respond well to current program and she is approprietly challenged  Patient demonstrated fatigue and would benefit from continued PT  Plan: Continue per plan of care            Precautions: None, Updated 3-22-21        Manuals 3/17 3/22 3/24 3/31 4/5    Adductor Stretch CM  RK ML RK MB    Flexor Stretch CM  RK ML RK MB                  Neuro Re-Ed             SLS 30"x3 30"x3 30" x 3 10"x4 3x30" 1 UE    Rock Board                           Ther Ex             NuStep:   S 9, A 11 L4 14' L4 14' L5 15' L5 15' L5 15'    Flexor stretch supine            Ball Squeeze 3"x30 3"3/10 3" 3 x 10 3" 3/10 3x10 3"    TB Clamshells L3 3x10 L3 3/10 L3 3 x 10 L3 3/10 L 3x10    TB ABD             TB TKE             Hip Patterns Standing x3 1# 3x10  1# 3/10 2# 3 x 10 each 2# 3/10 2# 3x10                  Ther Activity             Mini Squats Doorway 2x10  2/10 2 x 10 10x 2x10    Marching            Side Stepping L2 10x L2 10x 2# 10x L3 10x L3 10x    Steps Forward 6" 3x10 6" 2/10 6" 2/10 Varun 6" 2x10 6" 2x10    Steps Lateral          Steps Downward             Lunging             Lifting: Knee to waist level    15# 10x 15#   2 x 10   15# 2/10 15# 2/10    Carrying                           Gait Training             Levels with SPC                         Modalities             CP sitting 15' 15'  sitting 15' sitting 15'  15'

## 2021-04-07 ENCOUNTER — OFFICE VISIT (OUTPATIENT)
Dept: PHYSICAL THERAPY | Facility: CLINIC | Age: 61
End: 2021-04-07
Payer: COMMERCIAL

## 2021-04-07 DIAGNOSIS — M16.11 PRIMARY OSTEOARTHRITIS OF RIGHT HIP: Primary | ICD-10-CM

## 2021-04-07 PROCEDURE — 97140 MANUAL THERAPY 1/> REGIONS: CPT | Performed by: PHYSICAL THERAPIST

## 2021-04-07 PROCEDURE — 97530 THERAPEUTIC ACTIVITIES: CPT | Performed by: PHYSICAL THERAPIST

## 2021-04-07 PROCEDURE — 97110 THERAPEUTIC EXERCISES: CPT | Performed by: PHYSICAL THERAPIST

## 2021-04-07 NOTE — PROGRESS NOTES
Daily Note     Today's date: 2021  Patient name: Dayan Chapman  : 1960  MRN: [id number]  Referring provider: Soha Cody MD  Dx:   Encounter Diagnosis     ICD-10-CM    1  Primary osteoarthritis of right hip  M16 11                   Subjective: Patient started wearing orthotics in her shoes  She walked one mile and felt cramping in her right hip  She is not sure if the orthotics were the reason  Objective: See treatment diary below      Assessment: Tolerated treatment well  Patient exhibited good technique with therapeutic exercises      Plan: Potential discharge next visit          Precautions: None, Updated 3-22-21        Manuals 3/17 3/22 3/24 3/31 4/5 4/7   Adductor Stretch CM  RK ML RK MB RK   Flexor Stretch CM  RK ML RK MB RK                 Neuro Re-Ed             SLS 30"x3 30"x3 30" x 3 10"x4 3x30" 1 UE Foam 30"x3   Rocker Board                           Ther Ex             NuStep:   S 9, A 11 L4 14' L4 14' L5 15' L5 15' L5 15' L6 15'   Flexor stretch supine            Ball Squeeze 3"x30 3"3/10 3" 3 x 10 3" 3/10 3x10 3" 3/10   TB Clamshells L3 3x10 L3 3/10 L3 3 x 10 L3 3/10 L 3x10 L3 3/10   TB ABD             TB TKE             Hip Patterns Standing x3 1# 3x10  1# 3/10 2# 3 x 10 each 2# 3/10 2# 3x10 2# 3/10                 Ther Activity             Mini Squats Doorway 2x10  2/10 2 x 10 10x 2x10 2/10   Marching            Side Stepping L2 10x L2 10x 2# 10x L3 10x L3 10x L3 10x   Steps Forward 6" 3x10 6" 2/10 6" 2/10 Varun 6" 2x10 6" 2x10 6" 2/10   Steps Lateral          Steps Downward             Lunging             Lifting: Knee to waist level    15# 10x 15#   2 x 10   15# 2/10 15# 2/10 15# 2/10   Carrying                           Gait Training             Levels with [facility]                         Modalities             CP sitting 15' 15'  sitting 15' sitting 15'  15'  15'

## 2021-04-07 NOTE — PROGRESS NOTES
PT Discharge    Today's date: 2021  Patient name: Juve Koch  : 1960  MRN: [id number]  Referring provider: Vani Paz MD  Dx:   Encounter Diagnosis     ICD-10-CM    1  Primary osteoarthritis of right hip  M16 11                   Assessment  Assessment details:   CURRENT FUNCTIONAL STATUS    Standing/ADL tolerance 2 hours  Walking tolerance 1 mile  Ascends stairs reciprocally/descends stairs step by step due to knee pain  Difficulty arising from sitting: None  Ability to dress lower extremities: Independent  Lifting tolerance 15 lbs  LONG TERM GOALS (DISCHARGE)    Hip AROM: F=90 deg, ABD=30 deg, EXT=10 deg -met  Hip Strength: F=22 lbs, ABD=22 lbs  -met  Knee Strength: E=32 lbs, F=23 lbs  -met   Decrease pain to 0-1/10 -partially met  Standing/ADL tolerance 2 hours  -met   Walking tolerance 1 mile without an AD  -partially met    Ascends/descends stairs reciprocally  -partially met  Difficulty arising from sitting: None-met  Ability to dress lower extremities: Independent -met  Lifting tolerance 30 lbs  -partially met  Work Status: Full Duty-not met  Understanding of Dx/Px/POC: good   Prognosis: good    Goals  See assessment details above  Plan  Plan details: The patient has shown good improvement in PT demonstrating decreased pain, increased range of motion, increased strength, and increased tolerance to activity  Goals have been met, the patient is satisfied with her current status, and she has been discharged to a home exercise program         Patient would benefit from: skilled physical therapy  Planned modality interventions: cryotherapy  Planned therapy interventions: manual therapy, neuromuscular re-education, therapeutic exercise, therapeutic activities and self care        Subjective Evaluation    History of Present Illness  Mechanism of injury: Subjective: The patient's right hip pain is mild and intermittent   She is doing well with standing and walking, more limited by bilateral knee OA than the hip  She is pleased with her current status and has been discharged to a [facility]  Pain  Current pain ratin  At best pain ratin  At worst pain ratin    Patient Goals  Patient goals for therapy: decreased pain, increased motion, increased strength, return to work and independence with ADLs/IADLs          Objective     Observations     Additional Observation Details  Patient ambulates without an AD, with good extension of the right hip  Patient does limp slightly, but she states that it is due to her knees and not her hip  General Comments:      Hip Comments   CURRENT OBJECTIVE MEASUREMENTS    Hip AROM: F=100 deg, ABD=35 deg, EXT=10 deg  Hip Strength: F=24 lbs, ABD=34 lbs  Knee Strength: E=31 lbs, F=33 lbs                                Precautions: None, Updated 3-22-21        Manuals 4/12 3/22 3/24 3/31 4/5 4/7   Adductor Stretch RK  RK ML RK MB RK   Flexor Stretch RK  RK ML RK MB RK                   Neuro Re-Ed               SLS 30"x3  Foam 30"x3 30" x 3 10"x4 3x30" 1 UE Foam 30"x3   Rocker Board                               Ther Ex               NuStep:   S 9, A 11 L6 15'' L4 14' L5 15' L5 15' L5 15' L6 15'   Flexor stretch supine               Ball Squeeze 3"x30 3"3/10 3" 3 x 10 3" 3/10 3x10 3" 3/10   TB Clamshells L3 3x10 L3 3/10 L3 3 x 10 L3 3/10 L 3x10 L3 3/10   TB ABD               TB TKE               Hip Patterns Standing x3 2# 3x10  1# 3/10 2# 3 x 10 each 2# 3/10 2# 3x10 2# 3/10                   Ther Activity               Mini Squats Doorway 2x10  2/10 2 x 10 10x 2x10 2/10   Marching               Side Stepping L3 10x L2 10x 2# 10x L3 10x L3 10x L3 10x   Steps Forward 6" 3x10 6" 2/10 6" 2/10 Varun 6" 2x10 6" 2x10 6" 2/10   Steps Lateral               Steps Downward               Lunging               Lifting: Knee to waist level  15# 2/10  15# 10x 15#   2 x 10   15# 2/10 15# 2/10 15# 2/10   Carrying                               Gait Training             Levels with [facility]                               Modalities               CP sitting 15' 15'  sitting 15' sitting 15'  15'  15'

## 2021-04-08 DIAGNOSIS — I10 HYPERTENSION, UNSPECIFIED TYPE: ICD-10-CM

## 2021-04-08 RX ORDER — HYDROCHLOROTHIAZIDE 12.5 MG/1
TABLET ORAL
Qty: 90 TABLET | Refills: 3 | Status: SHIPPED | OUTPATIENT
Start: 2021-04-08 | End: 2022-04-04

## 2021-04-12 ENCOUNTER — EVALUATION (OUTPATIENT)
Dept: PHYSICAL THERAPY | Facility: CLINIC | Age: 61
End: 2021-04-12
Payer: COMMERCIAL

## 2021-04-12 DIAGNOSIS — M16.11 PRIMARY OSTEOARTHRITIS OF RIGHT HIP: Primary | ICD-10-CM

## 2021-04-12 PROCEDURE — 97110 THERAPEUTIC EXERCISES: CPT | Performed by: PHYSICAL THERAPIST

## 2021-04-12 PROCEDURE — 97530 THERAPEUTIC ACTIVITIES: CPT | Performed by: PHYSICAL THERAPIST

## 2021-04-12 PROCEDURE — 97140 MANUAL THERAPY 1/> REGIONS: CPT | Performed by: PHYSICAL THERAPIST

## 2021-06-25 ENCOUNTER — VBI (OUTPATIENT)
Dept: ADMINISTRATIVE | Facility: OTHER | Age: 61
End: 2021-06-25

## 2021-07-27 ENCOUNTER — APPOINTMENT (OUTPATIENT)
Dept: LAB | Facility: MEDICAL CENTER | Age: 61
End: 2021-07-27
Payer: COMMERCIAL

## 2021-07-27 DIAGNOSIS — M15.9 PRIMARY OSTEOARTHRITIS INVOLVING MULTIPLE JOINTS: ICD-10-CM

## 2021-07-27 LAB
ALBUMIN SERPL BCP-MCNC: 3.9 G/DL (ref 3.5–5)
ALP SERPL-CCNC: 91 U/L (ref 46–116)
ALT SERPL W P-5'-P-CCNC: 22 U/L (ref 12–78)
ANION GAP SERPL CALCULATED.3IONS-SCNC: 9 MMOL/L (ref 4–13)
AST SERPL W P-5'-P-CCNC: 12 U/L (ref 5–45)
BASOPHILS # BLD AUTO: 0.05 THOUSANDS/ΜL (ref 0–0.1)
BASOPHILS NFR BLD AUTO: 1 % (ref 0–1)
BILIRUB SERPL-MCNC: 0.36 MG/DL (ref 0.2–1)
BUN SERPL-MCNC: 17 MG/DL (ref 5–25)
CALCIUM SERPL-MCNC: 9.2 MG/DL (ref 8.3–10.1)
CHLORIDE SERPL-SCNC: 107 MMOL/L (ref 100–108)
CO2 SERPL-SCNC: 25 MMOL/L (ref 21–32)
CREAT SERPL-MCNC: 0.83 MG/DL (ref 0.6–1.3)
CRP SERPL QL: 4.4 MG/L
EOSINOPHIL # BLD AUTO: 0.12 THOUSAND/ΜL (ref 0–0.61)
EOSINOPHIL NFR BLD AUTO: 2 % (ref 0–6)
ERYTHROCYTE [DISTWIDTH] IN BLOOD BY AUTOMATED COUNT: 13.3 % (ref 11.6–15.1)
ERYTHROCYTE [SEDIMENTATION RATE] IN BLOOD: 19 MM/HOUR (ref 0–29)
GFR SERPL CREATININE-BSD FRML MDRD: 77 ML/MIN/1.73SQ M
GLUCOSE P FAST SERPL-MCNC: 93 MG/DL (ref 65–99)
HCT VFR BLD AUTO: 46.6 % (ref 34.8–46.1)
HGB BLD-MCNC: 15.3 G/DL (ref 11.5–15.4)
IMM GRANULOCYTES # BLD AUTO: 0.02 THOUSAND/UL (ref 0–0.2)
IMM GRANULOCYTES NFR BLD AUTO: 0 % (ref 0–2)
LYMPHOCYTES # BLD AUTO: 2.23 THOUSANDS/ΜL (ref 0.6–4.47)
LYMPHOCYTES NFR BLD AUTO: 32 % (ref 14–44)
MCH RBC QN AUTO: 29.9 PG (ref 26.8–34.3)
MCHC RBC AUTO-ENTMCNC: 32.8 G/DL (ref 31.4–37.4)
MCV RBC AUTO: 91 FL (ref 82–98)
MONOCYTES # BLD AUTO: 0.47 THOUSAND/ΜL (ref 0.17–1.22)
MONOCYTES NFR BLD AUTO: 7 % (ref 4–12)
NEUTROPHILS # BLD AUTO: 4.13 THOUSANDS/ΜL (ref 1.85–7.62)
NEUTS SEG NFR BLD AUTO: 58 % (ref 43–75)
NRBC BLD AUTO-RTO: 0 /100 WBCS
PLATELET # BLD AUTO: 273 THOUSANDS/UL (ref 149–390)
PMV BLD AUTO: 11.9 FL (ref 8.9–12.7)
POTASSIUM SERPL-SCNC: 3.6 MMOL/L (ref 3.5–5.3)
PROT SERPL-MCNC: 7.5 G/DL (ref 6.4–8.2)
RBC # BLD AUTO: 5.12 MILLION/UL (ref 3.81–5.12)
SODIUM SERPL-SCNC: 141 MMOL/L (ref 136–145)
WBC # BLD AUTO: 7.02 THOUSAND/UL (ref 4.31–10.16)

## 2021-07-27 PROCEDURE — 80053 COMPREHEN METABOLIC PANEL: CPT

## 2021-07-27 PROCEDURE — 85025 COMPLETE CBC W/AUTO DIFF WBC: CPT

## 2021-07-27 PROCEDURE — 85652 RBC SED RATE AUTOMATED: CPT

## 2021-07-27 PROCEDURE — 86140 C-REACTIVE PROTEIN: CPT

## 2021-07-27 PROCEDURE — 36415 COLL VENOUS BLD VENIPUNCTURE: CPT

## 2021-09-13 ENCOUNTER — HOSPITAL ENCOUNTER (EMERGENCY)
Facility: HOSPITAL | Age: 61
Discharge: HOME | End: 2021-09-13
Attending: EMERGENCY MEDICINE | Admitting: EMERGENCY MEDICINE
Payer: OTHER MISCELLANEOUS

## 2021-09-13 ENCOUNTER — APPOINTMENT (EMERGENCY)
Dept: RADIOLOGY | Facility: HOSPITAL | Age: 61
End: 2021-09-13
Payer: OTHER MISCELLANEOUS

## 2021-09-13 VITALS
OXYGEN SATURATION: 96 % | DIASTOLIC BLOOD PRESSURE: 82 MMHG | SYSTOLIC BLOOD PRESSURE: 202 MMHG | BODY MASS INDEX: 27.46 KG/M2 | WEIGHT: 160 LBS | RESPIRATION RATE: 18 BRPM | TEMPERATURE: 97.8 F | HEART RATE: 54 BPM

## 2021-09-13 DIAGNOSIS — S90.30XA CONTUSION OF FOOT: Primary | ICD-10-CM

## 2021-09-13 PROCEDURE — 73630 X-RAY EXAM OF FOOT: CPT

## 2021-09-13 PROCEDURE — 99284 EMERGENCY DEPT VISIT MOD MDM: CPT | Performed by: PHYSICIAN ASSISTANT

## 2021-09-13 PROCEDURE — 99283 EMERGENCY DEPT VISIT LOW MDM: CPT

## 2021-09-13 NOTE — ED PROVIDER NOTES
History  Chief Complaint   Patient presents with    Foot Injury     3 barrels fell on left foot today at work  complaining of pain in same     Patient presents to the emergency department today via private vehicle with her significant other stating she is experiencing left foot pain  She states at 0900 hours this morning at work 3 heavy cylinders fell on the left foot where she works in [employer]  She complains of pain and bruising in the region since that time  Denies knee tib-fib or ankle tenderness  Denies sensation or range of motion deficits of the toes  She denies any other injuries associated with this incident  Prior to Admission Medications   Prescriptions Last Dose Informant Patient Reported? Taking?    Ascorbic Acid (VITAMIN C) 250 MG CHEW   Yes Yes   Sig: Chew 1 tablet daily   Calcium 600 MG tablet   Yes Yes   Sig: Take 2 tablets by mouth daily   Cholecalciferol (VITAMIN D3) 1000 units CAPS   Yes Yes   Sig: Take by mouth   Multiple Vitamin (DAILY VALUE MULTIVITAMIN PO)   Yes Yes   Sig: Take 1 tablet by mouth daily   Multiple Vitamins-Minerals (PRESERVISION AREDS 2 PO)  Self Yes Yes   Sig: Take by mouth daily   celecoxib (CeleBREX) 200 mg capsule   Yes Yes   Sig: Take 200 mg by mouth daily    hydrochlorothiazide (HYDRODIURIL) 12 5 mg tablet   No Yes   Sig: TAKE 1 TABLET DAILY   metoprolol tartrate (LOPRESSOR) 25 mg tablet   No Yes   Sig: TAKE 1 TABLET DAILY      Facility-Administered Medications: None       Past Medical History:   Diagnosis Date    Hypertension     Intracranial aneurysm     resolved 11/17/17 - possible R P-comm region aneurysm measuring 3mm found on MRI results    Reactive depression     last assessed 12/15/15       Past Surgical History:   Procedure Laterality Date    ENDOMETRIAL ABLATION  2012    EPIDURAL BLOCK INJECTION Right 1/7/2020    Procedure: Right L4-5 transforaminal epidural steroid injection 00245;  Surgeon: Jay Jay Manning MD;  Location: [facility] OR;  Service: Pain Management     EPIDURAL BLOCK INJECTION Right 5/19/2020    Procedure: Right L4-L5 Transforaminal Epidural Steroid Injection (67964); Surgeon: Jaden Jc MD;  Location: [facility];  Service: Pain Management     FL GUIDED NEEDLE PLAC BX/ASP/INJ  1/7/2020    FL GUIDED NEEDLE PLAC BX/ASP/INJ  5/19/2020    FL GUIDED NEEDLE PLAC BX/ASP/INJ  8/28/2020    LA INJECTION,SACROILIAC JOINT Right 8/28/2020    Procedure: Sacroiliac joint injection and piriformis muscle steroid Injection;  Surgeon: Jaden Jc MD;  [facility]: [facility];  Service: Pain Management     LA REPAIR INTERCARP/CARP-METACARP JT Left 7/30/2018    Procedure: Thumb Arthrex mini tight rope suspension arthroplasty with trapezial resection left thumb;  Surgeon: Sofia Nielsen MD;  Location: [facility];  Service: Orthopedics    THYROID SURGERY      near total thyroidectomy       Family History   Problem Relation Age of Onset    Arthritis Mother     Heart disease Mother         CAD    Arthritis Father     Lymphoma Father         Hodgkin's    No Known Problems Sister     No Known Problems Daughter     No Known Problems Maternal Grandmother     No Known Problems Maternal Grandfather     No Known Problems Paternal Grandmother     No Known Problems Paternal Grandfather     No Known Problems Sister     No Known Problems Daughter     No Known Problems Maternal Aunt     No Known Problems Paternal Aunt      I have reviewed and agree with the history as documented  E-Cigarette/Vaping    E-Cigarette Use Never User      E-Cigarette/Vaping Substances     Social History     Tobacco Use    Smoking status: Never Smoker    Smokeless tobacco: Never Used    Tobacco comment: only smoked as a teenager   Vaping Use    Vaping Use: Never used   Substance Use Topics    Alcohol use: Not Currently     Comment: social    Drug use: Never     Comment: CBD       Review of Systems   Constitutional: Negative    Negative for chills and fever  HENT: Negative  Negative for sore throat and trouble swallowing  Eyes: Negative  Respiratory: Negative  Negative for cough, shortness of breath and wheezing  Cardiovascular: Negative  Negative for chest pain and leg swelling  Gastrointestinal: Negative  Negative for abdominal pain, blood in stool and vomiting  Endocrine: Negative  Genitourinary: Negative  Musculoskeletal: Negative  Negative for back pain, neck pain and neck stiffness  Left foot pain/brusing   Skin: Negative  Allergic/Immunologic: Negative  Neurological: Negative  Negative for dizziness, seizures, speech difficulty, weakness, light-headedness, numbness and headaches  Hematological: Negative  Psychiatric/Behavioral: Negative  All other systems reviewed and are negative  Physical Exam  Physical Exam  Vitals reviewed  Constitutional:       General: She is not in acute distress  Appearance: She is normal weight  She is not ill-appearing, toxic-appearing or diaphoretic  HENT:      Head: Normocephalic and atraumatic  Eyes:      General: No scleral icterus  Right eye: No discharge  Left eye: No discharge  Extraocular Movements: Extraocular movements intact  Conjunctiva/sclera: Conjunctivae normal    Cardiovascular:      Rate and Rhythm: Normal rate and regular rhythm  Pulses: Normal pulses  Pulmonary:      Effort: Pulmonary effort is normal  No respiratory distress  Breath sounds: No stridor  Musculoskeletal:         General: Swelling, tenderness and signs of injury present  No deformity  Normal range of motion  Comments: Tenderness over the metatarsal region left foot  There is no phalanx tenderness  Normal sensation range of motion of the toes  No tib-fib tenderness  No malleolar tenderness  Normal dorsalis pedis pulse  Skin:     Capillary Refill: Capillary refill takes less than 2 seconds        Coloration: Skin is not jaundiced  Findings: Bruising present  No erythema, lesion or rash  Comments: Ecchymosis dorsum left foot   Neurological:      General: No focal deficit present  Mental Status: She is alert and oriented to person, place, and time  Mental status is at baseline  Psychiatric:         Mood and Affect: Mood normal          Behavior: Behavior normal          Thought Content: Thought content normal          Judgment: Judgment normal          Vital Signs  ED Triage Vitals [09/13/21 1010]   Temperature Pulse Respirations Blood Pressure SpO2   97 8 °F (36 6 °C) (!) 54 18 (!) 202/82 96 %      Temp Source Heart Rate Source Patient Position - Orthostatic VS BP Location FiO2 (%)   Temporal -- -- -- --      Pain Score       Worst Possible Pain           Vitals:    09/13/21 1010   BP: (!) 202/82   Pulse: (!) 54         Visual Acuity      ED Medications  Medications - No data to display    Diagnostic Studies  Results Reviewed     None                 XR foot 3+ views LEFT   ED Interpretation by Susanna Duran PA-C (09/13 1056)   Soft tissue swelling without evidence of acute displaced fracture                 Procedures  Procedures         ED Course  ED Course as of Sep 13 1107   Mon Sep 13, 2021   1025 Blood Pressure(!): 202/82   1025 Temperature: 97 8 °F (36 6 °C)   1025 Pulse(!): 54   1025 Respirations: 18   1025 SpO2: 96 %   1104 Patient's workman's compensation form that she came with was filled out  Will make a copy to placed on file  SBIRT 20yo+      Most Recent Value   SBIRT (24 yo +)   In order to provide better care to our patients, we are screening all of our patients for alcohol and drug use  Would it be okay to ask you these screening questions? Yes Filed at: 09/13/2021 1016   Initial Alcohol Screen: US AUDIT-C    1  How often do you have a drink containing alcohol?  0 Filed at: 09/13/2021 1016   2   How many drinks containing alcohol do you have on a typical day you are drinking? 0 Filed at: 09/13/2021 1016   3b  FEMALE Any Age, or MALE 65+: How often do you have 4 or more drinks on one occassion? 0 Filed at: 09/13/2021 1016   Audit-C Score  0 Filed at: 09/13/2021 1016   ASHLEY: How many times in the past year have you    Used an illegal drug or used a prescription medication for non-medical reasons? Never Filed at: 09/13/2021 1016                    MDM    Disposition  Final diagnoses:   Contusion of foot     Time reflects when diagnosis was documented in both MDM as applicable and the Disposition within this note     Time User Action Codes Description Comment    9/13/2021 11:06 AM Marshall RUTLEDGE Add [S90 30XA] Contusion of foot       ED Disposition     ED Disposition Condition Date/Time Comment    Discharge Stable Mon Sep 13, 2021 11:06 AM Maribel Martínez discharge to home/self care  Follow-up Information     Follow up With Specialties Details Why Contact Info    Workman's compensation provider  Call today            Patient's Medications   Discharge Prescriptions    No medications on file     No discharge procedures on file      PDMP Review       Value Time User    PDMP Reviewed  Yes 4/9/2020 10:41 AM Marj Stein DO          ED Provider  Electronically Signed by           Nigle Askew PA-C  09/13/21 7860

## 2021-09-17 ENCOUNTER — OCCMED (OUTPATIENT)
Dept: URGENT CARE | Facility: CLINIC | Age: 61
End: 2021-09-17
Payer: OTHER MISCELLANEOUS

## 2021-09-17 DIAGNOSIS — S90.32XA CONTUSION OF LEFT FOOT, INITIAL ENCOUNTER: Primary | ICD-10-CM

## 2021-09-17 PROCEDURE — 99213 OFFICE O/P EST LOW 20 MIN: CPT | Performed by: PHYSICIAN ASSISTANT

## 2021-09-17 RX ORDER — METHYLPREDNISOLONE 4 MG/1
TABLET ORAL
Qty: 21 TABLET | Refills: 0 | Status: SHIPPED | OUTPATIENT
Start: 2021-09-17 | End: 2021-10-11

## 2021-09-21 ENCOUNTER — APPOINTMENT (OUTPATIENT)
Dept: URGENT CARE | Facility: CLINIC | Age: 61
End: 2021-09-21
Payer: OTHER MISCELLANEOUS

## 2021-09-21 PROCEDURE — 99213 OFFICE O/P EST LOW 20 MIN: CPT

## 2021-09-28 ENCOUNTER — APPOINTMENT (OUTPATIENT)
Dept: RADIOLOGY | Facility: CLINIC | Age: 61
End: 2021-09-28
Payer: OTHER MISCELLANEOUS

## 2021-09-28 ENCOUNTER — APPOINTMENT (OUTPATIENT)
Dept: URGENT CARE | Facility: CLINIC | Age: 61
End: 2021-09-28
Payer: OTHER MISCELLANEOUS

## 2021-09-28 DIAGNOSIS — S90.122A CONTUSION OF FIFTH TOE OF LEFT FOOT, INITIAL ENCOUNTER: ICD-10-CM

## 2021-09-28 DIAGNOSIS — S90.122A CONTUSION OF FIFTH TOE OF LEFT FOOT, INITIAL ENCOUNTER: Primary | ICD-10-CM

## 2021-09-28 PROCEDURE — 73630 X-RAY EXAM OF FOOT: CPT

## 2021-09-28 PROCEDURE — 99213 OFFICE O/P EST LOW 20 MIN: CPT | Performed by: PHYSICIAN ASSISTANT

## 2021-10-05 ENCOUNTER — APPOINTMENT (OUTPATIENT)
Dept: URGENT CARE | Facility: CLINIC | Age: 61
End: 2021-10-05
Payer: OTHER MISCELLANEOUS

## 2021-10-05 PROCEDURE — 99214 OFFICE O/P EST MOD 30 MIN: CPT

## 2021-10-11 ENCOUNTER — OFFICE VISIT (OUTPATIENT)
Dept: FAMILY MEDICINE CLINIC | Facility: CLINIC | Age: 61
End: 2021-10-11
Payer: COMMERCIAL

## 2021-10-11 VITALS
BODY MASS INDEX: 28.79 KG/M2 | WEIGHT: 168.6 LBS | HEIGHT: 64 IN | SYSTOLIC BLOOD PRESSURE: 164 MMHG | TEMPERATURE: 97.5 F | DIASTOLIC BLOOD PRESSURE: 100 MMHG

## 2021-10-11 DIAGNOSIS — Z12.31 ENCOUNTER FOR SCREENING MAMMOGRAM FOR MALIGNANT NEOPLASM OF BREAST: ICD-10-CM

## 2021-10-11 DIAGNOSIS — B37.2 MONILIAL INTERTRIGO: Primary | ICD-10-CM

## 2021-10-11 DIAGNOSIS — Z12.11 SCREENING FOR COLORECTAL CANCER: ICD-10-CM

## 2021-10-11 DIAGNOSIS — Z12.12 SCREENING FOR COLORECTAL CANCER: ICD-10-CM

## 2021-10-11 PROCEDURE — 99213 OFFICE O/P EST LOW 20 MIN: CPT | Performed by: FAMILY MEDICINE

## 2021-10-11 PROCEDURE — 3008F BODY MASS INDEX DOCD: CPT | Performed by: FAMILY MEDICINE

## 2021-10-11 PROCEDURE — 1036F TOBACCO NON-USER: CPT | Performed by: FAMILY MEDICINE

## 2021-10-11 RX ORDER — KETOCONAZOLE 20 MG/G
CREAM TOPICAL DAILY
Qty: 30 G | Refills: 1 | Status: SHIPPED | OUTPATIENT
Start: 2021-10-11 | End: 2022-08-03

## 2021-10-14 ENCOUNTER — TELEPHONE (OUTPATIENT)
Dept: FAMILY MEDICINE CLINIC | Facility: CLINIC | Age: 61
End: 2021-10-14

## 2021-10-14 DIAGNOSIS — B37.2 MONILIAL INTERTRIGO: Primary | ICD-10-CM

## 2021-10-14 RX ORDER — NYSTATIN 100000 U/G
CREAM TOPICAL 2 TIMES DAILY
Qty: 30 G | Refills: 0 | Status: SHIPPED | OUTPATIENT
Start: 2021-10-14 | End: 2022-08-03

## 2021-10-22 ENCOUNTER — APPOINTMENT (OUTPATIENT)
Dept: URGENT CARE | Facility: CLINIC | Age: 61
End: 2021-10-22
Payer: OTHER MISCELLANEOUS

## 2021-10-22 PROCEDURE — 99214 OFFICE O/P EST MOD 30 MIN: CPT

## 2021-10-31 ENCOUNTER — OFFICE VISIT (OUTPATIENT)
Dept: URGENT CARE | Facility: CLINIC | Age: 61
End: 2021-10-31
Payer: COMMERCIAL

## 2021-10-31 VITALS
OXYGEN SATURATION: 97 % | RESPIRATION RATE: 18 BRPM | TEMPERATURE: 97.8 F | DIASTOLIC BLOOD PRESSURE: 76 MMHG | HEART RATE: 86 BPM | SYSTOLIC BLOOD PRESSURE: 128 MMHG

## 2021-10-31 DIAGNOSIS — H01.00A BLEPHARITIS OF UPPER AND LOWER EYELIDS OF BOTH EYES, UNSPECIFIED TYPE: Primary | ICD-10-CM

## 2021-10-31 DIAGNOSIS — H01.00B BLEPHARITIS OF UPPER AND LOWER EYELIDS OF BOTH EYES, UNSPECIFIED TYPE: Primary | ICD-10-CM

## 2021-10-31 PROCEDURE — 99214 OFFICE O/P EST MOD 30 MIN: CPT | Performed by: PHYSICIAN ASSISTANT

## 2021-10-31 RX ORDER — KETOTIFEN FUMARATE 0.35 MG/ML
1 SOLUTION/ DROPS OPHTHALMIC 2 TIMES DAILY PRN
Qty: 5 ML | Refills: 0 | Status: SHIPPED | OUTPATIENT
Start: 2021-10-31 | End: 2022-08-03

## 2021-10-31 RX ORDER — POLYMYXIN B SULFATE AND TRIMETHOPRIM 1; 10000 MG/ML; [USP'U]/ML
1 SOLUTION OPHTHALMIC EVERY 4 HOURS
Qty: 10 ML | Refills: 0 | Status: SHIPPED | OUTPATIENT
Start: 2021-10-31 | End: 2021-11-07

## 2021-10-31 RX ORDER — KETOTIFEN FUMARATE 0.35 MG/ML
1 SOLUTION/ DROPS OPHTHALMIC 2 TIMES DAILY PRN
Qty: 5 ML | Refills: 0 | Status: SHIPPED | OUTPATIENT
Start: 2021-10-31 | End: 2021-10-31 | Stop reason: SDUPTHER

## 2021-10-31 RX ORDER — POLYMYXIN B SULFATE AND TRIMETHOPRIM 1; 10000 MG/ML; [USP'U]/ML
1 SOLUTION OPHTHALMIC EVERY 4 HOURS
Qty: 10 ML | Refills: 0 | Status: SHIPPED | OUTPATIENT
Start: 2021-10-31 | End: 2021-10-31 | Stop reason: SDUPTHER

## 2021-11-05 ENCOUNTER — APPOINTMENT (OUTPATIENT)
Dept: URGENT CARE | Facility: CLINIC | Age: 61
End: 2021-11-05
Payer: OTHER MISCELLANEOUS

## 2021-11-05 ENCOUNTER — TELEMEDICINE (OUTPATIENT)
Dept: FAMILY MEDICINE CLINIC | Facility: CLINIC | Age: 61
End: 2021-11-05
Payer: COMMERCIAL

## 2021-11-05 ENCOUNTER — VBI (OUTPATIENT)
Dept: ADMINISTRATIVE | Facility: OTHER | Age: 61
End: 2021-11-05

## 2021-11-05 VITALS
SYSTOLIC BLOOD PRESSURE: 128 MMHG | BODY MASS INDEX: 28.68 KG/M2 | WEIGHT: 168 LBS | HEIGHT: 64 IN | DIASTOLIC BLOOD PRESSURE: 76 MMHG | TEMPERATURE: 97.8 F

## 2021-11-05 DIAGNOSIS — H01.00A BLEPHARITIS OF UPPER AND LOWER EYELIDS OF BOTH EYES, UNSPECIFIED TYPE: Primary | ICD-10-CM

## 2021-11-05 DIAGNOSIS — H01.00B BLEPHARITIS OF UPPER AND LOWER EYELIDS OF BOTH EYES, UNSPECIFIED TYPE: Primary | ICD-10-CM

## 2021-11-05 PROCEDURE — 99213 OFFICE O/P EST LOW 20 MIN: CPT

## 2021-11-05 PROCEDURE — 99213 OFFICE O/P EST LOW 20 MIN: CPT | Performed by: FAMILY MEDICINE

## 2021-11-05 RX ORDER — AMOXICILLIN 500 MG
1 CAPSULE ORAL DAILY
COMMUNITY

## 2021-11-15 ENCOUNTER — OFFICE VISIT (OUTPATIENT)
Dept: FAMILY MEDICINE CLINIC | Facility: CLINIC | Age: 61
End: 2021-11-15
Payer: COMMERCIAL

## 2021-11-15 VITALS
HEART RATE: 80 BPM | WEIGHT: 171 LBS | SYSTOLIC BLOOD PRESSURE: 118 MMHG | TEMPERATURE: 97 F | HEIGHT: 64 IN | BODY MASS INDEX: 29.19 KG/M2 | OXYGEN SATURATION: 98 % | DIASTOLIC BLOOD PRESSURE: 84 MMHG

## 2021-11-15 DIAGNOSIS — H01.139 ECZEMA OF EYELID, UNSPECIFIED LATERALITY: Primary | ICD-10-CM

## 2021-11-15 PROCEDURE — 99213 OFFICE O/P EST LOW 20 MIN: CPT | Performed by: PHYSICIAN ASSISTANT

## 2021-11-15 PROCEDURE — 1036F TOBACCO NON-USER: CPT | Performed by: PHYSICIAN ASSISTANT

## 2021-11-15 PROCEDURE — 3008F BODY MASS INDEX DOCD: CPT | Performed by: PHYSICIAN ASSISTANT

## 2021-11-15 PROCEDURE — 3725F SCREEN DEPRESSION PERFORMED: CPT | Performed by: PHYSICIAN ASSISTANT

## 2021-11-15 RX ORDER — PREDNISONE 10 MG/1
TABLET ORAL
Qty: 20 TABLET | Refills: 0 | Status: SHIPPED | OUTPATIENT
Start: 2021-11-15 | End: 2022-08-03

## 2021-11-19 ENCOUNTER — APPOINTMENT (OUTPATIENT)
Dept: URGENT CARE | Facility: CLINIC | Age: 61
End: 2021-11-19
Payer: OTHER MISCELLANEOUS

## 2021-11-19 PROCEDURE — 99213 OFFICE O/P EST LOW 20 MIN: CPT

## 2022-01-02 ENCOUNTER — HOSPITAL ENCOUNTER (EMERGENCY)
Facility: HOSPITAL | Age: 62
Discharge: HOME | End: 2022-01-02
Attending: EMERGENCY MEDICINE
Payer: COMMERCIAL

## 2022-01-02 VITALS
TEMPERATURE: 98 F | OXYGEN SATURATION: 99 % | SYSTOLIC BLOOD PRESSURE: 126 MMHG | DIASTOLIC BLOOD PRESSURE: 72 MMHG | WEIGHT: 171 LBS | RESPIRATION RATE: 20 BRPM | HEART RATE: 79 BPM | HEIGHT: 64 IN | BODY MASS INDEX: 29.19 KG/M2

## 2022-01-02 DIAGNOSIS — J32.9 SINUSITIS: ICD-10-CM

## 2022-01-02 DIAGNOSIS — H10.9 RIGHT CONJUNCTIVITIS: Primary | ICD-10-CM

## 2022-01-02 LAB
FLUAV RNA RESP QL NAA+PROBE: NEGATIVE
FLUBV RNA RESP QL NAA+PROBE: NEGATIVE
RSV RNA RESP QL NAA+PROBE: NEGATIVE
SARS-COV-2 RNA RESP QL NAA+PROBE: NEGATIVE

## 2022-01-02 PROCEDURE — 99283 EMERGENCY DEPT VISIT LOW MDM: CPT

## 2022-01-02 PROCEDURE — 0241U HB NFCT DS VIR RESP RNA 4 TRGT: CPT | Performed by: EMERGENCY MEDICINE

## 2022-01-02 PROCEDURE — 99284 EMERGENCY DEPT VISIT MOD MDM: CPT | Performed by: EMERGENCY MEDICINE

## 2022-01-02 RX ORDER — KETOTIFEN FUMARATE 0.35 MG/ML
1 SOLUTION/ DROPS OPHTHALMIC 2 TIMES DAILY PRN
Qty: 5 ML | Refills: 0 | Status: SHIPPED | OUTPATIENT
Start: 2022-01-02

## 2022-01-02 RX ORDER — AZITHROMYCIN 250 MG/1
TABLET, FILM COATED ORAL
Qty: 6 TABLET | Refills: 0 | Status: SHIPPED | OUTPATIENT
Start: 2022-01-02 | End: 2022-01-06

## 2022-01-02 NOTE — Clinical Note
Chayito Ko was seen and treated in our emergency department on 1/2/2022  Diagnosis: Congestion, sinusitis    Landry Chow  may return to work on return date  She may return on this date: 01/04/2022         If you have any questions or concerns, please don't hesitate to call        Evon South DO    ______________________________           _______________          _______________  Hospital Representative                              Date                                Time

## 2022-01-02 NOTE — ED PROVIDER NOTES
History  Chief Complaint   Patient presents with    Eye Redness     R eye resdness, nasal congestion since yesterday; patient states she think it may be allergies      61-year-old female presents for evaluation of right eye irritation, congestion  Patient reports nasal congestion started yesterday as well as sinus pressure in her frontal forehead and maxillary sinuses  Today patient noticed injected conjunctiva, she had crusting to the eye  She has had no purulent discharge, no painful extraocular eye movements  Patient admits to an itchy sensation in her eye, denies foreign body or injury  She has had no fevers or chills, cough, nausea or vomiting, abdominal pain, myalgias  Patient does in the school cafeteria  Prior to Admission Medications   Prescriptions Last Dose Informant Patient Reported? Taking?    Ascorbic Acid (VITAMIN C) 250 MG CHEW   Yes No   Sig: Chew 1 tablet daily   Calcium 600 MG tablet   Yes No   Sig: Take 2 tablets by mouth daily   Cholecalciferol (VITAMIN D3) 1000 units CAPS   Yes No   Sig: Take by mouth   Multiple Vitamin (DAILY VALUE MULTIVITAMIN PO)   Yes No   Sig: Take 1 tablet by mouth daily   Multiple Vitamins-Minerals (PRESERVISION AREDS 2 PO)  Self Yes No   Sig: Take by mouth daily   Omega-3 Fatty Acids (Fish Oil) 1200 MG CAPS   Yes No   Sig: Take 1 capsule by mouth daily   celecoxib (CeleBREX) 200 mg capsule   Yes No   Sig: Take 200 mg by mouth daily    hydrochlorothiazide (HYDRODIURIL) 12 5 mg tablet   No No   Sig: TAKE 1 TABLET DAILY   ketoconazole (NIZORAL) 2 % cream   No No   Sig: Apply topically daily   ketotifen (ZADITOR) 0 025 % ophthalmic solution   No No   Sig: Administer 1 drop to both eyes 2 (two) times a day as needed (allergy)   metoprolol tartrate (LOPRESSOR) 25 mg tablet   No No   Sig: TAKE 1 TABLET DAILY   nystatin (MYCOSTATIN) cream   No No   Sig: Apply topically 2 (two) times a day   predniSONE 10 mg tablet   No No   Sig: Days 1 and 2 take 4 tablets daily, days 3 and 4 take 3 tablets daily, days 5 and 6 and 2 tablets daily, days 7 and 8 take 1 tablet daily  Facility-Administered Medications: None       Past Medical History:   Diagnosis Date    Hypertension     Intracranial aneurysm     resolved 11/17/17 - possible R P-comm region aneurysm measuring 3mm found on MRI results    Reactive depression     last assessed 12/15/15       Past Surgical History:   Procedure Laterality Date    ENDOMETRIAL ABLATION  2012    EPIDURAL BLOCK INJECTION Right 1/7/2020    Procedure: Right L4-5 transforaminal epidural steroid injection 93699;  Surgeon: Alan Marina MD;  [facility];  Service: Pain Management     EPIDURAL BLOCK INJECTION Right 5/19/2020    Procedure: Right L4-L5 Transforaminal Epidural Steroid Injection (86557); Surgeon: Alan Marina MD;  [facility];  Service: Pain Management     FL GUIDED NEEDLE PLAC BX/ASP/INJ  1/7/2020    FL GUIDED NEEDLE PLAC BX/ASP/INJ  5/19/2020    FL GUIDED NEEDLE PLAC BX/ASP/INJ  8/28/2020    SD INJECTION,SACROILIAC JOINT Right 8/28/2020    Procedure: Sacroiliac joint injection and piriformis muscle steroid Injection;  Surgeon: Alan Marina MD;  [facility];  Service: Pain Management     SD REPAIR INTERCARP/CARP-METACARP JT Left 7/30/2018    Procedure:  Thumb Arthrex mini tight rope suspension arthroplasty with trapezial resection left thumb;  Surgeon: Hao Ocasio MD;  [facility];  Service: Orthopedics    THYROID SURGERY      near total thyroidectomy       Family History   Problem Relation Age of Onset    Arthritis Mother     Heart disease Mother         CAD    Arthritis Father     Lymphoma Father         Hodgkin's    No Known Problems Sister     No Known Problems Daughter     No Known Problems Maternal Grandmother     No Known Problems Maternal Grandfather     No Known Problems Paternal Grandmother     No Known Problems Paternal Grandfather     No Known Problems Sister     No Known Problems Daughter     No Known Problems Maternal Aunt     No Known Problems Paternal Aunt      I have reviewed and agree with the history as documented  E-Cigarette/Vaping    E-Cigarette Use Never User      E-Cigarette/Vaping Substances     Social History     Tobacco Use    Smoking status: Never Smoker    Smokeless tobacco: Never Used    Tobacco comment: only smoked as a teenager   Vaping Use    Vaping Use: Never used   Substance Use Topics    Alcohol use: Not Currently     Comment: social    Drug use: Never     Comment: CBD       Review of Systems   Constitutional: Negative for appetite change, chills and fever  HENT: Positive for congestion and sinus pain  Negative for ear pain and sore throat  Eyes: Positive for discharge, redness and itching  Negative for visual disturbance  Respiratory: Negative for shortness of breath  Cardiovascular: Negative for chest pain  Gastrointestinal: Negative for abdominal pain  Musculoskeletal: Negative for myalgias  Neurological: Negative for weakness, numbness and headaches  All other systems reviewed and are negative  Physical Exam  Physical Exam  Vitals reviewed  Constitutional:       General: She is not in acute distress  Appearance: Normal appearance  She is not ill-appearing, toxic-appearing or diaphoretic  HENT:      Head: Normocephalic and atraumatic  Right Ear: Tympanic membrane and ear canal normal       Left Ear: Tympanic membrane and ear canal normal       Nose: Congestion present  Right Sinus: Maxillary sinus tenderness and frontal sinus tenderness present  Left Sinus: Maxillary sinus tenderness and frontal sinus tenderness present  Eyes:      General:         Right eye: No discharge  Left eye: No discharge  Extraocular Movements: Extraocular movements intact  Pupils: Pupils are equal, round, and reactive to light        Comments: R conjunctival injection, L conjunctiva normal   Cardiovascular:      Rate and Rhythm: Normal rate and regular rhythm  Pulmonary:      Effort: Pulmonary effort is normal  No respiratory distress  Breath sounds: Normal breath sounds  No stridor  No wheezing, rhonchi or rales  Abdominal:      General: There is no distension  Palpations: Abdomen is soft  Tenderness: There is no abdominal tenderness  Musculoskeletal:         General: No deformity or signs of injury  Skin:     General: Skin is warm  Coloration: Skin is not jaundiced or pale  Findings: No erythema  Neurological:      General: No focal deficit present  Mental Status: She is alert  Mental status is at baseline  Vital Signs  ED Triage Vitals [01/02/22 1516]   Temperature Pulse Respirations Blood Pressure SpO2   98 °F (36 7 °C) 79 20 126/72 99 %      Temp Source Heart Rate Source Patient Position - Orthostatic VS BP Location FiO2 (%)   Temporal Monitor Sitting Right arm --      Pain Score       No Pain           Vitals:    01/02/22 1516   BP: 126/72   Pulse: 79   Patient Position - Orthostatic VS: Sitting         Visual Acuity      ED Medications  Medications - No data to display    Diagnostic Studies  Results Reviewed     Procedure Component Value Units Date/Time    COVID/FLU/RSV - 2 hour TAT [[id number]] Collected: 01/02/22 1810    Lab Status: In process Specimen: Nares from Nose Updated: 01/02/22 1813                 No orders to display              Procedures  Procedures         ED Course                                             MDM  Number of Diagnoses or Management Options  Right conjunctivitis  Sinusitis  Diagnosis management comments: 82-year-old female presents for evaluation of right eye irritation, congestion  Patient overall appears well, patient likely has a viral illness contributing to conjunctivitis, nasal congestion  Will swab with viral panel, patient states she normally uses a Z-Kumar for this instance    Will prescribe eyedrops as well  Disposition  Final diagnoses:   Right conjunctivitis   Sinusitis     Time reflects when diagnosis was documented in both MDM as applicable and the Disposition within this note     Time User Action Codes Description Comment    1/2/2022  5:53 PM Nahum Sen Add [H10 9] Right conjunctivitis     1/2/2022  5:53 PM Nahum Sen Add [J32 9] Sinusitis       ED Disposition     ED Disposition Condition Date/Time Comment    Discharge Stable Sun Jan 2, 2022  5:53 PM [address] [place] Martínez discharge to home/self care  Follow-up Information    None         Discharge Medication List as of 1/2/2022  5:59 PM      START taking these medications    Details   azithromycin (Zithromax Z-Kumar) 250 mg tablet Take 2 tablets today then 1 tablet daily x 4 days, Normal      !! ketotifen (ZADITOR) 0 025 % ophthalmic solution Administer 1 drop to both eyes 2 (two) times a day as needed (Eye irritation), Starting Sun 1/2/2022, Normal       !! - Potential duplicate medications found  Please discuss with provider        CONTINUE these medications which have NOT CHANGED    Details   Ascorbic Acid (VITAMIN C) 250 MG CHEW Chew 1 tablet daily, Historical Med      Calcium 600 MG tablet Take 2 tablets by mouth daily, Historical Med      celecoxib (CeleBREX) 200 mg capsule Take 200 mg by mouth daily , Starting Wed 9/18/2019, Historical Med      Cholecalciferol (VITAMIN D3) 1000 units CAPS Take by mouth, Historical Med      hydrochlorothiazide (HYDRODIURIL) 12 5 mg tablet TAKE 1 TABLET DAILY, Normal      ketoconazole (NIZORAL) 2 % cream Apply topically daily, Starting Mon 10/11/2021, Normal      !! ketotifen (ZADITOR) 0 025 % ophthalmic solution Administer 1 drop to both eyes 2 (two) times a day as needed (allergy), Starting Sun 10/31/2021, Normal      metoprolol tartrate (LOPRESSOR) 25 mg tablet TAKE 1 TABLET DAILY, Normal      Multiple Vitamin (DAILY VALUE MULTIVITAMIN PO) Take 1 tablet by mouth daily, Historical Med      Multiple Vitamins-Minerals (PRESERVISION AREDS 2 PO) Take by mouth daily, Historical Med      nystatin (MYCOSTATIN) cream Apply topically 2 (two) times a day, Starting Thu 10/14/2021, Normal      Omega-3 Fatty Acids (Fish Oil) 1200 MG CAPS Take 1 capsule by mouth daily, Historical Med      predniSONE 10 mg tablet Days 1 and 2 take 4 tablets daily, days 3 and 4 take 3 tablets daily, days 5 and 6 and 2 tablets daily, days 7 and 8 take 1 tablet daily  , Normal       !! - Potential duplicate medications found  Please discuss with provider  No discharge procedures on file      PDMP Review       Value Time User    PDMP Reviewed  Yes 4/9/2020 10:41 AM Mandeep Edwards DO          ED Provider  Electronically Signed by           Bryan Briseno DO  01/02/22 1696

## 2022-01-06 NOTE — PROGRESS NOTES
Daily Note     Today's date: 2018  Patient name: Juleen Siemens  : 1960  MRN: [id number]  Referring provider: Chris Gutierres PA-C  Dx:   Encounter Diagnosis     ICD-10-CM    1  H/O hand surgery Z98 890                   Subjective: Pt noted decreased swelling the past 2 days, however pt noted continued stiffness in L thumb  Objective: See treatment diary below    AROM left wrist E/F- 50/25; Thumb MP- 0/15; IP- 0/30  Strenght- Un-assessed  Inspection- pr wearing thumb spica; steri-strips in place  Circumference at wrist- 16 0 cm;; MPs- 17 0 cm; Thumb P1- 5 8 cm  Sensation- intact to LT     Manual             STM 15  15  15  15  15  15                                                                                   tubigrip E                           Exercise Diary             Wrist AROM 2/5  2/5  2/5  2/5  2/5  2/5           Thumb MP/IP 2/5  2/5  2/5  2/5  2/5  2/5                                                                                                                                                                                                                                                                                                                                                                                                                                                                 Modalities             HP/biphasic 15  15  15  15  15  15           US 3 mz 12  12  12  12  12  12           CP 15  15  15  15  15  15                       Assessment: Tolerated treatment well  Patient would benefit from continued PT      Plan: Continue per plan of care  I have personally performed a face to face diagnostic evaluation on this patient. I have reviewed the ACP note and agree with the history, exam and plan of care, except as noted.

## 2022-03-07 DIAGNOSIS — I10 ESSENTIAL HYPERTENSION: ICD-10-CM

## 2022-03-15 ENCOUNTER — VBI (OUTPATIENT)
Dept: ADMINISTRATIVE | Facility: OTHER | Age: 62
End: 2022-03-15

## 2022-03-25 ENCOUNTER — OFFICE VISIT (OUTPATIENT)
Dept: OBGYN CLINIC | Facility: CLINIC | Age: 62
End: 2022-03-25
Payer: OTHER MISCELLANEOUS

## 2022-03-25 VITALS
HEIGHT: 64 IN | BODY MASS INDEX: 28.51 KG/M2 | WEIGHT: 167 LBS | DIASTOLIC BLOOD PRESSURE: 109 MMHG | SYSTOLIC BLOOD PRESSURE: 160 MMHG

## 2022-03-25 DIAGNOSIS — S90.32XA CONTUSION OF LEFT FOOT, INITIAL ENCOUNTER: ICD-10-CM

## 2022-03-25 DIAGNOSIS — M79.672 PAIN IN LEFT FOOT: Primary | ICD-10-CM

## 2022-03-25 DIAGNOSIS — M19.072 DJD (DEGENERATIVE JOINT DISEASE), ANKLE AND FOOT, LEFT: ICD-10-CM

## 2022-03-25 PROCEDURE — 99203 OFFICE O/P NEW LOW 30 MIN: CPT | Performed by: ORTHOPAEDIC SURGERY

## 2022-03-25 NOTE — PROGRESS NOTES
Assessment/Plan:    No problem-specific Assessment & Plan notes found for this encounter  Diagnoses and all orders for this visit:    Pain in left foot  -     XR foot 3+ vw left; Future    Contusion of left foot, initial encounter  -     MRI foot/forefoot toes left wo contrast; Future    DJD (degenerative joint disease), ankle and foot, left  -     MRI foot/forefoot toes left wo contrast; Future          An MRI is ordered of her left foot to look at the tarsal metatarsal joints in further detail  Return back once it is complete  Encourage ambulation for now    Subjective:      Patient ID: Allison Weber is a 63 y o  female  HPI    The patient injured her approximately 6 months ago at work when large capsules struck the top of it  She did have x-rays in the past where she was told she might have had a fracture  She says over the past 2 months her foot pain has increased  She denies any numbness or tingling  She denies any fever or chills  She has not had any recent x-rays    The following portions of the patient's history were reviewed and updated as appropriate: allergies, current medications, past family history, past medical history, past social history, past surgical history and problem list     Review of Systems   Constitutional: Negative for chills, fever and unexpected weight change  HENT: Negative for hearing loss, nosebleeds and sore throat  Eyes: Negative for pain, redness and visual disturbance  Respiratory: Negative for cough, shortness of breath and wheezing  Cardiovascular: Negative for chest pain, palpitations and leg swelling  Gastrointestinal: Negative for abdominal pain, nausea and vomiting  Endocrine: Negative for polydipsia and polyuria  Genitourinary: Negative for dysuria and hematuria  Musculoskeletal: Positive for arthralgias, gait problem, joint swelling and myalgias  Negative for back pain, neck pain and neck stiffness          As noted in HPI   Skin: Negative for rash and wound  Neurological: Negative for dizziness, numbness and headaches  Psychiatric/Behavioral: Negative for decreased concentration and suicidal ideas  The patient is not nervous/anxious  Objective:      BP (!) 160/109 Comment: just took BP med  Ht 5' 4" (1 626 m)   Wt 75 8 kg (167 lb)   BMI 28 67 kg/m²          Physical Exam      Left lower extremity is neurovascular intact  Toes are pink and mobile  Compartments are soft  No obvious trauma  No ecchymosis  No trophic changes  Her tenderness is mostly around the 2nd through 4th tarsometatarsal joints  No gross ligament dysfunction  No warmth erythema  Cap refill intact    X-rays show no fractures or dislocations    Mild arthritic changes is present along her entire foot region

## 2022-03-31 ENCOUNTER — TELEPHONE (OUTPATIENT)
Dept: OBGYN CLINIC | Facility: MEDICAL CENTER | Age: 62
End: 2022-03-31

## 2022-03-31 NOTE — TELEPHONE ENCOUNTER
Patient sees Dr Penny Read  Shae at [facility]-for[MD], [facility]  called to confirm appointments for the patient, confirmed 
No

## 2022-04-04 DIAGNOSIS — I10 HYPERTENSION, UNSPECIFIED TYPE: ICD-10-CM

## 2022-04-04 RX ORDER — HYDROCHLOROTHIAZIDE 12.5 MG/1
TABLET ORAL
Qty: 90 TABLET | Refills: 3 | Status: SHIPPED | OUTPATIENT
Start: 2022-04-04

## 2022-04-08 ENCOUNTER — HOSPITAL ENCOUNTER (OUTPATIENT)
Dept: MRI IMAGING | Facility: HOSPITAL | Age: 62
Discharge: HOME | End: 2022-04-08
Attending: ORTHOPAEDIC SURGERY
Payer: OTHER MISCELLANEOUS

## 2022-04-08 DIAGNOSIS — S90.32XA CONTUSION OF LEFT FOOT, INITIAL ENCOUNTER: ICD-10-CM

## 2022-04-08 DIAGNOSIS — M19.072 DJD (DEGENERATIVE JOINT DISEASE), ANKLE AND FOOT, LEFT: ICD-10-CM

## 2022-04-08 PROCEDURE — G1004 CDSM NDSC: HCPCS

## 2022-04-08 PROCEDURE — 73718 MRI LOWER EXTREMITY W/O DYE: CPT

## 2022-04-19 ENCOUNTER — OFFICE VISIT (OUTPATIENT)
Dept: OBGYN CLINIC | Facility: CLINIC | Age: 62
End: 2022-04-19
Payer: OTHER MISCELLANEOUS

## 2022-04-19 VITALS
HEIGHT: 64 IN | BODY MASS INDEX: 28.51 KG/M2 | HEART RATE: 86 BPM | WEIGHT: 167 LBS | SYSTOLIC BLOOD PRESSURE: 142 MMHG | DIASTOLIC BLOOD PRESSURE: 83 MMHG

## 2022-04-19 DIAGNOSIS — S93.622A LISFRANC'S SPRAIN, LEFT, INITIAL ENCOUNTER: Primary | ICD-10-CM

## 2022-04-19 PROCEDURE — 99213 OFFICE O/P EST LOW 20 MIN: CPT | Performed by: ORTHOPAEDIC SURGERY

## 2022-04-19 NOTE — LETTER
April 19, 2022     Patient: Kellie Judd  YOB: 1960  Date of Visit: 4/19/2022      To Whom it May Concern:    Rmain Earl is under my professional care  Ramirez Arteaga was seen in my office on 4/19/2022  Ramirez Jenni may return to work on 04/20/2022 but must wear the cam walker boot  If you have any questions or concerns, please don't hesitate to call           Sincerely,          Marek Piña DO        CC: No Recipients

## 2022-04-19 NOTE — PROGRESS NOTES
ASSESSMENT/PLAN:    Diagnoses and all orders for this visit:    Lisfranc's sprain, left, initial encounter  -     Cam Boot        MRI of the patient's left foot was consistent with a Lisfranc sprain  Possible treatment options were discussed with the patient  She was given a Cam walker boot  She will follow up with our office in 6 weeks  The patient is acceptable to this plan  Return in about 6 weeks (around 5/31/2022)  The patient's MRI results were discussed  He is consistent with a partial Lisfranc's sprain  She was placed in a Cam boot  She may weight bear as tolerate  May remove for hygiene  Follow up in 1 month for re-evaluation, if there is any further problems, she will not hesitate to let us know      _____________________________________________________  CHIEF COMPLAINT:  Chief Complaint   Patient presents with    Left Foot - Follow-up         SUBJECTIVE:  Donell Irwin is a 63 y o  female who presents to our office to review MRI results of her left foot  She still complains of left foot pain  She denies any numbness or tingling  She denies any fever chills  She denies any new falls or trauma        The following portions of the patient's history were reviewed and updated as appropriate: allergies, current medications, past family history, past medical history, past social history, past surgical history and problem list     PAST MEDICAL HISTORY:  Past Medical History:   Diagnosis Date    Hypertension     Intracranial aneurysm     resolved 11/17/17 - possible R P-comm region aneurysm measuring 3mm found on MRI results    Reactive depression     last assessed 12/15/15       PAST SURGICAL HISTORY:  Past Surgical History:   Procedure Laterality Date    ENDOMETRIAL ABLATION  2012    EPIDURAL BLOCK INJECTION Right 1/7/2020    Procedure: Right L4-5 transforaminal epidural steroid injection 26389;  Surgeon: Samara Howard MD;  [facility];  Service: Pain Management     EPIDURAL BLOCK INJECTION Right 5/19/2020    Procedure: Right L4-L5 Transforaminal Epidural Steroid Injection (65300); Surgeon: Padmini Case MD;  [facility];  Service: Pain Management     FL GUIDED NEEDLE PLAC BX/ASP/INJ  1/7/2020    FL GUIDED NEEDLE PLAC BX/ASP/INJ  5/19/2020    FL GUIDED NEEDLE PLAC BX/ASP/INJ  8/28/2020    NM INJECTION,SACROILIAC JOINT Right 8/28/2020    Procedure: Sacroiliac joint injection and piriformis muscle steroid Injection;  Surgeon: Padmini Case MD;  [facility]: [facility];  Service: Pain Management     NM REPAIR INTERCARP/CARP-METACARP JT Left 7/30/2018    Procedure:  Thumb Arthrex mini tight rope suspension arthroplasty with trapezial resection left thumb;  Surgeon: Enrike Pedersen MD;  [facility]: [facility];  Service: Orthopedics    THYROID SURGERY      near total thyroidectomy       FAMILY HISTORY:  Family History   Problem Relation Age of Onset    Arthritis Mother     Heart disease Mother         CAD    Arthritis Father     Lymphoma Father         Hodgkin's    No Known Problems Sister     No Known Problems Daughter     No Known Problems Maternal Grandmother     No Known Problems Maternal Grandfather     No Known Problems Paternal Grandmother     No Known Problems Paternal Grandfather     No Known Problems Sister     No Known Problems Daughter     No Known Problems Maternal Aunt     No Known Problems Paternal Aunt        SOCIAL HISTORY:  Social History     Tobacco Use    Smoking status: Never Smoker    Smokeless tobacco: Never Used    Tobacco comment: only smoked as a teenager   Vaping Use    Vaping Use: Never used   Substance Use Topics    Alcohol use: Not Currently     Comment: social    Drug use: Never     Comment: CBD       MEDICATIONS:    Current Outpatient Medications:     Ascorbic Acid (VITAMIN C) 250 MG CHEW, Chew 1 tablet daily, Disp: , Rfl:     Calcium 600 MG tablet, Take 2 tablets by mouth daily, Disp: , Rfl:     celecoxib (CeleBREX) 200 mg capsule, Take 200 mg by mouth daily , Disp: , Rfl:     Cholecalciferol (VITAMIN D3) 1000 units CAPS, Take by mouth, Disp: , Rfl:     hydrochlorothiazide (HYDRODIURIL) 12 5 mg tablet, TAKE 1 TABLET DAILY, Disp: 90 tablet, Rfl: 3    ketoconazole (NIZORAL) 2 % cream, Apply topically daily, Disp: 30 g, Rfl: 1    ketotifen (ZADITOR) 0 025 % ophthalmic solution, Administer 1 drop to both eyes 2 (two) times a day as needed (allergy), Disp: 5 mL, Rfl: 0    ketotifen (ZADITOR) 0 025 % ophthalmic solution, Administer 1 drop to both eyes 2 (two) times a day as needed (Eye irritation), Disp: 5 mL, Rfl: 0    metoprolol tartrate (LOPRESSOR) 25 mg tablet, TAKE 1 TABLET DAILY, Disp: 90 tablet, Rfl: 3    Multiple Vitamin (DAILY VALUE MULTIVITAMIN PO), Take 1 tablet by mouth daily, Disp: , Rfl:     Multiple Vitamins-Minerals (PRESERVISION AREDS 2 PO), Take by mouth daily, Disp: , Rfl:     nystatin (MYCOSTATIN) cream, Apply topically 2 (two) times a day, Disp: 30 g, Rfl: 0    Omega-3 Fatty Acids (Fish Oil) 1200 MG CAPS, Take 1 capsule by mouth daily, Disp: , Rfl:     predniSONE 10 mg tablet, Days 1 and 2 take 4 tablets daily, days 3 and 4 take 3 tablets daily, days 5 and 6 and 2 tablets daily, days 7 and 8 take 1 tablet daily  (Patient not taking: Reported on 4/19/2022 ), Disp: 20 tablet, Rfl: 0    ALLERGIES:  No Known Allergies    ROS:  Review of Systems     Constitutional: Negative for fatigue, fever or loss of appetite  HENT: Negative  Respiratory: Negative for shortness of breath, dyspnea  Cardiovascular: Negative for chest pain/tightness  Gastrointestinal: Negative for abdominal pain, N/V  Endocrine: Negative for cold/heat intolerance, unexplained weight loss/gain  Genitourinary: Negative for flank pain, dysuria, hematuria  Musculoskeletal: Positive for arthralgia   Skin: Negative for rash      Neurological: Negative for numbness or tingling  Psychiatric/Behavioral: Negative for agitation  _____________________________________________________  PHYSICAL EXAMINATION:    Blood pressure 142/83, pulse 86, height 5' 4" (1 626 m), weight 75 8 kg (167 lb)  Constitutional: Oriented to person, place, and time  Appears well-developed and well-nourished  No distress  HENT:   Head: Normocephalic  Eyes: Conjunctivae are normal  Right eye exhibits no discharge  Left eye exhibits no discharge  No scleral icterus  Cardiovascular: Normal rate  Pulmonary/Chest: Effort normal    Neurological: Alert and oriented to person, place, and time  Skin: Skin is warm and dry  No rash noted  Not diaphoretic  No erythema  No pallor  Psychiatric: Normal mood and affect  Behavior is normal  Judgment and thought content normal       MUSCULOSKELETAL EXAMINATION:   Physical Exam  Ortho Exam    Left lower extremity is neurovascular intact  Toes are pink and mobile  Compartments are soft  Tenderness to palpation along the Lisfranc ligament  Brisk cap refill  Sensation intact  No significant edema  No warmth, erythema or ecchymosis present  Objective:  BP Readings from Last 1 Encounters:   04/19/22 142/83      Wt Readings from Last 1 Encounters:   04/19/22 75 8 kg (167 lb)        BMI:   Estimated body mass index is 28 67 kg/m² as calculated from the following:    Height as of this encounter: 5' 4" (1 626 m)  Weight as of this encounter: 75 8 kg (167 lb)          Scribe Attestation    I,:  Cole Estrella PA-C am acting as a scribe while in the presence of the attending physician :       I,:  Leslie Tuttle DO personally performed the services described in this documentation    as scribed in my presence :

## 2022-04-22 ENCOUNTER — OFFICE VISIT (OUTPATIENT)
Dept: URGENT CARE | Facility: MEDICAL CENTER | Age: 62
End: 2022-04-22
Payer: COMMERCIAL

## 2022-04-22 VITALS
HEIGHT: 64 IN | SYSTOLIC BLOOD PRESSURE: 150 MMHG | RESPIRATION RATE: 18 BRPM | TEMPERATURE: 98.2 F | OXYGEN SATURATION: 99 % | DIASTOLIC BLOOD PRESSURE: 84 MMHG | HEART RATE: 73 BPM | BODY MASS INDEX: 27.96 KG/M2 | WEIGHT: 163.8 LBS

## 2022-04-22 DIAGNOSIS — J01.90 ACUTE NON-RECURRENT SINUSITIS, UNSPECIFIED LOCATION: Primary | ICD-10-CM

## 2022-04-22 PROCEDURE — 99212 OFFICE O/P EST SF 10 MIN: CPT | Performed by: PHYSICIAN ASSISTANT

## 2022-04-22 RX ORDER — AZITHROMYCIN 250 MG/1
250 TABLET, FILM COATED ORAL DAILY
Qty: 6 TABLET | Refills: 0 | Status: SHIPPED | OUTPATIENT
Start: 2022-04-22 | End: 2022-04-27

## 2022-04-22 NOTE — PROGRESS NOTES
[id number]        NAME: Alejo Martinez is a 63 y o  female  : 1960    MRN: [id number]  DATE: 2022  TIME: 4:56 PM    Assessment and Plan   Acute non-recurrent sinusitis, unspecified location [J01 90]  1  Acute non-recurrent sinusitis, unspecified location  azithromycin (ZITHROMAX) 250 mg tablet         Patient Instructions       Follow up with PCP in 3-5 days  Proceed to  ER if symptoms worsen  Chief Complaint     Chief Complaint   Patient presents with    Sinusitis     c/o sinus congestion, hoarsed voice, productive cough of yellow sputum, headache, sore throat, symptoms started wednseday, taking robitussin and sudafed otc with minor relief         History of Present Illness       Patient presents with a 2 day history of a runny stuffy nose sinus pressure sore throat and headache  She does have slight cough which is occasionally productive  No fever chills nausea vomiting diarrhea body aches rashes or dizziness  No known exposure to COVID or influenza  She is vaccinated for COVID  Review of Systems   Review of Systems   Constitutional: Negative for chills and fever  HENT: Positive for congestion, rhinorrhea, sinus pressure and sore throat  Respiratory: Positive for cough  Negative for shortness of breath  Gastrointestinal: Negative for diarrhea, nausea and vomiting  Musculoskeletal: Negative for myalgias  Skin: Negative for rash  Neurological: Positive for headaches  Negative for dizziness           Current Medications       Current Outpatient Medications:     Ascorbic Acid (VITAMIN C) 250 MG CHEW, Chew 1 tablet daily, Disp: , Rfl:     azithromycin (ZITHROMAX) 250 mg tablet, Take 1 tablet (250 mg total) by mouth daily for 5 days 2 pills today, then one daily for 4 more days, Disp: 6 tablet, Rfl: 0    Calcium 600 MG tablet, Take 2 tablets by mouth daily, Disp: , Rfl:     celecoxib (CeleBREX) 200 mg capsule, Take 200 mg by mouth daily , Disp: , Rfl:    Cholecalciferol (VITAMIN D3) 1000 units CAPS, Take by mouth, Disp: , Rfl:     hydrochlorothiazide (HYDRODIURIL) 12 5 mg tablet, TAKE 1 TABLET DAILY, Disp: 90 tablet, Rfl: 3    ketoconazole (NIZORAL) 2 % cream, Apply topically daily, Disp: 30 g, Rfl: 1    ketotifen (ZADITOR) 0 025 % ophthalmic solution, Administer 1 drop to both eyes 2 (two) times a day as needed (allergy), Disp: 5 mL, Rfl: 0    ketotifen (ZADITOR) 0 025 % ophthalmic solution, Administer 1 drop to both eyes 2 (two) times a day as needed (Eye irritation), Disp: 5 mL, Rfl: 0    metoprolol tartrate (LOPRESSOR) 25 mg tablet, TAKE 1 TABLET DAILY, Disp: 90 tablet, Rfl: 3    Multiple Vitamin (DAILY VALUE MULTIVITAMIN PO), Take 1 tablet by mouth daily, Disp: , Rfl:     Multiple Vitamins-Minerals (PRESERVISION AREDS 2 PO), Take by mouth daily, Disp: , Rfl:     nystatin (MYCOSTATIN) cream, Apply topically 2 (two) times a day, Disp: 30 g, Rfl: 0    Omega-3 Fatty Acids (Fish Oil) 1200 MG CAPS, Take 1 capsule by mouth daily, Disp: , Rfl:     predniSONE 10 mg tablet, Days 1 and 2 take 4 tablets daily, days 3 and 4 take 3 tablets daily, days 5 and 6 and 2 tablets daily, days 7 and 8 take 1 tablet daily   (Patient not taking: Reported on 4/19/2022 ), Disp: 20 tablet, Rfl: 0    Current Allergies     Allergies as of 04/22/2022    (No Known Allergies)            The following portions of the patient's history were reviewed and updated as appropriate: allergies, current medications, past family history, past medical history, past social history, past surgical history and problem list      Past Medical History:   Diagnosis Date    Hypertension     Intracranial aneurysm     resolved 11/17/17 - possible R P-comm region aneurysm measuring 3mm found on MRI results    Reactive depression     last assessed 12/15/15       Past Surgical History:   Procedure Laterality Date    ENDOMETRIAL ABLATION  2012    EPIDURAL BLOCK INJECTION Right 1/7/2020    Procedure: Right L4-5 transforaminal epidural steroid injection [id number];  Surgeon: Jefferson Harmon MD;  [facility];  Service: Pain Management     EPIDURAL BLOCK INJECTION Right 5/19/2020    Procedure: Right L4-L5 Transforaminal Epidural Steroid Injection (19708); Surgeon: Jefferson Harmon MD;  [facility];  Service: Pain Management     FL GUIDED NEEDLE PLAC BX/ASP/INJ  1/7/2020    FL GUIDED NEEDLE PLAC BX/ASP/INJ  5/19/2020    FL GUIDED NEEDLE PLAC BX/ASP/INJ  8/28/2020    ND INJECTION,SACROILIAC JOINT Right 8/28/2020    Procedure: Sacroiliac joint injection and piriformis muscle steroid Injection;  Surgeon: Jefferson Harmon MD;  [facility];  Service: Pain Management     ND REPAIR INTERCARP/CARP-METACARP JT Left 7/30/2018    Procedure: Thumb Arthrex mini tight rope suspension arthroplasty with trapezial resection left thumb;  Surgeon: Kevin Phillips MD;  [facility];  Service: Orthopedics    THYROID SURGERY      near total thyroidectomy       Family History   Problem Relation Age of Onset    Arthritis Mother     Heart disease Mother         CAD    Arthritis Father     Lymphoma Father         Hodgkin's    No Known Problems Sister     No Known Problems Daughter     No Known Problems Maternal Grandmother     No Known Problems Maternal Grandfather     No Known Problems Paternal Grandmother     No Known Problems Paternal Grandfather     No Known Problems Sister     No Known Problems Daughter     No Known Problems Maternal Aunt     No Known Problems Paternal Aunt          Medications have been verified  Objective   /84   Pulse 73   Temp 98 2 °F (36 8 °C)   Resp 18   Ht 5' 4" (1 626 m)   Wt 74 3 kg (163 lb 12 8 oz)   SpO2 99%   BMI 28 12 kg/m²   No LMP recorded  Patient has had an ablation  Physical Exam     Physical Exam  Vitals and nursing note reviewed  Constitutional:       Appearance: Normal appearance     HENT:      Head: Normocephalic and atraumatic  Right Ear: Tympanic membrane normal       Left Ear: Tympanic membrane normal       Nose: Nose normal       Mouth/Throat:      Mouth: Mucous membranes are moist       Pharynx: Oropharynx is clear  Eyes:      Conjunctiva/sclera: Conjunctivae normal    Cardiovascular:      Rate and Rhythm: Normal rate and regular rhythm  Heart sounds: Normal heart sounds  Pulmonary:      Effort: Pulmonary effort is normal       Breath sounds: Normal breath sounds  Musculoskeletal:      Cervical back: Neck supple  Lymphadenopathy:      Cervical: No cervical adenopathy  Skin:     General: Skin is warm  Neurological:      Mental Status: She is alert

## 2022-04-22 NOTE — PATIENT INSTRUCTIONS

## 2022-05-11 ENCOUNTER — HOSPITAL ENCOUNTER (EMERGENCY)
Facility: HOSPITAL | Age: 62
Discharge: HOME | End: 2022-05-11
Attending: EMERGENCY MEDICINE | Admitting: EMERGENCY MEDICINE
Payer: COMMERCIAL

## 2022-05-11 ENCOUNTER — APPOINTMENT (EMERGENCY)
Dept: NON INVASIVE DIAGNOSTICS | Facility: HOSPITAL | Age: 62
End: 2022-05-11
Payer: COMMERCIAL

## 2022-05-11 ENCOUNTER — APPOINTMENT (EMERGENCY)
Dept: RADIOLOGY | Facility: HOSPITAL | Age: 62
End: 2022-05-11
Payer: COMMERCIAL

## 2022-05-11 ENCOUNTER — OFFICE VISIT (OUTPATIENT)
Dept: URGENT CARE | Facility: MEDICAL CENTER | Age: 62
End: 2022-05-11
Payer: COMMERCIAL

## 2022-05-11 VITALS
HEART RATE: 76 BPM | TEMPERATURE: 98 F | RESPIRATION RATE: 18 BRPM | DIASTOLIC BLOOD PRESSURE: 64 MMHG | SYSTOLIC BLOOD PRESSURE: 118 MMHG | HEIGHT: 64 IN | BODY MASS INDEX: 27.83 KG/M2 | OXYGEN SATURATION: 97 % | WEIGHT: 163 LBS

## 2022-05-11 VITALS
RESPIRATION RATE: 15 BRPM | TEMPERATURE: 98.6 F | OXYGEN SATURATION: 95 % | HEART RATE: 84 BPM | DIASTOLIC BLOOD PRESSURE: 84 MMHG | SYSTOLIC BLOOD PRESSURE: 183 MMHG

## 2022-05-11 DIAGNOSIS — J06.9 ACUTE RESPIRATORY DISEASE: Primary | ICD-10-CM

## 2022-05-11 DIAGNOSIS — R42 LIGHTHEADEDNESS: ICD-10-CM

## 2022-05-11 DIAGNOSIS — J32.9 SINUSITIS: Primary | ICD-10-CM

## 2022-05-11 DIAGNOSIS — R05.1 ACUTE COUGH: ICD-10-CM

## 2022-05-11 DIAGNOSIS — I49.3 FREQUENT PVCS: ICD-10-CM

## 2022-05-11 DIAGNOSIS — I49.9 IRREGULAR HEART BEAT: ICD-10-CM

## 2022-05-11 DIAGNOSIS — I49.3 PVC (PREMATURE VENTRICULAR CONTRACTION): ICD-10-CM

## 2022-05-11 LAB
ANION GAP SERPL CALCULATED.3IONS-SCNC: 10 MMOL/L (ref 4–13)
BASOPHILS # BLD AUTO: 0.02 THOUSANDS/ΜL (ref 0–0.1)
BASOPHILS NFR BLD AUTO: 0 % (ref 0–1)
BUN SERPL-MCNC: 18 MG/DL (ref 5–25)
CALCIUM SERPL-MCNC: 9.4 MG/DL (ref 8.3–10.1)
CARDIAC TROPONIN I PNL SERPL HS: 7 NG/L
CHLORIDE SERPL-SCNC: 106 MMOL/L (ref 100–108)
CO2 SERPL-SCNC: 26 MMOL/L (ref 21–32)
CREAT SERPL-MCNC: 0.9 MG/DL (ref 0.6–1.3)
EOSINOPHIL # BLD AUTO: 0.07 THOUSAND/ΜL (ref 0–0.61)
EOSINOPHIL NFR BLD AUTO: 1 % (ref 0–6)
ERYTHROCYTE [DISTWIDTH] IN BLOOD BY AUTOMATED COUNT: 12.8 % (ref 11.6–15.1)
GFR SERPL CREATININE-BSD FRML MDRD: 70 ML/MIN/1.73SQ M
GLUCOSE SERPL-MCNC: 85 MG/DL (ref 65–140)
HCT VFR BLD AUTO: 41.4 % (ref 34.8–46.1)
HGB BLD-MCNC: 14.1 G/DL (ref 11.5–15.4)
IMM GRANULOCYTES # BLD AUTO: 0.03 THOUSAND/UL (ref 0–0.2)
IMM GRANULOCYTES NFR BLD AUTO: 0 % (ref 0–2)
LYMPHOCYTES # BLD AUTO: 1.26 THOUSANDS/ΜL (ref 0.6–4.47)
LYMPHOCYTES NFR BLD AUTO: 13 % (ref 14–44)
MAGNESIUM SERPL-MCNC: 2.2 MG/DL (ref 1.6–2.6)
MCH RBC QN AUTO: 30.5 PG (ref 26.8–34.3)
MCHC RBC AUTO-ENTMCNC: 34.1 G/DL (ref 31.4–37.4)
MCV RBC AUTO: 90 FL (ref 82–98)
MONOCYTES # BLD AUTO: 0.48 THOUSAND/ΜL (ref 0.17–1.22)
MONOCYTES NFR BLD AUTO: 5 % (ref 4–12)
NEUTROPHILS # BLD AUTO: 7.84 THOUSANDS/ΜL (ref 1.85–7.62)
NEUTS SEG NFR BLD AUTO: 81 % (ref 43–75)
NRBC BLD AUTO-RTO: 0 /100 WBCS
PLATELET # BLD AUTO: 238 THOUSANDS/UL (ref 149–390)
PMV BLD AUTO: 10.9 FL (ref 8.9–12.7)
POTASSIUM SERPL-SCNC: 3.7 MMOL/L (ref 3.5–5.3)
RBC # BLD AUTO: 4.62 MILLION/UL (ref 3.81–5.12)
SODIUM SERPL-SCNC: 142 MMOL/L (ref 136–145)
TSH SERPL DL<=0.05 MIU/L-ACNC: 1.3 UIU/ML (ref 0.45–4.5)
WBC # BLD AUTO: 9.7 THOUSAND/UL (ref 4.31–10.16)

## 2022-05-11 PROCEDURE — U0005 INFEC AGEN DETEC AMPLI PROBE: HCPCS | Performed by: PHYSICIAN ASSISTANT

## 2022-05-11 PROCEDURE — 84443 ASSAY THYROID STIM HORMONE: CPT | Performed by: PHYSICIAN ASSISTANT

## 2022-05-11 PROCEDURE — U0003 INFECTIOUS AGENT DETECTION BY NUCLEIC ACID (DNA OR RNA); SEVERE ACUTE RESPIRATORY SYNDROME CORONAVIRUS 2 (SARS-COV-2) (CORONAVIRUS DISEASE [COVID-19]), AMPLIFIED PROBE TECHNIQUE, MAKING USE OF HIGH THROUGHPUT TECHNOLOGIES AS DESCRIBED BY CMS-2020-01-R: HCPCS | Performed by: PHYSICIAN ASSISTANT

## 2022-05-11 PROCEDURE — 99284 EMERGENCY DEPT VISIT MOD MDM: CPT

## 2022-05-11 PROCEDURE — 36415 COLL VENOUS BLD VENIPUNCTURE: CPT | Performed by: PHYSICIAN ASSISTANT

## 2022-05-11 PROCEDURE — 99285 EMERGENCY DEPT VISIT HI MDM: CPT | Performed by: PHYSICIAN ASSISTANT

## 2022-05-11 PROCEDURE — 93971 EXTREMITY STUDY: CPT

## 2022-05-11 PROCEDURE — 93005 ELECTROCARDIOGRAM TRACING: CPT

## 2022-05-11 PROCEDURE — 85025 COMPLETE CBC W/AUTO DIFF WBC: CPT | Performed by: PHYSICIAN ASSISTANT

## 2022-05-11 PROCEDURE — 93971 EXTREMITY STUDY: CPT | Performed by: SURGERY

## 2022-05-11 PROCEDURE — 80048 BASIC METABOLIC PNL TOTAL CA: CPT | Performed by: PHYSICIAN ASSISTANT

## 2022-05-11 PROCEDURE — 71045 X-RAY EXAM CHEST 1 VIEW: CPT

## 2022-05-11 PROCEDURE — 93005 ELECTROCARDIOGRAM TRACING: CPT | Performed by: PHYSICIAN ASSISTANT

## 2022-05-11 PROCEDURE — 84484 ASSAY OF TROPONIN QUANT: CPT | Performed by: PHYSICIAN ASSISTANT

## 2022-05-11 PROCEDURE — 83735 ASSAY OF MAGNESIUM: CPT | Performed by: PHYSICIAN ASSISTANT

## 2022-05-11 PROCEDURE — 99214 OFFICE O/P EST MOD 30 MIN: CPT | Performed by: PHYSICIAN ASSISTANT

## 2022-05-11 RX ORDER — BENZONATATE 200 MG/1
200 CAPSULE ORAL 3 TIMES DAILY PRN
Qty: 20 CAPSULE | Refills: 0 | Status: SHIPPED | OUTPATIENT
Start: 2022-05-11 | End: 2022-08-03

## 2022-05-11 RX ORDER — FLUTICASONE PROPIONATE 50 MCG
1 SPRAY, SUSPENSION (ML) NASAL DAILY
Qty: 16 G | Refills: 0 | Status: SHIPPED | OUTPATIENT
Start: 2022-05-11

## 2022-05-11 NOTE — LETTER
May 11, 2022     Patient: Ester Mcdaniels   YOB: 1960   Date of Visit: 5/11/2022       To Whom It May Concern:    Please excuse Gilma Santos from work  She may return if COVID test is negative and patient has been fever free for 24 hours without the use of a fever reducing agent  If COVID test is positive, patient may return on 05/15/2022 and when fever free for 24 hours without the use of a fever reducing agent  Upon return, the patient must then adhere to strict masking for an additional 5 days  If you have any questions or concerns, please don't hesitate to call           Sincerely,        Willie Irvin PA-C    CC: Ester Ramachandranrenuka

## 2022-05-11 NOTE — PROGRESS NOTES
[address]        NAME: Renee Saleh is a 63 y o  female  : 1960    MRN: [id number]  DATE: May 11, 2022  TIME: 12:44 PM    Assessment and Plan   Acute respiratory disease [J06 9]  1  Acute respiratory disease  fluticasone (FLONASE) 50 mcg/act nasal spray    benzonatate (TESSALON) 200 MG capsule   2  Irregular heart beat  ECG 12 lead    Transfer to other facility   3  Acute cough  Novel Coronavirus (Covid-19),PCR [facility]   4  Frequent PVCs  Ambulatory Referral to Cardiology    Transfer to other facility   5  Lightheadedness       EKG: frequent and consecutive PVCs  About 1-2 PVCs after every 2 beats  Patient reports she is feeling lightheaded but denies SOB, chest pain or palpitations  Will send to ER for further evaluation including electrolyte evaluation and cardiac enzymes as patient is subjectively symptomatic  Recommended avoiding sudafed and/or other stimulants  Cardiology referral placed  Patient is being driven by   Patient Instructions     Follow up with cardiology  Follow up with PCP within 1-3 days and discuss irregular heart beat  Proceed to  ER     Chief Complaint     Chief Complaint   Patient presents with    Sinusitis     C/o sinus pain an chest congestion that started last evening around 1800, dry cough, denies sore throat         History of Present Illness       Denies hx/o arrhythmias  Denies chest pain, heart palpitations or SOB  URI   This is a new problem  The current episode started yesterday  There has been no fever  Associated symptoms include coughing and headaches  Pertinent negatives include no abdominal pain, congestion, diarrhea, ear pain, nausea, rash, rhinorrhea, sinus pain, sneezing, sore throat, vomiting or wheezing  Treatments tried: robitussin; sudafed  Review of Systems   Review of Systems   Constitutional: Negative for chills, fatigue and fever  HENT: Positive for sinus pressure   Negative for congestion, ear discharge, ear pain, postnasal drip, rhinorrhea, sinus pain, sneezing, sore throat and trouble swallowing  Respiratory: Positive for cough  Negative for chest tightness, shortness of breath and wheezing  Gastrointestinal: Negative for abdominal pain, diarrhea, nausea and vomiting  Musculoskeletal: Negative for myalgias  Skin: Negative for rash  Neurological: Positive for light-headedness and headaches  Current Medications       Current Outpatient Medications:     benzonatate (TESSALON) 200 MG capsule, Take 1 capsule (200 mg total) by mouth as needed in the morning and 1 capsule (200 mg total) as needed at noon and 1 capsule (200 mg total) as needed in the evening for cough  , Disp: 20 capsule, Rfl: 0    fluticasone (FLONASE) 50 mcg/act nasal spray, 1 spray into each nostril in the morning , Disp: 16 g, Rfl: 0    Ascorbic Acid (VITAMIN C) 250 MG CHEW, Chew 1 tablet daily, Disp: , Rfl:     Calcium 600 MG tablet, Take 2 tablets by mouth daily, Disp: , Rfl:     celecoxib (CeleBREX) 200 mg capsule, Take 200 mg by mouth daily , Disp: , Rfl:     Cholecalciferol (VITAMIN D3) 1000 units CAPS, Take by mouth, Disp: , Rfl:     hydrochlorothiazide (HYDRODIURIL) 12 5 mg tablet, TAKE 1 TABLET DAILY, Disp: 90 tablet, Rfl: 3    ketoconazole (NIZORAL) 2 % cream, Apply topically daily, Disp: 30 g, Rfl: 1    ketotifen (ZADITOR) 0 025 % ophthalmic solution, Administer 1 drop to both eyes 2 (two) times a day as needed (allergy), Disp: 5 mL, Rfl: 0    ketotifen (ZADITOR) 0 025 % ophthalmic solution, Administer 1 drop to both eyes 2 (two) times a day as needed (Eye irritation), Disp: 5 mL, Rfl: 0    metoprolol tartrate (LOPRESSOR) 25 mg tablet, TAKE 1 TABLET DAILY, Disp: 90 tablet, Rfl: 3    Multiple Vitamin (DAILY VALUE MULTIVITAMIN PO), Take 1 tablet by mouth daily, Disp: , Rfl:     Multiple Vitamins-Minerals (PRESERVISION AREDS 2 PO), Take by mouth daily, Disp: , Rfl:     nystatin (MYCOSTATIN) cream, Apply topically 2 (two) times a day, Disp: 30 g, Rfl: 0    Omega-3 Fatty Acids (Fish Oil) 1200 MG CAPS, Take 1 capsule by mouth daily, Disp: , Rfl:     predniSONE 10 mg tablet, Days 1 and 2 take 4 tablets daily, days 3 and 4 take 3 tablets daily, days 5 and 6 and 2 tablets daily, days 7 and 8 take 1 tablet daily  (Patient not taking: Reported on 2022 ), Disp: 20 tablet, Rfl: 0    Current Allergies     Allergies as of 2022    (No Known Allergies)            The following portions of the patient's history were reviewed and updated as appropriate: allergies, current medications, past family history, past medical history, past social history, past surgical history and problem list      Past Medical History:   Diagnosis Date    Hypertension     Intracranial aneurysm     resolved 17 - possible R P-comm region aneurysm measuring 3mm found on MRI results    Reactive depression     last assessed 12/15/15       Past Surgical History:   Procedure Laterality Date    ENDOMETRIAL ABLATION      EPIDURAL BLOCK INJECTION Right 2020    Procedure: Right L4-5 transforaminal epidural steroid injection 25493;  Surgeon: Areta Apgar, MD;  [facility];  Service: Pain Management     EPIDURAL BLOCK INJECTION Right 2020    Procedure: Right L4-L5 Transforaminal Epidural Steroid Injection (46061); Surgeon: Areta Apgar, MD;  [facility];  Service: Pain Management     FL GUIDED NEEDLE PLAC BX/ASP/INJ  2020    FL GUIDED NEEDLE PLAC BX/ASP/INJ  2020    FL GUIDED NEEDLE PLAC BX/ASP/INJ  2020    VA INJECTION,SACROILIAC JOINT Right 2020    Procedure: Sacroiliac joint injection and piriformis muscle steroid Injection;  Surgeon: Areta Apgar, MD;  [facility];  Service: Pain Management     [place] REPAIR INTERCARP/CARP-METACARP JT Left 2018    Procedure:  Thumb Arthrex mini tight rope suspension arthroplasty with trapezial resection left thumb;  Surgeon: Lucrecia Vicente Giuliana Salgado MD;  [facility];  Service: Orthopedics    THYROID SURGERY      near total thyroidectomy       Family History   Problem Relation Age of Onset    Arthritis Mother     Heart disease Mother         CAD    Arthritis Father     Lymphoma Father         Hodgkin's    No Known Problems Sister     No Known Problems Daughter     No Known Problems Maternal Grandmother     No Known Problems Maternal Grandfather     No Known Problems Paternal Grandmother     No Known Problems Paternal Grandfather     No Known Problems Sister     No Known Problems Daughter     No Known Problems Maternal Aunt     No Known Problems Paternal Aunt          Medications have been verified  Objective   /64   Pulse 76   Temp 98 °F (36 7 °C)   Resp 18   Ht 5' 4" (1 626 m)   Wt 73 9 kg (163 lb)   SpO2 97%   BMI 27 98 kg/m²   No LMP recorded  Patient has had an ablation  Physical Exam     Physical Exam  Vitals reviewed  Constitutional:       General: She is not in acute distress  Appearance: She is well-developed  She is not diaphoretic  HENT:      Head: Normocephalic  Right Ear: Tympanic membrane and external ear normal       Left Ear: Tympanic membrane and external ear normal       Nose: Nose normal       Mouth/Throat:      Pharynx: No oropharyngeal exudate or posterior oropharyngeal erythema  Eyes:      Conjunctiva/sclera: Conjunctivae normal    Cardiovascular:      Rate and Rhythm: Normal rate  Rhythm irregular  Heart sounds: No murmur heard  No friction rub  No gallop  Pulmonary:      Effort: Pulmonary effort is normal  No respiratory distress  Breath sounds: Normal breath sounds  No wheezing or rales  Chest:      Chest wall: No tenderness  Lymphadenopathy:      Cervical: No cervical adenopathy  Skin:     General: Skin is warm  Neurological:      Mental Status: She is alert and oriented to person, place, and time     Psychiatric:         Behavior: Behavior normal          Thought Content:  Thought content normal          Judgment: Judgment normal

## 2022-05-11 NOTE — ED PROVIDER NOTES
History  Chief Complaint   Patient presents with    Evaluation of Abnormal Diagnostic Test     Pt was seen at urgent care earlier today for congestion and cough and was told to come into ED for ECG changes  Pt denies any sob or chest pain at this time  Patient presents to the emergency department today for evaluation of PVCs  She actually started with sinus pain and pressure nasal congestion yesterday, she felt as though she had a sinusitis went to urgent care who did an EKG on her today in the noted PVCs and sent her here  She denies palpitations chest pain or shortness of breath  I do note that she has a orthopedic boot on the left leg she states this is been wearing for the last 5-6 weeks secondary to foot tendinitis  She does admit to left calf pain  Reason to perform venous duplex  No chest pain shortness of breath here if she has heart rate in the 60s, blood pressure 184/76  She has been taking decongestants and drinking iced tea more heavily over the last 24 hours  Prior to Admission Medications   Prescriptions Last Dose Informant Patient Reported? Taking? Ascorbic Acid (VITAMIN C) 250 MG CHEW   Yes No   Sig: Chew 1 tablet daily   Calcium 600 MG tablet   Yes No   Sig: Take 2 tablets by mouth daily   Cholecalciferol (VITAMIN D3) 1000 units CAPS   Yes No   Sig: Take by mouth   Multiple Vitamin (DAILY VALUE MULTIVITAMIN PO)   Yes No   Sig: Take 1 tablet by mouth daily   Multiple Vitamins-Minerals (PRESERVISION AREDS 2 PO)  Self Yes No   Sig: Take by mouth daily   Omega-3 Fatty Acids (Fish Oil) 1200 MG CAPS   Yes No   Sig: Take 1 capsule by mouth daily   benzonatate (TESSALON) 200 MG capsule   No No   Sig: Take 1 capsule (200 mg total) by mouth as needed in the morning and 1 capsule (200 mg total) as needed at noon and 1 capsule (200 mg total) as needed in the evening for cough     celecoxib (CeleBREX) 200 mg capsule   Yes No   Sig: Take 200 mg by mouth daily    fluticasone (FLONASE) 50 mcg/act nasal spray   No No   Si spray into each nostril in the morning  hydrochlorothiazide (HYDRODIURIL) 12 5 mg tablet   No No   Sig: TAKE 1 TABLET DAILY   ketoconazole (NIZORAL) 2 % cream   No No   Sig: Apply topically daily   ketotifen (ZADITOR) 0 025 % ophthalmic solution   No No   Sig: Administer 1 drop to both eyes 2 (two) times a day as needed (allergy)   ketotifen (ZADITOR) 0 025 % ophthalmic solution   No No   Sig: Administer 1 drop to both eyes 2 (two) times a day as needed (Eye irritation)   metoprolol tartrate (LOPRESSOR) 25 mg tablet   No No   Sig: TAKE 1 TABLET DAILY   nystatin (MYCOSTATIN) cream   No No   Sig: Apply topically 2 (two) times a day   predniSONE 10 mg tablet   No No   Sig: Days 1 and 2 take 4 tablets daily, days 3 and 4 take 3 tablets daily, days 5 and 6 and 2 tablets daily, days 7 and 8 take 1 tablet daily  Patient not taking: Reported on 2022       Facility-Administered Medications: None       Past Medical History:   Diagnosis Date    Hypertension     Intracranial aneurysm     resolved 17 - possible R P-comm region aneurysm measuring 3mm found on MRI results    Reactive depression     last assessed 12/15/15       Past Surgical History:   Procedure Laterality Date    ENDOMETRIAL ABLATION      EPIDURAL BLOCK INJECTION Right 2020    Procedure: Right L4-5 transforaminal epidural steroid injection 46022;  Surgeon: Moiz Lee MD;  [facility];  Service: Pain Management     EPIDURAL BLOCK INJECTION Right 2020    Procedure: Right L4-L5 Transforaminal Epidural Steroid Injection (36214);   Surgeon: Moiz Lee MD;  [facility];  Service: Pain Management     FL GUIDED NEEDLE PLAC BX/ASP/INJ  2020    FL GUIDED NEEDLE PLAC BX/ASP/INJ  2020    FL GUIDED NEEDLE PLAC BX/ASP/INJ  2020    [place] INJECTION,SACROILIAC JOINT Right 2020    Procedure: Sacroiliac joint injection and piriformis muscle steroid Injection;  Surgeon: Brianne Lopez MD;  [facility];  Service: Pain Management     [place] REPAIR INTERCARP/CARP-METACARP JT Left 7/30/2018    Procedure: Thumb Arthrex mini tight rope suspension arthroplasty with trapezial resection left thumb;  Surgeon: Laura Martinez MD;  [facility];  Service: Orthopedics    THYROID SURGERY      near total thyroidectomy       Family History   Problem Relation Age of Onset    Arthritis Mother     Heart disease Mother         CAD    Arthritis Father     Lymphoma Father         Hodgkin's    No Known Problems Sister     No Known Problems Daughter     No Known Problems Maternal Grandmother     No Known Problems Maternal Grandfather     No Known Problems Paternal Grandmother     No Known Problems Paternal Grandfather     No Known Problems Sister     No Known Problems Daughter     No Known Problems Maternal Aunt     No Known Problems Paternal Aunt      I have reviewed and agree with the history as documented  E-Cigarette/Vaping    E-Cigarette Use Never User      E-Cigarette/Vaping Substances    Nicotine No     THC No     CBD No     Flavoring No      Social History     Tobacco Use    Smoking status: Never Smoker    Smokeless tobacco: Never Used    Tobacco comment: only smoked as a teenager   Vaping Use    Vaping Use: Never used   Substance Use Topics    Alcohol use: Not Currently     Comment: social    Drug use: Never     Comment: CBD       Review of Systems   Constitutional: Negative  Negative for chills and fever  HENT: Positive for congestion, sinus pressure and sinus pain  Negative for sore throat and trouble swallowing  Eyes: Negative  Respiratory: Negative  Negative for cough, shortness of breath and wheezing  Cardiovascular: Negative  Negative for chest pain, palpitations and leg swelling  Gastrointestinal: Negative  Negative for abdominal pain, blood in stool and vomiting  Endocrine: Negative      Genitourinary: Negative  Musculoskeletal: Negative  Negative for neck stiffness  Leg pain pain   Skin: Negative  Allergic/Immunologic: Negative  Neurological: Negative  Negative for dizziness, seizures, speech difficulty, weakness, light-headedness, numbness and headaches  Hematological: Negative  Psychiatric/Behavioral: Negative  All other systems reviewed and are negative  Physical Exam  Physical Exam  Vitals reviewed  Constitutional:       General: She is not in acute distress  Appearance: She is well-developed  She is not ill-appearing or diaphoretic  HENT:      Right Ear: External ear normal  No swelling  Tympanic membrane is not bulging  Left Ear: External ear normal  No swelling  Tympanic membrane is not bulging  Nose: Congestion present  Comments: Boggy edematous nasal mucosa     Mouth/Throat:      Pharynx: No oropharyngeal exudate or posterior oropharyngeal erythema  Eyes:      General: Lids are normal  No scleral icterus  Right eye: No discharge  Left eye: No discharge  Extraocular Movements: Extraocular movements intact  Conjunctiva/sclera: Conjunctivae normal       Pupils: Pupils are equal, round, and reactive to light  Neck:      Thyroid: No thyromegaly  Vascular: No JVD  Trachea: No tracheal deviation  Cardiovascular:      Rate and Rhythm: Normal rate and regular rhythm  Pulses: Normal pulses  Heart sounds: Normal heart sounds  No murmur heard  No friction rub  No gallop  Pulmonary:      Effort: Pulmonary effort is normal  No respiratory distress  Breath sounds: Normal breath sounds  No stridor  No wheezing, rhonchi or rales  Chest:      Chest wall: No tenderness  Abdominal:      General: Bowel sounds are normal  There is no distension  Palpations: Abdomen is soft  There is no mass  Tenderness: There is no abdominal tenderness  There is no guarding or rebound   Negative signs include Ku's sign  Hernia: No hernia is present  Musculoskeletal:         General: Tenderness present  Normal range of motion  Cervical back: Normal range of motion and neck supple  No edema  Normal range of motion  Comments: Calf tenderness without swelling  No palpable cord  No redness or warmth  Lymphadenopathy:      Cervical: No cervical adenopathy  Skin:     General: Skin is warm and dry  Capillary Refill: Capillary refill takes less than 2 seconds  Coloration: Skin is not pale  Findings: No erythema or rash  Neurological:      General: No focal deficit present  Mental Status: She is alert and oriented to person, place, and time  GCS: GCS eye subscore is 4  GCS verbal subscore is 5  GCS motor subscore is 6  Cranial Nerves: No cranial nerve deficit  Sensory: No sensory deficit  Deep Tendon Reflexes: Reflexes are normal and symmetric  Psychiatric:         Mood and Affect: Mood normal          Speech: Speech normal          Behavior: Behavior normal          Thought Content:  Thought content normal          Judgment: Judgment normal          Vital Signs  ED Triage Vitals   Temperature Pulse Respirations Blood Pressure SpO2   05/11/22 1404 05/11/22 1404 05/11/22 1403 05/11/22 1404 05/11/22 1404   98 6 °F (37 °C) (!) 50 18 (!) 184/76 97 %      Temp Source Heart Rate Source Patient Position - Orthostatic VS BP Location FiO2 (%)   05/11/22 1404 05/11/22 1403 05/11/22 1403 05/11/22 1403 --   Tympanic Monitor Lying Left arm       Pain Score       05/11/22 1403       No Pain           Vitals:    05/11/22 1403 05/11/22 1404 05/11/22 1430 05/11/22 1600   BP:  (!) 184/76 153/67 (!) 183/84   Pulse:  (!) 50 88 84   Patient Position - Orthostatic VS: Lying            Visual Acuity      ED Medications  Medications - No data to display    Diagnostic Studies  Results Reviewed     Procedure Component Value Units Date/Time    Basic metabolic panel [[id number]] Collected: 05/11/22 1521    Lab Status: Final result Specimen: Blood from Arm, Right Updated: 05/11/22 1554     Sodium 142 mmol/L      Potassium 3 7 mmol/L      Chloride 106 mmol/L      CO2 26 mmol/L      ANION GAP 10 mmol/L      BUN 18 mg/dL      Creatinine 0 90 mg/dL      Glucose 85 mg/dL      Calcium 9 4 mg/dL      eGFR 70 ml/min/1 73sq m     Narrative:      [place] guidelines for Chronic Kidney Disease (CKD):     Stage 1 with normal or high GFR (GFR > 90 mL/min/1 73 square meters)    Stage 2 Mild CKD (GFR = 60-89 mL/min/1 73 square meters)    Stage 3A Moderate CKD (GFR = 45-59 mL/min/1 73 square meters)    Stage 3B Moderate CKD (GFR = 30-44 mL/min/1 73 square meters)    Stage 4 Severe CKD (GFR = 15-29 mL/min/1 73 square meters)    Stage 5 End Stage CKD (GFR <15 mL/min/1 73 square meters)  Note: GFR calculation is accurate only with a steady state creatinine    TSH [[id number]]  (Normal) Collected: 05/11/22 1521    Lab Status: Final result Specimen: Blood from Arm, Right Updated: 05/11/22 1554     TSH 3RD GENERATON 1 304 uIU/mL     Narrative:      Patients undergoing fluorescein dye angiography may retain small amounts of fluorescein in the body for 48-72 hours post procedure  Samples containing fluorescein can produce falsely depressed TSH values  If the patient had this procedure,a specimen should be resubmitted post fluorescein clearance        Magnesium [[id number]]  (Normal) Collected: 05/11/22 1521    Lab Status: Final result Specimen: Blood from Arm, Right Updated: 05/11/22 1554     Magnesium 2 2 mg/dL     HS Troponin 0hr (reflex protocol) [[id number]]  (Normal) Collected: 05/11/22 1521    Lab Status: Final result Specimen: Blood from Arm, Right Updated: 05/11/22 1553     hs TnI 0hr 7 ng/L     CBC and differential [[id number]]  (Abnormal) Collected: 05/11/22 1521    Lab Status: Final result Specimen: Blood from Arm, Right Updated: 05/11/22 1529     WBC 9 70 Thousand/uL      RBC 4 62 Million/uL      Hemoglobin 14 1 g/dL      Hematocrit 41 4 %      MCV 90 fL      MCH 30 5 pg      MCHC 34 1 g/dL      RDW 12 8 %      MPV 10 9 fL      Platelets 675 Thousands/uL      nRBC 0 /100 WBCs      Neutrophils Relative 81 %      Immat GRANS % 0 %      Lymphocytes Relative 13 %      Monocytes Relative 5 %      Eosinophils Relative 1 %      Basophils Relative 0 %      Neutrophils Absolute 7 84 Thousands/µL      Immature Grans Absolute 0 03 Thousand/uL      Lymphocytes Absolute 1 26 Thousands/µL      Monocytes Absolute 0 48 Thousand/µL      Eosinophils Absolute 0 07 Thousand/µL      Basophils Absolute 0 02 Thousands/µL                  XR chest 1 view portable   ED Interpretation by Dian Lazar PA-C (05/11 1627)   No evidence of acute cardiopulmonary process  VAS lower limb venous duplex study, unilateral/limited    (Results Pending)              Procedures  Procedures         ED Course  ED Course as of 05/11/22 1627   Wed May 11, 2022   1623 Per ultrasound technologist no evidence of DVT    1623 TSH 3RD GENERATON: 1 304   1623 Magnesium: 2 2                                             MDM    Disposition  Final diagnoses:   Sinusitis   PVC (premature ventricular contraction)     Time reflects when diagnosis was documented in both MDM as applicable and the Disposition within this note     Time User Action Codes Description Comment    5/11/2022  4:23 PM Abby RUTLEDGE Add [J32 9] Sinusitis     5/11/2022  4:25 PM Abby RUTLEDGE Add [I49 3] PVC (premature ventricular contraction)       ED Disposition     ED Disposition   Discharge    Condition   Stable    Date/Time   Wed May 11, 2022  4:23 PM    [address] discharge to home/self care                 Follow-up Information     Follow up With Specialties Details Why Contact Info Additional Information    Dalton Diss, DO Family Medicine Schedule an appointment as soon as possible for a visit  As needed [address] ([address]) [place] [id number]  Cardiology   [facility] [id number]  [address] [facility] [place], [address] [place] [address], [address] [facility]          Patient's Medications   Discharge Prescriptions    No medications on file           PDMP Review       Value Time User    PDMP Reviewed  Yes 4/9/2020 10:41 AM Saul Ramirez DO          ED Provider  Electronically Signed by           Car Koch PA-C  05/11/22 [address] Skyla Wolff PA-C  05/11/22 0819

## 2022-05-11 NOTE — PATIENT INSTRUCTIONS
Follow up with cardiology  Follow up with PCP within 1-3 days and discuss irregular heart beat     Proceed to  ER

## 2022-05-12 LAB
ATRIAL RATE: 86 BPM
P AXIS: 53 DEGREES
PR INTERVAL: 172 MS
QRS AXIS: -6 DEGREES
QRSD INTERVAL: 84 MS
QT INTERVAL: 414 MS
QTC INTERVAL: 495 MS
SARS-COV-2 RNA RESP QL NAA+PROBE: NEGATIVE
T WAVE AXIS: 50 DEGREES
VENTRICULAR RATE: 86 BPM

## 2022-05-12 PROCEDURE — 93010 ELECTROCARDIOGRAM REPORT: CPT | Performed by: INTERNAL MEDICINE

## 2022-05-13 ENCOUNTER — OFFICE VISIT (OUTPATIENT)
Dept: FAMILY MEDICINE CLINIC | Facility: CLINIC | Age: 62
End: 2022-05-13
Payer: COMMERCIAL

## 2022-05-13 VITALS
HEART RATE: 58 BPM | BODY MASS INDEX: 28.31 KG/M2 | WEIGHT: 165.8 LBS | OXYGEN SATURATION: 97 % | DIASTOLIC BLOOD PRESSURE: 82 MMHG | HEIGHT: 64 IN | TEMPERATURE: 98.5 F | SYSTOLIC BLOOD PRESSURE: 124 MMHG

## 2022-05-13 DIAGNOSIS — J06.9 UPPER RESPIRATORY TRACT INFECTION, UNSPECIFIED TYPE: Primary | ICD-10-CM

## 2022-05-13 DIAGNOSIS — I49.3 PVC (PREMATURE VENTRICULAR CONTRACTION): ICD-10-CM

## 2022-05-13 PROCEDURE — 99214 OFFICE O/P EST MOD 30 MIN: CPT | Performed by: PHYSICIAN ASSISTANT

## 2022-05-13 PROCEDURE — 1036F TOBACCO NON-USER: CPT | Performed by: PHYSICIAN ASSISTANT

## 2022-05-13 PROCEDURE — 3008F BODY MASS INDEX DOCD: CPT | Performed by: PHYSICIAN ASSISTANT

## 2022-05-13 RX ORDER — AMOXICILLIN 500 MG/1
500 CAPSULE ORAL EVERY 8 HOURS SCHEDULED
Qty: 30 CAPSULE | Refills: 0 | Status: SHIPPED | OUTPATIENT
Start: 2022-05-13 | End: 2022-05-23

## 2022-05-13 NOTE — PROGRESS NOTES
Assessment/Plan:    Problem List Items Addressed This Visit    None     Visit Diagnoses     Upper respiratory tract infection, unspecified type    -  Primary    Relevant Medications    amoxicillin (AMOXIL) 500 mg capsule    PVC (premature ventricular contraction)        Relevant Orders    Ambulatory Referral to Cardiology           Diagnoses and all orders for this visit:    Upper respiratory tract infection, unspecified type  -     amoxicillin (AMOXIL) 500 mg capsule; Take 1 capsule (500 mg total) by mouth every 8 (eight) hours for 10 days    PVC (premature ventricular contraction)  -     Ambulatory Referral to Cardiology; Future            Subjective:      Patient ID: Carolyne Felipe is a 63 y o  female  Ru Chery is here today for ER follow up  Initially, was seen at Urgent Care due to acute sick symptoms  Diagnosed with irregular heartbeat and then sent to ER for further management  Diagnosed with PVCs, recommended to have cardiology follow up  Pt would like to see Dr Juanita Scott, referral placed today  She still complains of acute sick symptoms, especially cough  The following portions of the patient's history were reviewed and updated as appropriate:   She has a past medical history of Hypertension, Intracranial aneurysm, and Reactive depression  ,  does not have any pertinent problems on file  ,   has a past surgical history that includes Thyroid surgery; pr repair intercarp/carp-metacarp jt (Left, 7/30/2018); Epidural block injection (Right, 1/7/2020); FL guided needle plac bx/asp/inj (1/7/2020); Endometrial ablation (2012); FL guided needle plac bx/asp/inj (5/19/2020); Epidural block injection (Right, 5/19/2020); FL guided needle plac bx/asp/inj (8/28/2020); and pr injection,sacroiliac joint (Right, 8/28/2020)  ,  family history includes Arthritis in her father and mother; Heart disease in her mother; Lymphoma in her father; No Known Problems in her daughter, daughter, maternal aunt, maternal grandfather, maternal grandmother, paternal aunt, paternal grandfather, paternal grandmother, sister, and sister  ,   reports that she has never smoked  She has never used smokeless tobacco  She reports previous alcohol use  She reports that she does not use drugs  ,  has No Known Allergies     Current Outpatient Medications   Medication Sig Dispense Refill    amoxicillin (AMOXIL) 500 mg capsule Take 1 capsule (500 mg total) by mouth every 8 (eight) hours for 10 days 30 capsule 0    Ascorbic Acid (VITAMIN C) 250 MG CHEW Chew 1 tablet daily      benzonatate (TESSALON) 200 MG capsule Take 1 capsule (200 mg total) by mouth as needed in the morning and 1 capsule (200 mg total) as needed at noon and 1 capsule (200 mg total) as needed in the evening for cough  20 capsule 0    Calcium 600 MG tablet Take 2 tablets by mouth daily      celecoxib (CeleBREX) 200 mg capsule Take 200 mg by mouth daily       Cholecalciferol (VITAMIN D3) 1000 units CAPS Take by mouth      fluticasone (FLONASE) 50 mcg/act nasal spray 1 spray into each nostril in the morning   16 g 0    hydrochlorothiazide (HYDRODIURIL) 12 5 mg tablet TAKE 1 TABLET DAILY 90 tablet 3    ketoconazole (NIZORAL) 2 % cream Apply topically daily 30 g 1    metoprolol tartrate (LOPRESSOR) 25 mg tablet TAKE 1 TABLET DAILY 90 tablet 3    Multiple Vitamin (DAILY VALUE MULTIVITAMIN PO) Take 1 tablet by mouth daily      Multiple Vitamins-Minerals (PRESERVISION AREDS 2 PO) Take by mouth daily      nystatin (MYCOSTATIN) cream Apply topically 2 (two) times a day 30 g 0    Omega-3 Fatty Acids (Fish Oil) 1200 MG CAPS Take 1 capsule by mouth daily      ketotifen (ZADITOR) 0 025 % ophthalmic solution Administer 1 drop to both eyes 2 (two) times a day as needed (allergy) (Patient not taking: Reported on 5/13/2022) 5 mL 0    ketotifen (ZADITOR) 0 025 % ophthalmic solution Administer 1 drop to both eyes 2 (two) times a day as needed (Eye irritation) (Patient not taking: Reported on 5/13/2022) 5 mL 0    predniSONE 10 mg tablet Days 1 and 2 take 4 tablets daily, days 3 and 4 take 3 tablets daily, days 5 and 6 and 2 tablets daily, days 7 and 8 take 1 tablet daily  (Patient not taking: No sig reported) 20 tablet 0     No current facility-administered medications for this visit  Review of Systems   Constitutional: Negative for activity change, appetite change, chills, diaphoresis, fatigue, fever and unexpected weight change  HENT: Positive for postnasal drip and sinus pressure  Negative for congestion, ear pain, rhinorrhea, sinus pain, sneezing, sore throat, tinnitus and voice change  Eyes: Negative for pain, redness and visual disturbance  Respiratory: Positive for cough  Negative for chest tightness, shortness of breath and wheezing  Cardiovascular: Negative for chest pain, palpitations and leg swelling  Gastrointestinal: Negative for abdominal pain, blood in stool, constipation, diarrhea, nausea and vomiting  Genitourinary: Negative for difficulty urinating, dysuria, frequency, hematuria and urgency  Musculoskeletal: Negative for arthralgias, back pain, gait problem, joint swelling, myalgias, neck pain and neck stiffness  Skin: Negative for color change, pallor, rash and wound  Neurological: Negative for dizziness, tremors, weakness, light-headedness and headaches  Psychiatric/Behavioral: Positive for sleep disturbance (due to cough)  Negative for dysphoric mood, self-injury and suicidal ideas  The patient is not nervous/anxious  Objective:  Vitals:    05/13/22 1334   BP: 124/82   Pulse: 58   Temp: 98 5 °F (36 9 °C)   SpO2: 97%   Weight: 75 2 kg (165 lb 12 8 oz)   Height: 5' 4" (1 626 m)     Body mass index is 28 46 kg/m²  Physical Exam  Vitals reviewed  Constitutional:       General: She is not in acute distress  Appearance: She is well-developed  She is not diaphoretic  HENT:      Head: Normocephalic and atraumatic        Right Ear: Hearing, tympanic membrane, ear canal and external ear normal       Left Ear: Hearing, tympanic membrane, ear canal and external ear normal       Mouth/Throat:      Pharynx: Uvula midline  No oropharyngeal exudate  Comments: +PND  Eyes:      General: No scleral icterus  Right eye: No discharge  Left eye: No discharge  Conjunctiva/sclera: Conjunctivae normal    Neck:      Thyroid: No thyromegaly  Vascular: No carotid bruit  Cardiovascular:      Rate and Rhythm: Normal rate  Rhythm irregular  Heart sounds: Normal heart sounds  No murmur heard  Pulmonary:      Effort: Pulmonary effort is normal  No respiratory distress  Breath sounds: Normal breath sounds  No wheezing  Abdominal:      General: Bowel sounds are normal  There is no distension  Palpations: Abdomen is soft  There is no mass  Tenderness: There is no abdominal tenderness  There is no guarding or rebound  Musculoskeletal:         General: No tenderness  Normal range of motion  Cervical back: Neck supple  Lymphadenopathy:      Cervical: No cervical adenopathy  Skin:     General: Skin is warm and dry  Findings: No erythema or rash  Neurological:      Mental Status: She is alert and oriented to person, place, and time  Psychiatric:         Behavior: Behavior normal          Thought Content:  Thought content normal          Judgment: Judgment normal

## 2022-05-19 LAB
ATRIAL RATE: 99 BPM
P AXIS: 48 DEGREES
PR INTERVAL: 168 MS
QRS AXIS: 8 DEGREES
QRSD INTERVAL: 92 MS
QT INTERVAL: 400 MS
QTC INTERVAL: 513 MS
T WAVE AXIS: 41 DEGREES
VENTRICULAR RATE: 99 BPM

## 2022-05-19 PROCEDURE — 93010 ELECTROCARDIOGRAM REPORT: CPT | Performed by: INTERNAL MEDICINE

## 2022-06-02 ENCOUNTER — OFFICE VISIT (OUTPATIENT)
Dept: OBGYN CLINIC | Facility: CLINIC | Age: 62
End: 2022-06-02
Payer: OTHER MISCELLANEOUS

## 2022-06-02 VITALS — HEIGHT: 64 IN | BODY MASS INDEX: 28.46 KG/M2

## 2022-06-02 DIAGNOSIS — S93.622A LISFRANC'S SPRAIN, LEFT, INITIAL ENCOUNTER: Primary | ICD-10-CM

## 2022-06-02 DIAGNOSIS — M19.072 OSTEOARTHRITIS OF LEFT ANKLE AND FOOT: ICD-10-CM

## 2022-06-02 PROCEDURE — 99213 OFFICE O/P EST LOW 20 MIN: CPT | Performed by: ORTHOPAEDIC SURGERY

## 2022-06-02 NOTE — PROGRESS NOTES
ASSESSMENT/PLAN:    Diagnoses and all orders for this visit:    Lisfranc's sprain, left, initial encounter  -     Ambulatory Referral to Podiatry; Future    Osteoarthritis of left ankle and foot  -     Ambulatory Referral to Podiatry; Future        The patient's symptoms today are more likely as result of osteoarthritis rather than the Lisfranc's sprain  Possible treatment options were discussed with the patient  She no longer needs to wear the cam walker boot  She will be referred to podiatry for possible injections or other modalities  The patient is acceptable to this plan  Return if symptoms worsen or fail to improve  The patient still has persistent pain along her left ankle despite immobilization with a cam boot  Would recommend follow-up Podiatry for possible injection therapy versus other modalities  I look forward to hearing back from her once it is obtained  There is any further problems, she will not hesitate to let us know      _____________________________________________________  CHIEF COMPLAINT:  Chief Complaint   Patient presents with    Left Foot - Follow-up         SUBJECTIVE:  Shaan Lozano is a 63 y o  female who presents to our office for a follow-up visit  The patient has a remote history of a Lisfranc sprain of her left foot  She has been wearing a cam walker boot  She still complains of pain along the a lateral aspect of her foot  She denies any numbness or tingling  She denies any fever or chills        The following portions of the patient's history were reviewed and updated as appropriate: allergies, current medications, past family history, past medical history, past social history, past surgical history and problem list     PAST MEDICAL HISTORY:  Past Medical History:   Diagnosis Date    Hypertension     Intracranial aneurysm     resolved 11/17/17 - possible R P-comm region aneurysm measuring 3mm found on MRI results    Reactive depression     last assessed 12/15/15       PAST SURGICAL HISTORY:  Past Surgical History:   Procedure Laterality Date    ENDOMETRIAL ABLATION  2012    EPIDURAL BLOCK INJECTION Right 1/7/2020    Procedure: Right L4-5 transforaminal epidural steroid injection 06360;  Surgeon: Maci Peoples MD;  Location: [facility];  Service: Pain Management     EPIDURAL BLOCK INJECTION Right 5/19/2020    Procedure: Right L4-L5 Transforaminal Epidural Steroid Injection (25831); Surgeon: Maci Peoples MD;  Location: [facility];  Service: Pain Management     FL GUIDED NEEDLE PLAC BX/ASP/INJ  1/7/2020    FL GUIDED NEEDLE PLAC BX/ASP/INJ  5/19/2020    FL GUIDED NEEDLE PLAC BX/ASP/INJ  8/28/2020    AK INJECTION,SACROILIAC JOINT Right 8/28/2020    Procedure: Sacroiliac joint injection and piriformis muscle steroid Injection;  Surgeon: Maci Peoples MD;  Location: [facility];  Service: Pain Management     AK REPAIR INTERCARP/CARP-METACARP JT Left 7/30/2018    Procedure:  Thumb Arthrex mini tight rope suspension arthroplasty with trapezial resection left thumb;  Surgeon: Jeremiah Cortez MD;  Location: [facility];  Service: Orthopedics    THYROID SURGERY      near total thyroidectomy       FAMILY HISTORY:  Family History   Problem Relation Age of Onset    Arthritis Mother     Heart disease Mother         CAD    Arthritis Father     Lymphoma Father         Hodgkin's    No Known Problems Sister     No Known Problems Daughter     No Known Problems Maternal Grandmother     No Known Problems Maternal Grandfather     No Known Problems Paternal Grandmother     No Known Problems Paternal Grandfather     No Known Problems Sister     No Known Problems Daughter     No Known Problems Maternal Aunt     No Known Problems Paternal Aunt        SOCIAL HISTORY:  Social History     Tobacco Use    Smoking status: Never Smoker    Smokeless tobacco: Never Used    Tobacco comment: only smoked as a teenager   Vaping Use    Vaping Use: Never used Substance Use Topics    Alcohol use: Not Currently     Comment: social    Drug use: Never     Comment: CBD       MEDICATIONS:    Current Outpatient Medications:     Ascorbic Acid (VITAMIN C) 250 MG CHEW, Chew 1 tablet daily, Disp: , Rfl:     benzonatate (TESSALON) 200 MG capsule, Take 1 capsule (200 mg total) by mouth as needed in the morning and 1 capsule (200 mg total) as needed at noon and 1 capsule (200 mg total) as needed in the evening for cough  , Disp: 20 capsule, Rfl: 0    Calcium 600 MG tablet, Take 2 tablets by mouth daily, Disp: , Rfl:     celecoxib (CeleBREX) 200 mg capsule, Take 200 mg by mouth daily , Disp: , Rfl:     Cholecalciferol (VITAMIN D3) 1000 units CAPS, Take by mouth, Disp: , Rfl:     fluticasone (FLONASE) 50 mcg/act nasal spray, 1 spray into each nostril in the morning , Disp: 16 g, Rfl: 0    hydrochlorothiazide (HYDRODIURIL) 12 5 mg tablet, TAKE 1 TABLET DAILY, Disp: 90 tablet, Rfl: 3    ketoconazole (NIZORAL) 2 % cream, Apply topically daily, Disp: 30 g, Rfl: 1    metoprolol tartrate (LOPRESSOR) 25 mg tablet, TAKE 1 TABLET DAILY, Disp: 90 tablet, Rfl: 3    Multiple Vitamin (DAILY VALUE MULTIVITAMIN PO), Take 1 tablet by mouth daily, Disp: , Rfl:     Multiple Vitamins-Minerals (PRESERVISION AREDS 2 PO), Take by mouth daily, Disp: , Rfl:     nystatin (MYCOSTATIN) cream, Apply topically 2 (two) times a day, Disp: 30 g, Rfl: 0    Omega-3 Fatty Acids (Fish Oil) 1200 MG CAPS, Take 1 capsule by mouth daily, Disp: , Rfl:     ketotifen (ZADITOR) 0 025 % ophthalmic solution, Administer 1 drop to both eyes 2 (two) times a day as needed (allergy) (Patient not taking: No sig reported), Disp: 5 mL, Rfl: 0    ketotifen (ZADITOR) 0 025 % ophthalmic solution, Administer 1 drop to both eyes 2 (two) times a day as needed (Eye irritation) (Patient not taking: No sig reported), Disp: 5 mL, Rfl: 0    predniSONE 10 mg tablet, Days 1 and 2 take 4 tablets daily, days 3 and 4 take 3 tablets daily, days 5 and 6 and 2 tablets daily, days 7 and 8 take 1 tablet daily  (Patient not taking: No sig reported), Disp: 20 tablet, Rfl: 0    ALLERGIES:  No Known Allergies    ROS:  Review of Systems     Constitutional: Negative for fatigue, fever or loss of appetite  HENT: Negative  Respiratory: Negative for shortness of breath, dyspnea  Cardiovascular: Negative for chest pain/tightness  Gastrointestinal: Negative for abdominal pain, N/V  Endocrine: Negative for cold/heat intolerance, unexplained weight loss/gain  Genitourinary: Negative for flank pain, dysuria, hematuria  Musculoskeletal: Positive for arthralgia   Skin: Negative for rash  Neurological: Negative for numbness or tingling  Psychiatric/Behavioral: Negative for agitation  _____________________________________________________  PHYSICAL EXAMINATION:    Height 5' 4" (1 626 m)  Constitutional: Oriented to person, place, and time  Appears well-developed and well-nourished  No distress  HENT:   Head: Normocephalic  Eyes: Conjunctivae are normal  Right eye exhibits no discharge  Left eye exhibits no discharge  No scleral icterus  Cardiovascular: Normal rate  Pulmonary/Chest: Effort normal    Neurological: Alert and oriented to person, place, and time  Skin: Skin is warm and dry  No rash noted  Not diaphoretic  No erythema  No pallor  Psychiatric: Normal mood and affect   Behavior is normal  Judgment and thought content normal       MUSCULOSKELETAL EXAMINATION:   Physical Exam  Ortho Exam    Left lower extremity is neurovascularly intact  Toes are pink and mobile  Compartments are soft  Tenderness to palpation along the 5th and 4th metatarsals  Good range of motion of the ankle  Brisk cap refill  Sensation intact  No significant edema  No warmth, erythema or ecchymosis    Objective:  BP Readings from Last 1 Encounters:   05/13/22 124/82      Wt Readings from Last 1 Encounters:   05/13/22 75 2 kg (165 lb 12 8 oz)        BMI: Estimated body mass index is 28 46 kg/m² as calculated from the following:    Height as of this encounter: 5' 4" (1 626 m)  Weight as of 5/13/22: 75 2 kg (165 lb 12 8 oz)          Scribe Attestation    I,:  Buck Singleton PA-C am acting as a scribe while in the presence of the attending physician :       I,:  Yuniel Galvan, DO personally performed the services described in this documentation    as scribed in my presence :

## 2022-06-10 ENCOUNTER — OFFICE VISIT (OUTPATIENT)
Dept: CARDIOLOGY CLINIC | Facility: CLINIC | Age: 62
End: 2022-06-10
Payer: COMMERCIAL

## 2022-06-10 VITALS
DIASTOLIC BLOOD PRESSURE: 80 MMHG | HEIGHT: 64 IN | HEART RATE: 60 BPM | BODY MASS INDEX: 28 KG/M2 | SYSTOLIC BLOOD PRESSURE: 160 MMHG | WEIGHT: 164 LBS

## 2022-06-10 DIAGNOSIS — I10 PRIMARY HYPERTENSION: ICD-10-CM

## 2022-06-10 DIAGNOSIS — I49.3 PVC (PREMATURE VENTRICULAR CONTRACTION): Primary | ICD-10-CM

## 2022-06-10 DIAGNOSIS — E66.3 OVERWEIGHT (BMI 25.0-29.9): ICD-10-CM

## 2022-06-10 DIAGNOSIS — Z13.220 SCREENING FOR HYPERLIPIDEMIA: ICD-10-CM

## 2022-06-10 PROCEDURE — 99215 OFFICE O/P EST HI 40 MIN: CPT | Performed by: INTERNAL MEDICINE

## 2022-06-10 PROCEDURE — 1036F TOBACCO NON-USER: CPT | Performed by: INTERNAL MEDICINE

## 2022-06-10 RX ORDER — LOSARTAN POTASSIUM 25 MG/1
12.5 TABLET ORAL DAILY
Qty: 30 TABLET | Refills: 3 | Status: SHIPPED | OUTPATIENT
Start: 2022-06-10

## 2022-06-10 NOTE — PROGRESS NOTES
Cardiology Consultation     Donell Irwin  [id number]  1960  [facility], [facility] [address] [address]      1  PVC (premature ventricular contraction)  Ambulatory Referral to Cardiology   2  Primary hypertension     3  Screening for hyperlipidemia     4  Overweight (BMI 25 0-29  9)         Discussion/Summary:  1  Hypertension  2  Screening hyperlipidemia  3  PVCs  4  Overweight    -EKG 05/11/2022 showing sinus rhythm with frequent and consecutive PVCs with biatrial enlargement, prolonged QT   -in the setting of frequent PVCs will check 48 hour Holter monitor to evaluate overall burden  -will check transthoracic echocardiogram to evaluate overall cardiac structure and function  -counseled patient on avoidance of QT prolonging medications along with continued close monitoring  -as patient's blood pressure mildly elevated in the office today will add losartan 12 5 mg daily to her current antihypertensive regimen of hydrochlorothiazide 12 5 mg daily  -she is only taking metoprolol tartrate 25 mg once daily at this time and notes that it may have lowered her blood pressure too much in the past   We will follow-up results of Holter listed above to determine any significant need for up titration and if not make transition her to metoprolol succinate at that time  -patient counseled on dietary lifestyle modifications including following a low-salt low-fat heart healthy diet  -will see patient in 1 month or sooner if necessary once testing is completed  -will check BMP in fasting lipid panel   -patient counseled if she were to have any warning or alarm type symptoms she is to seek emergency medical care immediately      History of Present Illness:  -patient is a 51-year-old female with hypertension, overweight, chronic bilateral knee arthritis who presents to the office today after being seen in the emergency department 05/11/2022 for abnormal EKG seen after urgent care where she went for evaluation of upper respiratory tract congestion  Patient was seen and evaluated in the emergency department and denies any significant issue  She did note that at time she was taking over-the-counter decongestants which may have been a contributing factor  Patient notes intermittent palpitations for many years worsening of them over the recent future  She denies any chest pain, palpitations, lightheadedness or dizziness, loss of consciousness or shortness of breath with exertion but states that due to her chronic knee arthritis she is not as active as she would like to be due to pain with activity    -currently in the office today she denies any active symptoms and states that overall she feels well  -patient does have home blood pressure cuff but does not check on a regular basis however will be changing this practice in the near future   -patient denies any tobacco or illicit drug use and has intermittent social alcohol use  -patient notes family history of heart disease particularly in her mother who had permanent pacemaker placed in her 76s and lived into her 80s, and unfortunately father passing of congestive heart failure at age 76    Patient Active Problem List   Diagnosis    Hypertension    Chronic bilateral low back pain with right-sided sciatica    Primary osteoarthritis of both knees    Primary osteoarthritis of first carpometacarpal joint of left hand    Trigger middle finger of right hand    CMC DJD(carpometacarpal degenerative joint disease), localized primary, left    Chronic pain syndrome    Lumbar disc herniation    Lumbar radiculopathy    Piriformis muscle pain     Past Medical History:   Diagnosis Date    Hypertension     Intracranial aneurysm     resolved 11/17/17 - possible R P-comm region aneurysm measuring 3mm found on MRI results    PVC (premature ventricular contraction)     Reactive depression     last assessed 12/15/15     Social History     Socioeconomic History    Marital status: /Civil Union     Spouse name: Not on file    Number of children: Not on file    Years of education: Not on file    Highest education level: Not on file   Occupational History    Not on file   Tobacco Use    Smoking status: Never Smoker    Smokeless tobacco: Never Used    Tobacco comment: only smoked as a teenager   Vaping Use    Vaping Use: Never used   Substance and Sexual Activity    Alcohol use: Not Currently     Comment: social    Drug use: Never     Comment: CBD    Sexual activity: Yes     Partners: Male   Other Topics Concern    Not on file   Social History Narrative    No living will     Social Determinants of Health     Financial Resource Strain: Not on file   Food Insecurity: Not on file   Transportation Needs: Not on file   Physical Activity: Not on file   Stress: Not on file   Social Connections: Not on file   Intimate Partner Violence: Not on file   Housing Stability: Not on file      Family History   Problem Relation Age of Onset    Arthritis Mother     Heart disease Mother         CAD    Arthritis Father     Lymphoma Father         Hodgkin's    No Known Problems Sister     No Known Problems Daughter     No Known Problems Maternal Grandmother     No Known Problems Maternal Grandfather     No Known Problems Paternal Grandmother     No Known Problems Paternal Grandfather     No Known Problems Sister     No Known Problems Daughter     No Known Problems Maternal Aunt     No Known Problems Paternal Aunt      Past Surgical History:   Procedure Laterality Date    ENDOMETRIAL ABLATION  2012    EPIDURAL BLOCK INJECTION Right 1/7/2020    Procedure: Right L4-5 transforaminal epidural steroid injection 01518;  Surgeon: Padmini Case MD;  [facility];  Service: Pain Management     EPIDURAL BLOCK INJECTION Right 5/19/2020    Procedure: Right L4-L5 Transforaminal Epidural Steroid Injection (72698); Surgeon: Karen Moran MD;  [facility];  Service: Pain Management     FL GUIDED NEEDLE PLAC BX/ASP/INJ  1/7/2020    FL GUIDED NEEDLE PLAC BX/ASP/INJ  5/19/2020    FL GUIDED NEEDLE PLAC BX/ASP/INJ  8/28/2020    ID INJECTION,SACROILIAC JOINT Right 8/28/2020    Procedure: Sacroiliac joint injection and piriformis muscle steroid Injection;  Surgeon: Karen Moran MD;  [facility];  Service: Pain Management     ID REPAIR INTERCARP/CARP-METACARP JT Left 7/30/2018    Procedure: Thumb Arthrex mini tight rope suspension arthroplasty with trapezial resection left thumb;  Surgeon: Deneen Hale MD;  [facility];  Service: Orthopedics    THYROID SURGERY      near total thyroidectomy       Current Outpatient Medications:     Ascorbic Acid (VITAMIN C) 250 MG CHEW, Chew 1 tablet daily, Disp: , Rfl:     benzonatate (TESSALON) 200 MG capsule, Take 1 capsule (200 mg total) by mouth as needed in the morning and 1 capsule (200 mg total) as needed at noon and 1 capsule (200 mg total) as needed in the evening for cough  , Disp: 20 capsule, Rfl: 0    Calcium 600 MG tablet, Take 2 tablets by mouth daily, Disp: , Rfl:     celecoxib (CeleBREX) 200 mg capsule, Take 200 mg by mouth daily , Disp: , Rfl:     Cholecalciferol (VITAMIN D3) 1000 units CAPS, Take by mouth, Disp: , Rfl:     fluticasone (FLONASE) 50 mcg/act nasal spray, 1 spray into each nostril in the morning , Disp: 16 g, Rfl: 0    hydrochlorothiazide (HYDRODIURIL) 12 5 mg tablet, TAKE 1 TABLET DAILY, Disp: 90 tablet, Rfl: 3    ketoconazole (NIZORAL) 2 % cream, Apply topically daily, Disp: 30 g, Rfl: 1    metoprolol tartrate (LOPRESSOR) 25 mg tablet, TAKE 1 TABLET DAILY, Disp: 90 tablet, Rfl: 3    Multiple Vitamin (DAILY VALUE MULTIVITAMIN PO), Take 1 tablet by mouth daily, Disp: , Rfl:     Multiple Vitamins-Minerals (PRESERVISION AREDS 2 PO), Take by mouth daily, Disp: , Rfl:     nystatin (MYCOSTATIN) cream, Apply topically 2 (two) times a day, Disp: 30 g, Rfl: 0    Omega-3 Fatty Acids (Fish Oil) 1200 MG CAPS, Take 1 capsule by mouth daily, Disp: , Rfl:     ketotifen (ZADITOR) 0 025 % ophthalmic solution, Administer 1 drop to both eyes 2 (two) times a day as needed (allergy) (Patient not taking: No sig reported), Disp: 5 mL, Rfl: 0    ketotifen (ZADITOR) 0 025 % ophthalmic solution, Administer 1 drop to both eyes 2 (two) times a day as needed (Eye irritation) (Patient not taking: No sig reported), Disp: 5 mL, Rfl: 0    predniSONE 10 mg tablet, Days 1 and 2 take 4 tablets daily, days 3 and 4 take 3 tablets daily, days 5 and 6 and 2 tablets daily, days 7 and 8 take 1 tablet daily   (Patient not taking: No sig reported), Disp: 20 tablet, Rfl: 0  No Known Allergies      Labs:  Admission on 05/11/2022, Discharged on 05/11/2022   Component Date Value    WBC 05/11/2022 9 70     RBC 05/11/2022 4 62     Hemoglobin 05/11/2022 14 1     Hematocrit 05/11/2022 41 4     MCV 05/11/2022 90     MCH 05/11/2022 30 5     MCHC 05/11/2022 34 1     RDW 05/11/2022 12 8     MPV 05/11/2022 10 9     Platelets 20/40/5046 238     nRBC 05/11/2022 0     Neutrophils Relative 05/11/2022 81 (A)    Immat GRANS % 05/11/2022 0     Lymphocytes Relative 05/11/2022 13 (A)    Monocytes Relative 05/11/2022 5     Eosinophils Relative 05/11/2022 1     Basophils Relative 05/11/2022 0     Neutrophils Absolute 05/11/2022 7 84 (A)    Immature Grans Absolute 05/11/2022 0 03     Lymphocytes Absolute 05/11/2022 1 26     Monocytes Absolute 05/11/2022 0 48     Eosinophils Absolute 05/11/2022 0 07     Basophils Absolute 05/11/2022 0 02     Sodium 05/11/2022 142     Potassium 05/11/2022 3 7     Chloride 05/11/2022 106     CO2 05/11/2022 26     ANION GAP 05/11/2022 10     BUN 05/11/2022 18     Creatinine 05/11/2022 0 90     Glucose 05/11/2022 85     Calcium 05/11/2022 9 4     eGFR 05/11/2022 70     TSH 3RD GENERATON 05/11/2022 1 304     hs TnI 0hr 05/11/2022 7     Magnesium 05/11/2022 2 2     Ventricular Rate 05/11/2022 86     Atrial Rate 05/11/2022 86     CT Interval 05/11/2022 172     QRSD Interval 05/11/2022 84     QT Interval 05/11/2022 414     QTC Interval 05/11/2022 495     P Axis 05/11/2022 53     QRS Axis 05/11/2022 -6     T Wave Axis 05/11/2022 50    Office Visit on 05/11/2022   Component Date Value    SARS-CoV-2 05/11/2022 Negative     Ventricular Rate 05/11/2022 99     Atrial Rate 05/11/2022 99     CT Interval 05/11/2022 168     QRSD Interval 05/11/2022 92     QT Interval 05/11/2022 400     QTC Interval 05/11/2022 513     P Axis 05/11/2022 48     QRS Axis 05/11/2022 8     T Wave Axis 05/11/2022 41         Imaging: XR chest 1 view portable    Result Date: 5/11/2022  Narrative: CHEST INDICATION:   Cough  Patient has suspected COVID-19  COMPARISON:  1/4/2017 and 2/27/2007 EXAM PERFORMED/VIEWS:  XR CHEST PORTABLE FINDINGS: Cardiomediastinal silhouette appears unremarkable  Normal pulmonary vasculature  The lungs are clear  No pneumothorax or pleural effusion  Osseous structures appear within normal limits for patient age  Impression: No acute cardiopulmonary findings or significant change from priors  In the setting of clinically suspected/proven COVID-19, this plain film appearance does not contain findings that raise concern for viral pneumonia such as COVID-19, but does not rule out this diagnosis  Workstation performed: [id number]     VAS lower limb venous duplex study, unilateral/limited    Result Date: 5/11/2022  Narrative:  [facility] REPORT CLINICAL: Indications: Patient presents with left lower extremity pain s/p foot injury  Operative History: No cardiovascular surgeries noted  Risk Factors The patient has history of HTN     CONCLUSION:  Impression: LEFT LOWER LIMB: No evidence of acute or chronic deep vein thrombosis Doppler evaluation shows a normal response to augmentation maneuvers  Technical findings were given to Giorgio Larsen PA-C in the ED  SIGNATURE: Electronically Signed by: David Reyes on 2022-05-11 07:00:08 PM      Review of Systems:  Review of Systems   Constitutional: Negative for chills, diaphoresis, fatigue and fever  HENT: Negative for trouble swallowing and voice change  Eyes: Negative for pain and redness  Respiratory: Negative for shortness of breath and wheezing  Cardiovascular: Negative for chest pain, palpitations and leg swelling  Gastrointestinal: Negative for abdominal pain, constipation, diarrhea, nausea and vomiting  Genitourinary: Negative for dysuria  Musculoskeletal: Positive for arthralgias  Negative for neck pain and neck stiffness  Skin: Negative for rash  Neurological: Negative for dizziness, syncope, light-headedness and headaches  Psychiatric/Behavioral: Negative for agitation and confusion  All other systems reviewed and are negative  Vitals:    06/10/22 0928   BP: 160/80   Pulse: 60   Weight: 74 4 kg (164 lb)   Height: 5' 4" (1 626 m)     Vitals:    06/10/22 0928   Weight: 74 4 kg (164 lb)     Height: 5' 4" (162 6 cm)     Physical Exam:  Physical Exam  Vitals reviewed  Constitutional:       General: She is not in acute distress  Appearance: Normal appearance  She is not diaphoretic  HENT:      Head: Normocephalic and atraumatic  Eyes:      General:         Right eye: No discharge  Left eye: No discharge  Neck:      Comments: Trachea midline, no JVD present  Cardiovascular:      Rate and Rhythm: Normal rate and regular rhythm  Heart sounds: No friction rub  Pulmonary:      Effort: Pulmonary effort is normal  No respiratory distress  Breath sounds: No wheezing or rhonchi  Abdominal:      General: Bowel sounds are normal       Palpations: Abdomen is soft  Tenderness: There is no abdominal tenderness  There is no rebound  Musculoskeletal:      Right lower leg: No edema  Left lower leg: No edema  Skin:     General: Skin is warm and dry  Neurological:      Mental Status: She is alert  Comments: Awake, alert, able to answer questions appropriately, able move extremities bilaterally     Psychiatric:         Mood and Affect: Mood normal          Behavior: Behavior normal

## 2022-06-15 ENCOUNTER — APPOINTMENT (OUTPATIENT)
Dept: LAB | Facility: MEDICAL CENTER | Age: 62
End: 2022-06-15
Payer: COMMERCIAL

## 2022-06-15 DIAGNOSIS — Z13.220 SCREENING FOR HYPERLIPIDEMIA: ICD-10-CM

## 2022-06-15 DIAGNOSIS — E66.3 OVERWEIGHT (BMI 25.0-29.9): ICD-10-CM

## 2022-06-15 DIAGNOSIS — I10 PRIMARY HYPERTENSION: ICD-10-CM

## 2022-06-15 DIAGNOSIS — I49.3 PVC (PREMATURE VENTRICULAR CONTRACTION): ICD-10-CM

## 2022-06-15 LAB
ANION GAP SERPL CALCULATED.3IONS-SCNC: 1 MMOL/L (ref 4–13)
BUN SERPL-MCNC: 17 MG/DL (ref 5–25)
CALCIUM SERPL-MCNC: 9.5 MG/DL (ref 8.3–10.1)
CHLORIDE SERPL-SCNC: 108 MMOL/L (ref 100–108)
CHOLEST SERPL-MCNC: 234 MG/DL
CO2 SERPL-SCNC: 32 MMOL/L (ref 21–32)
CREAT SERPL-MCNC: 0.9 MG/DL (ref 0.6–1.3)
GFR SERPL CREATININE-BSD FRML MDRD: 69 ML/MIN/1.73SQ M
GLUCOSE P FAST SERPL-MCNC: 91 MG/DL (ref 65–99)
HDLC SERPL-MCNC: 58 MG/DL
LDLC SERPL CALC-MCNC: 130 MG/DL (ref 0–100)
POTASSIUM SERPL-SCNC: 3.9 MMOL/L (ref 3.5–5.3)
SODIUM SERPL-SCNC: 141 MMOL/L (ref 136–145)
TRIGL SERPL-MCNC: 229 MG/DL

## 2022-06-15 PROCEDURE — 36415 COLL VENOUS BLD VENIPUNCTURE: CPT

## 2022-06-15 PROCEDURE — 80048 BASIC METABOLIC PNL TOTAL CA: CPT

## 2022-06-15 PROCEDURE — 80061 LIPID PANEL: CPT

## 2022-06-16 ENCOUNTER — HOSPITAL ENCOUNTER (OUTPATIENT)
Dept: NON INVASIVE DIAGNOSTICS | Facility: CLINIC | Age: 62
Discharge: HOME | End: 2022-06-16
Payer: COMMERCIAL

## 2022-06-16 ENCOUNTER — TELEPHONE (OUTPATIENT)
Dept: CARDIOLOGY CLINIC | Facility: CLINIC | Age: 62
End: 2022-06-16

## 2022-06-16 DIAGNOSIS — Z13.220 SCREENING FOR HYPERLIPIDEMIA: ICD-10-CM

## 2022-06-16 DIAGNOSIS — E78.5 HYPERLIPIDEMIA, UNSPECIFIED HYPERLIPIDEMIA TYPE: Primary | ICD-10-CM

## 2022-06-16 DIAGNOSIS — I49.3 PVC (PREMATURE VENTRICULAR CONTRACTION): ICD-10-CM

## 2022-06-16 DIAGNOSIS — E66.3 OVERWEIGHT (BMI 25.0-29.9): ICD-10-CM

## 2022-06-16 DIAGNOSIS — I10 PRIMARY HYPERTENSION: ICD-10-CM

## 2022-06-16 PROCEDURE — 93225 XTRNL ECG REC<48 HRS REC: CPT

## 2022-06-16 PROCEDURE — 93226 XTRNL ECG REC<48 HR SCAN A/R: CPT

## 2022-06-16 RX ORDER — ATORVASTATIN CALCIUM 10 MG/1
10 TABLET, FILM COATED ORAL DAILY
Qty: 30 TABLET | Refills: 6 | Status: SHIPPED | OUTPATIENT
Start: 2022-06-16

## 2022-06-16 NOTE — TELEPHONE ENCOUNTER
Attempted to call patient to go over recent laboratory study results  Unfortunately I was not able to reach the patient but did leave a message on her phone with my name and office number to call back for better time to speak

## 2022-06-16 NOTE — TELEPHONE ENCOUNTER
Called spoke with patient recent laboratory studies including lipid panel which showed total cholesterol 234, triglyceride 229, HDL 58,   As patient's ASCVD risk is elevated at 7 5% could consider starting lower dose statin therapy while patient attempts greater dietary lifestyle modification  After discussion with patient she is agreeable to this and I will start atorvastatin 10 mg daily  Patient was counseled on dietary lifestyle modifications including following a low-salt low-fat heart healthy diet with weight loss  Patient counseled on possible side effects for monitoring will let our office know if there are any issues with the medications

## 2022-06-23 ENCOUNTER — OFFICE VISIT (OUTPATIENT)
Dept: PODIATRY | Facility: CLINIC | Age: 62
End: 2022-06-23
Payer: OTHER MISCELLANEOUS

## 2022-06-23 ENCOUNTER — APPOINTMENT (OUTPATIENT)
Dept: RADIOLOGY | Facility: MEDICAL CENTER | Age: 62
End: 2022-06-23
Payer: COMMERCIAL

## 2022-06-23 VITALS
BODY MASS INDEX: 28 KG/M2 | WEIGHT: 164 LBS | HEIGHT: 64 IN | SYSTOLIC BLOOD PRESSURE: 160 MMHG | DIASTOLIC BLOOD PRESSURE: 80 MMHG

## 2022-06-23 DIAGNOSIS — M19.072 OSTEOARTHRITIS OF LEFT ANKLE AND FOOT: ICD-10-CM

## 2022-06-23 DIAGNOSIS — S93.622A LISFRANC'S SPRAIN, LEFT, INITIAL ENCOUNTER: ICD-10-CM

## 2022-06-23 PROCEDURE — 73630 X-RAY EXAM OF FOOT: CPT

## 2022-06-23 PROCEDURE — 99204 OFFICE O/P NEW MOD 45 MIN: CPT | Performed by: PODIATRIST

## 2022-06-23 PROCEDURE — 3008F BODY MASS INDEX DOCD: CPT | Performed by: INTERNAL MEDICINE

## 2022-06-23 RX ORDER — METHYLPREDNISOLONE 4 MG/1
TABLET ORAL
Qty: 1 EACH | Refills: 0 | Status: SHIPPED | OUTPATIENT
Start: 2022-06-23 | End: 2022-08-03

## 2022-06-23 RX ORDER — CEFAZOLIN SODIUM 1 G/50ML
1000 SOLUTION INTRAVENOUS ONCE
OUTPATIENT
Start: 2022-08-11 | End: 2022-06-23

## 2022-06-23 RX ORDER — CHLORHEXIDINE GLUCONATE 0.12 MG/ML
15 RINSE ORAL ONCE
OUTPATIENT
Start: 2022-08-11 | End: 2022-06-23

## 2022-06-23 NOTE — PROGRESS NOTES
HISTORY AND PHYSICAL EXAM  - [facility]    PATIENT:  Mainor Dunaway    1960      Assessment/Plan     Problem List Items Addressed This Visit    None     Visit Diagnoses     Lisfranc's sprain, left, initial encounter        Relevant Orders    Case request operating room: OPEN REDUCTION W/ INTERNAL FIXATION (ORIF) FOOT, ORIF of the left lis franc ligament (Completed)    Comprehensive metabolic panel    CBC and differential    Osteoarthritis of left ankle and foot        Relevant Orders    X-ray foot left 3+ views    Comprehensive metabolic panel    CBC and differential           Diagnoses and all orders for this visit:    Lisfranc's sprain, left, initial encounter  -     Ambulatory Referral to Podiatry  -     Case request operating room: OPEN REDUCTION W/ INTERNAL FIXATION (ORIF) FOOT, ORIF of the left lis franc ligament; Standing  -     Comprehensive metabolic panel; Future  -     CBC and differential; Future  -     Case request operating room: OPEN REDUCTION W/ INTERNAL FIXATION (ORIF) FOOT, ORIF of the left lis franc ligament    Osteoarthritis of left ankle and foot  -     Ambulatory Referral to Podiatry  -     X-ray foot left 3+ views; Future  -     Comprehensive metabolic panel; Future  -     CBC and differential; Future    Other orders  -     Nursing Communication Warmimg Interventions Implemented; Standing  -     Nursing Communication CHG bath, have staff wash entire body (neck down) per pre-op bathing protocol  Routine, evening prior to, and day of surgery ; Standing  -     Nursing Communication Swab both nares with Povidone-Iodine solution, EXCLUDE if patient has shellfish/Iodine allergy  Routine, day of surgery, on call to OR; Standing  -     chlorhexidine (PERIDEX) 0 12 % oral rinse 15 mL  -     Void on call to OR; Standing  -     Insert peripheral IV;  Standing  -     Diet NPO; Sips with meds; Standing  -     ceFAZolin (ANCEF) 1,000 mg in dextrose 5 % 100 mL IVPB History of Present Illness   Ester Mcdaniels is a 63 y o  female who presents with pain of her left foot due to a work injury in September of 2021  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 and also the side of her foot  She went through [employer] to see ortho for 2 months  She was in a surgical shoe for 2 months without significant improvement and then went to see Dr Galen Fontana and was placed in boot for 6weeks and she is still not having improvement in pain  Only had an Xr while in treatment with workers comp  Review of Systems   Constitutional: Negative for chills and fever  HENT: Negative for ear pain and sore throat  Eyes: Negative for pain and visual disturbance  Respiratory: Negative for cough and shortness of breath  Cardiovascular: Negative for chest pain and palpitations  Gastrointestinal: Negative for abdominal pain and vomiting  Genitourinary: Negative for dysuria and hematuria  Musculoskeletal: Negative for arthralgias and back pain  Skin: Negative for color change and rash  Neurological: Negative for seizures and syncope  All other systems reviewed and are negative  Historical Information   Past Medical History:   Diagnosis Date    Hypertension     Intracranial aneurysm     resolved 11/17/17 - possible R P-comm region aneurysm measuring 3mm found on MRI results    PVC (premature ventricular contraction)     Reactive depression     last assessed 12/15/15     Past Surgical History:   Procedure Laterality Date    ENDOMETRIAL ABLATION  2012    EPIDURAL BLOCK INJECTION Right 1/7/2020    Procedure: Right L4-5 transforaminal epidural steroid injection 49795;  Surgeon: Effie Carlisle MD;  [facility];  Service: Pain Management     EPIDURAL BLOCK INJECTION Right 5/19/2020    Procedure: Right L4-L5 Transforaminal Epidural Steroid Injection (36049);   Surgeon: Effie Carlisle MD;  [facility];  Service: Pain Management     FL GUIDED NEEDLE PLAC BX/ASP/INJ  1/7/2020    FL GUIDED NEEDLE PLAC BX/ASP/INJ  5/19/2020    FL GUIDED NEEDLE PLAC BX/ASP/INJ  8/28/2020    SC INJECTION,SACROILIAC JOINT Right 8/28/2020    Procedure: Sacroiliac joint injection and piriformis muscle steroid Injection;  Surgeon: Gabby Carrillo MD;  [facility];  Service: Pain Management     SC REPAIR INTERCARP/CARP-METACARP JT Left 7/30/2018    Procedure: Thumb Arthrex mini tight rope suspension arthroplasty with trapezial resection left thumb;  Surgeon: Isha Ferrell MD;  [facility];  Service: Orthopedics    THYROID SURGERY      near total thyroidectomy     Social History   Social History     Substance and Sexual Activity   Alcohol Use Not Currently    Comment: social     Social History     Substance and Sexual Activity   Drug Use Never    Comment: CBD     Social History     Tobacco Use   Smoking Status Never Smoker   Smokeless Tobacco Never Used   Tobacco Comment    only smoked as a teenager     Family History: non-contributory    Meds/Allergies   all medications and allergies reviewed  No Known Allergies    Objective   Vitals: Blood pressure 160/80, height 5' 4" (1 626 m), weight 74 4 kg (164 lb)  ,Body mass index is 28 15 kg/m²  Physical Exam  Vitals reviewed  Constitutional:       Appearance: Normal appearance  HENT:      Head: Normocephalic and atraumatic  Nose: Nose normal       Mouth/Throat:      Mouth: Mucous membranes are moist    Eyes:      Pupils: Pupils are equal, round, and reactive to light  Cardiovascular:      Pulses: Normal pulses  Pulmonary:      Effort: Pulmonary effort is normal    Musculoskeletal:         General: Swelling and tenderness present  Comments: TTP With ROM of the TMTJs 1-3 of the left foot, TTP overlying the lis franc ligament, TTP against active eversion of the peroneals, TTP along the course of the peroneous brevis      Skin:     Capillary Refill: Capillary refill takes less than 2 seconds  Neurological:      General: No focal deficit present  Mental Status: She is alert           Ortho Exam

## 2022-06-23 NOTE — PATIENT INSTRUCTIONS
Ingrown Nail   WHAT YOU NEED TO KNOW:   An ingrown nail is when the edge of your fingernail or toenail grows into the skin next to it  The most common cause is when nails are trimmed too short  DISCHARGE INSTRUCTIONS:   Return to the emergency department if:   You have a red streak running up your leg or arm  Contact your healthcare provider if:   Your pain is getting worse  Your nail and skin are more swollen or start to drain pus  You have a fever or chills  Your ingrown nail is not better in 7 days  You have questions or concerns about your condition or care  Medicines:   Acetaminophen  decreases pain and can be bought without a doctor's order  Ask how much to take and how often to take it  Follow directions  Acetaminophen can cause liver damage if not taken correctly  NSAIDs , such as ibuprofen, help decrease swelling, pain, and fever  This medicine is available with or without a doctor's order  NSAIDs can cause stomach bleeding or kidney problems in certain people  If you take blood thinner medicine, always ask your healthcare provider if NSAIDs are safe for you  Always read the medicine label and follow directions  Antibiotics  help treat or prevent a bacterial infection  They may be given as an ointment, pill, or both  Take your medicine as directed  Contact your healthcare provider if you think your medicine is not helping or if you have side effects  Tell him or her if you are allergic to any medicine  Keep a list of the medicines, vitamins, and herbs you take  Include the amounts, and when and why you take them  Bring the list or the pill bottles to follow-up visits  Carry your medicine list with you in case of an emergency  Self-care:   Soak and lift the nail  Soak your ingrown nail in warm water for 20 minutes, 2 to 3 times each day  Then gently lift the edge of the ingrown nail away from the skin   Wedge a small piece of cotton or gauze under the corner of the nail  You can also put dental floss under the nail to lift the edge away from the skin  This may help keep the nail from growing into the skin  Keep your nails clean and dry  Wash your hands and feet with soap and water  Pat dry with a clean towel  Dry in between each toe  Do not put lotion between your toes  Prevent another ingrown nail:   Carefully trim your nails  Cut your nails straight across  Do not cut them too short  Lightly file the nail corners if you have sharp edges  Do not round your nails  Do not rip or tear off the tips of your nails  This may cause your nail edge to grow into the skin  Use clippers, not nail scissors  Wear shoes and socks that fit well  Make sure they are not too tight  You may need to wear a shoe with the toe cut out, such as sandals, until your ingrown toenail heals  Do not wear shoes that have pointed toes or heels that are more than 2 inches high  Do not wear tight hose or socks  Wear socks that pull moisture away from your feet, such as cotton-acrylic blends  Inspect your nails daily  Look for signs of an ingrown nail  Manage problems early so the nail does not become infected  Follow up with your healthcare provider as directed: You may be referred to a podiatrist  Write down your questions so you remember to ask them during your visits  © Copyright [employer] 2022 Information is for End User's use only and may not be sold, redistributed or otherwise used for commercial purposes  All illustrations and images included in CareNotes® are the copyrighted property of A D A M [employer] Inc  or Cristhian Daigle  The above information is an  only  It is not intended as medical advice for individual conditions or treatments  Talk to your doctor, nurse or pharmacist before following any medical regimen to see if it is safe and effective for you

## 2022-06-24 ENCOUNTER — TELEPHONE (OUTPATIENT)
Dept: PAIN MEDICINE | Facility: CLINIC | Age: 62
End: 2022-06-24

## 2022-06-24 NOTE — TELEPHONE ENCOUNTER
Called pt to remind her to change her clearance appt with her PCP to reflect her sx date of 8/11  Pt verbalized understanding

## 2022-06-27 PROCEDURE — 93227 XTRNL ECG REC<48 HR R&I: CPT | Performed by: INTERNAL MEDICINE

## 2022-06-28 ENCOUNTER — TELEPHONE (OUTPATIENT)
Dept: CARDIOLOGY CLINIC | Facility: CLINIC | Age: 62
End: 2022-06-28

## 2022-06-28 DIAGNOSIS — I49.9 CARDIAC ARRHYTHMIA, UNSPECIFIED CARDIAC ARRHYTHMIA TYPE: Primary | ICD-10-CM

## 2022-06-28 DIAGNOSIS — I10 ESSENTIAL HYPERTENSION: ICD-10-CM

## 2022-06-28 NOTE — TELEPHONE ENCOUNTER
Called spoke with patient recent Holter monitor results which showed predominantly sinus rhythm average heart rate 72 beats per minute with frequent PVCs accounting for 24 1% of total monitored beats with some episodes of dizziness correlating with frequent PVCs  In the setting of symptomatic PVCs will increase patient's metoprolol to 25 mg twice daily from her current daily regimen and patient will monitor home blood pressure readings which have been in the 212-987 mmHg systolic range as she had had problems with softer blood pressures while on b i d  dosing previously  In the setting of nonsustained run with frequent ventricular ectopic activity on monitor will have patient undergo pharmacologic nuclear stress test as she is unable to exercise due to foot and knee pain  Pending results of this will uptitrate/likely add additional antiarrhythmic therapy to assist in symptomatic PVC treatment  Will follow-up transthoracic echocardiogram   Patient counseled to monitor for any significant warning or alarm type symptoms and if present to seek emergency medical care  She will also let us know if there is any issues with medical therapy  Patient noted complete understanding was agreeable to plan

## 2022-06-30 ENCOUNTER — HOSPITAL ENCOUNTER (OUTPATIENT)
Dept: NON INVASIVE DIAGNOSTICS | Facility: CLINIC | Age: 62
Discharge: HOME | End: 2022-06-30
Payer: COMMERCIAL

## 2022-06-30 VITALS
HEART RATE: 62 BPM | WEIGHT: 164 LBS | HEIGHT: 64 IN | DIASTOLIC BLOOD PRESSURE: 80 MMHG | SYSTOLIC BLOOD PRESSURE: 160 MMHG | BODY MASS INDEX: 28 KG/M2

## 2022-06-30 DIAGNOSIS — E66.3 OVERWEIGHT (BMI 25.0-29.9): ICD-10-CM

## 2022-06-30 DIAGNOSIS — I10 PRIMARY HYPERTENSION: ICD-10-CM

## 2022-06-30 DIAGNOSIS — I49.3 PVC (PREMATURE VENTRICULAR CONTRACTION): ICD-10-CM

## 2022-06-30 DIAGNOSIS — Z13.220 SCREENING FOR HYPERLIPIDEMIA: ICD-10-CM

## 2022-06-30 LAB
AORTIC ROOT: 3 CM
AORTIC VALVE MEAN VELOCITY: 11.3 M/S
APICAL FOUR CHAMBER EJECTION FRACTION: 56 %
ASCENDING AORTA: 3 CM
AV AREA BY CONTINUOUS VTI: 1.7 CM2
AV AREA PEAK VELOCITY: 1.6 CM2
AV LVOT MEAN GRADIENT: 2 MMHG
AV LVOT PEAK GRADIENT: 3 MMHG
AV MEAN GRADIENT: 6 MMHG
AV PEAK GRADIENT: 10 MMHG
AV VALVE AREA: 1.67 CM2
AV VELOCITY RATIO: 0.51
DOP CALC AO PEAK VEL: 1.56 M/S
DOP CALC AO VTI: 36.15 CM
DOP CALC LVOT AREA: 3.14 CM2
DOP CALC LVOT DIAMETER: 2 CM
DOP CALC LVOT PEAK VEL VTI: 19.25 CM
DOP CALC LVOT PEAK VEL: 0.8 M/S
DOP CALC LVOT STROKE INDEX: 32.2 ML/M2
DOP CALC LVOT STROKE VOLUME: 60.45
E WAVE DECELERATION TIME: 259 MS
FRACTIONAL SHORTENING: 26 (ref 28–44)
INTERVENTRICULAR SEPTUM IN DIASTOLE (PARASTERNAL SHORT AXIS VIEW): 1.2 CM
INTERVENTRICULAR SEPTUM: 1.2 CM (ref 0.6–1.1)
LAAS-AP2: 18 CM2
LAAS-AP4: 17.9 CM2
LEFT ATRIUM AREA SYSTOLE SINGLE PLANE A4C: 17.5 CM2
LEFT ATRIUM SIZE: 4 CM
LEFT INTERNAL DIMENSION IN SYSTOLE: 3.7 CM (ref 2.1–4)
LEFT VENTRICULAR INTERNAL DIMENSION IN DIASTOLE: 5 CM (ref 3.5–6)
LEFT VENTRICULAR POSTERIOR WALL IN END DIASTOLE: 1.1 CM
LEFT VENTRICULAR STROKE VOLUME: 60 ML
LVSV (TEICH): 60 ML
MITRAL REGURGITATION PEAK VELOCITY: 6.04 M/S
MITRAL VALVE MEAN INFLOW VELOCITY: 4.3 M/S
MITRAL VALVE REGURGITANT PEAK GRADIENT: 146 MMHG
MV E'TISSUE VEL-SEP: 8 CM/S
MV PEAK A VEL: 0.82 M/S
MV PEAK E VEL: 69 CM/S
MV STENOSIS PRESSURE HALF TIME: 75 MS
MV VALVE AREA P 1/2 METHOD: 2.93
RIGHT ATRIUM AREA SYSTOLE A4C: 11.7 CM2
RIGHT VENTRICLE ID DIMENSION: 3 CM
SL CV DOP CALC MV VTI RETROGRADE: 214.6 CM
SL CV LEFT ATRIUM LENGTH A2C: 5.5 CM
SL CV LV EF: 55
SL CV MV MEAN GRADIENT RETROGRADE: 89 MMHG
SL CV PED ECHO LEFT VENTRICLE DIASTOLIC VOLUME (MOD BIPLANE) 2D: 119 ML
SL CV PED ECHO LEFT VENTRICLE SYSTOLIC VOLUME (MOD BIPLANE) 2D: 59 ML
TR MAX PG: 15 MMHG
TR PEAK VELOCITY: 1.9 M/S
TRICUSPID VALVE PEAK REGURGITATION VELOCITY: 1.94 M/S

## 2022-06-30 PROCEDURE — 93306 TTE W/DOPPLER COMPLETE: CPT | Performed by: INTERNAL MEDICINE

## 2022-06-30 PROCEDURE — 93306 TTE W/DOPPLER COMPLETE: CPT

## 2022-07-01 ENCOUNTER — TELEPHONE (OUTPATIENT)
Dept: NON INVASIVE DIAGNOSTICS | Facility: HOSPITAL | Age: 62
End: 2022-07-01

## 2022-07-01 NOTE — TELEPHONE ENCOUNTER
Called pt and made sure she was aware her sx was at the [facility] and not [facility]  I couldn't remember which one I said when I scheduled her  Pt is aware it is at [facility]

## 2022-07-01 NOTE — TELEPHONE ENCOUNTER
Called spoke with patient recent transthoracic echocardiogram results  Patient also notes that she is tolerating metoprolol therapy very well and denies any significant issues at this time and is otherwise relatively asymptomatic  We will follow-up results of stress testing and I will see patient as scheduled

## 2022-07-05 ENCOUNTER — ANESTHESIA EVENT (OUTPATIENT)
Dept: PERIOP | Facility: HOSPITAL | Age: 62
End: 2022-07-05
Payer: OTHER MISCELLANEOUS

## 2022-07-15 ENCOUNTER — PREP FOR PROCEDURE (OUTPATIENT)
Dept: OBGYN CLINIC | Facility: CLINIC | Age: 62
End: 2022-07-15

## 2022-07-15 ENCOUNTER — OFFICE VISIT (OUTPATIENT)
Dept: CARDIOLOGY CLINIC | Facility: CLINIC | Age: 62
End: 2022-07-15
Payer: COMMERCIAL

## 2022-07-15 VITALS
HEIGHT: 64 IN | HEART RATE: 66 BPM | BODY MASS INDEX: 28.34 KG/M2 | WEIGHT: 166 LBS | DIASTOLIC BLOOD PRESSURE: 74 MMHG | SYSTOLIC BLOOD PRESSURE: 124 MMHG

## 2022-07-15 DIAGNOSIS — I49.3 PVC (PREMATURE VENTRICULAR CONTRACTION): ICD-10-CM

## 2022-07-15 DIAGNOSIS — E66.3 OVERWEIGHT (BMI 25.0-29.9): ICD-10-CM

## 2022-07-15 DIAGNOSIS — E78.5 HYPERLIPIDEMIA, UNSPECIFIED HYPERLIPIDEMIA TYPE: ICD-10-CM

## 2022-07-15 DIAGNOSIS — I10 PRIMARY HYPERTENSION: Primary | ICD-10-CM

## 2022-07-15 PROCEDURE — 3078F DIAST BP <80 MM HG: CPT | Performed by: INTERNAL MEDICINE

## 2022-07-15 PROCEDURE — 99214 OFFICE O/P EST MOD 30 MIN: CPT | Performed by: INTERNAL MEDICINE

## 2022-07-15 PROCEDURE — 3074F SYST BP LT 130 MM HG: CPT | Performed by: INTERNAL MEDICINE

## 2022-07-15 NOTE — PROGRESS NOTES
Cardiology Follow up  Elena Rowland  [id number]  1960  [facility] Sherice Stoddard  [facility], [facility] [address] [address]      1  Primary hypertension     2  Hyperlipidemia, unspecified hyperlipidemia type     3  Overweight (BMI 25 0-29 9)     4  PVC (premature ventricular contraction)         Discussion/Summary:  1  Frequent PVCs  2  Hypertension  3  Overweight  4  Preoperative evaluation  5  Hyperlipidemia      -transthoracic echocardiogram 06/30/2022 showing normal left ventricular systolic function estimated LVEF 55% with normal right ventricular systolic function, mildly dilated left atrium, mild mitral thickening with systolic bowing of the posterior leaflet mild-to-moderate mitral regurgitation  -48 hour Holter monitor showing predominantly sinus rhythm with average heart rate 72 beats per minute with rare supraventricular ectopic activity however frequent ventricular ectopic activity accounting for 24 1% of total monitored beats with no sustained ventricular tachycardia present and accompanying patient diary noting symptoms of dizziness with correlation tracings revealing sinus rhythm with PVCs    -limited panel 06/15/2022 showing total cholesterol 234, triglyceride 229, HDL 58,   -counseled patient on dietary lifestyle modifications including following sodium restriction less than 2000 mg sodium daily along with a low-fat heart healthy diet with weight loss more particularly though better dietary changes  -patient is tolerating current medical therapy will continue atorvastatin 10 mg daily, hydrochlorothiazide 12 5 mg daily, losartan 12 5 mg daily, and metoprolol tartrate 25 mg twice daily  -patient notes very good control blood pressures at home on current regimen  -we will follow-up stress testing which if negative patient will remain intermediate risk for planned operative procedure   -will see patient in 3 months or sooner if necessary  -patient counseled if she were to have any warning or alarm type symptoms she is to seek emergency medical care immediately  -will likely recommend pending results of stress testing if patient were to be going through with procedure to have her monitored on telemetry while in hospital and to avoid any QTC prolonging medications if possible  History of Present Illness:  -patient is a 30-year-old female with hypertension, overweight, chronic bilateral knee arthritis to presented to the office in June of 2022 after being seen in the emergency department 05/11/2022 for abnormal EKG after being seen at urgent care were she was seen and evaluated for upper respiratory tract congestion  Patient was seen and evaluated in the emergency department and had denied any significant issue  She was taking over-the-counter decongestants which she thought may have been contributing to her symptoms and while she had had intermittent palpitations for many years had noticed a worsening of them over the recent past   -patient presents to the office today for scheduled follow-up  Currently in the office patient denies any chest pain, palpitations, lightheadedness or dizziness, loss of consciousness or shortness of breath  She does note that intermittently she does feel the palpitations but states that since increasing the metoprolol she has had less symptoms than prior to  At this point the largest complaint is foot and knee pain for which she will be attending undergo surgery in August for revision  Patient has stress testing coming which if negative patient will remain intermediate risk      Patient Active Problem List   Diagnosis    Hypertension    Chronic bilateral low back pain with right-sided sciatica    Primary osteoarthritis of both knees    Primary osteoarthritis of first carpometacarpal joint of left hand    Trigger middle finger of right hand    Aia 16 DJD(carpometacarpal degenerative joint disease), localized primary, left    Chronic pain syndrome    Lumbar disc herniation    Lumbar radiculopathy    Piriformis muscle pain     Past Medical History:   Diagnosis Date    Hypertension     Intracranial aneurysm     resolved 11/17/17 - possible R P-comm region aneurysm measuring 3mm found on MRI results    PVC (premature ventricular contraction)     Reactive depression     last assessed 12/15/15     Social History     Socioeconomic History    Marital status: /Civil Union     Spouse name: Not on file    Number of children: Not on file    Years of education: Not on file    Highest education level: Not on file   Occupational History    Not on file   Tobacco Use    Smoking status: Never Smoker    Smokeless tobacco: Never Used    Tobacco comment: only smoked as a teenager   Vaping Use    Vaping Use: Never used   Substance and Sexual Activity    Alcohol use: Not Currently     Comment: social    Drug use: Never     Comment: CBD    Sexual activity: Yes     Partners: Male   Other Topics Concern    Not on file   Social History Narrative    No living will     Social Determinants of Health     Financial Resource Strain: Not on file   Food Insecurity: Not on file   Transportation Needs: Not on file   Physical Activity: Not on file   Stress: Not on file   Social Connections: Not on file   Intimate Partner Violence: Not on file   Housing Stability: Not on file      Family History   Problem Relation Age of Onset    Arthritis Mother     Heart disease Mother         CAD    Arthritis Father     Lymphoma Father         Hodgkin's    No Known Problems Sister     No Known Problems Daughter     No Known Problems Maternal Grandmother     No Known Problems Maternal Grandfather     No Known Problems Paternal Grandmother     No Known Problems Paternal Grandfather     No Known Problems Sister     No Known Problems Daughter     No Known Problems Maternal Aunt     No Known Problems Paternal Aunt      Past Surgical History:   Procedure Laterality Date    ENDOMETRIAL ABLATION  2012    EPIDURAL BLOCK INJECTION Right 1/7/2020    Procedure: Right L4-5 transforaminal epidural steroid injection 65966;  Surgeon: Benny Lu MD;  Location: [facility];  Service: Pain Management     EPIDURAL BLOCK INJECTION Right 5/19/2020    Procedure: Right L4-L5 Transforaminal Epidural Steroid Injection (90832); Surgeon: Benny Lu MD;  Location: [facility];  Service: Pain Management     FL GUIDED NEEDLE PLAC BX/ASP/INJ  1/7/2020    FL GUIDED NEEDLE PLAC BX/ASP/INJ  5/19/2020    FL GUIDED NEEDLE PLAC BX/ASP/INJ  8/28/2020    CT INJECTION,SACROILIAC JOINT Right 8/28/2020    Procedure: Sacroiliac joint injection and piriformis muscle steroid Injection;  Surgeon: Benny Lu MD;  Location: [facility];  Service: Pain Management     CT REPAIR INTERCARP/CARP-METACARP JT Left 7/30/2018    Procedure:  Thumb Arthrex mini tight rope suspension arthroplasty with trapezial resection left thumb;  Surgeon: Felisa Mistry MD;  Location: [facility];  Service: Orthopedics    THYROID SURGERY      near total thyroidectomy       Current Outpatient Medications:     Ascorbic Acid (VITAMIN C) 250 MG CHEW, Chew 1 tablet daily, Disp: , Rfl:     atorvastatin (LIPITOR) 10 mg tablet, Take 1 tablet (10 mg total) by mouth daily, Disp: 30 tablet, Rfl: 6    Calcium 600 MG tablet, Take 2 tablets by mouth daily, Disp: , Rfl:     celecoxib (CeleBREX) 200 mg capsule, Take 200 mg by mouth daily , Disp: , Rfl:     Cholecalciferol (VITAMIN D3) 1000 units CAPS, Take by mouth, Disp: , Rfl:     hydrochlorothiazide (HYDRODIURIL) 12 5 mg tablet, TAKE 1 TABLET DAILY, Disp: 90 tablet, Rfl: 3    losartan (COZAAR) 25 mg tablet, Take 0 5 tablets (12 5 mg total) by mouth daily, Disp: 30 tablet, Rfl: 3    metoprolol tartrate (LOPRESSOR) 25 mg tablet, Take 1 tablet (25 mg total) by mouth every 12 (twelve) hours, Disp: 90 tablet, Rfl: 3    Multiple Vitamin (DAILY VALUE MULTIVITAMIN PO), Take 1 tablet by mouth daily, Disp: , Rfl:     Multiple Vitamins-Minerals (PRESERVISION AREDS 2 PO), Take by mouth daily, Disp: , Rfl:     Omega-3 Fatty Acids (Fish Oil) 1200 MG CAPS, Take 1 capsule by mouth daily, Disp: , Rfl:     benzonatate (TESSALON) 200 MG capsule, Take 1 capsule (200 mg total) by mouth as needed in the morning and 1 capsule (200 mg total) as needed at noon and 1 capsule (200 mg total) as needed in the evening for cough  (Patient not taking: Reported on 7/15/2022), Disp: 20 capsule, Rfl: 0    fluticasone (FLONASE) 50 mcg/act nasal spray, 1 spray into each nostril in the morning  (Patient not taking: Reported on 7/15/2022), Disp: 16 g, Rfl: 0    ketoconazole (NIZORAL) 2 % cream, Apply topically daily (Patient not taking: Reported on 7/15/2022), Disp: 30 g, Rfl: 1    ketotifen (ZADITOR) 0 025 % ophthalmic solution, Administer 1 drop to both eyes 2 (two) times a day as needed (allergy) (Patient not taking: No sig reported), Disp: 5 mL, Rfl: 0    ketotifen (ZADITOR) 0 025 % ophthalmic solution, Administer 1 drop to both eyes 2 (two) times a day as needed (Eye irritation) (Patient not taking: No sig reported), Disp: 5 mL, Rfl: 0    methylPREDNISolone 4 MG tablet therapy pack, Use as directed on package (Patient not taking: Reported on 7/15/2022), Disp: 1 each, Rfl: 0    nystatin (MYCOSTATIN) cream, Apply topically 2 (two) times a day (Patient not taking: Reported on 7/15/2022), Disp: 30 g, Rfl: 0    predniSONE 10 mg tablet, Days 1 and 2 take 4 tablets daily, days 3 and 4 take 3 tablets daily, days 5 and 6 and 2 tablets daily, days 7 and 8 take 1 tablet daily   (Patient not taking: No sig reported), Disp: 20 tablet, Rfl: 0  No Known Allergies      Labs:  Hospital Outpatient Visit on 06/30/2022   Component Date Value    LV EF 06/30/2022 55     A4C EF 06/30/2022 56     LVOT stroke volume 06/30/2022 60 45     LVOT SI 06/30/2022 32 20     LVIDd 06/30/2022 5 00     LVIDS 06/30/2022 3 70     IVSd 06/30/2022 1 20     LVPWd 06/30/2022 1 10     FS 06/30/2022 26     MV E' Tissue Velocity Se* 06/30/2022 8     E wave deceleration time 06/30/2022 259     MV Peak E Art 06/30/2022 69     MV Peak A Art 06/30/2022 0 82     AV LVOT peak gradient 06/30/2022 3     LVOT peak VTI 06/30/2022 19 25     LVOT peak art 06/30/2022 0 8     RVID d 06/30/2022 3 0     LA size 06/30/2022 4     LA length (A2C) 06/30/2022 5 50     STEVE A4C 06/30/2022 17 5     RAA A4C 06/30/2022 11 7     LVOT diameter 06/30/2022 2 0     Ao peak art retrograde 06/30/2022 1 56     Ao VTI 06/30/2022 36 15     AV mean gradient 06/30/2022 6     LVOT mn grad 06/30/2022 2 0     AV peak gradient 06/30/2022 10     AV area by cont VTI 06/30/2022 1 7     AV area peak art 06/30/2022 1 6     MV VTI RETROGRADE 06/30/2022 214 6     MV stenosis pressure 1/2* 06/30/2022 75     MV valve area p 1/2 meth* 06/30/2022 2 93     MV mean gradient retrogr* 06/30/2022 89     MR PG 06/30/2022 146     TR Peak Art 06/30/2022 1 9     Triscuspid Valve Regurgi* 06/30/2022 15 0     Ao root 06/30/2022 3 00     Asc Ao 06/30/2022 3     Aortic valve mean veloci* 06/30/2022 11 30     Mitral regurgitation pea* 06/30/2022 6 04     Mitral valve mean inflow* 06/30/2022 4 30     Tricuspid valve peak reg* 06/30/2022 1 94     Left ventricular stroke * 06/30/2022 60 00     IVS 06/30/2022 1 2     LEFT VENTRICLE SYSTOLIC * 73/18/6534 59     LV DIASTOLIC VOLUME (MOD* 46/81/1470 119     Left Atrium Area-systoli* 06/30/2022 17 9     Left Atrium Area-systoli* 06/30/2022 18     LVSV, 2D 06/30/2022 60     LVOT area 06/30/2022 3 14     AV Velocity Ratio 06/30/2022 0 51     AV valve area 06/30/2022 1 67    Appointment on 06/15/2022   Component Date Value    Sodium 06/15/2022 141     Potassium 06/15/2022 3 9     Chloride 06/15/2022 108     CO2 06/15/2022 32     ANION GAP 06/15/2022 1 (A)    BUN 06/15/2022 17     Creatinine 06/15/2022 0 90     Glucose, Fasting 06/15/2022 91     Calcium 06/15/2022 9 5     eGFR 06/15/2022 69     Cholesterol 06/15/2022 234 (A)    Triglycerides 06/15/2022 229 (A)    HDL, Direct 06/15/2022 58     LDL Calculated 06/15/2022 130 (A)        Imaging: X-ray foot left 3+ views    Result Date: 6/27/2022  Narrative: LEFT FOOT INDICATION:   M19 072: Primary osteoarthritis, left ankle and foot  COMPARISON:  3/25/2022 VIEWS:  XR FOOT 3+ VW LEFT Images: 3 FINDINGS: There is no acute fracture or dislocation  Mild degenerative changes at the tarsometatarsal joints again evident No lytic or blastic osseous lesion  Soft tissues are unremarkable  Impression: Degenerative changes No acute osseous abnormality  Similar to prior study Workstation performed: [id number]     Echo complete w/ contrast if indicated    Result Date: 6/30/2022  Narrative: Jaime Martinez  Left Ventricle: Left ventricular cavity size is normal  Wall thickness is mildly increased  The left ventricular ejection fraction is 55%  Systolic function is low normal  Wall motion is normal  Diastolic function is normal for age    Right Ventricle: Systolic function is normal    Left Atrium: The atrium is mildly dilated    Mitral Valve: There is mild thickening  There is mild annular calcification  There is systolic bowing of the posterior leaflet  There is mild to moderate regurgitation    Tricuspid Valve: There is mild regurgitation    Pericardium: There is no pericardial effusion  Holter monitor    Result Date: 6/27/2022  Narrative: 48 hour Holter monitor report INDICATIONS:  PVCs DESCRIPTION OF FINDINGS: The patient was monitored for a total of 48 hours  The patient was predominantly in sinus rhythm throughout the study  The average heart rate was 72 beats per minute    The heart rate ranged from a low of 62 beats per minute in sinus rhythm with PVC at 10:24 a m  to a maximum of 117 beats per minute in sinus tachycardia at 9:19 a m  Yogesh Robledo Ventricular ectopic activity consisted of 49181 beats (24 1% of total beats), of which, 85302 were single PVCs, 79 interpolated PVCs, 661 were ventricular couplets, 149 triplet events, 63966 were in bigeminy and 2216 were in trigeminy  The longest ventricular run was 4 beats at 2:35 p m  There was no sustained ventricular tachycardia  Supraventricular ectopic activity consisted of 17 beats (0 0% of total beats), of which, 15 were single PACs, 1 were atrial couplets, 0 were in bigeminy and 0 were in trigeminy  There were 0 atrial runs, longest of which was 0 beats  There was no supraventricular tachycardia identified  There was no evidence of atrial fibrillation or atrial flutter  There were no significant pauses  The longest R-R interval was 1 6 seconds  There was no evidence of advanced degree heart block  The accompanying patient diary notes symptoms of dizziness  Correlation with the tracings at these times reveals sinus rhythm with PVCs  Impression: Predominantly sinus rhythm, with an average heart rate of 72 beats per minute There was rare supraventricular ectopic activity with no evidence of atrial fibrillation or atrial flutter present  There was frequent ventricular ectopic activity accounting for 24 1% of total monitored beats  There was no sustained ventricular tachycardia present  No significant pauses or advanced degree heart block The accompanying patient diary notes symptoms of dizziness  Correlation with the tracings at these times reveals sinus rhythm with PVCs  Review of Systems:  Review of Systems   Constitutional: Negative for chills, diaphoresis, fatigue and fever  HENT: Negative for trouble swallowing and voice change  Eyes: Negative for pain and redness  Respiratory: Negative for shortness of breath and wheezing  Cardiovascular: Negative for chest pain, palpitations and leg swelling     Gastrointestinal: Negative for abdominal pain, constipation, diarrhea, nausea and vomiting  Genitourinary: Negative for dysuria  Musculoskeletal: Positive for arthralgias  Negative for neck pain and neck stiffness  Neurological: Negative for dizziness, syncope, light-headedness and headaches  Psychiatric/Behavioral: Negative for agitation and confusion  All other systems reviewed and are negative  Vitals:    07/15/22 0939   BP: 124/74   Pulse: 66   Weight: 75 3 kg (166 lb)   Height: 5' 4" (1 626 m)     Vitals:    07/15/22 0939   Weight: 75 3 kg (166 lb)     Height: 5' 4" (162 6 cm)     Physical Exam:  Physical Exam  Vitals reviewed  Constitutional:       General: She is not in acute distress  Appearance: Normal appearance  She is not diaphoretic  HENT:      Head: Normocephalic and atraumatic  Eyes:      General:         Right eye: No discharge  Left eye: No discharge  Neck:      Comments: Trachea midline, no JVD present  Cardiovascular:      Rate and Rhythm: Normal rate and regular rhythm  Heart sounds: Murmur heard  No friction rub  Pulmonary:      Effort: Pulmonary effort is normal  No respiratory distress  Breath sounds: No wheezing or rhonchi  Abdominal:      General: Bowel sounds are normal       Palpations: Abdomen is soft  Tenderness: There is no abdominal tenderness  There is no rebound  Musculoskeletal:      Right lower leg: No edema  Left lower leg: No edema  Skin:     General: Skin is warm and dry  Neurological:      Mental Status: She is alert  Comments: Awake, alert, able to answer questions appropriately, able move extremities bilaterally     Psychiatric:         Mood and Affect: Mood normal          Behavior: Behavior normal

## 2022-07-19 ENCOUNTER — VBI (OUTPATIENT)
Dept: ADMINISTRATIVE | Facility: OTHER | Age: 62
End: 2022-07-19

## 2022-07-19 NOTE — TELEPHONE ENCOUNTER
07/19/22 9:36 AM     See documentation in the [employer] SmartForm       [facility]

## 2022-07-20 ENCOUNTER — VBI (OUTPATIENT)
Dept: ADMINISTRATIVE | Facility: OTHER | Age: 62
End: 2022-07-20

## 2022-07-20 ENCOUNTER — APPOINTMENT (OUTPATIENT)
Dept: LAB | Facility: MEDICAL CENTER | Age: 62
End: 2022-07-20
Payer: OTHER MISCELLANEOUS

## 2022-07-20 DIAGNOSIS — M19.072 OSTEOARTHRITIS OF LEFT ANKLE AND FOOT: ICD-10-CM

## 2022-07-20 DIAGNOSIS — S93.622A LISFRANC'S SPRAIN, LEFT, INITIAL ENCOUNTER: ICD-10-CM

## 2022-07-20 LAB
ALBUMIN SERPL BCP-MCNC: 3.8 G/DL (ref 3.5–5)
ALP SERPL-CCNC: 82 U/L (ref 46–116)
ALT SERPL W P-5'-P-CCNC: 23 U/L (ref 12–78)
ANION GAP SERPL CALCULATED.3IONS-SCNC: 9 MMOL/L (ref 4–13)
AST SERPL W P-5'-P-CCNC: 16 U/L (ref 5–45)
BASOPHILS # BLD AUTO: 0.05 THOUSANDS/ΜL (ref 0–0.1)
BASOPHILS NFR BLD AUTO: 1 % (ref 0–1)
BILIRUB SERPL-MCNC: 0.66 MG/DL (ref 0.2–1)
BUN SERPL-MCNC: 15 MG/DL (ref 5–25)
CALCIUM SERPL-MCNC: 9.2 MG/DL (ref 8.3–10.1)
CHLORIDE SERPL-SCNC: 108 MMOL/L (ref 96–108)
CO2 SERPL-SCNC: 28 MMOL/L (ref 21–32)
CREAT SERPL-MCNC: 0.91 MG/DL (ref 0.6–1.3)
EOSINOPHIL # BLD AUTO: 0.2 THOUSAND/ΜL (ref 0–0.61)
EOSINOPHIL NFR BLD AUTO: 3 % (ref 0–6)
ERYTHROCYTE [DISTWIDTH] IN BLOOD BY AUTOMATED COUNT: 13 % (ref 11.6–15.1)
GFR SERPL CREATININE-BSD FRML MDRD: 68 ML/MIN/1.73SQ M
GLUCOSE P FAST SERPL-MCNC: 83 MG/DL (ref 65–99)
HCT VFR BLD AUTO: 44.6 % (ref 34.8–46.1)
HGB BLD-MCNC: 14.5 G/DL (ref 11.5–15.4)
IMM GRANULOCYTES # BLD AUTO: 0.02 THOUSAND/UL (ref 0–0.2)
IMM GRANULOCYTES NFR BLD AUTO: 0 % (ref 0–2)
LYMPHOCYTES # BLD AUTO: 2.03 THOUSANDS/ΜL (ref 0.6–4.47)
LYMPHOCYTES NFR BLD AUTO: 31 % (ref 14–44)
MCH RBC QN AUTO: 30.6 PG (ref 26.8–34.3)
MCHC RBC AUTO-ENTMCNC: 32.5 G/DL (ref 31.4–37.4)
MCV RBC AUTO: 94 FL (ref 82–98)
MONOCYTES # BLD AUTO: 0.55 THOUSAND/ΜL (ref 0.17–1.22)
MONOCYTES NFR BLD AUTO: 9 % (ref 4–12)
NEUTROPHILS # BLD AUTO: 3.65 THOUSANDS/ΜL (ref 1.85–7.62)
NEUTS SEG NFR BLD AUTO: 56 % (ref 43–75)
NRBC BLD AUTO-RTO: 0 /100 WBCS
PLATELET # BLD AUTO: 245 THOUSANDS/UL (ref 149–390)
PMV BLD AUTO: 11.7 FL (ref 8.9–12.7)
POTASSIUM SERPL-SCNC: 3.5 MMOL/L (ref 3.5–5.3)
PROT SERPL-MCNC: 7.3 G/DL (ref 6.4–8.4)
RBC # BLD AUTO: 4.74 MILLION/UL (ref 3.81–5.12)
SODIUM SERPL-SCNC: 145 MMOL/L (ref 135–147)
WBC # BLD AUTO: 6.5 THOUSAND/UL (ref 4.31–10.16)

## 2022-07-20 PROCEDURE — 85025 COMPLETE CBC W/AUTO DIFF WBC: CPT

## 2022-07-20 PROCEDURE — 36415 COLL VENOUS BLD VENIPUNCTURE: CPT

## 2022-07-20 PROCEDURE — 80053 COMPREHEN METABOLIC PANEL: CPT

## 2022-07-25 ENCOUNTER — HOSPITAL ENCOUNTER (OUTPATIENT)
Dept: NUCLEAR MEDICINE | Facility: HOSPITAL | Age: 62
Discharge: HOME | End: 2022-07-25
Attending: INTERNAL MEDICINE
Payer: COMMERCIAL

## 2022-07-25 DIAGNOSIS — I49.9 CARDIAC ARRHYTHMIA, UNSPECIFIED CARDIAC ARRHYTHMIA TYPE: ICD-10-CM

## 2022-07-25 LAB
MAX HR: 92 BPM
RATE PRESSURE PRODUCT: NORMAL
SL CV REST NUCLEAR ISOTOPE DOSE: 11 MCI
SL CV STRESS NUCLEAR ISOTOPE DOSE: 31.6 MCI
STRESS BASELINE HR: 62 BPM
STRESS PEAK HR: 92 BPM
STRESS POST PEAK BP: 168 MMHG
STRESS ST DEPRESSION: 0 MM
STRESS/REST PERFUSION RATIO: 1.2

## 2022-07-25 PROCEDURE — 78452 HT MUSCLE IMAGE SPECT MULT: CPT

## 2022-07-25 PROCEDURE — A9502 TC99M TETROFOSMIN: HCPCS

## 2022-07-25 PROCEDURE — 93017 CV STRESS TEST TRACING ONLY: CPT

## 2022-07-25 PROCEDURE — 93018 CV STRESS TEST I&R ONLY: CPT | Performed by: INTERNAL MEDICINE

## 2022-07-25 PROCEDURE — 78452 HT MUSCLE IMAGE SPECT MULT: CPT | Performed by: INTERNAL MEDICINE

## 2022-07-25 PROCEDURE — G1004 CDSM NDSC: HCPCS

## 2022-07-25 PROCEDURE — 93016 CV STRESS TEST SUPVJ ONLY: CPT | Performed by: INTERNAL MEDICINE

## 2022-07-25 RX ADMIN — REGADENOSON 0.4 MG: 0.08 INJECTION, SOLUTION INTRAVENOUS at 09:40

## 2022-07-28 ENCOUNTER — CONSULT (OUTPATIENT)
Dept: FAMILY MEDICINE CLINIC | Facility: CLINIC | Age: 62
End: 2022-07-28
Payer: OTHER MISCELLANEOUS

## 2022-07-28 VITALS
BODY MASS INDEX: 29.12 KG/M2 | HEART RATE: 73 BPM | DIASTOLIC BLOOD PRESSURE: 80 MMHG | OXYGEN SATURATION: 98 % | TEMPERATURE: 98.1 F | HEIGHT: 64 IN | SYSTOLIC BLOOD PRESSURE: 132 MMHG | WEIGHT: 170.6 LBS

## 2022-07-28 DIAGNOSIS — Z01.818 PREOPERATIVE CLEARANCE: Primary | ICD-10-CM

## 2022-07-28 DIAGNOSIS — Z12.31 ENCOUNTER FOR SCREENING MAMMOGRAM FOR BREAST CANCER: ICD-10-CM

## 2022-07-28 PROCEDURE — 99213 OFFICE O/P EST LOW 20 MIN: CPT | Performed by: PHYSICIAN ASSISTANT

## 2022-07-28 NOTE — H&P (VIEW-ONLY)
INTERNAL MEDICINE PRE-OPERATIVE EVALUATION  [facility] - [address] [place] [address] PRIMARY CARE    NAME: Ernestina Cruz  AGE: 63 y o  SEX: female  : 1960     DATE: 2022     Internal Medicine Pre-Operative Evaluation:     Chief Complaint: Pre-operative Evaluation     Surgery: L foot tendon repair  Anticipated Date of Surgery: 2022  Referring Provider: Dr REMIGIO Harmon       History of Present Illness:     Ernestina Cruz is a 63 y o  female who presents to the office today for a preoperative consultation at the request of surgeon, Dr KOCH/ Geo Harmon, who plans on performing L foot tendon repair on 2022  Planned anesthesia is general  Patient has a bleeding risk of: no recent abnormal bleeding  Patient does not have objections to receiving blood products if needed  Assessment of Cardiac Risk:  Pt to have cardiac clearance       Prior Anesthesia Reactions: Yes, nausea     Personal history of venous thromboembolic disease? No    History of steroid use for >2 weeks within last year?  No          Review of Systems:     Review of Systems     Problem List:     Patient Active Problem List   Diagnosis    Hypertension    Chronic bilateral low back pain with right-sided sciatica    Primary osteoarthritis of both knees    Primary osteoarthritis of first carpometacarpal joint of left hand    Trigger middle finger of right hand    CMC DJD(carpometacarpal degenerative joint disease), localized primary, left    Chronic pain syndrome    Lumbar disc herniation    Lumbar radiculopathy    Piriformis muscle pain        Allergies:     No Known Allergies     Current Medications:       Current Outpatient Medications:     Ascorbic Acid (VITAMIN C) 250 MG CHEW, Chew 1 tablet daily, Disp: , Rfl:     atorvastatin (LIPITOR) 10 mg tablet, Take 1 tablet (10 mg total) by mouth daily, Disp: 30 tablet, Rfl: 6    Calcium 600 MG tablet, Take 2 tablets by mouth daily, Disp: , Rfl:     celecoxib (CeleBREX) 200 mg capsule, Take 200 mg by mouth daily , Disp: , Rfl:     Cholecalciferol (VITAMIN D3) 1000 units CAPS, Take by mouth, Disp: , Rfl:     hydrochlorothiazide (HYDRODIURIL) 12 5 mg tablet, TAKE 1 TABLET DAILY, Disp: 90 tablet, Rfl: 3    losartan (COZAAR) 25 mg tablet, Take 0 5 tablets (12 5 mg total) by mouth daily, Disp: 30 tablet, Rfl: 3    metoprolol tartrate (LOPRESSOR) 25 mg tablet, Take 1 tablet (25 mg total) by mouth every 12 (twelve) hours, Disp: 90 tablet, Rfl: 3    Multiple Vitamin (DAILY VALUE MULTIVITAMIN PO), Take 1 tablet by mouth daily, Disp: , Rfl:     Multiple Vitamins-Minerals (PRESERVISION AREDS 2 PO), Take by mouth daily, Disp: , Rfl:     Omega-3 Fatty Acids (Fish Oil) 1200 MG CAPS, Take 1 capsule by mouth daily, Disp: , Rfl:     benzonatate (TESSALON) 200 MG capsule, Take 1 capsule (200 mg total) by mouth as needed in the morning and 1 capsule (200 mg total) as needed at noon and 1 capsule (200 mg total) as needed in the evening for cough  (Patient not taking: No sig reported), Disp: 20 capsule, Rfl: 0    fluticasone (FLONASE) 50 mcg/act nasal spray, 1 spray into each nostril in the morning   (Patient not taking: No sig reported), Disp: 16 g, Rfl: 0    ketoconazole (NIZORAL) 2 % cream, Apply topically daily (Patient not taking: No sig reported), Disp: 30 g, Rfl: 1    ketotifen (ZADITOR) 0 025 % ophthalmic solution, Administer 1 drop to both eyes 2 (two) times a day as needed (allergy) (Patient not taking: No sig reported), Disp: 5 mL, Rfl: 0    ketotifen (ZADITOR) 0 025 % ophthalmic solution, Administer 1 drop to both eyes 2 (two) times a day as needed (Eye irritation) (Patient not taking: No sig reported), Disp: 5 mL, Rfl: 0    methylPREDNISolone 4 MG tablet therapy pack, Use as directed on package (Patient not taking: No sig reported), Disp: 1 each, Rfl: 0    nystatin (MYCOSTATIN) cream, Apply topically 2 (two) times a day (Patient not taking: No sig reported), Disp: 30 g, Rfl: 0    predniSONE 10 mg tablet, Days 1 and 2 take 4 tablets daily, days 3 and 4 take 3 tablets daily, days 5 and 6 and 2 tablets daily, days 7 and 8 take 1 tablet daily  (Patient not taking: No sig reported), Disp: 20 tablet, Rfl: 0     Past History:     Past Medical History:   Diagnosis Date    Hypertension     Intracranial aneurysm     resolved 11/17/17 - possible R P-comm region aneurysm measuring 3mm found on MRI results    PVC (premature ventricular contraction)     Reactive depression     last assessed 12/15/15        Past Surgical History:   Procedure Laterality Date    ENDOMETRIAL ABLATION  2012    EPIDURAL BLOCK INJECTION Right 1/7/2020    Procedure: Right L4-5 transforaminal epidural steroid injection 83825;  Surgeon: Benny Lu MD;  [facility];  Service: Pain Management     EPIDURAL BLOCK INJECTION Right 5/19/2020    Procedure: Right L4-L5 Transforaminal Epidural Steroid Injection (00998); Surgeon: Benny Lu MD;  [facility];  Service: Pain Management     FL GUIDED NEEDLE PLAC BX/ASP/INJ  1/7/2020    FL GUIDED NEEDLE PLAC BX/ASP/INJ  5/19/2020    FL GUIDED NEEDLE PLAC BX/ASP/INJ  8/28/2020    MO INJECTION,SACROILIAC JOINT Right 8/28/2020    Procedure: Sacroiliac joint injection and piriformis muscle steroid Injection;  Surgeon: Benny Lu MD;  [facility];  Service: Pain Management     MO REPAIR INTERCARP/CARP-METACARP JT Left 7/30/2018    Procedure:  Thumb Arthrex mini tight rope suspension arthroplasty with trapezial resection left thumb;  Surgeon: Felisa Mistry MD;  [facility];  Service: Orthopedics    THYROID SURGERY      near total thyroidectomy        Family History   Problem Relation Age of Onset    Arthritis Mother     Heart disease Mother         CAD    Arthritis Father     Lymphoma Father         Hodgkin's    No Known Problems Sister     No Known Problems Daughter     No Known Problems Maternal Grandmother     No Known Problems Maternal Grandfather     No Known Problems Paternal Grandmother     No Known Problems Paternal Grandfather     No Known Problems Sister     No Known Problems Daughter     No Known Problems Maternal Aunt     No Known Problems Paternal Aunt         Social History     Socioeconomic History    Marital status: /Civil Union     Spouse name: Not on file    Number of children: Not on file    Years of education: Not on file    Highest education level: Not on file   Occupational History    Not on file   Tobacco Use    Smoking status: Never Smoker    Smokeless tobacco: Never Used    Tobacco comment: only smoked as a teenager   Vaping Use    Vaping Use: Never used   Substance and Sexual Activity    Alcohol use: Not Currently     Comment: social    Drug use: Never     Comment: CBD    Sexual activity: Yes     Partners: Male   Other Topics Concern    Not on file   Social History Narrative    No living will     Social Determinants of Health     Financial Resource Strain: Not on file   Food Insecurity: Not on file   Transportation Needs: Not on file   Physical Activity: Not on file   Stress: Not on file   Social Connections: Not on file   Intimate Partner Violence: Not on file   Housing Stability: Not on file        Physical Exam:      /80   Pulse 73   Temp 98 1 °F (36 7 °C)   Ht 5' 4" (1 626 m)   Wt 77 4 kg (170 lb 9 6 oz)   SpO2 98%   BMI 29 28 kg/m²     Physical Exam      Data:     Pre-operative work-up    Laboratory Results: I have personally reviewed the pertinent laboratory results/reports          Assessment:     1  Preoperative clearance     2  Encounter for screening mammogram for breast cancer  Mammo screening bilateral w 3d & cad        Plan:     63 y o  female with planned surgery: L foot tendon repair  1  Further preoperative workup as follows:   - None; no further preoperative work-up is required    2   Patient requires further consultation with: Cardiology    Clearance  Pt cleared for surgery pending cardiac clearance       Faiza Sears PA-C  [facility] PRIMARY CARE  [facility] [address] [id number]  Phone#  [phone]  Fax#  [phone]

## 2022-07-28 NOTE — PROGRESS NOTES
INTERNAL MEDICINE PRE-OPERATIVE EVALUATION  [facility] PRIMARY CARE    NAME: Buzz Sprague  AGE: 63 y o  SEX: female  : 1960     DATE: 2022     Internal Medicine Pre-Operative Evaluation:     Chief Complaint: Pre-operative Evaluation     Surgery: L foot tendon repair  Anticipated Date of Surgery: 2022  Referring Provider: Dr Emily Mason       History of Present Illness:     Buzz Sprague is a 63 y o  female who presents to the office today for a preoperative consultation at the request of surgeon, Dr Emily Mason, who plans on performing L foot tendon repair on 2022  Planned anesthesia is general  Patient has a bleeding risk of: no recent abnormal bleeding  Patient does not have objections to receiving blood products if needed  Assessment of Cardiac Risk:  Pt to have cardiac clearance       Prior Anesthesia Reactions: Yes, nausea     Personal history of venous thromboembolic disease? No    History of steroid use for >2 weeks within last year?  No          Review of Systems:     Review of Systems     Problem List:     Patient Active Problem List   Diagnosis    Hypertension    Chronic bilateral low back pain with right-sided sciatica    Primary osteoarthritis of both knees    Primary osteoarthritis of first carpometacarpal joint of left hand    Trigger middle finger of right hand    CMC DJD(carpometacarpal degenerative joint disease), localized primary, left    Chronic pain syndrome    Lumbar disc herniation    Lumbar radiculopathy    Piriformis muscle pain        Allergies:     No Known Allergies     Current Medications:       Current Outpatient Medications:     Ascorbic Acid (VITAMIN C) 250 MG CHEW, Chew 1 tablet daily, Disp: , Rfl:     atorvastatin (LIPITOR) 10 mg tablet, Take 1 tablet (10 mg total) by mouth daily, Disp: 30 tablet, Rfl: 6    Calcium 600 MG tablet, Take 2 tablets by mouth daily, Disp: , Rfl:     celecoxib (CeleBREX) 200 mg capsule, Take 200 mg by mouth daily , Disp: , Rfl:     Cholecalciferol (VITAMIN D3) 1000 units CAPS, Take by mouth, Disp: , Rfl:     hydrochlorothiazide (HYDRODIURIL) 12 5 mg tablet, TAKE 1 TABLET DAILY, Disp: 90 tablet, Rfl: 3    losartan (COZAAR) 25 mg tablet, Take 0 5 tablets (12 5 mg total) by mouth daily, Disp: 30 tablet, Rfl: 3    metoprolol tartrate (LOPRESSOR) 25 mg tablet, Take 1 tablet (25 mg total) by mouth every 12 (twelve) hours, Disp: 90 tablet, Rfl: 3    Multiple Vitamin (DAILY VALUE MULTIVITAMIN PO), Take 1 tablet by mouth daily, Disp: , Rfl:     Multiple Vitamins-Minerals (PRESERVISION AREDS 2 PO), Take by mouth daily, Disp: , Rfl:     Omega-3 Fatty Acids (Fish Oil) 1200 MG CAPS, Take 1 capsule by mouth daily, Disp: , Rfl:     benzonatate (TESSALON) 200 MG capsule, Take 1 capsule (200 mg total) by mouth as needed in the morning and 1 capsule (200 mg total) as needed at noon and 1 capsule (200 mg total) as needed in the evening for cough  (Patient not taking: No sig reported), Disp: 20 capsule, Rfl: 0    fluticasone (FLONASE) 50 mcg/act nasal spray, 1 spray into each nostril in the morning   (Patient not taking: No sig reported), Disp: 16 g, Rfl: 0    ketoconazole (NIZORAL) 2 % cream, Apply topically daily (Patient not taking: No sig reported), Disp: 30 g, Rfl: 1    ketotifen (ZADITOR) 0 025 % ophthalmic solution, Administer 1 drop to both eyes 2 (two) times a day as needed (allergy) (Patient not taking: No sig reported), Disp: 5 mL, Rfl: 0    ketotifen (ZADITOR) 0 025 % ophthalmic solution, Administer 1 drop to both eyes 2 (two) times a day as needed (Eye irritation) (Patient not taking: No sig reported), Disp: 5 mL, Rfl: 0    methylPREDNISolone 4 MG tablet therapy pack, Use as directed on package (Patient not taking: No sig reported), Disp: 1 each, Rfl: 0    nystatin (MYCOSTATIN) cream, Apply topically 2 (two) times a day (Patient not taking: No sig reported), Disp: 30 g, Rfl: 0    predniSONE 10 mg tablet, Days 1 and 2 take 4 tablets daily, days 3 and 4 take 3 tablets daily, days 5 and 6 and 2 tablets daily, days 7 and 8 take 1 tablet daily  (Patient not taking: No sig reported), Disp: 20 tablet, Rfl: 0     Past History:     Past Medical History:   Diagnosis Date    Hypertension     Intracranial aneurysm     resolved 11/17/17 - possible R P-comm region aneurysm measuring 3mm found on MRI results    PVC (premature ventricular contraction)     Reactive depression     last assessed 12/15/15        Past Surgical History:   Procedure Laterality Date    ENDOMETRIAL ABLATION  2012    EPIDURAL BLOCK INJECTION Right 1/7/2020    Procedure: Right L4-5 transforaminal epidural steroid injection 70607;  Surgeon: Gopal Boss MD;  [facility];  Service: Pain Management     EPIDURAL BLOCK INJECTION Right 5/19/2020    Procedure: Right L4-L5 Transforaminal Epidural Steroid Injection (04683); Surgeon: Gopal Boss MD;  [facility];  Service: Pain Management     FL GUIDED NEEDLE PLAC BX/ASP/INJ  1/7/2020    FL GUIDED NEEDLE PLAC BX/ASP/INJ  5/19/2020    FL GUIDED NEEDLE PLAC BX/ASP/INJ  8/28/2020    MT INJECTION,SACROILIAC JOINT Right 8/28/2020    Procedure: Sacroiliac joint injection and piriformis muscle steroid Injection;  Surgeon: Gopal Boss MD;  [facility];  Service: Pain Management     MT REPAIR INTERCARP/CARP-METACARP JT Left 7/30/2018    Procedure:  Thumb Arthrex mini tight rope suspension arthroplasty with trapezial resection left thumb;  Surgeon: Jacob Julian MD;  [facility];  Service: Orthopedics    THYROID SURGERY      near total thyroidectomy        Family History   Problem Relation Age of Onset    Arthritis Mother     Heart disease Mother         CAD    Arthritis Father     Lymphoma Father         Hodgkin's    No Known Problems Sister     No Known Problems Daughter     No Known Problems Maternal Grandmother     No Known Problems Maternal Grandfather     No Known Problems Paternal Grandmother     No Known Problems Paternal Grandfather     No Known Problems Sister     No Known Problems Daughter     No Known Problems Maternal Aunt     No Known Problems Paternal Aunt         Social History     Socioeconomic History    Marital status: /Civil Union     Spouse name: Not on file    Number of children: Not on file    Years of education: Not on file    Highest education level: Not on file   Occupational History    Not on file   Tobacco Use    Smoking status: Never Smoker    Smokeless tobacco: Never Used    Tobacco comment: only smoked as a teenager   Vaping Use    Vaping Use: Never used   Substance and Sexual Activity    Alcohol use: Not Currently     Comment: social    Drug use: Never     Comment: CBD    Sexual activity: Yes     Partners: Male   Other Topics Concern    Not on file   Social History Narrative    No living will     Social Determinants of Health     Financial Resource Strain: Not on file   Food Insecurity: Not on file   Transportation Needs: Not on file   Physical Activity: Not on file   Stress: Not on file   Social Connections: Not on file   Intimate Partner Violence: Not on file   Housing Stability: Not on file        Physical Exam:      /80   Pulse 73   Temp 98 1 °F (36 7 °C)   Ht 5' 4" (1 626 m)   Wt 77 4 kg (170 lb 9 6 oz)   SpO2 98%   BMI 29 28 kg/m²     Physical Exam      Data:     Pre-operative work-up    Laboratory Results: I have personally reviewed the pertinent laboratory results/reports          Assessment:     1  Preoperative clearance     2  Encounter for screening mammogram for breast cancer  Mammo screening bilateral w 3d & cad        Plan:     63 y o  female with planned surgery: L foot tendon repair  1  Further preoperative workup as follows:   - None; no further preoperative work-up is required    2   Patient requires further consultation with: Cardiology    Clearance  Pt cleared for surgery pending cardiac clearance       [facility] PR[facility]  [facility] [address] [address]  Phone#  [phone]  Fax#  [phone]

## 2022-08-01 ENCOUNTER — TELEPHONE (OUTPATIENT)
Dept: CARDIOLOGY CLINIC | Facility: CLINIC | Age: 62
End: 2022-08-01

## 2022-08-01 NOTE — TELEPHONE ENCOUNTER
Called and spoke with patient about results of nuclear stress testing that did not show any evidence of ischemia or infarction   -in the setting of above according the revised cardiac risk index patient is intermediate risk for planned operative procedure  Patient fully understands and is accepting of these risks and wishes to proceed with surgery as planned  In that setting patient is appropriate risk to proceed with surgery as planned  I would recommend telemetry monitoring while in hospital in during procedure along with avoidance of any QTC prolonging medications if possible

## 2022-08-02 ENCOUNTER — APPOINTMENT (OUTPATIENT)
Dept: PREADMISSION TESTING | Facility: HOSPITAL | Age: 62
End: 2022-08-02
Payer: COMMERCIAL

## 2022-08-02 ENCOUNTER — HOSPITAL ENCOUNTER (OUTPATIENT)
Dept: MAMMOGRAPHY | Facility: HOSPITAL | Age: 62
Discharge: HOME | End: 2022-08-02
Payer: COMMERCIAL

## 2022-08-02 VITALS — HEIGHT: 64 IN | BODY MASS INDEX: 29.13 KG/M2 | WEIGHT: 170.64 LBS

## 2022-08-02 DIAGNOSIS — Z12.31 ENCOUNTER FOR SCREENING MAMMOGRAM FOR BREAST CANCER: ICD-10-CM

## 2022-08-02 PROCEDURE — 77063 BREAST TOMOSYNTHESIS BI: CPT

## 2022-08-02 PROCEDURE — 77067 SCR MAMMO BI INCL CAD: CPT

## 2022-08-03 RX ORDER — NYSTATIN 100000 [USP'U]/G
POWDER TOPICAL AS NEEDED
COMMUNITY

## 2022-08-03 NOTE — PRE-PROCEDURE INSTRUCTIONS
Pre-Surgery Instructions:   Medication Instructions    Ascorbic Acid (VITAMIN C) 250 MG CHEW Stop taking 7 days prior to surgery   atorvastatin (LIPITOR) 10 mg tablet Take day of surgery   Calcium 600 MG tablet Stop taking 7 days prior to surgery   celecoxib (CeleBREX) 200 mg capsule Stop taking 3 days prior to surgery   Cholecalciferol (VITAMIN D3) 1000 units CAPS Stop taking 7 days prior to surgery   fluticasone (FLONASE) 50 mcg/act nasal spray Uses PRN- OK to take day of surgery    hydrochlorothiazide (HYDRODIURIL) 12 5 mg tablet Hold day of surgery   ketotifen (ZADITOR) 0 025 % ophthalmic solution Uses PRN- OK to take day of surgery    losartan (COZAAR) 25 mg tablet Hold day of surgery   metoprolol tartrate (LOPRESSOR) 25 mg tablet Take day of surgery   Multiple Vitamin (DAILY VALUE MULTIVITAMIN PO) Stop taking 7 days prior to surgery   Multiple Vitamins-Minerals (PRESERVISION AREDS 2 PO) Stop taking 7 days prior to surgery   nystatin (MYCOSTATIN) powder Hold day of surgery   Omega-3 Fatty Acids (Fish Oil) 1200 MG CAPS Stop taking 7 days prior to surgery  Have you had / have a sore throat? No  Have you had / have a cough less than 1 week? No  Have you had / have a fever greater than 100 0 - 100  4? No  Are you experiencing any shortness of breath? No    Review with patient via phone medications and showering instructions  Instructed to avoid all ASA and OTC Vit/Supp 1 week prior to surgery and to avoid NSAIDs 3 days prior to surgery per anesthesia instructions  Tylenol ok to take prn  [facility] call with surgery schedule time, nothing eat or drink after midnight  Verbalized understanding

## 2022-08-09 NOTE — ANESTHESIA PREPROCEDURE EVALUATION
Procedure:  OPEN REDUCTION W/ INTERNAL FIXATION (ORIF) FOOT, ORIF of the left lis franc ligament (Left Foot)    Relevant Problems   CARDIO   (+) Hypertension      MUSCULOSKELETAL   (+) CMC DJD(carpometacarpal degenerative joint disease), localized primary, left   (+) Chronic bilateral low back pain with right-sided sciatica   (+) Primary osteoarthritis of both knees   (+) Primary osteoarthritis of first carpometacarpal joint of left hand      NEURO/PSYCH   (+) Chronic bilateral low back pain with right-sided sciatica   (+) Chronic pain syndrome        Physical Exam    Airway    Mallampati score: II  TM Distance: >3 FB  Neck ROM: full     Dental   No notable dental hx     Cardiovascular      Pulmonary      Other Findings        Anesthesia Plan  ASA Score- 2     Anesthesia Type- IV sedation with anesthesia with ASA Monitors  Additional Monitors:   Airway Plan:           Plan Factors-Exercise tolerance (METS): >4 METS  Chart reviewed  Existing labs reviewed  Patient summary reviewed  Patient is not a current smoker  Induction- intravenous  Postoperative Plan- Plan for postoperative opioid use  Informed Consent- Anesthetic plan and risks discussed with patient  I personally reviewed this patient with the CRNA  Discussed and agreed on the Anesthesia Plan with the CRNA  Yordy Benson

## 2022-08-11 ENCOUNTER — APPOINTMENT (OUTPATIENT)
Dept: RADIOLOGY | Facility: HOSPITAL | Age: 62
End: 2022-08-11
Payer: OTHER MISCELLANEOUS

## 2022-08-11 ENCOUNTER — ANESTHESIA (OUTPATIENT)
Dept: PERIOP | Facility: HOSPITAL | Age: 62
End: 2022-08-11
Payer: OTHER MISCELLANEOUS

## 2022-08-11 ENCOUNTER — HOSPITAL ENCOUNTER (OUTPATIENT)
Facility: HOSPITAL | Age: 62
Setting detail: OUTPATIENT SURGERY
Discharge: HOME | End: 2022-08-11
Attending: PODIATRIST | Admitting: PODIATRIST
Payer: OTHER MISCELLANEOUS

## 2022-08-11 VITALS
OXYGEN SATURATION: 99 % | SYSTOLIC BLOOD PRESSURE: 187 MMHG | DIASTOLIC BLOOD PRESSURE: 81 MMHG | RESPIRATION RATE: 22 BRPM | TEMPERATURE: 97 F | BODY MASS INDEX: 29.02 KG/M2 | HEART RATE: 77 BPM | HEIGHT: 64 IN | WEIGHT: 170 LBS

## 2022-08-11 DIAGNOSIS — Z98.890 POST-OPERATIVE STATE: ICD-10-CM

## 2022-08-11 DIAGNOSIS — G89.18 ACUTE POST-OPERATIVE PAIN: Primary | ICD-10-CM

## 2022-08-11 DIAGNOSIS — Z29.9 DVT PROPHYLAXIS: ICD-10-CM

## 2022-08-11 PROCEDURE — C1713 ANCHOR/SCREW BN/BN,TIS/BN: HCPCS | Performed by: PODIATRIST

## 2022-08-11 PROCEDURE — 73620 X-RAY EXAM OF FOOT: CPT

## 2022-08-11 PROCEDURE — 76000 FLUOROSCOPY <1 HR PHYS/QHP: CPT | Performed by: PODIATRIST

## 2022-08-11 PROCEDURE — 73630 X-RAY EXAM OF FOOT: CPT

## 2022-08-11 PROCEDURE — 28615 REPAIR FOOT DISLOCATION: CPT | Performed by: PODIATRIST

## 2022-08-11 PROCEDURE — NC001 PR NO CHARGE: Performed by: PODIATRIST

## 2022-08-11 DEVICE — LISFRANC SCREW
Type: IMPLANTABLE DEVICE | Site: FOOT | Status: FUNCTIONAL
Brand: CHARLOTTE

## 2022-08-11 RX ORDER — ACETAMINOPHEN 325 MG/1
650 TABLET ORAL EVERY 4 HOURS PRN
Status: DISCONTINUED | OUTPATIENT
Start: 2022-08-11 | End: 2022-08-11 | Stop reason: HOSPADM

## 2022-08-11 RX ORDER — CEFAZOLIN SODIUM 1 G/50ML
SOLUTION INTRAVENOUS AS NEEDED
Status: DISCONTINUED | OUTPATIENT
Start: 2022-08-11 | End: 2022-08-11

## 2022-08-11 RX ORDER — DEXAMETHASONE SODIUM PHOSPHATE 4 MG/ML
INJECTION, SOLUTION INTRA-ARTICULAR; INTRALESIONAL; INTRAMUSCULAR; INTRAVENOUS; SOFT TISSUE AS NEEDED
Status: DISCONTINUED | OUTPATIENT
Start: 2022-08-11 | End: 2022-08-11

## 2022-08-11 RX ORDER — PROPOFOL 10 MG/ML
INJECTION, EMULSION INTRAVENOUS AS NEEDED
Status: DISCONTINUED | OUTPATIENT
Start: 2022-08-11 | End: 2022-08-11

## 2022-08-11 RX ORDER — PROPOFOL 10 MG/ML
INJECTION, EMULSION INTRAVENOUS CONTINUOUS PRN
Status: DISCONTINUED | OUTPATIENT
Start: 2022-08-11 | End: 2022-08-11

## 2022-08-11 RX ORDER — LIDOCAINE HYDROCHLORIDE 10 MG/ML
INJECTION, SOLUTION EPIDURAL; INFILTRATION; INTRACAUDAL; PERINEURAL AS NEEDED
Status: DISCONTINUED | OUTPATIENT
Start: 2022-08-11 | End: 2022-08-11 | Stop reason: HOSPADM

## 2022-08-11 RX ORDER — LIDOCAINE HYDROCHLORIDE 20 MG/ML
INJECTION, SOLUTION EPIDURAL; INFILTRATION; INTRACAUDAL; PERINEURAL AS NEEDED
Status: DISCONTINUED | OUTPATIENT
Start: 2022-08-11 | End: 2022-08-11 | Stop reason: HOSPADM

## 2022-08-11 RX ORDER — SODIUM CHLORIDE, SODIUM LACTATE, POTASSIUM CHLORIDE, CALCIUM CHLORIDE 600; 310; 30; 20 MG/100ML; MG/100ML; MG/100ML; MG/100ML
125 INJECTION, SOLUTION INTRAVENOUS CONTINUOUS
Status: DISCONTINUED | OUTPATIENT
Start: 2022-08-11 | End: 2022-08-11 | Stop reason: HOSPADM

## 2022-08-11 RX ORDER — ONDANSETRON 2 MG/ML
4 INJECTION INTRAMUSCULAR; INTRAVENOUS EVERY 6 HOURS PRN
Status: DISCONTINUED | OUTPATIENT
Start: 2022-08-11 | End: 2022-08-11 | Stop reason: HOSPADM

## 2022-08-11 RX ORDER — ASPIRIN 325 MG
325 TABLET ORAL DAILY
Qty: 30 TABLET | Refills: 0 | Status: SHIPPED | OUTPATIENT
Start: 2022-08-11

## 2022-08-11 RX ORDER — CHLORHEXIDINE GLUCONATE 0.12 MG/ML
15 RINSE ORAL ONCE
Status: COMPLETED | OUTPATIENT
Start: 2022-08-11 | End: 2022-08-11

## 2022-08-11 RX ORDER — FENTANYL CITRATE/PF 50 MCG/ML
25 SYRINGE (ML) INJECTION
Status: DISCONTINUED | OUTPATIENT
Start: 2022-08-11 | End: 2022-08-11 | Stop reason: HOSPADM

## 2022-08-11 RX ORDER — MIDAZOLAM HYDROCHLORIDE 2 MG/2ML
INJECTION, SOLUTION INTRAMUSCULAR; INTRAVENOUS AS NEEDED
Status: DISCONTINUED | OUTPATIENT
Start: 2022-08-11 | End: 2022-08-11

## 2022-08-11 RX ORDER — OXYCODONE HYDROCHLORIDE AND ACETAMINOPHEN 5; 325 MG/1; MG/1
1 TABLET ORAL EVERY 6 HOURS PRN
Qty: 20 TABLET | Refills: 0 | Status: SHIPPED | OUTPATIENT
Start: 2022-08-11 | End: 2022-08-21

## 2022-08-11 RX ORDER — CEFAZOLIN SODIUM 1 G/50ML
1000 SOLUTION INTRAVENOUS ONCE
Status: DISCONTINUED | OUTPATIENT
Start: 2022-08-11 | End: 2022-08-11 | Stop reason: HOSPADM

## 2022-08-11 RX ORDER — ONDANSETRON 2 MG/ML
INJECTION INTRAMUSCULAR; INTRAVENOUS AS NEEDED
Status: DISCONTINUED | OUTPATIENT
Start: 2022-08-11 | End: 2022-08-11

## 2022-08-11 RX ORDER — OXYCODONE HYDROCHLORIDE 5 MG/1
5 TABLET ORAL EVERY 4 HOURS PRN
Status: DISCONTINUED | OUTPATIENT
Start: 2022-08-11 | End: 2022-08-11 | Stop reason: HOSPADM

## 2022-08-11 RX ORDER — BUPIVACAINE HYDROCHLORIDE 2.5 MG/ML
INJECTION, SOLUTION EPIDURAL; INFILTRATION; INTRACAUDAL AS NEEDED
Status: DISCONTINUED | OUTPATIENT
Start: 2022-08-11 | End: 2022-08-11 | Stop reason: HOSPADM

## 2022-08-11 RX ORDER — FENTANYL CITRATE 50 UG/ML
INJECTION, SOLUTION INTRAMUSCULAR; INTRAVENOUS AS NEEDED
Status: DISCONTINUED | OUTPATIENT
Start: 2022-08-11 | End: 2022-08-11

## 2022-08-11 RX ORDER — ONDANSETRON 2 MG/ML
4 INJECTION INTRAMUSCULAR; INTRAVENOUS ONCE AS NEEDED
Status: DISCONTINUED | OUTPATIENT
Start: 2022-08-11 | End: 2022-08-11 | Stop reason: HOSPADM

## 2022-08-11 RX ADMIN — SODIUM CHLORIDE, SODIUM LACTATE, POTASSIUM CHLORIDE, AND CALCIUM CHLORIDE 125 ML/HR: .6; .31; .03; .02 INJECTION, SOLUTION INTRAVENOUS at 10:32

## 2022-08-11 RX ADMIN — FENTANYL CITRATE 25 MCG: 50 INJECTION, SOLUTION INTRAMUSCULAR; INTRAVENOUS at 12:24

## 2022-08-11 RX ADMIN — ACETAMINOPHEN 650 MG: 325 TABLET ORAL at 13:50

## 2022-08-11 RX ADMIN — CHLORHEXIDINE GLUCONATE 0.12% ORAL RINSE 15 ML: 1.2 LIQUID ORAL at 10:25

## 2022-08-11 RX ADMIN — PROPOFOL 20 MG: 10 INJECTION, EMULSION INTRAVENOUS at 11:44

## 2022-08-11 RX ADMIN — DEXAMETHASONE SODIUM PHOSPHATE 4 MG: 4 INJECTION, SOLUTION INTRAMUSCULAR; INTRAVENOUS at 11:58

## 2022-08-11 RX ADMIN — PROPOFOL 50 MG: 10 INJECTION, EMULSION INTRAVENOUS at 11:41

## 2022-08-11 RX ADMIN — CEFAZOLIN SODIUM 1000 MG: 1 SOLUTION INTRAVENOUS at 11:46

## 2022-08-11 RX ADMIN — ONDANSETRON 4 MG: 2 INJECTION INTRAMUSCULAR; INTRAVENOUS at 11:57

## 2022-08-11 RX ADMIN — FENTANYL CITRATE 50 MCG: 50 INJECTION, SOLUTION INTRAMUSCULAR; INTRAVENOUS at 11:41

## 2022-08-11 RX ADMIN — FENTANYL CITRATE 25 MCG: 50 INJECTION, SOLUTION INTRAMUSCULAR; INTRAVENOUS at 12:11

## 2022-08-11 RX ADMIN — PROPOFOL 80 MCG/KG/MIN: 10 INJECTION, EMULSION INTRAVENOUS at 11:43

## 2022-08-11 RX ADMIN — MIDAZOLAM HYDROCHLORIDE 2 MG: 1 INJECTION, SOLUTION INTRAMUSCULAR; INTRAVENOUS at 11:36

## 2022-08-11 NOTE — DISCHARGE SUMMARY
Discharge Summary Outpatient Procedure Podiatry -   Jacqueline Maciel 63 y o  female MRN: [id number]  Unit/Bed#: OR POOL Encounter: [id number]    Admission Date: 8/11/2022     Admitting Diagnosis: Lisfranc's sprain, left, initial encounter [[id number] [id number]]    Discharge Diagnosis: same    Procedures Performed: OPEN REDUCTION W/ INTERNAL FIXATION (ORIF) FOOT, ORIF of the left lis franc ligament: 95055 (CPT®)    Complications: none    Condition at Discharge: stable    Discharge instructions/Information to patient and family:   See after visit summary for information provided to patient and family  Provisions for Follow-Up Care/Important appointments:  See after visit summary for information related to follow-up care and any pertinent home health orders  Discharge Medications:  See after visit summary for reconciled discharge medications provided to patient and family

## 2022-08-11 NOTE — INTERVAL H&P NOTE
H&P reviewed  After examining the patient I find no changes in the patients condition since the H&P had been written      Vitals:    08/11/22 1022   BP: (!) 187/91   Pulse: 77   Resp: 18   Temp: 97 7 °F (36 5 °C)   SpO2: 99%

## 2022-08-11 NOTE — DISCHARGE INSTRUCTIONS
Dr Edith Christina, DPM  Post-op surgery Instructions    Pain / Swelling  There is expected to be some discomfort, swelling and bruising of the foot  You might see some blood on the bandage  This is not a cause for alarm  However, if there is active or persistent bleeding (blood running out of the bandage while at rest) - call the office at once (or) go to a [facility] ER and ask them to page the podiatry residents  Apply an ice bag to the top of your ankle for 30 minutes for each waking hour, for the first 72 hours  This should be discontinued when sleeping  This will also work through your cast if you have one  Ice must not leak and wet the dressings  Also, using the ice inappropriately can cause permanent nerve damage  Your foot should be elevated as much as possible for the first 72 hours  The foot should be above heart level  If your foot is below heart level, throbbing and pain will increase  When sleeping, elevation can be accomplished by putting a small hard suitcase between the box spring and mattress at the foot of the bed  Walking and standing will increase pain, throbbing and bleeding  Persistent pain despite elevation and your pain meds can many times be relieved by removing the tight brown compression layer (called the ACE wrap) that is over the white gauze dressing  If you are elevating and taking your pain meds and pain is still severe, remove this brown stretchy layer but leave the gauze intact  Wait 30 minutes  If the pain subsides, reapply the ACE so its not so tight  If pain doesnt get better, call your doctor  Dressings / Casts  Do not remove your surgical dressings - they will be changed at your doctor appointment  Do not allow surgical dressings to get wet  Sponge baths should be used until the sutures are removed  Do not try to keep the foot dry using a garbage bag and tape - this rarely works  If you get your dressings or cast wet - call your doctor immediately    If your cast or dressings feel tight - elevate your foot for 30 minutes  If this doesnt help and you feel tingling or see toe discoloration - call your doctor or go to a [facility] ER and ask them to page the podiatry residents  Do not put things in your cast such as powder, coat hangers to scratch, etc  This can cause skin damage and infections  Infection  If you have a fever at or above 100 degrees, chills, sweats, or see red streaks rising above the dressing or smell odor / see pus (creamy white drainage), call your doctor immediately or go to a [facility] ER and ask them to page the podiatry residents  Constipation  If you have severe constipation after surgery, this can be due to the pain medication  Notify your doctor and special medication will be prescribed to deal with this  Blood Clots  If you had surgery and are in a cast, have an external fixation device, or are non-weightbearing using crutches, a knee scooter, a wheelchair, a walker, or an iWalk device - you need to be on a blood thinner  Your doctor will prescribe one of the regimens below  If you run out of the blood thinner checked off below before you are walking normally on your foot and out of your cast - notify your doctor immediately so you can get a refill  Not doing so can lead to blood clots and serious complications including death  Aspirin 325mg daily    Numbness  It is normal for your foot to be numb until about dinner time  If youve had a popliteal block procedure, you might be numb until the following day  When you start to feel pins and needles in the foot - this means the block is wearing off  That is the appropriate time to take your pain medication  Pain Medication  Do not supplement your pain medication with over the counter drugs, old leftover pain medications, or extra Tylenol  You must discuss any additional medications with your doctor prior to taking them for pain     Driving  No driving is allowed without discussion with the doctor  Ambulation  Nonweightbearing to surgical foot  If given a flat, stiff shoe / darco wedge shoe, Do not walk at all without it  Use a device (cane, walker, crutches) to take some weight off of the foot when walking  If instructed not to put weight on the surgical foot, use the following:  Crutches     Putting weight on the foot will lead to complications

## 2022-08-11 NOTE — OP NOTE
OPERATIVE REPORT - Podiatry  PATIENT NAME: Foreign Reasons    :  1960  MRN: [id number]  Pt Location: [facility] OR ROOM 02    SURGERY DATE: 2022    Surgeon(s) and Role:     * Sena Arnold DPM - Primary     * Titus Johnston DPM - Assisting    Pre-op Diagnosis:  Lisfranc's sprain, left, initial encounter [S93 622A]    Post-Op Diagnosis Codes:     * Lisfranc's sprain, left, initial encounter [P86 050T]    Procedure(s) (LRB):  OPEN REDUCTION W/ INTERNAL FIXATION (ORIF) FOOT, ORIF of the left lis franc ligament (Left)    Specimen(s):  * No specimens in log *    Estimated Blood Loss:   Minimal    Drains:  * No LDAs found *    Anesthesia Type:   Conscious Sedation  with 20 ml of 2% Lidocaine and 0 25% Bupivacaine in a 1:1 mixture    Hemostasis:  -18in Pneumatic ankle tourniquet inflated to 250mmHg for approximately 27 minutes  -manual compression  -Atraumatic technique    Materials:  Implant Name Type Inv  Item Serial No   Lot No  LRB No  Used Action   3 7 x 32mm screw      Left 1 Implanted     -3-0 vicryl  -4-0 nylon    Operative Findings:  Consistent with diagnosis    Complications:   None    Procedure and Technique:     Under mild sedation, the patient was brought into the operating room and placed on the operating room table in the supine position  IV sedation was achieved by anesthesia team and a universal timeout was performed where all parties are in agreement of correct patient, correct procedure and correct site  A pneumatic tourniquet was then placed over the patient's left lower extremity with ample padding  A ankle block was performed consisting of 20 ml of 2% Lidocaine and 0 5% Bupivacaine in a 1:1 mixture  The foot was then prepped and draped in the usual aseptic manner  An esmarch bandage was used to exsangunate the foot and the pneumatic tourniquet was then inflated to 250mmHg  Attention was then directed to the left foot   A freer elevator under fluoroscopy was utilized to anthony out the lateral base of the second metatarsal and the medial aspect of the medial cuneiform  A surgical blade was utilized to make a stab incision in both locations  Blunt dissection to bone was performed with a hemostat  The [facility] Lisfranc guide was inserted onto the foot as per manufacturers guidelines  The 2nd metatarsal was reduced with the provided reduction clamp  Using the guide, a provisional K-wire was inserted across the medial cuneiform and into the second metatarsal base  Its position was assessed under fluoroscopy and was noted to be excellent  It was measured and a 32mm screw was deemed to be appropriate  An overdrill was performed over the k-wire  The 32mm arti screw as noted above in implants was inserted across the joint  Final position of the screw was assessed under fluoroscopy and was noted to be excellent  The incision sites were then irrigated with a bulb syringe and normal sterile saline  Deep layers were closed with 3-0 vicryl  Skin was reapproximated with 4-0 nylon in horizontal mattress technique  The foot was then cleansed and dried and the incision site was dressed with adaptic, DSD, webrill and a posterior splint  This was secured with a 4in and a 6in ACE bandage  The tourniquet was deflated at approximately 27 min and normal hyperemic response was noted to all digits  The patient tolerated the procedure and anesthesia well without immediate complications and transferred to PACU with vital signs stable  Dr Kenny Perdomo was present during the entire procedure and participated in all marroquin aspects  Sherin Souza DPM  DATE: August 11, 2022  TIME: 12:44 PM      Portions of the record may have been created with voice recognition software  Occasional wrong word or "sound a like" substitutions may have occurred due to the inherent limitations of voice recognition software   Read the chart carefully and recognize, using context, where substitutions have occurred

## 2022-08-11 NOTE — ANESTHESIA POSTPROCEDURE EVALUATION
Post-Op Assessment Note    CV Status:  Stable  Pain Score: 0    Pain management: satisfactory to patient     Mental Status:  Awake   Hydration Status:  Stable   PONV Controlled:  None   Airway Patency:  Patent      Post Op Vitals Reviewed: Yes      Staff: Anesthesiologist, CRNA         No complications documented      BP  147/80   Temp   97 7   Pulse  80   Resp   15   SpO2   96

## 2022-08-15 ENCOUNTER — TELEPHONE (OUTPATIENT)
Dept: FAMILY MEDICINE CLINIC | Facility: CLINIC | Age: 62
End: 2022-08-15

## 2022-08-15 DIAGNOSIS — J01.01 ACUTE RECURRENT MAXILLARY SINUSITIS: Primary | ICD-10-CM

## 2022-08-15 RX ORDER — AMOXICILLIN 500 MG/1
500 CAPSULE ORAL EVERY 8 HOURS SCHEDULED
Qty: 30 CAPSULE | Refills: 0 | Status: SHIPPED | OUTPATIENT
Start: 2022-08-15 | End: 2022-08-25

## 2022-08-15 NOTE — TELEPHONE ENCOUNTER
Requesting amoxicillin to [facility] for a sinus infection    She had foot surgery on Thursday and is non weight bearing  Did not test for covid, has no test kits at home

## 2022-08-18 ENCOUNTER — APPOINTMENT (OUTPATIENT)
Dept: RADIOLOGY | Facility: MEDICAL CENTER | Age: 62
End: 2022-08-18
Payer: COMMERCIAL

## 2022-08-18 ENCOUNTER — OFFICE VISIT (OUTPATIENT)
Dept: PODIATRY | Facility: CLINIC | Age: 62
End: 2022-08-18

## 2022-08-18 VITALS — WEIGHT: 170 LBS | BODY MASS INDEX: 29.02 KG/M2 | HEIGHT: 64 IN

## 2022-08-18 DIAGNOSIS — M79.672 LEFT FOOT PAIN: ICD-10-CM

## 2022-08-18 DIAGNOSIS — M79.672 LEFT FOOT PAIN: Primary | ICD-10-CM

## 2022-08-18 PROCEDURE — 73630 X-RAY EXAM OF FOOT: CPT

## 2022-08-18 PROCEDURE — 99024 POSTOP FOLLOW-UP VISIT: CPT | Performed by: PODIATRIST

## 2022-08-18 NOTE — LETTER
August 18, 2022     Patient: Spike Trevino  YOB: 1960  Date of Visit: 8/18/2022      To Whom it May Concern:    Pio Sheriff is under my professional care  Cary Phillips was seen in my office on 8/18/2022  Cary Phillips is unable to work for the next 5 weeks until cleared by the Jm Galeana She will then have a week to transition from boot to shoe, with return to work for light duty for likely a period of 3-4 weeks  If you have any questions or concerns, please don't hesitate to call           Sincerely,          Rocío Moreno DPM        CC: Spike Trevino

## 2022-08-18 NOTE — PROGRESS NOTES
PATIENT:  Spenser Crump      1960    ASSESSMENT     1  Left foot pain  X-ray foot left 3+ views          PLAN  Patient is doing well post-operatively  Sutures left intact  Incision was cleaned with betadine and DSD applied to be kept C/D/I  Continue post-op care as instructed  Stressed on patient compliance about proper off-loading, staying off of feet, and proper dressing care  Call if any increase in pain, fevers, calf pain, shortness of breath, or general distress is noted  Patient instructed to go to ER if call is not returned immediately  - she is unable to work for the next 5 weeks until cleared by me  She will then have a week to transition from boot to shoe, with return to work for light duty for likely a period of 3-4 weeks  -x-rays taken reviewed and show good adequate placement of Lisfranc screw, she will need screw removal in 10 to 12 weeks    HISTORY OF PRESENT ILLNESS  Patient presents for post-op appointment  Post-op pain is under control and resolving well  The patient is feeling well and in good spirits  Patient reported no post-op concern  Patient relates compliance with surgical shoe, elevation, and keeping dressing clean, dry and intact  REVIEW OF SYSTEMS  GENERAL: No fever or chills  HEART: No chest pain, or palpitation  RESPIRATORY:  No SOB or cough  GI: No Nausea, vomit or diarrhea  NEUROLOGIC: No syncope or acute weakness  MUSCULOSKELETAL: No calf pain or edema  PHYSICAL EXAMINATION  GENERAL  The patient appears in NAD / non-toxic  Afebrile  VSS    VASCULAR EXAM  Pedal pulses and vascular status are intact  No calf pain or edema bilaterally  No cyanosis  DERMATOLOGIC EXAM  Incision is coapted and healing well  No signs of infection  No active drainage  Normal post-op edema and ecchymosis  No necrosis or dehiscence  NEUROLOGIC EXAM  AAO X 3  No focal neurologic deficit  Neurologic status is intact BLE      MUSCULOSKELETAL EXAM  Good surgical correction  Normal post-op findings  ROM intact  No fluctuation or crepitus

## 2022-08-26 ENCOUNTER — OFFICE VISIT (OUTPATIENT)
Dept: PODIATRY | Facility: CLINIC | Age: 62
End: 2022-08-26

## 2022-08-26 VITALS
SYSTOLIC BLOOD PRESSURE: 132 MMHG | BODY MASS INDEX: 29.02 KG/M2 | WEIGHT: 170 LBS | OXYGEN SATURATION: 97 % | HEART RATE: 71 BPM | HEIGHT: 64 IN | DIASTOLIC BLOOD PRESSURE: 70 MMHG

## 2022-08-26 DIAGNOSIS — Z98.890 STATUS POST LEFT FOOT SURGERY: ICD-10-CM

## 2022-08-26 DIAGNOSIS — S93.325A DISLOCATION OF TARSOMETATARSAL JOINT OF LEFT FOOT, INITIAL ENCOUNTER: Primary | ICD-10-CM

## 2022-08-26 PROCEDURE — 99024 POSTOP FOLLOW-UP VISIT: CPT | Performed by: STUDENT IN AN ORGANIZED HEALTH CARE EDUCATION/TRAINING PROGRAM

## 2022-08-27 NOTE — PROGRESS NOTES
Assessment/Plan:    No problem-specific Assessment & Plan notes found for this encounter  Diagnoses and all orders for this visit:    Dislocation of tarsometatarsal joint of left foot, initial encounter    Status post left foot surgery      Plan:     -  Patient was counseled and educated on the condition and the diagnosis  The diagnosis, treatment options and prognosis were discussed in detail  - patient is doing great status post left foot Lisfranc ORIF   sutures removed incisions healed  Steri-Strips applied  Continue postop care as instructed  Nonweightbearing with Cam boot  - follow-up with   [facility] in 2 weeks      Subjective:      Patient ID: Thuy Martini is a 63 y o  female  30-year-old female presents status post left Lisfranc ORIF with   [facility]  Patient reports no complaints at this time  Denies pain or edema  She had a Band-Aid on incision sites and has been showering  Has been maintaining nonweightbearing status with Cam boot  No other complaints at this time  The following portions of the patient's history were reviewed and updated as appropriate: She  has a past medical history of Arthritis, Hyperlipidemia, Hypertension, Intracranial aneurysm, Irregular heart beat, PONV (postoperative nausea and vomiting), PVC (premature ventricular contraction), and Reactive depression       Review of Systems   Constitutional: Negative for chills  Respiratory: Negative for shortness of breath  Gastrointestinal: Negative for diarrhea and nausea  Musculoskeletal: Negative for arthralgias  Skin: Positive for color change  Negative for wound  Neurological: Negative for dizziness  Psychiatric/Behavioral: Negative for agitation           Objective:      /70 (BP Location: Right arm, Patient Position: Sitting, Cuff Size: Standard)   Pulse 71   Ht 5' 4" (1 626 m)   Wt 77 1 kg (170 lb)   SpO2 97%   BMI 29 18 kg/m²          Physical Exam  Musculoskeletal:      Left foot: Tenderness present  Comments: Mild tenderness to touch the incision sites  Incision sites have healed and sutures removed  No erythema or edema noted to the foot  Neurovascular status intact musculoskeletal status intact

## 2022-09-08 ENCOUNTER — OFFICE VISIT (OUTPATIENT)
Dept: PODIATRY | Facility: CLINIC | Age: 62
End: 2022-09-08

## 2022-09-08 VITALS
BODY MASS INDEX: 29.02 KG/M2 | SYSTOLIC BLOOD PRESSURE: 126 MMHG | HEIGHT: 64 IN | DIASTOLIC BLOOD PRESSURE: 79 MMHG | WEIGHT: 170 LBS

## 2022-09-08 DIAGNOSIS — Z98.890 STATUS POST LEFT FOOT SURGERY: ICD-10-CM

## 2022-09-08 DIAGNOSIS — S93.325D DISLOCATION OF TARSOMETATARSAL JOINT OF LEFT FOOT, SUBSEQUENT ENCOUNTER: Primary | ICD-10-CM

## 2022-09-08 PROCEDURE — 99024 POSTOP FOLLOW-UP VISIT: CPT | Performed by: PODIATRIST

## 2022-09-08 NOTE — LETTER
September 8, 2022     Patient: Ravi Mays  YOB: 1960  Date of Visit: 9/8/2022      To Whom it May Concern:    Samuel Vela is under my professional care  Jaguar Montaño was seen in my office on 9/8/2022  Jaguar Montaño may return to work on hopefully in 3-4 weeks from now on light duty following repeat evaluation by Nazanin Sapp in 2 weeks       If you have any questions or concerns, please don't hesitate to call           Sincerely,          Mika Raygoza DPM        CC: No Recipients

## 2022-09-08 NOTE — PROGRESS NOTES
Assessment/Plan:      Diagnoses and all orders for this visit:    Dislocation of tarsometatarsal joint of left foot, subsequent encounter    Status post left foot surgery       -patient is now weight-bearing as tolerated to the left foot in Cam boot, she may perform stairs, she may discontinue the cam boot when she is not on her feet I encouraged her to pad practice passive range of motion exercises  -her Tinel's is likely due to her change in lifestyle over the past 4 weeks she is likely very sedentary probably having some issue with a compressive neuropathy   -she is to return to clinic in 2 weeks for repeat x-rays and hopeful transition to normal shoes pending pain  Subjective:     Patient ID: Buzz Sprague is a 63 y o  female  Patient presents for evaluation and management of her left foot status post Sanam screw placement approximately 4 weeks ago  She has been compliant with nonweightbearing presents today with her boot and wheelchair  She notes that she is having some numbness burning and tingling on the bottom of her foot and some hypersensitivity as well  She denies any other pain not complaining of pain at surgical site      Review of Systems   Constitutional: Negative for chills and fever  HENT: Negative for ear pain and sore throat  Eyes: Negative for pain and visual disturbance  Respiratory: Negative for cough and shortness of breath  Cardiovascular: Negative for chest pain and palpitations  Gastrointestinal: Negative for abdominal pain and vomiting  Genitourinary: Negative for dysuria and hematuria  Musculoskeletal: Negative for arthralgias and back pain  Skin: Negative for color change and rash  Neurological: Negative for seizures and syncope  All other systems reviewed and are negative  Objective:     Physical Exam  Vitals reviewed  Constitutional:       Appearance: Normal appearance  HENT:      Head: Normocephalic and atraumatic  Mouth/Throat:      Mouth: Mucous membranes are moist    Eyes:      Pupils: Pupils are equal, round, and reactive to light  Musculoskeletal:         General: Swelling and tenderness present  Comments: Positive paresthesias and hypersensitivity with Tinel's sign overlying the posterior tibial nerve on the left foot  No tenderness with range of motion TMTJ  Skin:     Capillary Refill: Capillary refill takes less than 2 seconds  Neurological:      General: No focal deficit present  Mental Status: She is alert and oriented to person, place, and time

## 2022-09-22 ENCOUNTER — OFFICE VISIT (OUTPATIENT)
Dept: PODIATRY | Facility: CLINIC | Age: 62
End: 2022-09-22

## 2022-09-22 ENCOUNTER — APPOINTMENT (OUTPATIENT)
Dept: RADIOLOGY | Facility: MEDICAL CENTER | Age: 62
End: 2022-09-22
Payer: COMMERCIAL

## 2022-09-22 VITALS
HEIGHT: 64 IN | BODY MASS INDEX: 29.02 KG/M2 | SYSTOLIC BLOOD PRESSURE: 118 MMHG | WEIGHT: 170 LBS | DIASTOLIC BLOOD PRESSURE: 72 MMHG

## 2022-09-22 DIAGNOSIS — Z98.890 STATUS POST LEFT FOOT SURGERY: ICD-10-CM

## 2022-09-22 DIAGNOSIS — M79.672 LEFT FOOT PAIN: ICD-10-CM

## 2022-09-22 DIAGNOSIS — M79.672 LEFT FOOT PAIN: Primary | ICD-10-CM

## 2022-09-22 PROCEDURE — 73630 X-RAY EXAM OF FOOT: CPT

## 2022-09-22 PROCEDURE — 99024 POSTOP FOLLOW-UP VISIT: CPT | Performed by: PODIATRIST

## 2022-09-22 RX ORDER — ASPIRIN 325 MG/1
TABLET, COATED ORAL
COMMUNITY
Start: 2022-08-11

## 2022-09-22 NOTE — LETTER
September 22, 2022     Patient: Sebastian Ward  YOB: 1960  Date of Visit: 9/22/2022      To Whom it May Concern:    Ebonie Carmen is under my professional care  Adonay Gardiner was seen in my office on 9/22/2022  Adonay Gardiner may return to work on Hopefully in 3 weeks after re-evaluation by physician  If you have any questions or concerns, please don't hesitate to call           Sincerely,          Jean Law DPM        CC: No Recipients

## 2022-09-22 NOTE — PROGRESS NOTES
Assessment/Plan:    No problem-specific Assessment & Plan notes found for this encounter  Diagnoses and all orders for this visit:    Left foot pain  -     X-ray foot left 3+ views; Future    Status post left foot surgery  -     X-ray foot left 3+ views; Future    Other orders  -     GNP Aspirin 325 MG EC tablet      -x-rays three views weight-bearing were taken reviewed of the left foot and show good maintenance of sterile screw with placement     -she is to continue transitioning out of the cam boot, she is to purchase rigid shoes, and also compression sock vs  compression stocking  With her continued swelling and pain, she is unable to work for the next 3 weeks, she is to return in 3 weeks for repeat x-rays, and further assessment of her pain, if she continues to not improve at all postsurgically with application of screw, she will likely need fusion of the 1st and 2nd TMTJ with inner cuneiform fusion as well  Subjective:      Patient ID: Jyothi Capone is a 63 y o  female  Patient presents for evaluation and management of proximally 6 weeks postop from application and insertion of Sanam screw  She presents today in Cam boot, she is unable to transition back into a normal shoe, she is having continued pain over the area, continued swelling  She is also complaining of burning numbness and tingling in the bottom of her feet  The following portions of the patient's history were reviewed and updated as appropriate: allergies, current medications, past family history, past medical history, past social history, past surgical history and problem list     Review of Systems   Constitutional: Negative for chills and fever  HENT: Negative for ear pain and sore throat  Eyes: Negative for pain and visual disturbance  Respiratory: Negative for cough and shortness of breath  Cardiovascular: Negative for chest pain and palpitations  Gastrointestinal: Negative for abdominal pain and vomiting  Genitourinary: Negative for dysuria and hematuria  Musculoskeletal: Negative for arthralgias and back pain  Skin: Negative for color change and rash  Neurological: Negative for seizures and syncope  All other systems reviewed and are negative  Objective:      /72   Ht 5' 4" (1 626 m)   Wt 77 1 kg (170 lb)   BMI 29 18 kg/m²          Physical Exam  Vitals reviewed  Constitutional:       Appearance: Normal appearance  HENT:      Head: Normocephalic and atraumatic  Nose: Nose normal       Mouth/Throat:      Mouth: Mucous membranes are moist    Eyes:      Pupils: Pupils are equal, round, and reactive to light  Cardiovascular:      Pulses: Normal pulses  Pulmonary:      Effort: Pulmonary effort is normal    Musculoskeletal:         General: Swelling and tenderness present  Comments: There is pain with palpation overlying the dorsal aspect of the left foot, pain with range of motion of the 1st TMTJ when compared to the 2nd MPJ  Incisions are healed   Skin:     Capillary Refill: Capillary refill takes less than 2 seconds  Neurological:      General: No focal deficit present  Mental Status: She is alert and oriented to person, place, and time

## 2022-10-13 ENCOUNTER — PREP FOR PROCEDURE (OUTPATIENT)
Dept: OBGYN CLINIC | Facility: CLINIC | Age: 62
End: 2022-10-13

## 2022-10-13 ENCOUNTER — APPOINTMENT (OUTPATIENT)
Dept: RADIOLOGY | Facility: MEDICAL CENTER | Age: 62
End: 2022-10-13
Payer: COMMERCIAL

## 2022-10-13 ENCOUNTER — OFFICE VISIT (OUTPATIENT)
Dept: PODIATRY | Facility: CLINIC | Age: 62
End: 2022-10-13
Payer: OTHER MISCELLANEOUS

## 2022-10-13 VITALS — BODY MASS INDEX: 29.02 KG/M2 | HEIGHT: 64 IN | WEIGHT: 170 LBS

## 2022-10-13 DIAGNOSIS — G89.18 POST-OP PAIN: ICD-10-CM

## 2022-10-13 DIAGNOSIS — S93.622S LISFRANC'S SPRAIN, LEFT, SEQUELA: ICD-10-CM

## 2022-10-13 DIAGNOSIS — G89.18 POST-OP PAIN: Primary | ICD-10-CM

## 2022-10-13 PROCEDURE — 99214 OFFICE O/P EST MOD 30 MIN: CPT | Performed by: PODIATRIST

## 2022-10-13 PROCEDURE — 73630 X-RAY EXAM OF FOOT: CPT

## 2022-10-13 RX ORDER — CHLORHEXIDINE GLUCONATE 0.12 MG/ML
15 RINSE ORAL ONCE
OUTPATIENT
Start: 2022-10-13 | End: 2022-10-13

## 2022-10-13 NOTE — LETTER
October 13, 2022     Patient: Abdoul Corbin  YOB: 1960  Date of Visit: 10/13/2022      To Whom it May Concern:    Mary Olszewski is under my professional care and was seen in my office on 10/13/2022She will remain out of work until cleared by Sarah Del Rosario  If you have any questions or concerns, please don't hesitate to call           Sincerely,          Magalene Cushing, DPM        CC: Abdoul Corbin

## 2022-10-13 NOTE — PROGRESS NOTES
Assessment/Plan:    No problem-specific Assessment & Plan notes found for this encounter  Diagnoses and all orders for this visit:    Post-op pain  -     X-ray foot left 3+ views; Future    Lisfranc's sprain, left, sequela      -she initially had a neglected Lisfranc injury and we attempted to fix his ligamentous injury with a Sanam screw with 6 weeks of nonweightbearing, and she is now about 2 months out and having continued pain  -her pain is not Betadine not improving at all with weight-bearing and I fear that motion across this joint has not been restored and ligament is not healed   -we will plan for a 1st, 2nd TMTJ fusion with an inner cuneiform fusion decreased range of motion across his ligament   -she understands the element of risks of continued pain but her activities of daily living are severely impinged at this point   -will plan for fusion  She would need to be 6 weeks strict nonweightbearing and followed around 2-4 weeks in Cam boot within transition back to normal shoes   -due to the nature of this injury, she would benefit from orthotics in the future and rigid shoes to restore her full function hopefully    Subjective:      Patient ID: Ce Stern is a 63 y o  female  Patient presents for evaluation management of continued left foot pain she did have surgery back in August and is having continued pain  She initially suffered this injury more than a year ago  She is ambulating today in normal shoes and using a compression stocking for her swelling in her pain  She notes that after about 10 minutes on her foot she is having continued pain  An unable to perform her activities of daily living        The following portions of the patient's history were reviewed and updated as appropriate: allergies, current medications, past family history, past medical history, past social history, past surgical history and problem list     Review of Systems   Constitutional: Negative for chills and fever  HENT: Negative for ear pain and sore throat  Eyes: Negative for pain and visual disturbance  Respiratory: Negative for cough and shortness of breath  Cardiovascular: Negative for chest pain and palpitations  Gastrointestinal: Negative for abdominal pain and vomiting  Genitourinary: Negative for dysuria and hematuria  Musculoskeletal: Negative for arthralgias and back pain  Skin: Negative for color change and rash  Neurological: Negative for seizures and syncope  All other systems reviewed and are negative  Objective:      Ht 5' 4" (1 626 m)   Wt 77 1 kg (170 lb)   BMI 29 18 kg/m²          Physical Exam  Vitals reviewed  Constitutional:       Appearance: Normal appearance  HENT:      Head: Normocephalic and atraumatic  Nose: Nose normal       Mouth/Throat:      Mouth: Mucous membranes are moist    Eyes:      Pupils: Pupils are equal, round, and reactive to light  Cardiovascular:      Pulses: Normal pulses  Pulmonary:      Effort: Pulmonary effort is normal    Musculoskeletal:         General: Swelling and tenderness present  Comments: There is pain with range of motion of the 1st TMTJ and also overlying the Lisfranc ligament  There is continued swelling and pain  Skin:     Capillary Refill: Capillary refill takes less than 2 seconds  Neurological:      General: No focal deficit present  Mental Status: She is alert and oriented to person, place, and time

## 2022-10-17 ENCOUNTER — PREP FOR PROCEDURE (OUTPATIENT)
Dept: PODIATRY | Facility: CLINIC | Age: 62
End: 2022-10-17

## 2022-10-17 ENCOUNTER — CONSULT (OUTPATIENT)
Dept: CARDIOLOGY CLINIC | Facility: CLINIC | Age: 62
End: 2022-10-17
Payer: OTHER MISCELLANEOUS

## 2022-10-17 VITALS
HEIGHT: 64 IN | SYSTOLIC BLOOD PRESSURE: 150 MMHG | HEART RATE: 67 BPM | BODY MASS INDEX: 29.53 KG/M2 | DIASTOLIC BLOOD PRESSURE: 80 MMHG | WEIGHT: 173 LBS

## 2022-10-17 DIAGNOSIS — I10 PRIMARY HYPERTENSION: ICD-10-CM

## 2022-10-17 DIAGNOSIS — I49.3 PVC (PREMATURE VENTRICULAR CONTRACTION): Primary | ICD-10-CM

## 2022-10-17 DIAGNOSIS — Z01.810 PREOP CARDIOVASCULAR EXAM: ICD-10-CM

## 2022-10-17 DIAGNOSIS — E66.3 OVERWEIGHT (BMI 25.0-29.9): ICD-10-CM

## 2022-10-17 DIAGNOSIS — E78.5 HYPERLIPIDEMIA, UNSPECIFIED HYPERLIPIDEMIA TYPE: ICD-10-CM

## 2022-10-17 PROCEDURE — 93000 ELECTROCARDIOGRAM COMPLETE: CPT | Performed by: INTERNAL MEDICINE

## 2022-10-17 PROCEDURE — 99214 OFFICE O/P EST MOD 30 MIN: CPT | Performed by: INTERNAL MEDICINE

## 2022-10-17 NOTE — PROGRESS NOTES
Cardiology Consultation     Nova Lozano  [id number]  1960  [facility], [facility] [address] [address]      1  PVC (premature ventricular contraction)  POCT ECG   2  Primary hypertension     3  Overweight (BMI 25 0-29 9)     4  Hyperlipidemia, unspecified hyperlipidemia type     5  Preop cardiovascular exam         Discussion/Summary:  1  Frequent asymptomatic PVCs  2  Hypertension  3  Hyperlipidemia  4  Overweight  5  Perioperative risk stratification  6  MVP    -EKG performed in the office today shows sinus rhythm with frequent PVCs, criteria for LVH heart rate 67 beats per minute  -transthoracic echocardiogram 06/30/2022 showing left ventricular systolic function normal estimated LVEF 55% with normal right ventricular systolic function, mildly dilated left atrium, mild mitral thickening with systolic bowing of the posterior leaflets with mild-to-moderate mitral regurgitation  -nuclear stress testing 07/25/2022 showing no evidence of ischemia or infarction  -as patient notes blood pressures at home are very well controlled overall she is feeling well on current medical therapy will continue atorvastatin 10 mg daily, hydrochlorothiazide 12 5 mg daily, losartan 12 5 mg daily, metoprolol tartrate 25 mg twice daily  -according the revised cardiac risk index patient is intermediate risk for planned operative procedure  Patient fully understands and is accepting of these risks and wishes to proceed with surgery as planned  In that setting patient is appropriate risk to proceed with surgery as planned    I would recommend telemetry monitoring while in hospital and during procedure along with avoidance of any QTC prolonging medications if possible   -I will see patient in 3 months or sooner if necessary  -patient counseled if she were to have any warning or alarm type symptoms she is to seek emergency medical care immediately  -patient counseled on dietary lifestyle modifications including following a low-salt, low-fat, heart healthy diet  History of Present Illness:  - patient is a 82-year-old female with hypertension, overweight, bilateral chronic knee osteoarthritis who initially presented to the office in June of 2022 after being seen in the emergency department in May of 2022 for abnormal EKG after upper respiratory tract infection  Patient at that time was taking over-the-counter decongestants and thought this may have been a contributing factor to her symptoms as she had had some intermittent palpitations at that time and had been for many years prior to  Patient was found have significant PVCs on monitoring of medical therapy was uptitrated with improvement in her symptoms  -patient recently underwent ORIF of ligament in left foot August 12, 2022 and presents today office for scheduled follow-up  Unfortunately patient is still having significant pain postprocedure in left foot and is scheduled to undergo redo surgery 11/17/2022   -patient denies any issues with anesthesia during most recent surgery in overall still denies any chest pain, palpitations, lightheadedness or dizziness, loss of consciousness, shortness of breath, lower extremity edema    Her only complaint during this office visit is pain with ambulation in her left foot    Patient Active Problem List   Diagnosis   • Hypertension   • Chronic bilateral low back pain with right-sided sciatica   • Primary osteoarthritis of both knees   • Primary osteoarthritis of first carpometacarpal joint of left hand   • Trigger middle finger of right hand   • CMC DJD(carpometacarpal degenerative joint disease), localized primary, left   • Chronic pain syndrome   • Lumbar disc herniation   • Lumbar radiculopathy   • Piriformis muscle pain     Past Medical History:   Diagnosis Date   • Arthritis    • Hyperlipidemia • Hypertension    • Intracranial aneurysm     resolved 11/17/17 - possible R P-comm region aneurysm measuring 3mm found on MRI results   • Irregular heart beat    • PONV (postoperative nausea and vomiting)    • PVC (premature ventricular contraction)    • Reactive depression     last assessed 12/15/15     Social History     Socioeconomic History   • Marital status: /Civil Union     Spouse name: Not on file   • Number of children: Not on file   • Years of education: Not on file   • Highest education level: Not on file   Occupational History   • Not on file   Tobacco Use   • Smoking status: Never Smoker   • Smokeless tobacco: Never Used   • Tobacco comment: only smoked as a teenager   Vaping Use   • Vaping Use: Never used   Substance and Sexual Activity   • Alcohol use: Yes     Comment: rare   • Drug use: Never     Comment: CBD   • Sexual activity: Yes     Partners: Male   Other Topics Concern   • Not on file   Social History Narrative    No living will     Social Determinants of Health     Financial Resource Strain: Not on file   Food Insecurity: Not on file   Transportation Needs: Not on file   Physical Activity: Not on file   Stress: Not on file   Social Connections: Not on file   Intimate Partner Violence: Not on file   Housing Stability: Not on file      Family History   Problem Relation Age of Onset   • Arthritis Mother    • Heart disease Mother         CAD   • Arthritis Father    • Lymphoma Father         Hodgkin's   • Breast cancer Sister 77   • No Known Problems Sister    • No Known Problems Daughter    • No Known Problems Daughter    • No Known Problems Maternal Grandmother    • No Known Problems Maternal Grandfather    • No Known Problems Paternal Grandmother    • No Known Problems Paternal Grandfather    • No Known Problems Son    • No Known Problems Maternal Aunt    • No Known Problems Paternal Aunt      Past Surgical History:   Procedure Laterality Date   • ENDOMETRIAL ABLATION  2012   • EPIDURAL BLOCK INJECTION Right 01/07/2020    Procedure: Right L4-5 transforaminal epidural steroid injection 10967;  Surgeon: Tavia Shell MD;  Location: [facility];  Service: Pain Management    • EPIDURAL BLOCK INJECTION Right 05/19/2020    Procedure: Right L4-L5 Transforaminal Epidural Steroid Injection (66189); Surgeon: Tavia Shell MD;  Location: [facility];  Service: Pain Management    • FL GUIDED NEEDLE PLAC BX/ASP/INJ  01/07/2020   • FL GUIDED NEEDLE PLAC BX/ASP/INJ  05/19/2020   • FL GUIDED NEEDLE PLAC BX/ASP/INJ  08/28/2020   • JOINT REPLACEMENT Right     Hip   • MS INJECTION,SACROILIAC JOINT Right 08/28/2020    Procedure: Sacroiliac joint injection and piriformis muscle steroid Injection;  Surgeon: Tavia Shell MD;  Location: [facility];  Service: Pain Management    • MS OPEN TREATMENT TARSOMETATARSAL JOINT DISLOCATION Left 8/11/2022    Procedure: OPEN REDUCTION W/ INTERNAL FIXATION (ORIF) FOOT, ORIF of the left lis franc ligament;  Surgeon: Sena Arnold DPM;  Location: [facility];  Service: Podiatry   • MS REPAIR INTERCARP/CARP-METACARP JT Left 07/30/2018    Procedure: Thumb Arthrex mini tight rope suspension arthroplasty with trapezial resection left thumb;  Surgeon: Violet Sigala MD;  Location: [facility];  Service: Orthopedics   • THYROID SURGERY      near total thyroidectomy       Current Outpatient Medications:   •  Ascorbic Acid (VITAMIN C) 250 MG CHEW, Chew 1 tablet daily, Disp: , Rfl:   •  atorvastatin (LIPITOR) 10 mg tablet, Take 1 tablet (10 mg total) by mouth daily, Disp: 30 tablet, Rfl: 6  •  Calcium 600 MG tablet, Take 2 tablets by mouth daily, Disp: , Rfl:   •  celecoxib (CeleBREX) 200 mg capsule, Take 200 mg by mouth daily , Disp: , Rfl:   •  Cholecalciferol (VITAMIN D3) 1000 units CAPS, Take by mouth daily after breakfast, Disp: , Rfl:   •  fluticasone (FLONASE) 50 mcg/act nasal spray, 1 spray into each nostril in the morning   (Patient taking differently: 1 spray into each nostril as needed), Disp: 16 g, Rfl: 0  •  hydrochlorothiazide (HYDRODIURIL) 12 5 mg tablet, TAKE 1 TABLET DAILY, Disp: 90 tablet, Rfl: 3  •  losartan (COZAAR) 25 mg tablet, Take 0 5 tablets (12 5 mg total) by mouth daily, Disp: 30 tablet, Rfl: 3  •  metoprolol tartrate (LOPRESSOR) 25 mg tablet, Take 1 tablet (25 mg total) by mouth every 12 (twelve) hours, Disp: 90 tablet, Rfl: 3  •  Multiple Vitamin (DAILY VALUE MULTIVITAMIN PO), Take 1 tablet by mouth daily, Disp: , Rfl:   •  Multiple Vitamins-Minerals (PRESERVISION AREDS 2 PO), Take by mouth daily, Disp: , Rfl:   •  nystatin (MYCOSTATIN) powder, Apply topically as needed, Disp: , Rfl:   •  Omega-3 Fatty Acids (Fish Oil) 1200 MG CAPS, Take 1 capsule by mouth daily, Disp: , Rfl:   •  aspirin 325 mg tablet, Take 1 tablet (325 mg total) by mouth daily (Patient not taking: Reported on 10/17/2022), Disp: 30 tablet, Rfl: 0  •  GNP Aspirin 325 MG EC tablet, , Disp: , Rfl:   •  ketotifen (ZADITOR) 0 025 % ophthalmic solution, Administer 1 drop to both eyes 2 (two) times a day as needed (Eye irritation) (Patient not taking: Reported on 10/17/2022), Disp: 5 mL, Rfl: 0  No Known Allergies      Labs:  No visits with results within 2 Month(s) from this visit  Latest known visit with results is:   Hospital Outpatient Visit on 07/25/2022   Component Date Value   • Baseline HR 07/25/2022 62    • Stress peak HR 07/25/2022 92    • Post peak BP 07/25/2022 168 0    • Max HR 07/25/2022 92    • Rate Pressure Product 07/25/2022 15,456 0    • Rest Nuclear Isotope Dose 07/25/2022 11 00    • Stress Nuclear Isotope D* 07/25/2022 31 60    • ST Depression (mm) 07/25/2022 0    • Stress/rest perfusion ra* 07/25/2022 1 20         Imaging: X-ray foot left 3+ views    Result Date: 10/16/2022  Narrative: LEFT FOOT INDICATION:   G89 18: Other acute postprocedural pain    COMPARISON:  Left foot x-ray 9/22/2022 VIEWS:  XR FOOT 3+ VW LEFT Images: 3 FINDINGS: There is no acute fracture or dislocation  Stable orthopedic screw spanning from medial cuneiform to 2nd metatarsal base  No lytic or blastic osseous lesion  Soft tissues are unremarkable  Impression: No acute osseous abnormality  Workstation performed: [id number]     X-ray foot left 3+ views    Result Date: 9/25/2022  Narrative: LEFT FOOT INDICATION:   M79 672: Pain in left foot Z98 890: Other specified postprocedural states  COMPARISON:  Left foot x-ray 8/18/2022 VIEWS:  XR FOOT 3+ VW LEFT Images: 3 FINDINGS: There is no acute fracture or dislocation  Stable orthopedic screw spanning from medial cuneiform to 2nd metatarsal base  No significant degenerative changes  No lytic or blastic osseous lesion  Soft tissues are unremarkable  Impression: Stable postsurgical changes of Lisfranc ligament ORIF  Workstation performed: [id number]         Review of Systems:  Review of Systems   Constitutional: Negative for chills, diaphoresis, fatigue and fever  HENT: Negative for trouble swallowing and voice change  Eyes: Negative for pain and redness  Respiratory: Negative for shortness of breath and wheezing  Cardiovascular: Negative for chest pain, palpitations and leg swelling  Gastrointestinal: Negative for abdominal pain, constipation, diarrhea, nausea and vomiting  Genitourinary: Negative for dysuria  Musculoskeletal: Positive for arthralgias  Skin: Negative for rash  Neurological: Negative for dizziness, syncope, light-headedness and headaches  Psychiatric/Behavioral: Negative for agitation and confusion  All other systems reviewed and are negative  Vitals:    10/17/22 1455   BP: 150/80   Pulse: 67   Weight: 78 5 kg (173 lb)   Height: 5' 4" (1 626 m)     Vitals:    10/17/22 1455   Weight: 78 5 kg (173 lb)     Height: 5' 4" (162 6 cm)     Physical Exam:  Physical Exam  Vitals reviewed  Constitutional:       General: She is not in acute distress  Appearance: Normal appearance   She is not diaphoretic  HENT:      Head: Normocephalic and atraumatic  Eyes:      General:         Right eye: No discharge  Left eye: No discharge  Neck:      Comments: Trachea midline, no JVD present  Cardiovascular:      Rate and Rhythm: Normal rate  Heart sounds: No friction rub  Comments: Irregular rhythm  Pulmonary:      Effort: No respiratory distress  Breath sounds: No wheezing or rhonchi  Chest:      Chest wall: No tenderness  Abdominal:      General: Bowel sounds are normal       Palpations: Abdomen is soft  Tenderness: There is no abdominal tenderness  There is no rebound  Musculoskeletal:      Right lower leg: No edema  Left lower leg: No edema  Skin:     General: Skin is warm and dry  Neurological:      Mental Status: She is alert  Comments: Awake, alert, able to answer questions appropriately, able move extremities bilaterally     Psychiatric:         Mood and Affect: Mood normal          Behavior: Behavior normal

## 2022-10-19 ENCOUNTER — TELEPHONE (OUTPATIENT)
Dept: PODIATRY | Facility: CLINIC | Age: 62
End: 2022-10-19

## 2022-10-19 NOTE — TELEPHONE ENCOUNTER
Caller: Libertad Reyna    Doctor/Office: Joelle Rodriguez DPM    #: [phone]    Escalation: Surgery   As Xi Heredia is going to be non weightbearing for 6 weeks and she has 15 steps in her house, she is requesting an order be put in for a stair lift  Chaymary Gerber, her , can pick it up on 10/20/22 between 3 and 3:30 in the [place] office

## 2022-11-03 ENCOUNTER — PREP FOR PROCEDURE (OUTPATIENT)
Dept: OBGYN CLINIC | Facility: CLINIC | Age: 62
End: 2022-11-03

## 2022-11-08 ENCOUNTER — APPOINTMENT (OUTPATIENT)
Dept: LAB | Facility: MEDICAL CENTER | Age: 62
End: 2022-11-08

## 2022-11-08 ENCOUNTER — CONSULT (OUTPATIENT)
Dept: FAMILY MEDICINE CLINIC | Facility: CLINIC | Age: 62
End: 2022-11-08

## 2022-11-08 VITALS
OXYGEN SATURATION: 98 % | BODY MASS INDEX: 29.88 KG/M2 | HEART RATE: 52 BPM | TEMPERATURE: 97 F | WEIGHT: 175 LBS | HEIGHT: 64 IN | SYSTOLIC BLOOD PRESSURE: 148 MMHG | DIASTOLIC BLOOD PRESSURE: 78 MMHG

## 2022-11-08 DIAGNOSIS — I10 PRIMARY HYPERTENSION: ICD-10-CM

## 2022-11-08 DIAGNOSIS — G89.18 POST-OP PAIN: ICD-10-CM

## 2022-11-08 DIAGNOSIS — Z01.818 PREOP EXAMINATION: Primary | ICD-10-CM

## 2022-11-08 DIAGNOSIS — S93.622S LISFRANC'S SPRAIN, LEFT, SEQUELA: ICD-10-CM

## 2022-11-08 DIAGNOSIS — S93.325A LISFRANC DISLOCATION, LEFT, INITIAL ENCOUNTER: ICD-10-CM

## 2022-11-08 LAB
ANION GAP SERPL CALCULATED.3IONS-SCNC: 6 MMOL/L (ref 4–13)
BASOPHILS # BLD AUTO: 0.05 THOUSANDS/ÂΜL (ref 0–0.1)
BASOPHILS NFR BLD AUTO: 1 % (ref 0–1)
BUN SERPL-MCNC: 15 MG/DL (ref 5–25)
CALCIUM SERPL-MCNC: 9.7 MG/DL (ref 8.3–10.1)
CHLORIDE SERPL-SCNC: 108 MMOL/L (ref 96–108)
CO2 SERPL-SCNC: 24 MMOL/L (ref 21–32)
CREAT SERPL-MCNC: 0.89 MG/DL (ref 0.6–1.3)
EOSINOPHIL # BLD AUTO: 0.16 THOUSAND/ÂΜL (ref 0–0.61)
EOSINOPHIL NFR BLD AUTO: 2 % (ref 0–6)
ERYTHROCYTE [DISTWIDTH] IN BLOOD BY AUTOMATED COUNT: 12.5 % (ref 11.6–15.1)
GFR SERPL CREATININE-BSD FRML MDRD: 70 ML/MIN/1.73SQ M
GLUCOSE SERPL-MCNC: 82 MG/DL (ref 65–140)
HCT VFR BLD AUTO: 44.3 % (ref 34.8–46.1)
HGB BLD-MCNC: 14.6 G/DL (ref 11.5–15.4)
IMM GRANULOCYTES # BLD AUTO: 0.02 THOUSAND/UL (ref 0–0.2)
IMM GRANULOCYTES NFR BLD AUTO: 0 % (ref 0–2)
LYMPHOCYTES # BLD AUTO: 2.01 THOUSANDS/ÂΜL (ref 0.6–4.47)
LYMPHOCYTES NFR BLD AUTO: 23 % (ref 14–44)
MCH RBC QN AUTO: 30.7 PG (ref 26.8–34.3)
MCHC RBC AUTO-ENTMCNC: 33 G/DL (ref 31.4–37.4)
MCV RBC AUTO: 93 FL (ref 82–98)
MONOCYTES # BLD AUTO: 0.55 THOUSAND/ÂΜL (ref 0.17–1.22)
MONOCYTES NFR BLD AUTO: 6 % (ref 4–12)
NEUTROPHILS # BLD AUTO: 5.89 THOUSANDS/ÂΜL (ref 1.85–7.62)
NEUTS SEG NFR BLD AUTO: 68 % (ref 43–75)
NRBC BLD AUTO-RTO: 0 /100 WBCS
PLATELET # BLD AUTO: 306 THOUSANDS/UL (ref 149–390)
PMV BLD AUTO: 11.4 FL (ref 8.9–12.7)
POTASSIUM SERPL-SCNC: 3.8 MMOL/L (ref 3.5–5.3)
RBC # BLD AUTO: 4.76 MILLION/UL (ref 3.81–5.12)
SODIUM SERPL-SCNC: 138 MMOL/L (ref 135–147)
WBC # BLD AUTO: 8.68 THOUSAND/UL (ref 4.31–10.16)

## 2022-11-08 NOTE — PROGRESS NOTES
Subjective:     Nitza Alatorre is a 63 y o  female who presents to the office today for a preoperative consultation at the request of surgeon Dr Foreign Gonzalez who plans on performing repair of lists for Onc fracture and sprain of left foot on November 17  This consultation is requested for the specific conditions prompting preoperative evaluation (i e  because of potential affect on operative risk): Hypertension  Planned anesthesia: general  The patient has the following known anesthesia issues:  no prior problems with endotracheal intubation or anesthesia  Patients bleeding risk:  No recent abnormal bleeding  The following portions of the patient's history were reviewed and updated as appropriate: She  has a past medical history of Arthritis, Hyperlipidemia, Hypertension, Intracranial aneurysm, Irregular heart beat, PONV (postoperative nausea and vomiting), PVC (premature ventricular contraction), and Reactive depression  She   Patient Active Problem List    Diagnosis Date Noted   • Piriformis muscle pain 07/15/2020   • Chronic pain syndrome 11/15/2019   • Lumbar disc herniation 11/15/2019   • Lumbar radiculopathy 11/15/2019   • [facility] DJD(carpometacarpal degenerative joint disease), localized primary, left 07/17/2018   • Primary osteoarthritis of first carpometacarpal joint of left hand 12/19/2017   • Trigger middle finger of right hand 12/19/2017   • Chronic bilateral low back pain with right-sided sciatica 11/17/2017   • Primary osteoarthritis of both knees 01/03/2017   • Hypertension 10/23/2012     She  has a past surgical history that includes Thyroid surgery; pr repair intercarp/carp-metacarp jt (Left, 07/30/2018); Epidural block injection (Right, 01/07/2020); FL guided needle plac bx/asp/inj (01/07/2020); Endometrial ablation (2012); FL guided needle plac bx/asp/inj (05/19/2020);  Epidural block injection (Right, 05/19/2020); FL guided needle plac bx/asp/inj (08/28/2020); pr injection,sacroiliac joint (Right, 08/28/2020); Joint replacement (Right); and pr open treatment tarsometatarsal joint dislocation (Left, 8/11/2022)  Her family history includes Arthritis in her father and mother; Breast cancer (age of onset: 77) in her sister; Heart disease in her mother; Lymphoma in her father; No Known Problems in her daughter, daughter, maternal aunt, maternal grandfather, maternal grandmother, paternal aunt, paternal grandfather, paternal grandmother, sister, and son  She  reports that she has never smoked  She has never used smokeless tobacco  She reports current alcohol use  She reports that she does not use drugs  Current Outpatient Medications   Medication Sig Dispense Refill   • Ascorbic Acid (VITAMIN C) 250 MG CHEW Chew 1 tablet daily     • atorvastatin (LIPITOR) 10 mg tablet Take 1 tablet (10 mg total) by mouth daily 30 tablet 6   • Calcium 600 MG tablet Take 2 tablets by mouth daily     • celecoxib (CeleBREX) 200 mg capsule Take 200 mg by mouth daily      • Cholecalciferol (VITAMIN D3) 1000 units CAPS Take by mouth daily after breakfast     • fluticasone (FLONASE) 50 mcg/act nasal spray 1 spray into each nostril in the morning   (Patient taking differently: 1 spray into each nostril as needed) 16 g 0   • hydrochlorothiazide (HYDRODIURIL) 12 5 mg tablet TAKE 1 TABLET DAILY 90 tablet 3   • losartan (COZAAR) 25 mg tablet Take 0 5 tablets (12 5 mg total) by mouth daily 30 tablet 3   • metoprolol tartrate (LOPRESSOR) 25 mg tablet Take 1 tablet (25 mg total) by mouth every 12 (twelve) hours 90 tablet 3   • Multiple Vitamin (DAILY VALUE MULTIVITAMIN PO) Take 1 tablet by mouth daily     • Multiple Vitamins-Minerals (PRESERVISION AREDS 2 PO) Take by mouth daily     • nystatin (MYCOSTATIN) powder Apply topically as needed     • Omega-3 Fatty Acids (Fish Oil) 1200 MG CAPS Take 1 capsule by mouth daily     • GNP Aspirin 325 MG EC tablet  (Patient not taking: No sig reported)     • ketotifen (ZADITOR) 0 025 % ophthalmic solution Administer 1 drop to both eyes 2 (two) times a day as needed (Eye irritation) (Patient not taking: Reported on 11/8/2022) 5 mL 0     No current facility-administered medications for this visit  Current Outpatient Medications on File Prior to Visit   Medication Sig   • Ascorbic Acid (VITAMIN C) 250 MG CHEW Chew 1 tablet daily   • atorvastatin (LIPITOR) 10 mg tablet Take 1 tablet (10 mg total) by mouth daily   • Calcium 600 MG tablet Take 2 tablets by mouth daily   • celecoxib (CeleBREX) 200 mg capsule Take 200 mg by mouth daily    • Cholecalciferol (VITAMIN D3) 1000 units CAPS Take by mouth daily after breakfast   • fluticasone (FLONASE) 50 mcg/act nasal spray 1 spray into each nostril in the morning  (Patient taking differently: 1 spray into each nostril as needed)   • hydrochlorothiazide (HYDRODIURIL) 12 5 mg tablet TAKE 1 TABLET DAILY   • losartan (COZAAR) 25 mg tablet Take 0 5 tablets (12 5 mg total) by mouth daily   • metoprolol tartrate (LOPRESSOR) 25 mg tablet Take 1 tablet (25 mg total) by mouth every 12 (twelve) hours   • Multiple Vitamin (DAILY VALUE MULTIVITAMIN PO) Take 1 tablet by mouth daily   • Multiple Vitamins-Minerals (PRESERVISION AREDS 2 PO) Take by mouth daily   • nystatin (MYCOSTATIN) powder Apply topically as needed   • Omega-3 Fatty Acids (Fish Oil) 1200 MG CAPS Take 1 capsule by mouth daily   • GNP Aspirin 325 MG EC tablet  (Patient not taking: No sig reported)   • ketotifen (ZADITOR) 0 025 % ophthalmic solution Administer 1 drop to both eyes 2 (two) times a day as needed (Eye irritation) (Patient not taking: Reported on 11/8/2022)   • [DISCONTINUED] aspirin 325 mg tablet Take 1 tablet (325 mg total) by mouth daily (Patient not taking: No sig reported)     No current facility-administered medications on file prior to visit  She has No Known Allergies     Past Medical History:   Diagnosis Date   • Arthritis    • Hyperlipidemia    • Hypertension    • Intracranial aneurysm     resolved 11/17/17 - possible R P-comm region aneurysm measuring 3mm found on MRI results   • Irregular heart beat    • PONV (postoperative nausea and vomiting)    • PVC (premature ventricular contraction)    • Reactive depression     last assessed 12/15/15       Past Surgical History:   Procedure Laterality Date   • ENDOMETRIAL ABLATION  2012   • EPIDURAL BLOCK INJECTION Right 01/07/2020    Procedure: Right L4-5 transforaminal epidural steroid injection 56145;  Surgeon: Cammy Jones MD;  [facility];  Service: Pain Management    • EPIDURAL BLOCK INJECTION Right 05/19/2020    Procedure: Right L4-L5 Transforaminal Epidural Steroid Injection (53888); Surgeon: Cammy Jones MD;  [facility];  Service: Pain Management    • FL GUIDED NEEDLE PLAC BX/ASP/INJ  01/07/2020   • FL GUIDED NEEDLE PLAC BX/ASP/INJ  05/19/2020   • FL GUIDED NEEDLE PLAC BX/ASP/INJ  08/28/2020   • JOINT REPLACEMENT Right     Hip   • TX INJECTION,SACROILIAC JOINT Right 08/28/2020    Procedure: Sacroiliac joint injection and piriformis muscle steroid Injection;  Surgeon: Cammy Jones MD;  [facility]: [facility];  Service: Pain Management    • TX OPEN TREATMENT TARSOMETATARSAL JOINT DISLOCATION Left 8/11/2022    Procedure: OPEN REDUCTION W/ INTERNAL FIXATION (ORIF) FOOT, ORIF of the left lis franc ligament;  Surgeon: Magdiel Montez DPM;  [facility]: [facility];  Service: Podiatry   • TX REPAIR INTERCARP/CARP-METACARP JT Left 07/30/2018    Procedure:  Thumb Arthrex mini tight rope suspension arthroplasty with trapezial resection left thumb;  Surgeon: Francisca Cuevas MD;  Location: [facility];  Service: Orthopedics   • THYROID SURGERY      near total thyroidectomy       Review of Systems  A comprehensive review of systems was negative except for: Cardiovascular: positive for palpitations  Musculoskeletal: positive for  Severe pain left foot from Lisfranc injury     Objective:  Physical Exam    Cardiographics  ECG: Prior ECG done by Dr Yasmany Leroy shows normal sinus rhythm with ectopic ventricular beats  Echocardiogram: See copy of echocardiogram done in July    Imaging  Chest x-ray:  no recent chest x-ray     Lab Review   No visits with results within 2 Month(s) from this visit  Latest known visit with results is:   Hospital Outpatient Visit on 07/25/2022   Component Date Value   • Baseline HR 07/25/2022 62    • Stress peak HR 07/25/2022 92    • Post peak BP 07/25/2022 168 0    • Max HR 07/25/2022 92    • Rate Pressure Product 07/25/2022 15,456 0    • Rest Nuclear Isotope Dose 07/25/2022 11 00    • Stress Nuclear Isotope D* 07/25/2022 31 60    • ST Depression (mm) 07/25/2022 0    • Stress/rest perfusion ra* 07/25/2022 1 20         Assessment:     63 y o  female with planned surgery as above  Known risk factors for perioperative complications: None    Difficulty with intubation is not anticipated  Cardiac Risk Estimation:  Minimal    Current medications which may produce withdrawal symptoms if withheld perioperatively:  None      Plan:  Patient is CLEARED for surgery without any additional cardiac testing  1  Preoperative workup as follows pre-admission testing has been reviewed and is acceptable  2  Change in medication regimen before surgery: none, continue medication regimen including morning of surgery, with sip of water  3  Prophylaxis for cardiac events with perioperative beta-blockers: not indicated  4  Invasive hemodynamic monitoring perioperatively: not indicated  5  Deep vein thrombosis prophylaxis postoperatively:Not indicated    6  Other measures: Not applicable

## 2022-11-08 NOTE — H&P (VIEW-ONLY)
Subjective:     Joe San is a 63 y o  female who presents to the office today for a preoperative consultation at the request of surgeon Dr Harpreet Rubio who plans on performing repair of lists for Onc fracture and sprain of left foot on November 17  This consultation is requested for the specific conditions prompting preoperative evaluation (i e  because of potential affect on operative risk): Hypertension  Planned anesthesia: general  The patient has the following known anesthesia issues:  no prior problems with endotracheal intubation or anesthesia  Patients bleeding risk:  No recent abnormal bleeding  The following portions of the patient's history were reviewed and updated as appropriate: She  has a past medical history of Arthritis, Hyperlipidemia, Hypertension, Intracranial aneurysm, Irregular heart beat, PONV (postoperative nausea and vomiting), PVC (premature ventricular contraction), and Reactive depression  She   Patient Active Problem List    Diagnosis Date Noted   • Piriformis muscle pain 07/15/2020   • Chronic pain syndrome 11/15/2019   • Lumbar disc herniation 11/15/2019   • Lumbar radiculopathy 11/15/2019   • [facility] DJD(carpometacarpal degenerative joint disease), localized primary, left 07/17/2018   • Primary osteoarthritis of first carpometacarpal joint of left hand 12/19/2017   • Trigger middle finger of right hand 12/19/2017   • Chronic bilateral low back pain with right-sided sciatica 11/17/2017   • Primary osteoarthritis of both knees 01/03/2017   • Hypertension 10/23/2012     She  has a past surgical history that includes Thyroid surgery; pr repair intercarp/carp-metacarp jt (Left, 07/30/2018); Epidural block injection (Right, 01/07/2020); FL guided needle plac bx/asp/inj (01/07/2020); Endometrial ablation (2012); FL guided needle plac bx/asp/inj (05/19/2020);  Epidural block injection (Right, 05/19/2020); FL guided needle plac bx/asp/inj (08/28/2020); pr injection,sacroiliac joint (Right, 08/28/2020); Joint replacement (Right); and pr open treatment tarsometatarsal joint dislocation (Left, 8/11/2022)  Her family history includes Arthritis in her father and mother; Breast cancer (age of onset: 77) in her sister; Heart disease in her mother; Lymphoma in her father; No Known Problems in her daughter, daughter, maternal aunt, maternal grandfather, maternal grandmother, paternal aunt, paternal grandfather, paternal grandmother, sister, and son  She  reports that she has never smoked  She has never used smokeless tobacco  She reports current alcohol use  She reports that she does not use drugs  Current Outpatient Medications   Medication Sig Dispense Refill   • Ascorbic Acid (VITAMIN C) 250 MG CHEW Chew 1 tablet daily     • atorvastatin (LIPITOR) 10 mg tablet Take 1 tablet (10 mg total) by mouth daily 30 tablet 6   • Calcium 600 MG tablet Take 2 tablets by mouth daily     • celecoxib (CeleBREX) 200 mg capsule Take 200 mg by mouth daily      • Cholecalciferol (VITAMIN D3) 1000 units CAPS Take by mouth daily after breakfast     • fluticasone (FLONASE) 50 mcg/act nasal spray 1 spray into each nostril in the morning   (Patient taking differently: 1 spray into each nostril as needed) 16 g 0   • hydrochlorothiazide (HYDRODIURIL) 12 5 mg tablet TAKE 1 TABLET DAILY 90 tablet 3   • losartan (COZAAR) 25 mg tablet Take 0 5 tablets (12 5 mg total) by mouth daily 30 tablet 3   • metoprolol tartrate (LOPRESSOR) 25 mg tablet Take 1 tablet (25 mg total) by mouth every 12 (twelve) hours 90 tablet 3   • Multiple Vitamin (DAILY VALUE MULTIVITAMIN PO) Take 1 tablet by mouth daily     • Multiple Vitamins-Minerals (PRESERVISION AREDS 2 PO) Take by mouth daily     • nystatin (MYCOSTATIN) powder Apply topically as needed     • Omega-3 Fatty Acids (Fish Oil) 1200 MG CAPS Take 1 capsule by mouth daily     • GNP Aspirin 325 MG EC tablet  (Patient not taking: No sig reported)     • ketotifen (ZADITOR) 0 025 % ophthalmic solution Administer 1 drop to both eyes 2 (two) times a day as needed (Eye irritation) (Patient not taking: Reported on 11/8/2022) 5 mL 0     No current facility-administered medications for this visit  Current Outpatient Medications on File Prior to Visit   Medication Sig   • Ascorbic Acid (VITAMIN C) 250 MG CHEW Chew 1 tablet daily   • atorvastatin (LIPITOR) 10 mg tablet Take 1 tablet (10 mg total) by mouth daily   • Calcium 600 MG tablet Take 2 tablets by mouth daily   • celecoxib (CeleBREX) 200 mg capsule Take 200 mg by mouth daily    • Cholecalciferol (VITAMIN D3) 1000 units CAPS Take by mouth daily after breakfast   • fluticasone (FLONASE) 50 mcg/act nasal spray 1 spray into each nostril in the morning  (Patient taking differently: 1 spray into each nostril as needed)   • hydrochlorothiazide (HYDRODIURIL) 12 5 mg tablet TAKE 1 TABLET DAILY   • losartan (COZAAR) 25 mg tablet Take 0 5 tablets (12 5 mg total) by mouth daily   • metoprolol tartrate (LOPRESSOR) 25 mg tablet Take 1 tablet (25 mg total) by mouth every 12 (twelve) hours   • Multiple Vitamin (DAILY VALUE MULTIVITAMIN PO) Take 1 tablet by mouth daily   • Multiple Vitamins-Minerals (PRESERVISION AREDS 2 PO) Take by mouth daily   • nystatin (MYCOSTATIN) powder Apply topically as needed   • Omega-3 Fatty Acids (Fish Oil) 1200 MG CAPS Take 1 capsule by mouth daily   • GNP Aspirin 325 MG EC tablet  (Patient not taking: No sig reported)   • ketotifen (ZADITOR) 0 025 % ophthalmic solution Administer 1 drop to both eyes 2 (two) times a day as needed (Eye irritation) (Patient not taking: Reported on 11/8/2022)   • [DISCONTINUED] aspirin 325 mg tablet Take 1 tablet (325 mg total) by mouth daily (Patient not taking: No sig reported)     No current facility-administered medications on file prior to visit  She has No Known Allergies     Past Medical History:   Diagnosis Date   • Arthritis    • Hyperlipidemia    • Hypertension    • Intracranial aneurysm     resolved 11/17/17 - possible R P-comm region aneurysm measuring 3mm found on MRI results   • Irregular heart beat    • PONV (postoperative nausea and vomiting)    • PVC (premature ventricular contraction)    • Reactive depression     last assessed 12/15/15       Past Surgical History:   Procedure Laterality Date   • ENDOMETRIAL ABLATION  2012   • EPIDURAL BLOCK INJECTION Right 01/07/2020    Procedure: Right L4-5 transforaminal epidural steroid injection 79243;  Surgeon: Gian June MD;  [facility];  Service: Pain Management    • EPIDURAL BLOCK INJECTION Right 05/19/2020    Procedure: Right L4-L5 Transforaminal Epidural Steroid Injection (13671); Surgeon: Gian June MD;  [facility];  Service: Pain Management    • FL GUIDED NEEDLE PLAC BX/ASP/INJ  01/07/2020   • FL GUIDED NEEDLE PLAC BX/ASP/INJ  05/19/2020   • FL GUIDED NEEDLE PLAC BX/ASP/INJ  08/28/2020   • JOINT REPLACEMENT Right     Hip   • [place] INJECTION,SACROILIAC JOINT Right 08/28/2020    Procedure: Sacroiliac joint injection and piriformis muscle steroid Injection;  Surgeon: Gian June MD;  [facility];  Service: Pain Management    • NH OPEN TREATMENT TARSOMETATARSAL JOINT DISLOCATION Left 8/11/2022    Procedure: OPEN REDUCTION W/ INTERNAL FIXATION (ORIF) FOOT, ORIF of the left lis franc ligament;  Surgeon: Carolina Loving DPM;  [facility];  Service: Podiatry   • NH REPAIR INTERCARP/CARP-METACARP JT Left 07/30/2018    Procedure:  Thumb Arthrex mini tight rope suspension arthroplasty with trapezial resection left thumb;  Surgeon: Dayan Blanton MD;  [facility];  Service: Orthopedics   • THYROID SURGERY      near total thyroidectomy       Review of Systems  A comprehensive review of systems was negative except for: Cardiovascular: positive for palpitations  Musculoskeletal: positive for  Severe pain left foot from Lisfranc injury     Objective:  Physical Exam    Cardiographics  ECG: Prior ECG done by Dr Chanda Collins shows normal sinus rhythm with ectopic ventricular beats  Echocardiogram: See copy of echocardiogram done in July    Imaging  Chest x-ray:  no recent chest x-ray     Lab Review   No visits with results within 2 Month(s) from this visit  Latest known visit with results is:   Hospital Outpatient Visit on 07/25/2022   Component Date Value   • Baseline HR 07/25/2022 62    • Stress peak HR 07/25/2022 92    • Post peak BP 07/25/2022 168 0    • Max HR 07/25/2022 92    • Rate Pressure Product 07/25/2022 15,456 0    • Rest Nuclear Isotope Dose 07/25/2022 11 00    • Stress Nuclear Isotope D* 07/25/2022 31 60    • ST Depression (mm) 07/25/2022 0    • Stress/rest perfusion ra* 07/25/2022 1 20         Assessment:     63 y o  female with planned surgery as above  Known risk factors for perioperative complications: None    Difficulty with intubation is not anticipated  Cardiac Risk Estimation:  Minimal    Current medications which may produce withdrawal symptoms if withheld perioperatively:  None      Plan:  Patient is CLEARED for surgery without any additional cardiac testing  1  Preoperative workup as follows pre-admission testing has been reviewed and is acceptable  2  Change in medication regimen before surgery: none, continue medication regimen including morning of surgery, with sip of water  3  Prophylaxis for cardiac events with perioperative beta-blockers: not indicated  4  Invasive hemodynamic monitoring perioperatively: not indicated  5  Deep vein thrombosis prophylaxis postoperatively:Not indicated    6  Other measures: Not applicable

## 2022-11-11 ENCOUNTER — TELEPHONE (OUTPATIENT)
Dept: PODIATRY | Facility: CLINIC | Age: 62
End: 2022-11-11

## 2022-11-11 NOTE — TELEPHONE ENCOUNTER
Caller: leigh    Doctor: Howie Hernandez / Beth Salazar    Reason for call: needs script for a wheelchair or slider       Call back#: [phone]    Fax :  [phone]

## 2022-11-14 ENCOUNTER — TELEPHONE (OUTPATIENT)
Dept: PODIATRY | Facility: CLINIC | Age: 62
End: 2022-11-14

## 2022-11-14 NOTE — PRE-PROCEDURE INSTRUCTIONS
Pre-Surgery Instructions:   Medication Instructions   • Ascorbic Acid (VITAMIN C) 250 MG CHEW last dose 11-12-22   • atorvastatin (LIPITOR) 10 mg tablet Take night before surgery   • Calcium 600 MG tablet last dose 11-12-22   • celecoxib (CeleBREX) 200 mg capsule last dose 11-13-22   • Cholecalciferol (VITAMIN D3) 1000 units CAPS last dose 11-12-22   • fluticasone (FLONASE) 50 mcg/act nasal spray Uses PRN- OK to take day of surgery   • hydrochlorothiazide (HYDRODIURIL) 12 5 mg tablet Hold day of surgery  • ketotifen (ZADITOR) 0 025 % ophthalmic solution Uses PRN- OK to take day of surgery   • losartan (COZAAR) 25 mg tablet Hold day of surgery  • metoprolol tartrate (LOPRESSOR) 25 mg tablet Take day of surgery  • Multiple Vitamin (DAILY VALUE MULTIVITAMIN PO) last dose 11-12-22   • Multiple Vitamins-Minerals (PRESERVISION AREDS 2 PO) last dose 11-12-22   • nystatin (MYCOSTATIN) powder Uses PRN- DO NOT take day of surgery   • Omega-3 Fatty Acids (Fish Oil) 1200 MG CAPS last dose 11-12-22   Pre op and bathing instructions reviewed  Pt has hibiclens  Pt  Verbalized understanding of current visitor restrictions  Pt  Verbalized an understanding of all instructions reviewed and offers no concerns at this time  Instructed to avoid all ASA/NSAIDs and OTC Vit/Supp from now until after surgery per anesthesia guidelines   Tylenol ok prn  DOS meds with a few sips of H2O

## 2022-11-14 NOTE — TELEPHONE ENCOUNTER
Caller: Vinnie Novak - Nurse     Doctor: Jem La    Reason for call: Needs a script for a wheel chair or a slide chair    Please fax to:  [phone]    Call back#: [phone]

## 2022-11-16 ENCOUNTER — TELEPHONE (OUTPATIENT)
Dept: PODIATRY | Facility: CLINIC | Age: 62
End: 2022-11-16

## 2022-11-16 NOTE — TELEPHONE ENCOUNTER
Caller: Kristie    Doctor: Dianelys Mejia    Reason for call: Needs a script for a wheel chair or a slider      Call back#: Fax:  [phone]

## 2022-11-17 ENCOUNTER — APPOINTMENT (OUTPATIENT)
Dept: RADIOLOGY | Facility: HOSPITAL | Age: 62
End: 2022-11-17

## 2022-11-17 ENCOUNTER — ANESTHESIA EVENT (OUTPATIENT)
Dept: PERIOP | Facility: HOSPITAL | Age: 62
End: 2022-11-17

## 2022-11-17 ENCOUNTER — HOSPITAL ENCOUNTER (OUTPATIENT)
Facility: HOSPITAL | Age: 62
Setting detail: OUTPATIENT SURGERY
Discharge: HOME | End: 2022-11-17
Attending: PODIATRIST | Admitting: PODIATRIST

## 2022-11-17 ENCOUNTER — TELEPHONE (OUTPATIENT)
Dept: PODIATRY | Facility: CLINIC | Age: 62
End: 2022-11-17

## 2022-11-17 ENCOUNTER — ANESTHESIA (OUTPATIENT)
Dept: PERIOP | Facility: HOSPITAL | Age: 62
End: 2022-11-17

## 2022-11-17 VITALS
TEMPERATURE: 98 F | DIASTOLIC BLOOD PRESSURE: 77 MMHG | RESPIRATION RATE: 18 BRPM | BODY MASS INDEX: 29.88 KG/M2 | SYSTOLIC BLOOD PRESSURE: 143 MMHG | OXYGEN SATURATION: 94 % | WEIGHT: 175 LBS | HEIGHT: 64 IN | HEART RATE: 98 BPM

## 2022-11-17 DIAGNOSIS — G89.18 ACUTE POST-OPERATIVE PAIN: Primary | ICD-10-CM

## 2022-11-17 DIAGNOSIS — Z29.9 DVT PROPHYLAXIS: ICD-10-CM

## 2022-11-17 DEVICE — HEADLESS COMPRESSION SCREW
Type: IMPLANTABLE DEVICE | Site: FOOT | Status: FUNCTIONAL
Brand: FIXOS

## 2022-11-17 RX ORDER — ONDANSETRON 2 MG/ML
4 INJECTION INTRAMUSCULAR; INTRAVENOUS EVERY 6 HOURS PRN
Status: DISCONTINUED | OUTPATIENT
Start: 2022-11-17 | End: 2022-11-17 | Stop reason: HOSPADM

## 2022-11-17 RX ORDER — ACETAMINOPHEN 325 MG/1
650 TABLET ORAL EVERY 4 HOURS PRN
Status: DISCONTINUED | OUTPATIENT
Start: 2022-11-17 | End: 2022-11-17 | Stop reason: HOSPADM

## 2022-11-17 RX ORDER — MIDAZOLAM HYDROCHLORIDE 2 MG/2ML
INJECTION, SOLUTION INTRAMUSCULAR; INTRAVENOUS AS NEEDED
Status: DISCONTINUED | OUTPATIENT
Start: 2022-11-17 | End: 2022-11-17

## 2022-11-17 RX ORDER — LIDOCAINE HYDROCHLORIDE 10 MG/ML
INJECTION, SOLUTION EPIDURAL; INFILTRATION; INTRACAUDAL; PERINEURAL AS NEEDED
Status: DISCONTINUED | OUTPATIENT
Start: 2022-11-17 | End: 2022-11-17

## 2022-11-17 RX ORDER — LABETALOL HYDROCHLORIDE 5 MG/ML
5 INJECTION, SOLUTION INTRAVENOUS
Status: DISCONTINUED | OUTPATIENT
Start: 2022-11-17 | End: 2022-11-17 | Stop reason: HOSPADM

## 2022-11-17 RX ORDER — CEFAZOLIN SODIUM 1 G/3ML
INJECTION, POWDER, FOR SOLUTION INTRAMUSCULAR; INTRAVENOUS AS NEEDED
Status: DISCONTINUED | OUTPATIENT
Start: 2022-11-17 | End: 2022-11-17

## 2022-11-17 RX ORDER — FENTANYL CITRATE 50 UG/ML
INJECTION, SOLUTION INTRAMUSCULAR; INTRAVENOUS AS NEEDED
Status: DISCONTINUED | OUTPATIENT
Start: 2022-11-17 | End: 2022-11-17

## 2022-11-17 RX ORDER — OXYCODONE HYDROCHLORIDE AND ACETAMINOPHEN 5; 325 MG/1; MG/1
1 TABLET ORAL EVERY 6 HOURS PRN
Qty: 28 TABLET | Refills: 0 | Status: SHIPPED | OUTPATIENT
Start: 2022-11-17 | End: 2022-11-27

## 2022-11-17 RX ORDER — FENTANYL CITRATE/PF 50 MCG/ML
25 SYRINGE (ML) INJECTION
Status: DISCONTINUED | OUTPATIENT
Start: 2022-11-17 | End: 2022-11-17 | Stop reason: HOSPADM

## 2022-11-17 RX ORDER — CHLORHEXIDINE GLUCONATE 0.12 MG/ML
15 RINSE ORAL ONCE
Status: COMPLETED | OUTPATIENT
Start: 2022-11-17 | End: 2022-11-17

## 2022-11-17 RX ORDER — LABETALOL HYDROCHLORIDE 5 MG/ML
INJECTION, SOLUTION INTRAVENOUS AS NEEDED
Status: DISCONTINUED | OUTPATIENT
Start: 2022-11-17 | End: 2022-11-17

## 2022-11-17 RX ORDER — MAGNESIUM HYDROXIDE 1200 MG/15ML
LIQUID ORAL AS NEEDED
Status: DISCONTINUED | OUTPATIENT
Start: 2022-11-17 | End: 2022-11-17 | Stop reason: HOSPADM

## 2022-11-17 RX ORDER — ASPIRIN 325 MG
325 TABLET ORAL DAILY
Qty: 30 TABLET | Refills: 2 | Status: SHIPPED | OUTPATIENT
Start: 2022-11-17

## 2022-11-17 RX ORDER — SODIUM CHLORIDE, SODIUM LACTATE, POTASSIUM CHLORIDE, CALCIUM CHLORIDE 600; 310; 30; 20 MG/100ML; MG/100ML; MG/100ML; MG/100ML
125 INJECTION, SOLUTION INTRAVENOUS CONTINUOUS
Status: DISCONTINUED | OUTPATIENT
Start: 2022-11-17 | End: 2022-11-17 | Stop reason: HOSPADM

## 2022-11-17 RX ORDER — BUPIVACAINE HYDROCHLORIDE 5 MG/ML
INJECTION, SOLUTION PERINEURAL
Status: COMPLETED | OUTPATIENT
Start: 2022-11-17 | End: 2022-11-17

## 2022-11-17 RX ORDER — ONDANSETRON 2 MG/ML
4 INJECTION INTRAMUSCULAR; INTRAVENOUS ONCE AS NEEDED
Status: DISCONTINUED | OUTPATIENT
Start: 2022-11-17 | End: 2022-11-17 | Stop reason: HOSPADM

## 2022-11-17 RX ORDER — SODIUM CHLORIDE, SODIUM LACTATE, POTASSIUM CHLORIDE, CALCIUM CHLORIDE 600; 310; 30; 20 MG/100ML; MG/100ML; MG/100ML; MG/100ML
INJECTION, SOLUTION INTRAVENOUS CONTINUOUS PRN
Status: DISCONTINUED | OUTPATIENT
Start: 2022-11-17 | End: 2022-11-17

## 2022-11-17 RX ORDER — ONDANSETRON 2 MG/ML
INJECTION INTRAMUSCULAR; INTRAVENOUS AS NEEDED
Status: DISCONTINUED | OUTPATIENT
Start: 2022-11-17 | End: 2022-11-17

## 2022-11-17 RX ORDER — DEXAMETHASONE SODIUM PHOSPHATE 4 MG/ML
INJECTION, SOLUTION INTRA-ARTICULAR; INTRALESIONAL; INTRAMUSCULAR; INTRAVENOUS; SOFT TISSUE AS NEEDED
Status: DISCONTINUED | OUTPATIENT
Start: 2022-11-17 | End: 2022-11-17

## 2022-11-17 RX ORDER — PROPOFOL 10 MG/ML
INJECTION, EMULSION INTRAVENOUS AS NEEDED
Status: DISCONTINUED | OUTPATIENT
Start: 2022-11-17 | End: 2022-11-17

## 2022-11-17 RX ORDER — OXYCODONE HYDROCHLORIDE 5 MG/1
5 TABLET ORAL EVERY 4 HOURS PRN
Status: DISCONTINUED | OUTPATIENT
Start: 2022-11-17 | End: 2022-11-17 | Stop reason: HOSPADM

## 2022-11-17 RX ORDER — ALBUTEROL SULFATE 2.5 MG/3ML
2.5 SOLUTION RESPIRATORY (INHALATION) ONCE AS NEEDED
Status: DISCONTINUED | OUTPATIENT
Start: 2022-11-17 | End: 2022-11-17 | Stop reason: HOSPADM

## 2022-11-17 RX ADMIN — ONDANSETRON 4 MG: 2 INJECTION INTRAMUSCULAR; INTRAVENOUS at 15:07

## 2022-11-17 RX ADMIN — LABETALOL HYDROCHLORIDE 5 MG: 5 INJECTION, SOLUTION INTRAVENOUS at 15:27

## 2022-11-17 RX ADMIN — BUPIVACAINE HYDROCHLORIDE 10 ML: 5 INJECTION, SOLUTION PERINEURAL at 11:53

## 2022-11-17 RX ADMIN — MIDAZOLAM HYDROCHLORIDE 2 MG: 1 INJECTION, SOLUTION INTRAMUSCULAR; INTRAVENOUS at 12:18

## 2022-11-17 RX ADMIN — FENTANYL CITRATE 25 MCG: 50 INJECTION, SOLUTION INTRAMUSCULAR; INTRAVENOUS at 12:22

## 2022-11-17 RX ADMIN — SODIUM CHLORIDE, SODIUM LACTATE, POTASSIUM CHLORIDE, AND CALCIUM CHLORIDE: .6; .31; .03; .02 INJECTION, SOLUTION INTRAVENOUS at 12:18

## 2022-11-17 RX ADMIN — PROPOFOL 200 MG: 10 INJECTION, EMULSION INTRAVENOUS at 12:22

## 2022-11-17 RX ADMIN — FENTANYL CITRATE 25 MCG: 50 INJECTION, SOLUTION INTRAMUSCULAR; INTRAVENOUS at 12:46

## 2022-11-17 RX ADMIN — FENTANYL CITRATE 50 MCG: 50 INJECTION, SOLUTION INTRAMUSCULAR; INTRAVENOUS at 15:06

## 2022-11-17 RX ADMIN — DEXAMETHASONE SODIUM PHOSPHATE 8 MG: 4 INJECTION, SOLUTION INTRAMUSCULAR; INTRAVENOUS at 12:31

## 2022-11-17 RX ADMIN — CHLORHEXIDINE GLUCONATE 0.12% ORAL RINSE 15 ML: 1.2 LIQUID ORAL at 11:21

## 2022-11-17 RX ADMIN — LABETALOL HYDROCHLORIDE 5 MG: 5 INJECTION, SOLUTION INTRAVENOUS at 15:07

## 2022-11-17 RX ADMIN — CEFAZOLIN 1000 MG: 1 INJECTION, POWDER, FOR SOLUTION INTRAMUSCULAR; INTRAVENOUS at 12:25

## 2022-11-17 RX ADMIN — BUPIVACAINE 20 ML: 13.3 INJECTION, SUSPENSION, LIPOSOMAL INFILTRATION at 11:53

## 2022-11-17 RX ADMIN — PROPOFOL 50 MG: 10 INJECTION, EMULSION INTRAVENOUS at 15:27

## 2022-11-17 NOTE — TELEPHONE ENCOUNTER
Addended by: YUVAL ANGELES on: 12/14/2020 05:27 PM     Modules accepted: Orders     Caller: Kristie    Doctor: Rossy Sims    Reason for call: Needs RX for a slide chair      Call back#: fax: [phone]

## 2022-11-17 NOTE — DISCHARGE INSTRUCTIONS
Dr Nasim Winslow Instructions    1  Take your prescribed medication as directed  2  Upon arrival at home, lie down and elevate your surgical foot on 2 pillows  3  Remain quiet, off your feet as much as possible, for the first 24-48 hours  This is when your feet first swell and may become painful  After 48 hours you may begin limited walking following these restrictions:   Nonweightbearinbg to surgical foot with use of walker for assistance  4  Drink large quantities of water and citrus fruit juice  Consume no alcohol  Continue a well-balanced diet  5  Report any unusual discomfort or fever to this office  6  A limited amount of discomfort and swelling is to be expected  In some cases the skin may take on a bruised appearance  The surgical solution that was applied to your foot prior to the operation is dark in color and the operation site may appear to be oozing when it actually is not  7  A slight amount of blood is to be expected, and is no cause for alarm  Do not remove the dressings  If there is active bleeding and if the bleeding persists, add additional gauze to the bandage, apply direct pressure, elevate your feet and call this office  8  Do not get the dressings wet  As regular bathing may be inconvenient, sponge baths are recommended  9  When anesthesia wears off and if any discomfort should be present, apply an ice pack directly over the operated area for 15 minute intervals for several hours or until the pain leaves  (USE IN EXCESS OF 15 MINUTES COULD CAUSE FROSTBITE)  Do not use hot water bags or electric pads  A convenient icepack can be made by placing ice cubes in a plastic bag and covering this with a towel  10  If necessary, take a mild laxative before retiring  11  Wear your special open shoes anytime you put weight on your foot, even if it is just to walk to the bathroom and back  It will probably be 2 or 3 weeks before you will be permitted to try regular shoes    12  Having performed the operation, we are interested in a prompt recovery  Please cooperate by following the above instructions  13  Please call to confirm your post-op appointment or call with any other questions  Bupivacaine Liposome (By injection)   Bupivacaine Liposome (zld-CUY-i-barbosa LYE-poh-some)  Relieves pain after surgery  This medicine is a local anesthetic  Brand Name(s): Exparel   There may be other brand names for this medicine  When This Medicine Should Not Be Used: This medicine is not right for everyone  You should not receive it if you had an allergic reaction to bupivacaine, or if you are pregnant  It should not be used to ease labor pains during childbirth  How to Use This Medicine:   Injectable  A nurse or other trained health professional will give you this medicine in a medical facility  It is given through a needle injected into the surgical site or into the nerve endings of the brachial plexus (nerves that conduct signals to the shoulder, arm, and hand)  Drugs and Foods to Avoid:   Ask your doctor or pharmacist before using any other medicine, including over-the-counter medicines, vitamins, and herbal products  Some medicines can affect how bupivacaine works  Tell your doctor if you are using any of the following:  Acetaminophen, chloroquine, metoclopramide, primaquine, quinine, sulfasalazine  Cancer medicine (including cyclophosphamide, flutamide, hydroxyurea, ifosfamide, rasburicase)  Medicine to treat an infection (including dapsone, nitrofurantoin, para-aminosalicylic acid, sulfonamide)  Medicine to treat seizures (including phenobarbital, phenytoin, sodium valproate)  Nitrate or nitrite medicine (including nitric oxide, nitroglycerin, nitroprusside, nitrous oxide)  Tell your doctor if you are using other numbing medicines (including lidocaine)  You should not be given any other kind of anesthetic within 4 days after receiving this medicine    Warnings While Using This Medicine:   Tell your doctor if you are breastfeeding, or if you have kidney disease, liver disease, heart or blood vessel problems, lung or breathing problems, a blood disorder, or G6PD  This medicine may cause the following problems:  Methemoglobinemia (blood disorder)  Heart or blood vessel problems, including changes in heart rhythm, low blood pressure, or heart attack  Chondrolysis (a bone or joint problem)  Seizures  This medicine may make you dizzy or drowsy  Do not drive or do anything else that could be dangerous until you know how this medicine affects you  This medicine should cause numbness only to the area where it is injected  It is not meant to cause you to fall asleep or become unconscious  It may be easier to hurt yourself while your treated body area is still numb  The effects of this medicine may last up to 5 days  Be careful to avoid injury until you have regained all the feeling and are no longer numb  Your doctor will do lab tests at regular visits to check on the effects of this medicine  Keep all appointments  Possible Side Effects While Using This Medicine:   Call your doctor right away if you notice any of these side effects:   Allergic reaction: Itching or hives, swelling in your face or hands, swelling or tingling in your mouth or throat, chest tightness, trouble breathing  Anxiety, depression, restlessness, drowsiness, ringing in your ears, blurred vision  Chest pain that may spread to your arms, jaw, back, or neck, fast, pounding, slow, or uneven heartbeat, trouble breathing  Joint pain, stiffness, or loss of motion of the shoulder  Lightheadedness, dizziness, fainting  Nausea, vomiting, chills, metallic taste in your mouth  Pale, gray, or blue lips, nails, or skin, dark urine, headache, unusual tiredness or weakness  Seizures, shivering, shaking, or tremors  Unusual bleeding or bruising  If you notice these less serious side effects, talk with your doctor:   Back pain  Constipation  Pain, itching, burning, swelling, or a lump under your skin where the needle is placed  Trouble sleeping  If you notice other side effects that you think are caused by this medicine, tell your doctor  Call your doctor for medical advice about side effects  You may report side effects to FDA at 7-282-FDA-9682    © Copyright [employer] 2022 Information is for End User's use only and may not be sold, redistributed or otherwise used for commercial purposes  The above information is an  only  It is not intended as medical advice for individual conditions or treatments  Talk to your doctor, nurse or pharmacist before following any medical regimen to see if it is safe and effective for you

## 2022-11-17 NOTE — ANESTHESIA POSTPROCEDURE EVALUATION
Post-Op Assessment Note    CV Status:  Stable    Pain management: adequate     Mental Status:  Alert and awake   Hydration Status:  Euvolemic   PONV Controlled:  Controlled   Airway Patency:  Patent      Post Op Vitals Reviewed: Yes      Staff: CRNA         No notable events documented      BP  165/88   Temp      Pulse  101   Resp   18   SpO2   92%

## 2022-11-17 NOTE — DISCHARGE SUMMARY
Discharge Summary Outpatient Procedure Podiatry -   Dereck Flemingwell 63 y o  female MRN: [id number]  Unit/Bed#: OR [facility] Encounter: [id number]    Admission Date: 11/17/2022     Admitting Diagnosis: Lisfranc's sprain, left, sequela [S93 622S]    Discharge Diagnosis: same    Procedures Performed: FUSION LIS FRANC L 1,2 TMTJ fusion with intercuneiform :     Complications: none    Condition at Discharge: stable    Discharge instructions/Information to patient and family:   See after visit summary for information provided to patient and family  Provisions for Follow-Up Care/Important appointments:  See after visit summary for information related to follow-up care and any pertinent home health orders  Discharge Medications:  See after visit summary for reconciled discharge medications provided to patient and family

## 2022-11-17 NOTE — ANESTHESIA PREPROCEDURE EVALUATION
Procedure:  FUSION NEYMAR AMOS L 1,2 TMTJ fusion with intercuneiform  (Left: Spine Thoracic)    Relevant Problems   CARDIO   (+) Hypertension      MUSCULOSKELETAL   (+) CMC DJD(carpometacarpal degenerative joint disease), localized primary, left   (+) Chronic bilateral low back pain with right-sided sciatica   (+) Primary osteoarthritis of both knees   (+) Primary osteoarthritis of first carpometacarpal joint of left hand      NEURO/PSYCH   (+) Chronic bilateral low back pain with right-sided sciatica   (+) Chronic pain syndrome      Nervous and Auditory   (+) Lumbar radiculopathy        Physical Exam    Airway    Mallampati score: II  TM Distance: >3 FB  Neck ROM: full     Dental       Cardiovascular      Pulmonary      Other Findings        Anesthesia Plan  ASA Score- 2     Anesthesia Type- general with ASA Monitors  Additional Monitors:   Airway Plan: LMA  Comment: Discussed extra long acting popliteal block of the sciatic nerve for postoperative pain control at surgeon's request  Discussed low likelihood of requiring narcotic type analgesics while block functioning  Patient informed of the variability of block duration ranged from <24 hrs up to 5 days  Patient aware of the approximate 1:40,000 risk of transient and/or permanent nerve injury, bleeding risk, or damage to surrounding structures  Discussed block gradual block offset and lack of need for preemptive oral analgesics  Patient endorsing understanding of aforementioned risk and benefits and would like to proceed with nerve blocks    Plan Factors-Exercise tolerance (METS): >4 METS  Chart reviewed  Existing labs reviewed  Patient summary reviewed  Induction- intravenous  Postoperative Plan- Plan for postoperative opioid use  Planned trial extubation    Informed Consent- Anesthetic plan and risks discussed with patient  I personally reviewed this patient with the CRNA   Discussed and agreed on the Anesthesia Plan with the CHIQUIS Candelario

## 2022-11-17 NOTE — ANESTHESIA PROCEDURE NOTES
Peripheral Block    Patient location during procedure: holding area  Start time: 11/17/2022 11:53 AM  Reason for block: at surgeon's request and post-op pain management  Staffing  Performed: Anesthesiologist   Anesthesiologist: Naida Thao MD  Preanesthetic Checklist  Completed: patient identified, IV checked, site marked, risks and benefits discussed, surgical consent, monitors and equipment checked, pre-op evaluation and timeout performed  Peripheral Block  Patient position: supine  Prep: ChloraPrep  Patient monitoring: continuous pulse ox and frequent blood pressure checks  Block type: popliteal  Laterality: left  Injection technique: single-shot  Procedures: ultrasound guided, Ultrasound guidance required for the procedure to increase accuracy and safety of medication placement and decrease risk of complications  Ultrasound permanent image savedbupivacaine (MARCAINE) 0 5 % - Perineural   10 mL - 11/17/2022 11:53:00 AM  Needle  Needle type: Stimuplex   Needle gauge: 20g  Needle length: 4in    Needle localization: ultrasound guidance  Test dose: negative  Assessment  Injection assessment: incremental injection, local visualized surrounding nerve on ultrasound, no paresthesia on injection and negative aspiration for heme  Paresthesia pain: none  Slow fractionated injection: yes  Post-procedure:  site cleaned  patient tolerated the procedure well with no immediate complications  Additional Notes  With Exparel 20 mL perineural

## 2022-11-17 NOTE — OP NOTE
OPERATIVE REPORT - Podiatry  PATIENT NAME: Maico Jimenez    :  1960  MRN: [id number]  Pt Location: [facility] OR ROOM 02    SURGERY DATE: 2022    Surgeon(s) and Role:     * Aan Jones DPM - Primary     * Trisha Judd DPM - Assisting    Pre-op Diagnosis:  Lisfranc's sprain, left, sequela [S93 622S]    Post-Op Diagnosis Codes:     * Lisfranc's sprain, left, sequela [S93 622S]    Procedure(s) (LRB):  FUSION LIS FRANC L 1,2 TMTJ fusion with intercuneiform  (Left)    Specimen(s):  * No specimens in log *    Estimated Blood Loss:   Minimal    Drains:  * No LDAs found *    Anesthesia Type:   General w/ Popliteal Block    Hemostasis:  -18 in pneumatic ankle tourniquet inflated to 250 mm of mercury for approximately 120 minutes  -manual compression  -atraumatic technique  -electrocautery    Materials:  Implant Name Type Inv  Item Serial No   Lot No  LRB No  Used Action   SCREW COMP 4 X 30MM HEADLESS - [id number]  SCREW COMP 4 X 30MM HEADLESS  [employer]  Left 1 Implanted   SCREW COMP 4 X 36MM HEADLESS - [id number]  SCREW COMP 4 X 36MM HEADLESS  [employer]  Left 2 Explanted   SCREW COMP 4 X 42MM HEADLESS - [id number]  SCREW COMP 4 X 42MM HEADLESS  [employer]  Left 1 Implanted   SCREW COMP 4 X 24MM HEADLESS - [id number]  SCREW COMP 4 X 24MM HEADLESS  [employer]  Left 1 Implanted     -2-0 Ethibond  -2-0 Monocryl  -4-0 Monocryl  -4-0 nylon    Operative Findings:  Consistent with diagnosis    Complications:   None    Procedure and Technique:     Under mild sedation, the patient was brought into the operating room and placed on the operating room table in the supine position  IV sedation was achieved by anesthesia team and a universal timeout was performed where all parties are in agreement of correct patient, correct procedure and correct site  A pneumatic tourniquet was then placed over the patient's right lower extremity with ample padding    The foot was then prepped and draped in the usual aseptic manner  An esmarch bandage was used to exsangunate the foot and the pneumatic tourniquet was then inflated to 250mmHg  Attention was then directed to the left foot  A surgical blade was utilized to make a controlled depth incision overlying the medial aspect of the foot, at the previous insertion site of the patient's Lisfranc screw  Blunt dissection was carried deep with hemostats and the head of the screw was exposed with a combination of rongeur and [place] elevator  Screwdriver was inserted into the head of the screw and it was reversed out of the foot  The screw was examined and was found to be in 1 piece with no breakage noted  Complete removal was confirmed under fluoroscopy  Attention was then directed to the dorsal aspect of the left foot  A surgical blade was utilized to create a controlled depth incision overlying the 1st intermetatarsal space at the level of the tarsometatarsal joint  Blunt dissection was carried deep utilizing Metzenbaum scissors  Care was taken to reflect vital neurovascular structures laterally away from the surgical field  Dissection was carried down to the level of the periosteum overlying the 1st TMTJ  A surgical blade was then utilized to incise the periosteum and reflected both medially and laterally away from the joint  Soft tissue structures surrounding the 1st TMTJ were freed utilizing an osteotome and the joint was then distracted with a Hinterman distractor  The articular cartilage overlying the 1st TMTJ was then removed off of the joint with a combination of osteotome mallet, curette, fish scaling, subchondral   An interval was then created overlying the 2nd TMTJ, the periosteum overlying this area was incised with a surgical blade and was reflected medially and laterally with joint  The distractor was then placed into both the medial and intermediate cuneiforms and the intercuneiform joint was distracted    Articular cartilage between the 2 bones was removed in the same fashion as described above  Next the distractor was moved to the intermediate cuneiform and the 2nd metatarsal base  The 2nd MPJ was distracted and articular cartilage was removed from the joint in the same method as described above  The distractor was removed, the hallux was dorsiflexed to induce the windlass mechanism, and a K-wire was inserted across the 1st TMTJ from the base of the 1st metatarsal to the medial cuneiform  The 2nd digit was then dorsiflexed to once again induced the windlass mechanism and a K-wire was inserted from the base of the 2nd metatarsal into the intermediate cuneiform  A reduction clamp was placed from the medial aspect of the medial cuneiform to the lateral aspect of the intermediate cuneiform in order to compress the inter cuneiform joint  A K-wire was then inserted from the medial aspect of the medial cuneiform across the inner cuneiform joint and into the intermediate cuneiform  Lastly, a K-wire was inserted from medial aspect of the base of the 1st metatarsal, across the medial cuneiform, and into the intermediate cuneiform  Proper positioning of all K-wires was confirmed under fluoroscopy and was noted to be excellent  The 1st K-wire inserted was then over drilled, measured, and an appropriately sized screw was placed as listed above in the implant section  Next, the K-wire from the base of the 1st metatarsal into the intermediate cuneiform was measured, and an appropriately-sized screw was inserted under power  The K-wire traversing the 2nd TMTJ was then over drilled, measured, and appropriately sized screw was placed as described above in the implant section  Lastly the K-wire traversing the inter cuneiform joint was over drilled, measured, and an appropriately sized screw was placed as described above in the implant section  Final positioning of all implants was assessed under fluoroscopy and was noted to be excellent   admit 106 RR Incision site was then irrigated with a bulb syringe and normal sterile saline  Periosteal/deep closure was achieved with 2-0 Monocryl  It was noted at this point that the extensor hallucis longus tendon was violated, this was repaired with 2-0 Ethibond in a Krackow suture technique  After repair, the hallux was plantar flexed and dorsiflexed and the tendon was noted to be intact with adequate strength  Subcutaneous closure was achieved with 4-0 Monocryl  Skin was reapproximated with 4-0 nylon in horizontal mattress technique  The foot was then cleansed and dried the incision site was dressed with Betadine-soaked Adaptic, 4 x 4 gauze, ABD, Kerlix, Webril, a posterior splint, and secured with a 4 six-inch Ace bandage  The tourniquet was deflated at approximately 120 min and normal hyperemic response was noted to all digits  The patient tolerated the procedure and anesthesia well without immediate complications and transferred to PACU with vital signs stable  Dr Nazanin Meza was present during the entire procedure and participated in all marroquin aspects  Rogelio Rosen DPM  DATE: November 17, 2022  TIME: 3:55 PM      Portions of the record may have been created with voice recognition software  Occasional wrong word or "sound a like" substitutions may have occurred due to the inherent limitations of voice recognition software  Read the chart carefully and recognize, using context, where substitutions have occurred

## 2022-11-25 ENCOUNTER — OFFICE VISIT (OUTPATIENT)
Dept: PODIATRY | Facility: CLINIC | Age: 62
End: 2022-11-25

## 2022-11-25 VITALS — HEIGHT: 64 IN | WEIGHT: 175 LBS | BODY MASS INDEX: 29.88 KG/M2

## 2022-11-25 DIAGNOSIS — M79.672 LEFT FOOT PAIN: Primary | ICD-10-CM

## 2022-11-25 NOTE — PROGRESS NOTES
PATIENT:  Saulo De Paz      1960    ASSESSMENT     1  Left foot pain  X-ray foot left 3+ views             PLAN  Patient is doing well post-operatively  Sutures left intact  Incision was cleaned with betadine and DSD applied to be kept C/D/I  Continue post-op care as instructed  Stressed on patient compliance about proper off-loading, staying off of feet, and proper dressing care  Call if any increase in pain, fevers, calf pain, shortness of breath, or general distress is noted  Patient instructed to go to ER if call is not returned immediately  HISTORY OF PRESENT ILLNESS  Patient presents for post-op appointment  Post-op pain is under control and resolving well  The patient is feeling well and in good spirits  Patient reported no post-op concern  Patient relates compliance with splint, crutches and wheelchair  REVIEW OF SYSTEMS  GENERAL: No fever or chills  HEART: No chest pain, or palpitation  RESPIRATORY:  No SOB or cough  GI: No Nausea, vomit or diarrhea  NEUROLOGIC: No syncope or acute weakness  MUSCULOSKELETAL: No calf pain or edema  PHYSICAL EXAMINATION  GENERAL  The patient appears in NAD / non-toxic  Afebrile  VSS    VASCULAR EXAM  Pedal pulses and vascular status are intact  No calf pain or edema bilaterally  No cyanosis  DERMATOLOGIC EXAM  Incision is coapted and healing well  No signs of infection  No active drainage  Normal post-op edema and ecchymosis  No necrosis or dehiscence  NEUROLOGIC EXAM  AAO X 3  No focal neurologic deficit  Neurologic status is intact BLE  MUSCULOSKELETAL EXAM  Good surgical correction  Normal post-op findings  ROM intact  No fluctuation or crepitus

## 2022-12-01 ENCOUNTER — OFFICE VISIT (OUTPATIENT)
Dept: PODIATRY | Facility: CLINIC | Age: 62
End: 2022-12-01

## 2022-12-01 VITALS — WEIGHT: 175 LBS | HEIGHT: 64 IN | BODY MASS INDEX: 29.88 KG/M2

## 2022-12-01 DIAGNOSIS — M79.672 LEFT FOOT PAIN: Primary | ICD-10-CM

## 2022-12-01 DIAGNOSIS — G89.18 POST-OP PAIN: ICD-10-CM

## 2022-12-01 NOTE — PROGRESS NOTES
Assessment/Plan:      Diagnoses and all orders for this visit:    Left foot pain    Post-op pain      -following suture removal there is mild sanguinous drainage emanating from the incision sites, antibiotic ointment and Band-Aid applied, advised patient that she can begin showering 48 hours     -she is to be nonweightbearing strictly in Cam boot she is passive range of motion exercises and return to clinic for x-rays and probable placement into physical therapy in 2 weeks  Subjective:     Patient ID: Shar Izquierdo is a 63 y o  female  Patient presents for evaluation and management of her Lisfranc injury, she is status post surgery 2 weeks out  She is nonweightbearing in a splint  She notes continued pain, numbness and swelling  She does feel as though pins and needles on the top and bottom of her foot      Review of Systems   Constitutional: Negative for chills and fever  HENT: Negative for ear pain and sore throat  Eyes: Negative for pain and visual disturbance  Respiratory: Negative for cough and shortness of breath  Cardiovascular: Negative for chest pain and palpitations  Gastrointestinal: Negative for abdominal pain and vomiting  Genitourinary: Negative for dysuria and hematuria  Musculoskeletal: Negative for arthralgias and back pain  Skin: Negative for color change and rash  Neurological: Negative for seizures and syncope  All other systems reviewed and are negative  Objective:     Physical Exam  Vitals reviewed  Constitutional:       Appearance: Normal appearance  HENT:      Head: Normocephalic  Nose: Nose normal       Mouth/Throat:      Mouth: Mucous membranes are moist    Eyes:      Pupils: Pupils are equal, round, and reactive to light  Cardiovascular:      Pulses: Normal pulses  Pulmonary:      Effort: Pulmonary effort is normal    Musculoskeletal:         General: Swelling and tenderness present        Comments: Positive paresthesias to the left foot, there is swelling and induration dorsally and plantarly on the foot  He is able to feel pressure, and does have some pins and needles with palpation of the foot  Digital range of motion is very limited but intact  Capillary fill time is present to the toes  Incisions are healed   Skin:     Capillary Refill: Capillary refill takes less than 2 seconds  Neurological:      General: No focal deficit present  Mental Status: She is alert and oriented to person, place, and time  Mental status is at baseline

## 2022-12-01 NOTE — LETTER
December 1, 2022     Patient: Renee Saleh  YOB: 1960  Date of Visit: 12/1/2022      To Whom it May Concern:    Shelley Lau is under my professional care  Eliz Galicia was seen in my office on 12/1/2022  Eliz Galicia is to be out of work until further notice  If you have any questions or concerns, please don't hesitate to call           Sincerely,          Katie Amato DPM        CC: No Recipients

## 2022-12-15 ENCOUNTER — APPOINTMENT (OUTPATIENT)
Dept: RADIOLOGY | Facility: MEDICAL CENTER | Age: 62
End: 2022-12-15

## 2022-12-15 ENCOUNTER — OFFICE VISIT (OUTPATIENT)
Dept: PODIATRY | Facility: CLINIC | Age: 62
End: 2022-12-15

## 2022-12-15 VITALS — BODY MASS INDEX: 29.88 KG/M2 | WEIGHT: 175 LBS | HEIGHT: 64 IN

## 2022-12-15 DIAGNOSIS — M79.672 LEFT FOOT PAIN: ICD-10-CM

## 2022-12-15 DIAGNOSIS — M79.672 LEFT FOOT PAIN: Primary | ICD-10-CM

## 2022-12-15 NOTE — PROGRESS NOTES
Assessment/Plan:      Diagnoses and all orders for this visit:    Left foot pain  -     X-ray foot left 3+ views; Future  -     Ambulatory referral to Physical Therapy; Future      -X-rays reviewed and show no loosening of fixation, normal postoperative appearance  - Will place in physical therapy for strengthening light range of motion exercises and continue nonweightbearing for the next 2 weeks  She is to return in 2 weeks for hopeful transition to heel weightbearing in boot  -Continue range of motion exercises  Advised compression socks for swelling of the foot  Subjective:     Patient ID: Buzz Sprague is a 63 y o  female  Patient in for evaluation management of her left foot, she is complaining continued swelling and continued numbness, tingling and pins-and-needles feeling to the foot  She notes no drainage emanating from any of her incisions  Review of Systems   Constitutional: Negative for chills and fever  HENT: Negative for ear pain and sore throat  Eyes: Negative for pain and visual disturbance  Respiratory: Negative for cough and shortness of breath  Cardiovascular: Negative for chest pain and palpitations  Gastrointestinal: Negative for abdominal pain and vomiting  Genitourinary: Negative for dysuria and hematuria  Musculoskeletal: Negative for arthralgias and back pain  Skin: Negative for color change and rash  Neurological: Negative for seizures and syncope  All other systems reviewed and are negative  Objective:     Physical Exam  Vitals reviewed  Constitutional:       Appearance: Normal appearance  HENT:      Head: Normocephalic and atraumatic  Nose: Nose normal       Mouth/Throat:      Mouth: Mucous membranes are moist    Eyes:      Pupils: Pupils are equal, round, and reactive to light  Cardiovascular:      Pulses: Normal pulses     Pulmonary:      Effort: Pulmonary effort is normal    Musculoskeletal:         General: Swelling and tenderness present  Comments: Tenderness with range of motion of the TMT J's, there is persistent soft tissue swelling left foot, incisions are scabbed over no drainage  There is paresthesias to the left foot, the foot is warm to the touch  No calf tenderness   Skin:     Capillary Refill: Capillary refill takes less than 2 seconds  Neurological:      General: No focal deficit present  Mental Status: She is alert and oriented to person, place, and time  Mental status is at baseline

## 2022-12-29 ENCOUNTER — APPOINTMENT (OUTPATIENT)
Dept: RADIOLOGY | Facility: MEDICAL CENTER | Age: 62
End: 2022-12-29

## 2022-12-29 ENCOUNTER — OFFICE VISIT (OUTPATIENT)
Dept: PODIATRY | Facility: CLINIC | Age: 62
End: 2022-12-29

## 2022-12-29 VITALS — HEIGHT: 64 IN | WEIGHT: 175 LBS | BODY MASS INDEX: 29.88 KG/M2

## 2022-12-29 DIAGNOSIS — M79.672 LEFT FOOT PAIN: Primary | ICD-10-CM

## 2022-12-29 DIAGNOSIS — S93.622S LISFRANC'S SPRAIN, LEFT, SEQUELA: ICD-10-CM

## 2022-12-29 NOTE — PROGRESS NOTES
Assessment/Plan:      Diagnoses and all orders for this visit:    Left foot pain  -     X-ray foot left 3+ views; Future    Lisfranc's sprain, left, sequela      -X-rays 3 views nonweightbearing taken reviewed of the left foot and show no loss of fixation good postoperative result normal postoperative appearance with intact fixation  - Clinically she is to having significant pain with range of motion of the TMT J's, we will advance her to protected heel weightbearing with crutch assistance, according to her swelling and pain she may continue to transition out of 2 crutches down to 1 crutch,  - She is to return following 2 weeks of physical therapy which she will begin on 1/10  -She would not be able to return to work until at least the end of January she is having continued swelling numbness and pain and she is unable to work    Subjective:     Patient ID: Armando Mckeon is a 63 y o  female  Patient presents evaluation management of left foot, she is now 6-week status post Lisfranc fusion  She presents today nonweightbearing in a boot utilizing wheelchair  Review of Systems   Constitutional: Negative for chills and fever  HENT: Negative for ear pain and sore throat  Eyes: Negative for pain and visual disturbance  Respiratory: Negative for cough and shortness of breath  Cardiovascular: Negative for chest pain and palpitations  Gastrointestinal: Negative for abdominal pain and vomiting  Genitourinary: Negative for dysuria and hematuria  Musculoskeletal: Negative for arthralgias and back pain  Skin: Negative for color change and rash  Neurological: Negative for seizures and syncope  All other systems reviewed and are negative  Objective:     Physical Exam  Vitals reviewed  Constitutional:       Appearance: Normal appearance  HENT:      Head: Normocephalic and atraumatic        Nose: Nose normal       Mouth/Throat:      Mouth: Mucous membranes are moist       Pharynx: Oropharynx is clear  Eyes:      Pupils: Pupils are equal, round, and reactive to light  Cardiovascular:      Pulses: Normal pulses  Pulmonary:      Effort: Pulmonary effort is normal    Musculoskeletal:         General: Swelling and tenderness present  Comments: There is significantly decreased sensation to the periincisional area along the course of the deep peroneal nerve  Pulse along the dorsalis pedis is palpable however  - Numbness tingling on the plantar aspect of the foot  Digital range of motion is limited and there is palpable scar tissue overlying the forefoot  There is mild pain with range of motion of the TMT J's no palpable hardware   Skin:     Capillary Refill: Capillary refill takes less than 2 seconds  Neurological:      General: No focal deficit present  Mental Status: She is alert and oriented to person, place, and time  Mental status is at baseline

## 2022-12-29 NOTE — LETTER
December 29, 2022     Patient: Ryan Roland  YOB: 1960  Date of Visit: 12/29/2022      To Whom it May Concern:    Anusha Tompkins is under my professional care  Roulapascale Lane was seen in my office on 12/29/2022  Kristian Lane is to be out of work until the 31st of January or until she is re-evaluated by myself  If you have any questions or concerns, please don't hesitate to call           Sincerely,          Layton Harada, DPM        CC: No Recipients

## 2023-01-10 ENCOUNTER — EVALUATION (OUTPATIENT)
Dept: PHYSICAL THERAPY | Facility: CLINIC | Age: 63
End: 2023-01-10

## 2023-01-10 DIAGNOSIS — M79.672 LEFT FOOT PAIN: ICD-10-CM

## 2023-01-10 NOTE — PROGRESS NOTES
PT Evaluation     Today's date: 1/10/2023  Patient name: Joshua Jean  : 1960  MRN: [id number]  Referring provider: Jordan Tinajero  Dx:   Encounter Diagnosis     ICD-10-CM    1  Left foot pain  M79 672 Ambulatory referral to Physical Therapy     PT plan of care cert/re-cert          Start Time: 1400  Stop Time: 1500  Total time in clinic (min): 60 minutes    Assessment  Assessment details: The patient is a 62 yo female who presents to physical therapy with signs and symptoms consistent with s/p Lisfranc ORIF  She is currently partial weight bearing on her heel in a boot with a SPC  She exhibits L LE weakness and limited L foot and ankle ROM  She utilizes a stair lift to ascend and descend the steps in her home  She would benefit from a course of skilled physical therapy to address deficits in ROM, strength, stability and functional status  Impairments: abnormal gait, abnormal or restricted ROM, impaired balance, impaired physical strength, lacks appropriate home exercise program and pain with function  Understanding of Dx/Px/POC: good   Prognosis: good    Goals  STG(4 weeks):             1  Independent with HEP            2  Decrease pain to 4/10 on average            3  Increase AROM L ankle by 5-10 degrees each plane            4  Increase strength L hip/knee to 4 to 4+/5      LTG(12 weeks):                1  Reduce L foot/ankle pain to 2/10 on average             2  Increase LLE strength to 4+to 5/5             3  L ankle AROM WFL             4  Resume a normal gait pattern without AD             5  Negotiate stairs independently              6  Return to [facility]      Plan  Plan details:  Will progress PT plan as per physician's recommendations  Patient would benefit from: skilled physical therapy  Planned modality interventions: cryotherapy and thermotherapy: hydrocollator packs  Planned therapy interventions: manual therapy, balance, neuromuscular re-education, strengthening, stretching, therapeutic activities, therapeutic exercise, gait training and home exercise program  Frequency: 2x week  Duration in weeks: 12  Plan of Care beginning date: 1/10/2023  Plan of Care expiration date: 2023  Treatment plan discussed with: patient and family        Subjective Evaluation    History of Present Illness  Date of onset: 9/10/2021  Date of surgery: 2022  Mechanism of injury: 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  On 9-10-21, while working a large container fell onto her L foot  She had immediate pain and swelling  Her spouse took her to the ER  Xray was unremarkable, placed in boot and discharged home  Did follow ups with urgent care  The pain continued  She was referred to specialist  She had returned to work 5 hours/day  After 3 months , went FT and was released from workers' comp doctor  The patient reports ongoing pain and swelling  She then saw Dr Brionna Lugo  An MRI was done and revealed ligament damage  She was placed in boot again  She was then referred to Dr Sigrid Rangel who diagnosed her with a torn ligament  She underwent surgery to have a screw placed but did not heal  She then underwent a Lisfranc ORIF on 23  She was NWB until 2 weeks ago  Now bearing weight only on heel using walker or cane  Now referred to PT  22 was her last day of work             Not a recurrent problem   Quality of life: fair    Pain  Current pain ratin  At best pain ratin  At worst pain ratin  Location:  L lateral ankle, numbness medial foot, pain and numbness bottom of foot  Quality: dull ache  Relieving factors: ice and rest  Aggravating factors: standing and walking    Social Support  Steps to enter house: yes  Stairs in house: yes   15  Lives in: multiple-level home  Lives with: significant other    Employment status: not working  Patient Goals  Patient goal: be able to walk        Objective     Neurological Testing     Sensation     Ankle/Foot   Left Ankle/Foot   Absent: light touch    Comments Left light touch: 1st ray       Active Range of Motion   Left Ankle/Foot   Dorsiflexion (ke): 5 degrees   Plantar flexion: 15 degrees   Inversion: 15 degrees   Eversion: 0 degrees with pain    Passive Range of Motion   Left Ankle/Foot    Dorsiflexion (ke): 8 degrees   Plantar flexion: 20 degrees   Inversion: 25 degrees   Eversion: 5 degrees     Strength/Myotome Testing     Additional Strength Details  L hip strength 4-/5  L knee 4/5  L ankle NT this date             Precautions: R THR, OA      Manuals 1/10            L foot/ankle JM                                                   Neuro Re-Ed             Ankle circles 20x CW/CCW            BAPS                                                                              Ther Ex             AROM 10x ea dir            Toe curls 20x            LAQ             SLR-3way             Clam             Iso hip add                                       Ther Activity                                       Gait Training                                       Modalities             CP

## 2023-01-11 DIAGNOSIS — I49.3 PVC (PREMATURE VENTRICULAR CONTRACTION): ICD-10-CM

## 2023-01-11 DIAGNOSIS — Z13.220 SCREENING FOR HYPERLIPIDEMIA: ICD-10-CM

## 2023-01-11 DIAGNOSIS — I10 PRIMARY HYPERTENSION: ICD-10-CM

## 2023-01-11 DIAGNOSIS — E66.3 OVERWEIGHT (BMI 25.0-29.9): ICD-10-CM

## 2023-01-11 DIAGNOSIS — E78.5 HYPERLIPIDEMIA, UNSPECIFIED HYPERLIPIDEMIA TYPE: ICD-10-CM

## 2023-01-11 DIAGNOSIS — I10 ESSENTIAL HYPERTENSION: ICD-10-CM

## 2023-01-11 RX ORDER — ATORVASTATIN CALCIUM 10 MG/1
10 TABLET, FILM COATED ORAL DAILY
Qty: 30 TABLET | Refills: 5 | Status: SHIPPED | OUTPATIENT
Start: 2023-01-11

## 2023-01-11 RX ORDER — LOSARTAN POTASSIUM 25 MG/1
12.5 TABLET ORAL DAILY
Qty: 15 TABLET | Refills: 5 | Status: SHIPPED | OUTPATIENT
Start: 2023-01-11

## 2023-01-12 ENCOUNTER — OFFICE VISIT (OUTPATIENT)
Dept: PHYSICAL THERAPY | Facility: CLINIC | Age: 63
End: 2023-01-12

## 2023-01-12 DIAGNOSIS — M79.672 LEFT FOOT PAIN: Primary | ICD-10-CM

## 2023-01-12 NOTE — PROGRESS NOTES
Daily Note     Today's date: 2023  Patient name: Marcos Cabrales  : 1960  MRN: [id number]  Referring provider: Harmeet Urbnia  Dx:   Encounter Diagnosis     ICD-10-CM    1  Left foot pain  M79 672           Start Time: 1330  Stop Time: 1430  Total time in clinic (min): 60 minutes    Subjective: The patient states that she has been doing her ROM exercises at home as instructed  She denies significant pain at present  Objective: See treatment diary below      Assessment: Initiated exercises to improve L hip strength and increase L ankle mobility  Good tolerance to session  Patient would benefit from continued PT      Plan: Continue per plan of care        Precautions: R THR, OA      Manuals 1/10 1/12           L foot/ankle JM JM                                                  Neuro Re-Ed             Ankle circles 20x CW/CCW 20x CW/CCW           BAPS  L3 DF/PF, Inv/Ever 20x ea                                                                            Ther Ex             AROM 10x ea dir 20x ea           Toe curls 20x 20x           LAQ  20x5" LLE           SLR-3way  20x ea LLE on mat           Clam  2x10 LLE           Iso hip add  20x5"           Heel slides  2x10                        Ther Activity                                       Gait Training                                       Modalities             CP  10' L ankle

## 2023-01-16 ENCOUNTER — OFFICE VISIT (OUTPATIENT)
Dept: PHYSICAL THERAPY | Facility: CLINIC | Age: 63
End: 2023-01-16

## 2023-01-16 DIAGNOSIS — M79.672 LEFT FOOT PAIN: Primary | ICD-10-CM

## 2023-01-16 NOTE — PROGRESS NOTES
Daily Note     Today's date: 2023  Patient name: Eleuterio Toney  : 1960  MRN: [id number]  Referring provider: Tyra Smiley  Dx:   Encounter Diagnosis     ICD-10-CM    1  Left foot pain  M79 672                      Subjective: Patient reports that she is compliant with HEP  She has weakness in L LE and ankle  Objective: See treatment diary below  Incorporated SAQ into program today  Assessment: Tolerated treatment well  Patient would benefit from continued PT to improve L ankle and foot function as able  Requires assistance with BAPS board control due to limited strength at this time  L LE presents with weakness due to NWB for a 6 week period  Patient is aware of ambulation restriction and is using SPC  Plan: Continue per plan of care        Precautions: R THR, OA      Manuals 1/10 1/12 1/16          L foot/ankle JM JM CM          Incision    CM                                    Neuro Re-Ed             Ankle circles 20x CW/CCW 20x CW/CCW 20x CW/CCW          BAPS  L3 DF/PF, Inv/Ever 20x ea L3 DF/PF, Inv/Ever 20x ea                                                                            Ther Ex             AROM 10x ea dir 20x ea 20x ea          Toe curls 20x 20x 20x          LAQ  20x5" LLE 20x5" L LE          SLR-3way  20x ea LLE on mat 20x ea LLE on mat          Clam  2x10 LLE 2x10 LLE          Iso hip add  20x5" 5"x20          Heel slides  2x10 2x10          SAQ   2x10          Ther Activity                                       Gait Training                                       Modalities             CP  10' L ankle 10' L ankle

## 2023-01-18 ENCOUNTER — OFFICE VISIT (OUTPATIENT)
Dept: PHYSICAL THERAPY | Facility: CLINIC | Age: 63
End: 2023-01-18

## 2023-01-18 DIAGNOSIS — M79.672 LEFT FOOT PAIN: Primary | ICD-10-CM

## 2023-01-18 NOTE — PROGRESS NOTES
Daily Note     Today's date: 2023  Patient name: Chelly Parekh  : 1960  MRN: [id number]  Referring provider: Jacquelin Orozco  Dx:   Encounter Diagnosis     ICD-10-CM    1  Left foot pain  M79 672                      Subjective: Patient reports she had trouble sleeping last night due to swelling and discomfort  Objective: See treatment diary below  Incorporated resistance with L LE strengthening, marbles, and prone HS curls  Assessment: Tolerated treatment well  Patient demonstrated fatigue post treatment and would benefit from continued PT to progress per protocol from MD  Her L LE has weakness due to past NWB  Edema noted around L lateral malleolus  Assessed with STM  Plan: Continue per plan of care        Precautions: R THR, OA      Manuals 1/10 1/12 1/16 1/18         L foot/ankle JM JM CM CM         Incision    CM CM                                   Neuro Re-Ed             Ankle circles 20x CW/CCW 20x CW/CCW 20x CW/CCW 20x CW/CCW         BAPS  L3 DF/PF, Inv/Ever 20x ea L3 DF/PF, Inv/Ever 20x ea  L3 DF/PF, Inv/Ever 20x ea          Marbles     2x                                                              Ther Ex             AROM 10x ea dir 20x ea 20x ea 20x ea         Toe curls 20x 20x 20x 20x ea         LAQ  20x5" LLE 20x5" L LE 2# 3x10         SLR-3way  20x ea LLE on mat 20x ea LLE on mat 30x ea LLE on mat         Clam  2x10 LLE 2x10 LLE 3x10         Iso hip add  20x5" 5"x20 5"x20         Heel slides  2x10 2x10 3x10         SAQ   2x10 2# 3x10         HS curls    Prone 2# 3x10 Prone 2# 3x10         Ther Activity             Sit to stands                          Gait Training                                       Modalities             CP  10' L ankle 10' L ankle 10' L ankle

## 2023-01-23 ENCOUNTER — OFFICE VISIT (OUTPATIENT)
Dept: PHYSICAL THERAPY | Facility: CLINIC | Age: 63
End: 2023-01-23

## 2023-01-23 DIAGNOSIS — M79.672 LEFT FOOT PAIN: Primary | ICD-10-CM

## 2023-01-23 NOTE — PROGRESS NOTES
Daily Note     Today's date: 2023  Patient name: Chelly Parekh  : 1960  MRN: [id number]  Referring provider: Jacquelin Orozco  Dx:   Encounter Diagnosis     ICD-10-CM    1  Left foot pain  M79 672           Start Time: 1300  Stop Time: 1405  Total time in clinic (min): 65 minutes    Subjective: The patient states that her L foot and lower leg have been swelling  She has been wearing compression socks which have been helping  Objective: See treatment diary below      Assessment: No significant swelling noted in the L foot with removal of sock  The distal region of the dorsum of the foot remains tight which limits toe flexion mobility  Good response to manual therapy  Tolerated treatment well  Patient would benefit from continued PT      Plan: Continue per plan of care        Precautions: R THR, OA      Manuals 1/10 1/12 1/16 1/18 1/23        L foot/ankle JM JM CM CM JM        Incision    CM CM JM                                  Neuro Re-Ed             Ankle circles 20x CW/CCW 20x CW/CCW 20x CW/CCW 20x CW/CCW 20x CW/CCW        BAPS  L3 DF/PF, Inv/Ever 20x ea L3 DF/PF, Inv/Ever 20x ea  L3 DF/PF, Inv/Ever 20x ea  L3 DF/PF, Inv/Ever 20x ea        Marbles     2x  2x                                                            Ther Ex             AROM 10x ea dir 20x ea 20x ea 20x ea 20x ea        Toe curls 20x 20x 20x 20x ea 30x         LAQ  20x5" LLE 20x5" L LE 2# 3x10 2# 3x10        SLR-3way  20x ea LLE on mat 20x ea LLE on mat 30x ea LLE on mat 30x ea LLE on mat        Clam  2x10 LLE 2x10 LLE 3x10 3x10 BLE        Iso hip add  20x5" 5"x20 5"x20 5"x30        Heel slides  2x10 2x10 3x10 3x10        SAQ   2x10 2# 3x10 2# 3x10        HS curls    Prone 2# 3x10 Prone 2# 3x10  Prone 2# 3x10        TB ankle 4way     L3 20x ea (TB@ midfoot)                     Ther Activity             Sit to stands                          Gait Training                                       Modalities             CP 10' L ankle 10' L ankle 10' L ankle 10' L ankle

## 2023-01-25 ENCOUNTER — APPOINTMENT (OUTPATIENT)
Dept: PHYSICAL THERAPY | Facility: CLINIC | Age: 63
End: 2023-01-25

## 2023-01-26 ENCOUNTER — OFFICE VISIT (OUTPATIENT)
Dept: PHYSICAL THERAPY | Facility: CLINIC | Age: 63
End: 2023-01-26

## 2023-01-26 DIAGNOSIS — M79.672 LEFT FOOT PAIN: Primary | ICD-10-CM

## 2023-01-26 NOTE — PROGRESS NOTES
Daily Note     Today's date: 2023  Patient name: Callie Colilns  : 1960  MRN: [id number]  Referring provider: Ramirez Sultana  Dx:   Encounter Diagnosis     ICD-10-CM    1  Left foot pain  M79 672                      Subjective: Patient reports that she had trouble sleeping due to edema and pain  Compliant with HEP and WB instructions  RTD   Objective: See treatment diary below  Assessment: Tolerated treatment well  Patient would benefit from continued PT to progress as per protocol  Scar is smooth and closed  Patient has edema in L lateral malleolus area that was assessed with STM  Patient has weakness in L LE due to NWB status  Strengthening to L LE chain in NWB to improve gait and L ankle/foot stability  Patient's L ankle strength is improving due to more DF and PF control  Plan: Continue per plan of care        Precautions: R THR, OA      Manuals 1/10 1/12 1/16 1/18 1/23 1/26       L foot/ankle JM JM CM CM JM CM       Incision    CM CM JM CM                                 Neuro Re-Ed             Ankle circles 20x CW/CCW 20x CW/CCW 20x CW/CCW 20x CW/CCW 20x CW/CCW 20x CW/CCW       BAPS  L3 DF/PF, Inv/Ever 20x ea L3 DF/PF, Inv/Ever 20x ea  L3 DF/PF, Inv/Ever 20x ea  L3 DF/PF, Inv/Ever 20x ea L3 DF/PF, Inv/Ever 20x ea       Marbles     2x  2x 2x                                                           Ther Ex             AROM 10x ea dir 20x ea 20x ea 20x ea 20x ea 20xea       Toe curls 20x 20x 20x 20x ea 30x  30x       LAQ  20x5" LLE 20x5" L LE 2# 3x10 2# 3x10 2# 3x10       SLR-3way  20x ea LLE on mat 20x ea LLE on mat 30x ea LLE on mat 30x ea LLE on mat 30x ea LLE on mat       Clam  2x10 LLE 2x10 LLE 3x10 3x10 BLE 3x10 BLE       Iso hip add  20x5" 5"x20 5"x20 5"x30 5"x30       Heel slides  2x10 2x10 3x10 3x10 3x10       SAQ   2x10 2# 3x10 2# 3x10 2# 3x10       HS curls    Prone 2# 3x10 Prone 2# 3x10  Prone 2# 3x10 Prone 2# 3x10       TB ankle 4way     L3 20x ea (TB@ midfoot) L3 20x ea (TB@ midfoot                    Ther Activity             Sit to stands                          Gait Training                                       Modalities             CP  10' L ankle 10' L ankle 10' L ankle 10' L ankle 10" L ankle

## 2023-01-30 ENCOUNTER — OFFICE VISIT (OUTPATIENT)
Dept: PODIATRY | Facility: CLINIC | Age: 63
End: 2023-01-30

## 2023-01-30 ENCOUNTER — APPOINTMENT (OUTPATIENT)
Dept: RADIOLOGY | Facility: MEDICAL CENTER | Age: 63
End: 2023-01-30

## 2023-01-30 VITALS
DIASTOLIC BLOOD PRESSURE: 81 MMHG | HEART RATE: 81 BPM | SYSTOLIC BLOOD PRESSURE: 179 MMHG | BODY MASS INDEX: 29.88 KG/M2 | WEIGHT: 175 LBS | HEIGHT: 64 IN

## 2023-01-30 DIAGNOSIS — S93.622S LISFRANC'S SPRAIN, LEFT, SEQUELA: Primary | ICD-10-CM

## 2023-01-30 DIAGNOSIS — M79.672 LEFT FOOT PAIN: ICD-10-CM

## 2023-01-30 NOTE — PROGRESS NOTES
Assessment/Plan:    No problem-specific Assessment & Plan notes found for this encounter  Diagnoses and all orders for this visit:    Perfecto's sprain, left, sequela    Left foot pain      -She is having some delay in fusion site of 1, 2, intercuneiform area  There is some spot welding that is occurring visible on the medial oblique view of the weightbearing x-ray  - Her clinical position is within normal normal limits, she is having continued pain along the intercuneiform area and minimal pain along the first, second TMT J fusion sites  - She is to continue cam boot, she is to continue physical therapy she may slowly DC the cam boot as she does feel able, and she may DC the crutch she is to return in 3 weeks for repeat evaluation with x-rays and her pain  Subjective:      Patient ID: Sim Mclaughlin is a 63 y o  female  Patient presents for evaluation management of her continued left foot pain, she presents today in cam boot with cane assistance, she is complaining of continued pain  She also complains that she has decreased digital range of motion and some numbness  The following portions of the patient's history were reviewed and updated as appropriate: allergies, current medications, past family history, past medical history, past social history, past surgical history and problem list     Review of Systems   Constitutional: Negative for chills and fever  HENT: Negative for ear pain and sore throat  Eyes: Negative for pain and visual disturbance  Respiratory: Negative for cough and shortness of breath  Cardiovascular: Negative for chest pain and palpitations  Gastrointestinal: Negative for abdominal pain and vomiting  Genitourinary: Negative for dysuria and hematuria  Musculoskeletal: Negative for arthralgias and back pain  Skin: Negative for color change and rash  Neurological: Negative for seizures and syncope  All other systems reviewed and are negative  Objective:      BP (!) 179/81 (BP Location: Right arm, Patient Position: Sitting, Cuff Size: Standard)   Pulse 81   Ht 5' 4" (1 626 m)   Wt 79 4 kg (175 lb)   BMI 30 04 kg/m²          Physical Exam  Vitals reviewed  Constitutional:       Appearance: Normal appearance  HENT:      Head: Normocephalic  Nose: Nose normal       Mouth/Throat:      Mouth: Mucous membranes are moist    Eyes:      Pupils: Pupils are equal, round, and reactive to light  Cardiovascular:      Pulses: Normal pulses  Pulmonary:      Effort: Pulmonary effort is normal    Musculoskeletal:         General: Swelling and tenderness present  Comments: Decreased digital range of motion on the left foot, there is pain with palpation overlying the cicatrix  There is some pain medially and also along the ankle  She does have new onset equinus  But the foot is not cool to touch  Skin:     Capillary Refill: Capillary refill takes less than 2 seconds  Neurological:      General: No focal deficit present  Mental Status: She is alert and oriented to person, place, and time  Mental status is at baseline

## 2023-01-30 NOTE — LETTER
January 30, 2023     Patient: Callie Collins  YOB: 1960  Date of Visit: 1/30/2023      To Whom it May Concern:    Lloyd Malcolm is under my professional care  Darien Hoe was seen in my office on 1/30/2023  Darien Soliz may not return to work until further notice  Re-evaluation in 3 weeks  If you have any questions or concerns, please don't hesitate to call           Sincerely,          Dale Garcia DPM        CC: Peep Brown DO

## 2023-01-31 ENCOUNTER — OFFICE VISIT (OUTPATIENT)
Dept: PHYSICAL THERAPY | Facility: CLINIC | Age: 63
End: 2023-01-31

## 2023-01-31 DIAGNOSIS — M79.672 LEFT FOOT PAIN: Primary | ICD-10-CM

## 2023-01-31 NOTE — PROGRESS NOTES
Daily Note     Today's date: 2023  Patient name: Mohinder Lombardo  : 1960  MRN: [id number]  Referring provider: Norberto Roberts  Dx:   Encounter Diagnosis     ICD-10-CM    1  Left foot pain  M79 672           Start Time: 1300  Stop Time: 1400  Total time in clinic (min): 60 minutes    Subjective: The patient reports 5-6/10 pain in the L foot dorsal and plantar surface and along the arch  She saw the surgeon and she is progressed to [facility] and was instructed to wean out of the CAM boot as tolerated  She states that she walked for a few minutes in stocking feet and her pain increased  She will follow up with him again on 23  Objective: See treatment diary below      Assessment: Patient instructed to bring a shoe with her to her next appointment to begin closed chain activities  CW/CCW rotations performed on BAPS to improve L ankle mobility  Tolerated treatment well  Patient would benefit from continued PT      Plan: Continue per plan of care        Precautions: R THR, OA      Manuals 1/10 1/12 1/16 1/18 1/23 1/26 1/31      L foot/ankle JM JM CM CM JM CM JM      Incision    CM CM JM CM JM                                Neuro Re-Ed             Ankle circles 20x CW/CCW 20x CW/CCW 20x CW/CCW 20x CW/CCW 20x CW/CCW 20x CW/CCW 20x CW/CCW      BAPS  L3 DF/PF, Inv/Ever 20x ea L3 DF/PF, Inv/Ever 20x ea  L3 DF/PF, Inv/Ever 20x ea  L3 DF/PF, Inv/Ever 20x ea L3 DF/PF, Inv/Ever 20x ea L3 DF/PF, inv/ever, CW/CCW 20x ea      Marbles     2x  2x 2x 2x                                                          Ther Ex             AROM 10x ea dir 20x ea 20x ea 20x ea 20x ea 20xea 20x ea      Toe curls 20x 20x 20x 20x ea 30x  30x 30x      LAQ  20x5" LLE 20x5" L LE 2# 3x10 2# 3x10 2# 3x10 2# 3x10      SLR-3way  20x ea LLE on mat 20x ea LLE on mat 30x ea LLE on mat 30x ea LLE on mat 30x ea LLE on mat 30x ea LLE on mat      Clam  2x10 LLE 2x10 LLE 3x10 3x10 BLE 3x10 BLE 3x10 BLE      Iso hip add  20x5" 5"x20 5"x20 5"x30 5"x30 5"x30      Heel slides  2x10 2x10 3x10 3x10 3x10 3x10      SAQ   2x10 2# 3x10 2# 3x10 2# 3x10 2# 3x10      HS curls    Prone 2# 3x10 Prone 2# 3x10  Prone 2# 3x10 Prone 2# 3x10 Prone 2# 3x10      TB ankle 4way     L3 20x ea (TB@ midfoot) L3 20x ea (TB@ midfoot  L3 20x ea ([email or web address])                   Ther Activity             Sit to stands                          Gait Training                                       Modalities             CP  10' L ankle 10' L ankle 10' L ankle 10' L ankle 10" L ankle 10' L foot

## 2023-02-02 ENCOUNTER — OFFICE VISIT (OUTPATIENT)
Dept: PHYSICAL THERAPY | Facility: CLINIC | Age: 63
End: 2023-02-02

## 2023-02-02 DIAGNOSIS — M79.672 LEFT FOOT PAIN: Primary | ICD-10-CM

## 2023-02-02 NOTE — PROGRESS NOTES
Daily Note     Today's date: 2023  Patient name: Raghu Edmondson  : 1960  MRN: [id number]  Referring provider: Mihaela Palafox  Dx:   Encounter Diagnosis     ICD-10-CM    1  Left foot pain  M79 672                      Subjective: Patient reports that her L foot has pain and soreness  She has been trying to wear her sneaker for short periods of time  Objective: See treatment diary below  Initiated heel/toe ambulation in sneaker today, Nutstep, and seated HR/TR  Assessment: Tolerated treatment well  Patient demonstrated fatigue post treatment, exhibited good technique with therapeutic exercises and would benefit from continued PT to improve L ankle ROM and improve normal gait pattern  Her gait is antalgic at this time  She has neutral L DF ROM  Plan: Continue per plan of care        Precautions: R THR, OA      Manuals /   L foot/ankle CM JM CM CM JM CM JM   Incision  CM  CM CM JM CM JM                       Neuro Re-Ed          Ankle circles HEP 20x CW/CCW 20x CW/CCW 20x CW/CCW 20x CW/CCW 20x CW/CCW 20x CW/CCW   BAPS L3 DF/PF, inv/ever, CW/CCW 20x ea L3 DF/PF, Inv/Ever 20x ea L3 DF/PF, Inv/Ever 20x ea  L3 DF/PF, Inv/Ever 20x ea  L3 DF/PF, Inv/Ever 20x ea L3 DF/PF, Inv/Ever 20x ea L3 DF/PF, inv/ever, CW/CCW 20x ea   Marbles     2x  2x 2x 2x                                           Ther Ex          Nustep for ankle/foot ROM L4 10'         AROM HEP 20x ea 20x ea 20x ea 20x ea 20xea 20x ea   Toe curls HEP 20x 20x 20x ea 30x  30x 30x   LAQ 2# 3x10 20x5" LLE 20x5" L LE 2# 3x10 2# 3x10 2# 3x10 2# 3x10   SLR-3way Flex 1# Abd/ 30x ea  20x ea LLE on mat 20x ea LLE on mat 30x ea LLE on mat 30x ea LLE on mat 30x ea LLE on mat 30x ea LLE on mat   Clam nv 2x10 LLE 2x10 LLE 3x10 3x10 BLE 3x10 BLE 3x10 BLE   Iso hip add nv 20x5" 5"x20 5"x20 5"x30 5"x30 5"x30   Heel slides HEP 2x10 2x10 3x10 3x10 3x10 3x10   SAQ 2# 3x10  2x10 2# 3x10 2# 3x10 2# 3x10 2# 3x10   HS curls    Prone 2# 3x10 Prone 2# 3x10  Prone 2# 3x10 Prone 2# 3x10 Prone 2# 3x10   TB ankle 4way L3 20x ea    L3 20x ea (TB@ midfoot) L3 20x ea (TB@ midfoot  L3 20x ea ([email or web address])   HR/TR [id number] ea         Ther Activity          Sit to stands          Step ups          Gait Training          WBAT with shoe Heel/toe WBAT// bars SPC                   Modalities          CP Home 10' L ankle 10' L ankle 10' L ankle 10' L ankle 10" L ankle 10' L foot

## 2023-02-06 ENCOUNTER — OFFICE VISIT (OUTPATIENT)
Dept: CARDIOLOGY CLINIC | Facility: CLINIC | Age: 63
End: 2023-02-06

## 2023-02-06 VITALS
WEIGHT: 178 LBS | BODY MASS INDEX: 30.39 KG/M2 | HEIGHT: 64 IN | SYSTOLIC BLOOD PRESSURE: 126 MMHG | HEART RATE: 80 BPM | DIASTOLIC BLOOD PRESSURE: 88 MMHG

## 2023-02-06 DIAGNOSIS — I10 PRIMARY HYPERTENSION: ICD-10-CM

## 2023-02-06 DIAGNOSIS — I49.3 PVC (PREMATURE VENTRICULAR CONTRACTION): Primary | ICD-10-CM

## 2023-02-06 DIAGNOSIS — E78.5 HYPERLIPIDEMIA, UNSPECIFIED HYPERLIPIDEMIA TYPE: ICD-10-CM

## 2023-02-06 DIAGNOSIS — E66.09 CLASS 1 OBESITY DUE TO EXCESS CALORIES WITH BODY MASS INDEX (BMI) OF 30.0 TO 30.9 IN ADULT, UNSPECIFIED WHETHER SERIOUS COMORBIDITY PRESENT: ICD-10-CM

## 2023-02-06 NOTE — PROGRESS NOTES
Cardiology Follow Up    Raghu Edmondson  [id number]  1960  [facility] [place] [facility]  [facility], [facility] [address] [address]      1  PVC (premature ventricular contraction)  Holter monitor      2  Primary hypertension        3  Hyperlipidemia, unspecified hyperlipidemia type  Lipid Panel with Direct LDL reflex      4  Class 1 obesity due to excess calories with body mass index (BMI) of 30 0 to 30 9 in adult, unspecified whether serious comorbidity present            Discussion/Summary:  1  PVCs  2  Hypertension  3  Hyperlipidemia  4  Obesity  5  Mitral regurgitation      -Pharmacologic nuclear stress test 7/25/2022 showing no diagnostic evidence of ischemia on stress testing with frequent PVCs noted  -Transthoracic echocardiogram 6/30/2022 showing normal left ventricular systolic function estimated LVEF 31% with diastolic parameters normal for age with mild to moderate mitral regurgitation  -Blood pressure in the office is reasonably well controlled the patient notes at home when she does check it is well controlled we will continue current medical therapy with losartan 12 5 mg daily, metoprolol tartrate 25 mg twice daily, HCTZ 12 5 mg daily along with atorvastatin 10 mg daily  -We will repeat fasting lipid panel prior to next office visit  -We will check a 24-hour Holter monitor to evaluate overall burden of ectopic activity  If patient remains with significant burden and has symptoms may need to consider additional medical therapy with flecainide  -Patient counseled on dietary and lifestyle modifications including following a low-salt, low-fat, heart healthy diet with goal BMI less than 30  -Patient will be seen in 3 months or sooner if necessary  -Patient counseled if she were to have any warning or alarm type symptoms she is to seek emergency medical care immediately        History of Present Illness:  -Patient is a 71-year-old female with hypertension, bilateral chronic knee osteoarthritis and overweight who initially presented to the office in June 2022 after being seen in emergency department in May 2022 for abnormal EKG after upper respiratory tract infection  Patient was found to have significant PVCs on monitoring and medical therapy was uptitrated with improvement in symptoms  She had undergone surgery in August for left foot issues and then unfortunately had did have redo surgery in November 2022 for ongoing symptoms and presents to the office today for scheduled follow-up   -Patient notes at home when she checks her blood pressure and is well controlled on current medical regimen and overall feels reasonably well  She denies any active chest pain, palpitations, lightheadedness or dizziness, loss of consciousness or shortness of breath  Intermittently she may have occasional palpitations but overall her only main complaint is residual pain with ambulation in the left foot  She denies any orthopnea or bendopnea        Patient Active Problem List   Diagnosis   • Hypertension   • Chronic bilateral low back pain with right-sided sciatica   • Primary osteoarthritis of both knees   • Primary osteoarthritis of first carpometacarpal joint of left hand   • Trigger middle finger of right hand   • [facility] DJD(carpometacarpal degenerative joint disease), localized primary, left   • Chronic pain syndrome   • Lumbar disc herniation   • Lumbar radiculopathy   • Piriformis muscle pain     Past Medical History:   Diagnosis Date   • Arthritis    • GERD (gastroesophageal reflux disease)     rarely   • Hyperlipidemia    • Hypertension    • Intracranial aneurysm     resolved 11/17/17 - possible R P-comm region aneurysm measuring 3mm found on MRI results   • Irregular heart beat    • PONV (postoperative nausea and vomiting)    • PVC (premature ventricular contraction)    • Reactive depression     last assessed 12/15/15     Social History     Socioeconomic History   • Marital status: /Civil Union     Spouse name: Not on file   • Number of children: Not on file   • Years of education: Not on file   • Highest education level: Not on file   Occupational History   • Not on file   Tobacco Use   • Smoking status: Never   • Smokeless tobacco: Never   • Tobacco comments:     only smoked as a teenager   Vaping Use   • Vaping Use: Never used   Substance and Sexual Activity   • Alcohol use: Yes     Comment: rare   • Drug use: Never     Comment: CBD   • Sexual activity: Yes     Partners: Male   Other Topics Concern   • Not on file   Social History Narrative    No living will     Social Determinants of Health     Financial Resource Strain: Not on file   Food Insecurity: Not on file   Transportation Needs: Not on file   Physical Activity: Not on file   Stress: Not on file   Social Connections: Not on file   Intimate Partner Violence: Not on file   Housing Stability: Not on file      Family History   Problem Relation Age of Onset   • Arthritis Mother    • Heart disease Mother         CAD   • Arthritis Father    • Lymphoma Father         Hodgkin's   • Breast cancer Sister 77   • No Known Problems Sister    • No Known Problems Daughter    • No Known Problems Daughter    • No Known Problems Maternal Grandmother    • No Known Problems Maternal Grandfather    • No Known Problems Paternal Grandmother    • No Known Problems Paternal Grandfather    • No Known Problems Son    • No Known Problems Maternal Aunt    • No Known Problems Paternal Aunt      Past Surgical History:   Procedure Laterality Date   • ENDOMETRIAL ABLATION  2012   • EPIDURAL BLOCK INJECTION Right 01/07/2020    Procedure: Right L4-5 transforaminal epidural steroid injection 24947;  Surgeon: Hope Vivar MD;  Location: [facility];  Service: Pain Management    • EPIDURAL BLOCK INJECTION Right 05/19/2020    Procedure: Right L4-L5 Transforaminal Epidural Steroid Injection (68344); Surgeon: Sarah Louis MD;  Location: [facility];  Service: Pain Management    • FL GUIDED NEEDLE PLAC BX/ASP/INJ  01/07/2020   • FL GUIDED NEEDLE PLAC BX/ASP/INJ  05/19/2020   • FL GUIDED NEEDLE PLAC BX/ASP/INJ  08/28/2020   • JOINT REPLACEMENT Right     Hip   • LISFRANC ARTHRODESIS Left 11/17/2022    Procedure: FUSION LIS FRANC L 1,2 TMTJ fusion with intercuneiform ;  Surgeon: Amanda Hauser DPM;  Location: [facility];  Service: Podiatry   • WI ARTHRP INTERPOS INTERCARPAL/METACARPAL JOINTS Left 07/30/2018    Procedure: Thumb Arthrex mini tight rope suspension arthroplasty with trapezial resection left thumb;  Surgeon: Mitchell Queen MD;  Location: [facility];  Service: Orthopedics   • WI INJECT SI JOINT ARTHRGRPHY&/ANES/STEROID W/ALESIA Right 08/28/2020    Procedure: Sacroiliac joint injection and piriformis muscle steroid Injection;  Surgeon: Sarah Louis MD;  [facility]: [facility];  Service: Pain Management    • WI OPEN TREATMENT TARSOMETATARSAL JOINT DISLOCATION Left 8/11/2022    Procedure: OPEN REDUCTION W/ INTERNAL FIXATION (ORIF) FOOT, ORIF of the left lis franc ligament;  Surgeon: Amanda Hauser DPM;  Location: [facility];  Service: Podiatry   • THYROID SURGERY      near total thyroidectomy       Current Outpatient Medications:   •  Ascorbic Acid (VITAMIN C) 250 MG CHEW, Chew 1 tablet daily, Disp: , Rfl:   •  atorvastatin (LIPITOR) 10 mg tablet, Take 1 tablet (10 mg total) by mouth daily, Disp: 30 tablet, Rfl: 5  •  Calcium 600 MG tablet, Take 2 tablets by mouth daily, Disp: , Rfl:   •  celecoxib (CeleBREX) 200 mg capsule, Take 200 mg by mouth 2 (two) times daily after meals, Disp: , Rfl:   •  Cholecalciferol (VITAMIN D3) 1000 units CAPS, Take by mouth daily after breakfast, Disp: , Rfl:   •  fluticasone (FLONASE) 50 mcg/act nasal spray, 1 spray into each nostril in the morning   (Patient taking differently: 1 spray into each nostril as needed), Disp: 16 g, Rfl: 0  •  hydrochlorothiazide (HYDRODIURIL) 12 5 mg tablet, TAKE 1 TABLET DAILY (Patient taking differently: Take 12 5 mg by mouth every morning), Disp: 90 tablet, Rfl: 3  •  ketotifen (ZADITOR) 0 025 % ophthalmic solution, Administer 1 drop to both eyes 2 (two) times a day as needed (Eye irritation) (Patient taking differently: Administer 1 drop to both eyes as needed (Eye irritation)), Disp: 5 mL, Rfl: 0  •  losartan (COZAAR) 25 mg tablet, Take 0 5 tablets (12 5 mg total) by mouth daily, Disp: 15 tablet, Rfl: 5  •  metoprolol tartrate (LOPRESSOR) 25 mg tablet, Take 1 tablet (25 mg total) by mouth every 12 (twelve) hours, Disp: 60 tablet, Rfl: 5  •  Multiple Vitamin (DAILY VALUE MULTIVITAMIN PO), Take 1 tablet by mouth daily, Disp: , Rfl:   •  Multiple Vitamins-Minerals (PRESERVISION AREDS 2 PO), Take by mouth daily, Disp: , Rfl:   •  nystatin (MYCOSTATIN) powder, Apply topically as needed, Disp: , Rfl:   •  Omega-3 Fatty Acids (Fish Oil) 1200 MG CAPS, Take 1 capsule by mouth daily, Disp: , Rfl:   •  aspirin 325 mg tablet, Take 1 tablet (325 mg total) by mouth daily (Patient not taking: Reported on 2/6/2023), Disp: 30 tablet, Rfl: 2  •  GNP Aspirin 325 MG EC tablet, , Disp: , Rfl:   No Known Allergies      Labs:  No visits with results within 2 Month(s) from this visit     Latest known visit with results is:   Appointment on 11/08/2022   Component Date Value   • WBC 11/08/2022 8 68    • RBC 11/08/2022 4 76    • Hemoglobin 11/08/2022 14 6    • Hematocrit 11/08/2022 44 3    • MCV 11/08/2022 93    • MCH 11/08/2022 30 7    • MCHC 11/08/2022 33 0    • RDW 11/08/2022 12 5    • MPV 11/08/2022 11 4    • Platelets 83/92/5976 306    • nRBC 11/08/2022 0    • Neutrophils Relative 11/08/2022 68    • Immat GRANS % 11/08/2022 0    • Lymphocytes Relative 11/08/2022 23    • Monocytes Relative 11/08/2022 6    • Eosinophils Relative 11/08/2022 2    • Basophils Relative 11/08/2022 1    • Neutrophils Absolute 11/08/2022 5 89    • Immature Grans Absolute 11/08/2022 0 02    • Lymphocytes Absolute 11/08/2022 2 01    • Monocytes Absolute 11/08/2022 0 55    • Eosinophils Absolute 11/08/2022 0 16    • Basophils Absolute 11/08/2022 0 05    • Sodium 11/08/2022 138    • Potassium 11/08/2022 3 8    • Chloride 11/08/2022 108    • CO2 11/08/2022 24    • ANION GAP 11/08/2022 6    • BUN 11/08/2022 15    • Creatinine 11/08/2022 0 89    • Glucose 11/08/2022 82    • Calcium 11/08/2022 9 7    • eGFR 11/08/2022 70         Imaging: X-ray foot left 3+ views    Result Date: 2/2/2023  Narrative: LEFT FOOT INDICATION:   M79 672: Pain in left foot  COMPARISON:  None VIEWS:  XR FOOT 3+ VW LEFT FINDINGS: 1st and 2nd tarsometatarsal joint and intercuneiform fusion  Alignment is maintained  No lucency to suggest loosening  Periarticular osteopenia  1st MTP joint space narrowing with sclerosis and mild hallux valgus deformity  No lytic or blastic osseous lesion  Soft tissues are unremarkable  Impression: Hardware in alignment with no loosening  Periarticular osteopenia  No acute osseous abnormality  Workstation performed: [id number]       Review of Systems:  Review of Systems   Constitutional: Negative for chills, diaphoresis, fatigue and fever  HENT: Negative for trouble swallowing and voice change  Eyes: Negative for pain and redness  Respiratory: Negative for shortness of breath and wheezing  Cardiovascular: Negative for chest pain, palpitations and leg swelling  Gastrointestinal: Negative for abdominal pain, constipation, diarrhea, nausea and vomiting  Genitourinary: Negative for dysuria  Musculoskeletal: Positive for arthralgias  Negative for neck pain and neck stiffness  Skin: Negative for rash  Neurological: Negative for dizziness, syncope, light-headedness and headaches  Psychiatric/Behavioral: Negative for agitation and confusion  All other systems reviewed and are negative          Vitals:    02/06/23 1315   BP: 126/88 Pulse: 80   Weight: 80 7 kg (178 lb)   Height: 5' 4" (1 626 m)     Vitals:    02/06/23 1315   Weight: 80 7 kg (178 lb)     Height: 5' 4" (162 6 cm)     Physical Exam:  Physical Exam  Vitals reviewed  Constitutional:       General: She is not in acute distress  Appearance: Normal appearance  She is not diaphoretic  HENT:      Head: Normocephalic and atraumatic  Eyes:      General:         Right eye: No discharge  Left eye: No discharge  Neck:      Comments: Trachea midline, no JVD present  Cardiovascular:      Rate and Rhythm: Normal rate and regular rhythm  Heart sounds: No friction rub  Pulmonary:      Effort: Pulmonary effort is normal  No respiratory distress  Breath sounds: No wheezing  Chest:      Chest wall: No tenderness  Abdominal:      General: Bowel sounds are normal       Palpations: Abdomen is soft  Tenderness: There is no abdominal tenderness  There is no rebound  Musculoskeletal:      Right lower leg: No edema  Left lower leg: No edema  Skin:     General: Skin is warm and dry  Neurological:      Mental Status: She is alert  Comments: Awake, alert, able to answer questions appropriately, able to move extremities bilaterally     Psychiatric:         Mood and Affect: Mood normal          Behavior: Behavior normal

## 2023-02-07 ENCOUNTER — OFFICE VISIT (OUTPATIENT)
Dept: PHYSICAL THERAPY | Facility: CLINIC | Age: 63
End: 2023-02-07

## 2023-02-07 DIAGNOSIS — M79.672 LEFT FOOT PAIN: Primary | ICD-10-CM

## 2023-02-07 NOTE — PROGRESS NOTES
Daily Note     Today's date: 2023  Patient name: Nicholas Blizzard  : 1960  MRN: [id number]  Referring provider: Juliane Fan  Dx:   Encounter Diagnosis     ICD-10-CM    1  Left foot pain  M79 672                      Subjective: Patient reports that she has pain/soreness in L foot during ambulation  She is in her shoe for majority of the day  She has tightness in her fore foot and toes  Patient as nerve sensitivity  Objective: See treatment diary below  Discussed desensitization technique and self STM on plantar fascia with lacrosse ball  Discussed with patient to wear CAM boot if she has an increase of pain for comfort measures at her discretion  Incorporated gastroc/soleus stretching with strap into program today  Hold prone TE due to LBP  Assessment: Tolerated treatment well  Patient would benefit from continued PT to improve ROM, strength, stability in L ankle/foot  Patient's toes have mobility issues and was assessed with STM to plantar fascia and achilles  Her plantar fascia has tightness which could be causing toe flexion  Patient has neutral DF during PROM  Her gastroc and soleus have limitations at this time  Will progress patient as able and to tolerance  Plan to incorporate standing TE into program when pain is controlled and slowly  Plan: Continue per plan of care        Precautions: R THR, OA      Manuals    L foot/ankle/Toes CM CM CM CM JM CM JM   Incision CM  CM CM JM CM JM   Plantar fascia   CM                  Neuro Re-Ed          Ankle circles HEP 20x CW/CCW 20x CW/CCW 20x CW/CCW 20x CW/CCW 20x CW/CCW 20x CW/CCW   BAPS L3 DF/PF, inv/ever, CW/CCW 20x ea L3 DF/PF, inv/ever, CW/CCW 20x ea L3 DF/PF, Inv/Ever 20x ea  L3 DF/PF, Inv/Ever 20x ea  L3 DF/PF, Inv/Ever 20x ea L3 DF/PF, Inv/Ever 20x ea L3 DF/PF, inv/ever, CW/CCW 20x ea   Marbles   DC  2x  2x 2x 2x   Bridges  NV        Tandem stance          NBOS  NV                  Ther Ex Nustep for ankle/foot ROM L4 10' L6 10'        AROM HEP  20x ea 20x ea 20x ea 20xea 20x ea   Toe curls HEP  20x 20x ea 30x  30x 30x   LAQ 2# 3x10 2# 3x10  20x5" L LE 2# 3x10 2# 3x10 2# 3x10 2# 3x10   SLR-3way Flex 1# Abd/ 30x ea  Flex/abd 3x10 20x ea LLE on mat 30x ea LLE on mat 30x ea LLE on mat 30x ea LLE on mat 30x ea LLE on mat   Clam nv Supine L3 3x10 2x10 LLE 3x10 3x10 BLE 3x10 BLE 3x10 BLE   Iso hip add nv  5"x20 5"x20 5"x30 5"x30 5"x30   Heel slides HEP  2x10 3x10 3x10 3x10 3x10   SAQ 2# 3x10  2x10 2# 3x10 2# 3x10 2# 3x10 2# 3x10   HS curls   Hold  Prone 2# 3x10 Prone 2# 3x10  Prone 2# 3x10 Prone 2# 3x10 Prone 2# 3x10   TB ankle 4way L3 20x ea L3 3x10 ea   L3 20x ea (TB@ midfoot) L3 20x ea (TB@ midfoot  L3 20x ea ([email or web address])   HR/TR [email or web address] ea Seated 2# 2x10 ea        Gastroc Stretch  Strap 30"x4        Soleus stretch   Strap 30"x4         Ther Activity          Sit to stands          Step ups          Gait Training          WBAT with shoe Heel/toe WBAT// bars SPC                   Modalities          CP Home  10' L ankle 10' L ankle 10' L ankle 10" L ankle 10' L foot

## 2023-02-09 ENCOUNTER — HOSPITAL ENCOUNTER (OUTPATIENT)
Dept: NON INVASIVE DIAGNOSTICS | Facility: CLINIC | Age: 63
Discharge: HOME | End: 2023-02-09

## 2023-02-09 ENCOUNTER — EVALUATION (OUTPATIENT)
Dept: PHYSICAL THERAPY | Facility: CLINIC | Age: 63
End: 2023-02-09

## 2023-02-09 DIAGNOSIS — M79.672 LEFT FOOT PAIN: Primary | ICD-10-CM

## 2023-02-09 DIAGNOSIS — I49.3 PVC (PREMATURE VENTRICULAR CONTRACTION): ICD-10-CM

## 2023-02-09 NOTE — PROGRESS NOTES
PT Re-Evaluation     Today's date: 2023  Patient name: Hattie Hastings  : 1960  MRN: [id number]  Referring provider: Jerri Verdin  Dx:   Encounter Diagnosis     ICD-10-CM    1  Left foot pain  M79 672           Start Time: 1300  Stop Time: 1400  Total time in clinic (min): 60 minutes    Assessment  Assessment details: The patient has attended 10 PT visits to date and demonstrates gains in L ankle ROM and strength  She is ambulating with a SPC  She has weaned herself out of her boot but uses it occasionally due to increased pain  She reports ongoing swelling  Moderate tightness is present in L plantar fascia and dorsum of foot  She would benefit from continued therapy to further address ongoing deficits and improve functional mobility  Impairments: abnormal gait, abnormal or restricted ROM, impaired balance, impaired physical strength, lacks appropriate home exercise program and pain with function  Understanding of Dx/Px/POC: good   Prognosis: good    Goals  STG(4 weeks):             1  Independent with HEP  MET            2  Decrease pain to 4/10 on average ONGOING            3  Increase AROM L ankle by 5-10 degrees each plane ONGOING            4  Increase strength L hip/knee to 4 to 4+/5 ONGOING     LTG(12 weeks):   ONGOING             1  Reduce L foot/ankle pain to 2/10 on average             2  Increase LLE strength to 4+to 5/5             3  L ankle AROM WFL             4  Resume a normal gait pattern without AD             5  Negotiate stairs independently              6  Return to [facility]      Plan  Plan details:  Will progress PT plan as per physician's recommendations  Patient would benefit from: skilled physical therapy  Planned modality interventions: cryotherapy and thermotherapy: hydrocollator packs  Planned therapy interventions: manual therapy, balance, neuromuscular re-education, strengthening, stretching, therapeutic activities, therapeutic exercise, gait training and home exercise program  Frequency: 2x week  Duration in weeks: 12  Plan of Care beginning date: 2023  Plan of Care expiration date: 2023  Treatment plan discussed with: patient and family        Subjective Evaluation    History of Present Illness  Date of onset: 9/10/2021  Date of surgery: 2022  Mechanism of injury: The patient works in cafeteria at school  On 9-10-21, while working a large container fell onto her L foot  She had immediate pain and swelling  Her spouse took her to the ER  Xray was unremarkable, placed in boot and discharged home  Did follow ups with urgent care  The pain continued  She was referred to specialist  She had returned to work 5 hours/day  After 3 months , went FT and was released from workers' comp doctor  The patient reports ongoing pain and swelling  She then saw Dr Capone Selvin  An MRI was done and revealed ligament damage  She was placed in boot again  She was then referred to Dr Tristen Torres who diagnosed her with a torn ligament  She underwent surgery to have a screw placed but did not heal  She then underwent a Lisfranc ORIF on 23  She was NWB until 2 weeks ago  Now bearing weight only on heel using walker or cane  Now referred to PT  22 was her last day of work  Not a recurrent problem   Quality of life: fair    Pain  Current pain ratin  At best pain ratin  At worst pain ratin  Location:  L lateral ankle, numbness medial foot, pain and numbness bottom of foot  Quality: dull ache  Relieving factors: ice and rest  Aggravating factors: standing and walking    Social Support  Steps to enter house: yes  Stairs in house: yes   15  Lives in: multiple-level home  Lives with: significant other    Employment status: not working  Patient Goals  Patient goal: be able to walk        Objective     Neurological Testing     Sensation     Ankle/Foot     Comments   Left light touch: 1st ray       Active Range of Motion   Left Ankle/Foot   Dorsiflexion (ke): 5 degrees Plantar flexion: 15 degrees   Inversion: 15 degrees   Eversion: 0 degrees with pain    Passive Range of Motion   Left Ankle/Foot    Dorsiflexion (ke): 8 degrees   Plantar flexion: 20 degrees   Inversion: 25 degrees   Eversion: 5 degrees     Strength/Myotome Testing     Left Ankle/Foot   Dorsiflexion: 3+  Plantar flexion: 4  Inversion: 4  Eversion: 3+    Additional Strength Details  L hip strength 4-/5  L knee 4/5  L ankle NT this date    UPDATE 2/9/23: L hip 4/5                              L knee 4/5             Precautions: R THR, OA

## 2023-02-09 NOTE — PROGRESS NOTES
Daily Note     Today's date: 2023  Patient name: Will Miles  : 1960  MRN: [id number]  Referring provider: Gertrudis Carvalho  Dx:   Encounter Diagnosis     ICD-10-CM    1  Left foot pain  M79 672                      Subjective: Patient reports that her L foot has pain, she reports that is has soreness but also pain  She was wearing her CAM boot for comfort last night  She is compliannt with her HEP  Objective: See treatment diary below  No progression due to L foot pain  DF neutral, and plantar flexion WFL  Assessment: Tolerated treatment well  Patient exhibited good technique with therapeutic exercises and would benefit from continued PT to improve L foot and ankle stability  She has tightness in her plantar fascia  Her 4 small toes are flexed and require stretching  Patient required tactile cues to perform proper toe yoga  Plan: Continue per plan of care        Precautions: R THR, OA      Manuals    L foot/ankle/Toes CM CM CM CM JM CM JM   Incision CM   CM JM CM JM   Plantar fascia   CM CM                 Neuro Re-Ed          Ankle circles HEP 20x CW/CCW  20x CW/CCW 20x CW/CCW 20x CW/CCW 20x CW/CCW   BAPS L3 DF/PF, inv/ever, CW/CCW 20x ea L3 DF/PF, inv/ever, CW/CCW 20x ea L3 DF/PF, inv/ever, CW/CCW 20x ea L3 DF/PF, Inv/Ever 20x ea  L3 DF/PF, Inv/Ever 20x ea L3 DF/PF, Inv/Ever 20x ea L3 DF/PF, inv/ever, CW/CCW 20x ea   Marbles   DC  2x  2x 2x 2x   Bridges  NV        Tandem stance          NBOS  NV                  Ther Ex          Nustep for ankle/foot ROM L4 10' L6 10' L6 10'       AROM HEP   20x ea 20x ea 20xea 20x ea   Toe curls HEP   20x ea 30x  30x 30x   LAQ 2# 3x10 2# 3x10  2# 3x10 2# 3x10 2# 3x10 2# 3x10 2# 3x10   SLR-3way Flex 1# Abd/ 30x ea  Flex/abd 3x10 Flex/abd 3x10 30x ea LLE on mat 30x ea LLE on mat 30x ea LLE on mat 30x ea LLE on mat   Clam nv Supine L3 3x10  3x10 3x10 BLE 3x10 BLE 3x10 BLE   Iso hip add nv   5"x20 5"x30 5"x30 5"x30   Heel slides HEP   3x10 3x10 3x10 3x10   SAQ 2# 3x10   2# 3x10 2# 3x10 2# 3x10 2# 3x10   HS curls   Hold  L3 2x10 Prone 2# 3x10  Prone 2# 3x10 Prone 2# 3x10 Prone 2# 3x10   TB ankle 4way L3 20x ea L3 3x10 ea L3 2x10 ea  L3 20x ea (TB@ midfoot) L3 20x ea (TB@ midfoot  L3 20x ea ([email or web address])   HR/TR [id number] ea Seated 2# 2x10 ea Seated 2# 2x10 ea       Gastroc Stretch  Strap 30"x4 Strap 30"x4       Soleus stretch   Strap 30"x4  Strap 30"x4        Ther Activity          Sit to stands          Step ups          Toe yoga   3"x20        Gait Training          WBAT with shoe Heel/toe WBAT// bars SPC                   Modalities          CP Home   10' L ankle 10' L ankle 10" L ankle 10' L foot

## 2023-02-13 ENCOUNTER — OFFICE VISIT (OUTPATIENT)
Dept: PHYSICAL THERAPY | Facility: CLINIC | Age: 63
End: 2023-02-13

## 2023-02-13 DIAGNOSIS — M79.672 LEFT FOOT PAIN: Primary | ICD-10-CM

## 2023-02-13 NOTE — PROGRESS NOTES
Daily Note     Today's date: 2023  Patient name: Jose David Han  : 1960  MRN: [id number]  Referring provider: Paulina Espinoza  Dx:   Encounter Diagnosis     ICD-10-CM    1  Left foot pain  M79 672                      Subjective: Patient has pain in L foot and toed and has sensitivity in plantar fascia  Patient expresses she uses CAM boot for comfort as per MD  RTD   She had pain  where her standing and walking were limited  She is compliant with HEP  Objective: See treatment diary below  Incorporated bridges into program today  Assessment: Tolerated treatment well  Patient exhibited good technique with therapeutic exercises and would benefit from continued PT to improve L foot and ankle stability  Patient has tenderness, sensitivity, and tightness in L plantar fascia  Flexion presents in 4 toes on L foot  Gentle IASTM to plantar fascia to reduce tightness and improve mobility  Plan: Continue per plan of care        Precautions: R THR, OA     Manuals    L foot/ankle/Toes CM CM CM CM JM CM JM   Incision CM      JM CM JM   Plantar fascia    CM CM CM          IASTM       Plantar fascia- CM         Neuro Re-Ed                Ankle circles HEP 20x CW/CCW    20x CW/CCW 20x CW/CCW 20x CW/CCW   BAPS L3 DF/PF, inv/ever, CW/CCW 20x ea L3 DF/PF, inv/ever, CW/CCW 20x ea L3 DF/PF, inv/ever, CW/CCW 20x ea L3 DF/PF, inv/ever, CW/CCW 20x ea L3 DF/PF, Inv/Ever 20x ea L3 DF/PF, Inv/Ever 20x ea L3 DF/PF, inv/ever, CW/CCW 20x ea   Marbles    DC    2x 2x 2x   Bridges   NV   2x10         Tandem stance                NBOS   NV                             Ther Ex                Nustep for ankle/foot ROM L4 10' L6 10' L6 10' L5 10'         AROM HEP      20x ea 20xea 20x ea   Toe curls HEP      30x  30x 30x   LAQ 2# 3x10 2# 3x10  2# 3x10 2# 3x10 2# 3x10 2# 3x10 2# 3x10   SLR-3way Flex 1# Abd/ 30x ea  Flex/abd 3x10 Flex/abd 3x10 2# flex/abd 3x10 30x ea LLE on mat 30x ea LLE on mat 30x ea LLE on mat   Clam nv Supine L3 3x10   SL 3x10 3x10 BLE 3x10 BLE 3x10 BLE   Iso hip add nv      5"x30 5"x30 5"x30   Heel slides HEP      3x10 3x10 3x10   SAQ 2# 3x10     2# 3x10 2# 3x10 2# 3x10 2# 3x10   HS curls    Hold  L3 2x10 L3 2x10  Prone 2# 3x10 Prone 2# 3x10 Prone 2# 3x10   TB ankle 4way L3 20x ea L3 3x10 ea L3 2x10 ea L3 3x10 ea L3 20x ea (TB@ midfoot) L3 20x ea (TB@ midfoot  L3 20x ea ([email or web address])   HR/TR [email or web address] ea Seated 2# 2x10 ea Seated 2# 2x10 ea Seated 2# 2x10 ea         Gastroc Stretch   Strap 30"x4 Strap 30"x4 Strap 30"x4         Soleus stretch    Strap 30"x4  Strap 30"x4  Strap 30"x4          Ther Activity                Sit to stands                Step ups                Toe yoga     3"x20  3"x20         Gait Training                WBAT with shoe Heel/toe WBAT// bars SPC                               Modalities                CP Home      10' L ankle 10" L ankle 10' L foot

## 2023-02-15 ENCOUNTER — OFFICE VISIT (OUTPATIENT)
Dept: PHYSICAL THERAPY | Facility: CLINIC | Age: 63
End: 2023-02-15

## 2023-02-15 DIAGNOSIS — M79.672 LEFT FOOT PAIN: Primary | ICD-10-CM

## 2023-02-15 NOTE — PROGRESS NOTES
Daily Note     Today's date: 2/15/2023  Patient name: Donita Heimlich  : 1960  MRN: [id number]  Referring provider: Arley Kuhn  Dx:   Encounter Diagnosis     ICD-10-CM    1  Left foot pain  M79 672                      Subjective: Patient reports that left foot had about an hour decrease in pain after last session  She still experiences pain where screws reside and plantar fascia  Toes are moving slightly better  She is compliant with HEP  Objective: See treatment diary below  Assessment: Tolerated treatment well  Patient exhibited good technique with therapeutic exercises and would benefit from continued PT to improve L ankle ROM and stability  Patient has tightness residing in plantar fascia  Toes present flexed and are being stretched with MT  Her 4 small toes are moving more  Plan: Continue per plan of care        Precautions: R THR, OA     Manuals 2/2 2/7 2/9 2/13 2/15 1/26 1/31   L foot/ankle/Toes CM CM CM CM CM CM JM   Incision CM       CM JM   Plantar fascia    CM CM CM  CM        IASTM       Plantar fascia- CM  Plantar fascia- CM       Neuro Re-Ed                Ankle circles HEP 20x CW/CCW     20x CW/CCW 20x CW/CCW   BAPS L3 DF/PF, inv/ever, CW/CCW 20x ea L3 DF/PF, inv/ever, CW/CCW 20x ea L3 DF/PF, inv/ever, CW/CCW 20x ea L3 DF/PF, inv/ever, CW/CCW 20x ea L3 DF/PF, Inv/Ever 20x ea L3 DF/PF, Inv/Ever 20x ea L3 DF/PF, inv/ever, CW/CCW 20x ea   Marbles    DC     2x 2x   Bridges   NV   2x10  2x10       Tandem stance                NBOS   NV                             Ther Ex                Nustep for ankle/foot ROM L4 10' L6 10' L6 10' L5 10'  L5 10'       AROM HEP       20xea 20x ea   Toe curls HEP       30x 30x   LAQ 2# 3x10 2# 3x10  2# 3x10 2# 3x10 2# 3x10 2# 3x10 2# 3x10   SLR-3way Flex 1# Abd/ 30x ea  Flex/abd 3x10 Flex/abd 3x10 2# flex/abd 3x10 2# flex/abd 3x10 30x ea LLE on mat 30x ea LLE on mat   Clam nv Supine L3 3x10   SL 3x10 SL 3x10 3x10 BLE 3x10 BLE   Iso hip add nv       5"x30 5"x30   Heel slides HEP       3x10 3x10   SAQ 2# 3x10     2# 3x10 2# 3x10 2# 3x10 2# 3x10   HS curls    Hold  L3 2x10 L3 2x10 L4 3x10 Prone 2# 3x10 Prone 2# 3x10   TB ankle 4way L3 20x ea L3 3x10 ea L3 2x10 ea L3 3x10 ea L4 20x ea  L3 20x ea (TB@ midfoot  L3 20x ea ([email or web address])   HR/TR [email or web address] ea Seated 2# 2x10 ea Seated 2# 2x10 ea Seated 2# 2x10 ea  Seated 2# 2x10 ea       Gastroc Stretch   Strap 30"x4 Strap 30"x4 Strap 30"x4  Strap 30"x4       Soleus stretch    Strap 30"x4  Strap 30"x4  Strap 30"x4  Strap 30"x4        Ther Activity                Sit to stands                Step ups                Toe yoga     3"x20  3"x20  3"x20        Gait Training                WBAT with shoe Heel/toe WBAT// bars SPC                               Modalities                CP Home       10" L ankle 10' L foot

## 2023-02-20 ENCOUNTER — APPOINTMENT (OUTPATIENT)
Dept: RADIOLOGY | Facility: MEDICAL CENTER | Age: 63
End: 2023-02-20

## 2023-02-20 ENCOUNTER — OFFICE VISIT (OUTPATIENT)
Dept: PODIATRY | Facility: CLINIC | Age: 63
End: 2023-02-20

## 2023-02-20 VITALS — BODY MASS INDEX: 30.39 KG/M2 | HEIGHT: 64 IN | WEIGHT: 178 LBS

## 2023-02-20 DIAGNOSIS — M79.672 LEFT FOOT PAIN: ICD-10-CM

## 2023-02-20 DIAGNOSIS — G62.9 NEUROPATHY: ICD-10-CM

## 2023-02-20 DIAGNOSIS — M79.672 LEFT FOOT PAIN: Primary | ICD-10-CM

## 2023-02-20 DIAGNOSIS — M72.2 PLANTAR FASCIITIS: ICD-10-CM

## 2023-02-20 RX ORDER — GABAPENTIN 300 MG/1
300 CAPSULE ORAL 3 TIMES DAILY
Qty: 30 CAPSULE | Refills: 1 | Status: SHIPPED | OUTPATIENT
Start: 2023-02-20 | End: 2023-02-20

## 2023-02-20 RX ORDER — GABAPENTIN 300 MG/1
300 CAPSULE ORAL
Qty: 30 CAPSULE | Refills: 1 | Status: SHIPPED | OUTPATIENT
Start: 2023-02-20

## 2023-02-20 NOTE — PROGRESS NOTES
Assessment/Plan:    No problem-specific Assessment & Plan notes found for this encounter  Diagnoses and all orders for this visit:    Left foot pain  -     X-ray foot left 3+ views; Future  -     gabapentin (Neurontin) 300 mg capsule; Take 1 capsule (300 mg total) by mouth 3 (three) times a day    Neuropathy  -     gabapentin (Neurontin) 300 mg capsule; Take 1 capsule (300 mg total) by mouth 3 (three) times a day    Plantar fasciitis       -She has been in the boot for an elongated period of time which is likely contributed to increase of her equinus, leading to plantar fasciitis and plantar fascial overload with tarsal tunnel symptoms  - Due to continued neuritic symptoms we will attempt to trial low-dose gabapentin with 300 mg nightly, she is to take this for the next month  - Return in 3 weeks to check her progress with her pain  - X-rays 3 views weightbearing taken reviewed and do show increased osseous consolidation specially in the intercuneiform area when compared to the previous x-rays  - She is still unable to work due to continued pain and swelling in decreased consolidation across the midfoot  - I am hopeful that her pain drastically improved with continued increased osseous consolidation  - I discussed that she is having new onset Planter fasciitis and equinus symptoms likely to be in the boot for an elongated period of time, she is to continue physical therapy, continue anti-inflammatories, continue stretching  -She is also having some keloid formation on the dorsal aspect of the incision site, I discussed use of scar away cream and deep massage to prevent further keloid formation    Subjective:      Patient ID: Spike Trevino is a 63 y o  female  Patient presents for evaluation management of her left foot pain, she is still complaining of severe pain when she is ambulating, she presents today ambulating in shoes with a short cinthya and antalgic gait    She is complaining of tightness on the bottom of her foot because of the tightness on the back of her calf, pain with weightbearing across the midfoot  Decreased range of motion of her toes, slight hammertoe formation  And increased hypersensitivity along the inner aspect of her left foot      The following portions of the patient's history were reviewed and updated as appropriate: allergies, current medications, past family history, past medical history, past social history, past surgical history and problem list     Review of Systems   Constitutional: Negative for chills and fever  HENT: Negative for ear pain and sore throat  Eyes: Negative for pain and visual disturbance  Respiratory: Negative for cough and shortness of breath  Cardiovascular: Negative for chest pain and palpitations  Gastrointestinal: Negative for abdominal pain and vomiting  Genitourinary: Negative for dysuria and hematuria  Musculoskeletal: Negative for arthralgias and back pain  Skin: Negative for color change and rash  Neurological: Negative for seizures and syncope  All other systems reviewed and are negative  Objective:      Ht 5' 4" (1 626 m)   Wt 80 7 kg (178 lb)   BMI 30 55 kg/m²          Physical Exam  Musculoskeletal:      Comments: Decreased range of motion of the digits of the left foot, good clinical appearance of the foot, pain and guarding with range of motion of the TMT J's, there is diffuse swelling across the midfoot  There is clear hypersensitivity along the inner aspect of the incision on the left foot, the medial aspect of the forefoot is very hypersensitivities, cool to touch and there is slight mottling  Positive paresthesias  Positive Tinel's overlying the left tarsal tunnel, pain with palpation of the plantar medial calcaneal tubercle as well

## 2023-02-20 NOTE — LETTER
February 20, 2023     Patient: Dayan Chapman  YOB: 1960  Date of Visit: 2/20/2023      To Whom it May Concern:    Marlene Carter is under my professional care  Debbi Miller was seen in my office on 2/20/2023  Debbi Miller is to remain out of work until next scheduled office visit on 3/13/2023  If you have any questions or concerns, please don't hesitate to call           Sincerely,          Stephane Kidd DPM        CC:   No Recipients

## 2023-02-21 ENCOUNTER — OFFICE VISIT (OUTPATIENT)
Dept: PHYSICAL THERAPY | Facility: CLINIC | Age: 63
End: 2023-02-21

## 2023-02-21 ENCOUNTER — TELEPHONE (OUTPATIENT)
Dept: CARDIOLOGY CLINIC | Facility: CLINIC | Age: 63
End: 2023-02-21

## 2023-02-21 DIAGNOSIS — M79.672 LEFT FOOT PAIN: Primary | ICD-10-CM

## 2023-02-21 NOTE — TELEPHONE ENCOUNTER
I was able to call and speak with patient about Holter monitor results which while it did show frequent PVCs with overall ventricular ectopic burden approximately 12 2% this was improved from previous ventricular ectopic burden of 24 1%  Patient notes intermittent palpitations but states these are not causing her any significant symptoms and overall she is doing well and therefore wishes to continue current medical therapy but will let our office know if she has any significant symptoms or worsening and we will then likely at that time reduce patient's beta-blocker therapy with the addition of flecainide therapy as nuclear stress testing in July 2022 appeared to show breast attenuation with no defect suggestive of ischemia or infarction  Patient will follow-up as scheduled in June or sooner if necessary

## 2023-02-21 NOTE — PROGRESS NOTES
Daily Note     Today's date: 2023  Patient name: Mario Camejo  : 1960  MRN: [id number]  Referring provider: Sushant Spine  Dx:   Encounter Diagnosis     ICD-10-CM    1  Left foot pain  M79 672               Subjective: continued foot pain with decreased       Objective: See treatment diary below    EDU on desensitizing incision sites and PF to progress manual        Assessment: Tolerated treatment well  Antalgic gait noted throughout session  Fair tolerance to manuals d/t high sensitivity  Continued focus on manuals to promote ankle and forefoot mobility  Continued PT would be beneficial to improve function           Plan: Continue per plan of care         Precautions: R THR, OA     Manuals 2/2 2/7 2/9 2/13 2/15 2/21 1/31   L foot/ankle/Toes CM CM CM CM CM MB JM   Incision CM        JM   Plantar fascia    CM CM CM  CM MB      IASTM       Plantar fascia- CM  Plantar fascia- CM Plantar fascia- MB     Neuro Re-Ed               Ankle circles HEP 20x CW/CCW      20x CW/CCW   BAPS L3 DF/PF, inv/ever, CW/CCW 20x ea L3 DF/PF, inv/ever, CW/CCW 20x ea L3 DF/PF, inv/ever, CW/CCW 20x ea L3 DF/PF, inv/ever, CW/CCW 20x ea L3 DF/PF, Inv/Ever 20x ea L3 DF/PF, Inv/Ever 20x ea L3 DF/PF, inv/ever, CW/CCW 20x ea   Marbles    DC      2x   Bridges   NV   2x10  2x10 2x10     Tandem stance               NBOS   NV                           Ther Ex               Nustep for ankle/foot ROM L4 10' L6 10' L6 10' L5 10'  L5 10' L5 10'     AROM HEP        20x ea   Toe curls HEP        30x   LAQ 2# 3x10 2# 3x10  2# 3x10 2# 3x10 2# 3x10 2# 3x10 2# 3x10   SLR-3way Flex 1# Abd/ 30x ea  Flex/abd 3x10 Flex/abd 3x10 2# flex/abd 3x10 2# flex/abd 3x10 2# flex/abd 3x10 30x ea LLE on mat   Clam nv Supine L3 3x10   SL 3x10 SL 3x10 SL 3x10 3x10 BLE   Iso hip add nv        5"x30   Heel slides HEP        3x10   SAQ 2# 3x10     2# 3x10 2# 3x10 2# 3x10 2# 3x10   HS curls    Hold  L3 2x10 L3 2x10 L4 3x10 L4 3x10 Prone 2# 3x10   TB ankle 4way L3 20x ea L3 3x10 ea L3 2x10 ea L3 3x10 ea L4 20x ea  L4 20x ea   L3 20x ea ([email or web address])   HR/TR [email or web address] ea Seated 2# 2x10 ea Seated 2# 2x10 ea Seated 2# 2x10 ea  Seated 2# 2x10 ea  Seated 2# 2x10 ea     Gastroc Stretch   Strap 30"x4 Strap 30"x4 Strap 30"x4  Strap 30"x4 Strap 30"x4      Soleus stretch    Strap 30"x4  Strap 30"x4  Strap 30"x4  Strap 30"x4  Strap 30"x4      Ther Activity               Sit to stands               Toe splay          10x5"     Toe yoga     3"x20  3"x20  3"x20  3"x20      Gait Training               WBAT with shoe Heel/toe WBAT// bars SPC                             Modalities               CP Home        10' L foot

## 2023-02-23 ENCOUNTER — OFFICE VISIT (OUTPATIENT)
Dept: PHYSICAL THERAPY | Facility: CLINIC | Age: 63
End: 2023-02-23

## 2023-02-23 DIAGNOSIS — M79.672 LEFT FOOT PAIN: Primary | ICD-10-CM

## 2023-02-23 NOTE — PROGRESS NOTES
Daily Note     Today's date: 2023  Patient name: Maico Jimenez  : 1960  MRN: [id number]  Referring provider: Casimiro Martinez  Dx:   Encounter Diagnosis     ICD-10-CM    1  Left foot pain  M79 672           Start Time: 1300  Stop Time: 1400  Total time in clinic (min): 60 minutes    Subjective: The patient reports ongoing stiffness in her toes and pain along the bottom of her foot and along the medial incision  Objective: See treatment diary below      Assessment: Mild improvement noted with ROM of toes 2-5  Significant limitations remain in the L great toe  Tenderness is present in plantar fascia  Tolerated treatment well  Patient would benefit from continued PT      Plan: Continue per plan of care        Precautions: R THR, OA     Manuals 2/2 2/7 2/9 2/13 2/15 2/21  2/23   L foot/ankle/Toes CM CM CM CM CM MB JM   Incision CM           Plantar fascia    CM CM CM  CM MB  JM    IASTM       Plantar fascia- CM  Plantar fascia- CM Plantar fascia- MB  Plantar fascia-JM   Neuro Re-Ed               Ankle circles HEP 20x CW/CCW         BAPS L3 DF/PF, inv/ever, CW/CCW 20x ea L3 DF/PF, inv/ever, CW/CCW 20x ea L3 DF/PF, inv/ever, CW/CCW 20x ea L3 DF/PF, inv/ever, CW/CCW 20x ea L3 DF/PF, Inv/Ever 20x ea L3 DF/PF, Inv/Ever 20x ea L3 DF/PF, inv/ever, CW/CCW 20x ea   Marbles    DC         Bridges   NV   2x10  2x10 2x10  2x10   Tandem stance               NBOS   NV                           Ther Ex               Nustep for ankle/foot ROM L4 10' L6 10' L6 10' L5 10'  L5 10' L5 10'  L5 10'   AROM HEP           Toe curls HEP           LAQ 2# 3x10 2# 3x10  2# 3x10 2# 3x10 2# 3x10 2# 3x10    SLR-3way Flex 1# Abd/ 30x ea  Flex/abd 3x10 Flex/abd 3x10 2# flex/abd 3x10 2# flex/abd 3x10 2# flex/abd 3x10 30x ea LLE on mat   Clam nv Supine L3 3x10   SL 3x10 SL 3x10 SL 3x10 3x10 BLE   Iso hip add nv           Heel slides HEP           SAQ 2# 3x10     2# 3x10 2# 3x10 2# 3x10    HS curls    Hold  L3 2x10 L3 2x10 L4 3x10 L4 3x10 L4  3x10   TB ankle 4way L3 20x ea L3 3x10 ea L3 2x10 ea L3 3x10 ea L4 20x ea  L4 20x ea   L4 20x ea ([email or web address])   HR/TR [id number] ea Seated 2# 2x10 ea Seated 2# 2x10 ea Seated 2# 2x10 ea  Seated 2# 2x10 ea  Seated 2# 2x10 ea     Gastroc Stretch   Strap 30"x4 Strap 30"x4 Strap 30"x4  Strap 30"x4 Strap 30"x4   Strap 30"x4   Soleus stretch    Strap 30"x4  Strap 30"x4  Strap 30"x4  Strap 30"x4  Strap 30"x4   Strap 30"x4   Ther Activity               Sit to stands               Toe splay          10x5"     Toe yoga     3"x20  3"x20  3"x20  3"x20   3"x20   Gait Training               WBAT with shoe Heel/toe WBAT// bars SPC                             Modalities               CP Home

## 2023-02-27 ENCOUNTER — OFFICE VISIT (OUTPATIENT)
Dept: PHYSICAL THERAPY | Facility: CLINIC | Age: 63
End: 2023-02-27

## 2023-02-27 DIAGNOSIS — M79.672 LEFT FOOT PAIN: Primary | ICD-10-CM

## 2023-02-27 NOTE — PROGRESS NOTES
Daily Note     Today's date: 2023  Patient name: Lise Baez  : 1960  MRN: [id number]  Referring provider: Kavitha Butler  Dx:   Encounter Diagnosis     ICD-10-CM    1  Left foot pain  M79 672           Start Time: 1300  Stop Time: 1400  Total time in clinic (min): 60 minutes    Subjective: The patient reports a burning pain in her toes during the night  She reports 5-6/10 pain in the foot today  Objective: See treatment diary below      Assessment: Moderate tenderness present in plantar fascia and medial foot  Patient notes some relief after CP  Tolerated treatment well  Patient would benefit from continued PT      Plan: Continue per plan of care        Precautions: R THR, OA     Manuals 2/27 2/7 2/9 2/13 2/15 2/21  2/23   L foot/ankle/Toes JM CM CM CM CM MB JM   Incision            Plantar fascia   JM CM CM CM  CM MB  JM    IASTM  Plantar fascia- JM     Plantar fascia- CM  Plantar fascia- CM Plantar fascia- MB  Plantar fascia-JM   Neuro Re-Ed               Ankle circles  20x CW/CCW         BAPS L3 DF/PF, inv/ever, CW/CCW 20x ea L3 DF/PF, inv/ever, CW/CCW 20x ea L3 DF/PF, inv/ever, CW/CCW 20x ea L3 DF/PF, inv/ever, CW/CCW 20x ea L3 DF/PF, Inv/Ever 20x ea L3 DF/PF, Inv/Ever 20x ea L3 DF/PF, inv/ever, CW/CCW 20x ea   Marbles    DC         Bridges   NV   2x10  2x10 2x10  2x10   Tandem stance               NBOS   NV                           Ther Ex               Nustep for ankle/foot ROM L4 10' L6 10' L6 10' L5 10'  L5 10' L5 10'  L5 10'   AROM            Toe curls            LAQ  2# 3x10  2# 3x10 2# 3x10 2# 3x10 2# 3x10    SLR-3way  30x ea on mat (flex and abd) Flex/abd 3x10 Flex/abd 3x10 2# flex/abd 3x10 2# flex/abd 3x10 2# flex/abd 3x10 30x ea LLE on mat   Clam  3x10 Supine L3 3x10   SL 3x10 SL 3x10 SL 3x10 3x10 BLE   Iso hip add            Heel slides            SAQ      2# 3x10 2# 3x10 2# 3x10    HS curls    Hold  L3 2x10 L3 2x10 L4 3x10 L4 3x10 L4  3x10   TB ankle 4way L4 20x ea L3 3x10 ea L3 2x10 ea L3 3x10 ea L4 20x ea  L4 20x ea   L4 20x ea ([email or web address])   HR/TR  Seated 2# 2x10 ea Seated 2# 2x10 ea Seated 2# 2x10 ea  Seated 2# 2x10 ea  Seated 2# 2x10 ea     Gastroc Stretch  Strap 30"x4 Strap 30"x4 Strap 30"x4 Strap 30"x4  Strap 30"x4 Strap 30"x4   Strap 30"x4   Soleus stretch   Strap 30"x4 Strap 30"x4  Strap 30"x4  Strap 30"x4  Strap 30"x4  Strap 30"x4   Strap 30"x4   Ther Activity               Sit to stands               Toe splay          10x5"     Toe yoga     3"x20  3"x20  3"x20  3"x20   3"x20   Gait Training               WBAT with shoe                              Modalities               CP Home

## 2023-03-01 ENCOUNTER — APPOINTMENT (OUTPATIENT)
Dept: PHYSICAL THERAPY | Facility: CLINIC | Age: 63
End: 2023-03-01

## 2023-03-06 ENCOUNTER — OFFICE VISIT (OUTPATIENT)
Dept: PHYSICAL THERAPY | Facility: CLINIC | Age: 63
End: 2023-03-06

## 2023-03-06 DIAGNOSIS — M79.672 LEFT FOOT PAIN: Primary | ICD-10-CM

## 2023-03-06 NOTE — PROGRESS NOTES
Daily Note     Today's date: 3/6/2023  Patient name: Sami Loving  : 1960  MRN: [id number]  Referring provider: Mauro Ca  Dx:   Encounter Diagnosis     ICD-10-CM    1  Left foot pain  M79 672           Start Time: 1300  Stop Time: 1400  Total time in clinic (min): 60 minutes    Subjective: The patient reports constant burning pain in her L foot  She notes no significant difference since starting the gabapentin  Objective: See treatment diary below      Assessment: Passive toe flexion is slowly improving  Increased sensitivity remains present on all incisions  Tolerated treatment fair  Patient would benefit from continued PT      Plan: Continue per plan of care        Precautions: R THR, OA     Manuals 2/27 3/6 2/9 2/13 2/15 2/21  2/23   L foot/ankle/Toes JM JM CM CM CM MB JM   Incision            Plantar fascia   JM JM-IASTM CM CM  CM MB  JM    IASTM  Plantar fascia- JM     Plantar fascia- CM  Plantar fascia- CM Plantar fascia- MB  Plantar fascia-JM   Neuro Re-Ed               Ankle circles           BAPS L3 DF/PF, inv/ever, CW/CCW 20x ea L3 DF/PF, inv/ever, CW/CCW 20x ea L3 DF/PF, inv/ever, CW/CCW 20x ea L3 DF/PF, inv/ever, CW/CCW 20x ea L3 DF/PF, Inv/Ever 20x ea L3 DF/PF, Inv/Ever 20x ea L3 DF/PF, inv/ever, CW/CCW 20x ea   Marbles             Bridges      2x10  2x10 2x10  2x10   Tandem stance               NBOS                              Ther Ex               Nustep for ankle/foot ROM L4 10' L6 10' L6 10' L5 10'  L5 10' L5 10'  L5 10'   AROM            Toe curls            LAQ    2# 3x10 2# 3x10 2# 3x10 2# 3x10    SLR-3way  30x ea on mat (flex and abd) Flex/abd 3x10 Flex/abd 3x10 2# flex/abd 3x10 2# flex/abd 3x10 2# flex/abd 3x10 30x ea LLE on mat   Clam  3x10 S/L  3x10   SL 3x10 SL 3x10 SL 3x10 3x10 BLE   Iso hip add            Heel slides            SAQ      2# 3x10 2# 3x10 2# 3x10    HS curls     L3 2x10 L3 2x10 L4 3x10 L4 3x10 L4  3x10   TB ankle 4way L4 20x ea L4 3x10 ea L3 2x10 ea L3 3x10 ea L4 20x ea  L4 20x ea   L4 20x ea ([email or web address])   HR/TR   Seated 2# 2x10 ea Seated 2# 2x10 ea  Seated 2# 2x10 ea  Seated 2# 2x10 ea     Gastroc Stretch  Strap 30"x4 Strap 30"x4 Strap 30"x4 Strap 30"x4  Strap 30"x4 Strap 30"x4   Strap 30"x4   Soleus stretch   Strap 30"x4 Strap 30"x4  Strap 30"x4  Strap 30"x4  Strap 30"x4  Strap 30"x4   Strap 30"x4   Ther Activity               Sit to stands               Toe splay          10x5"     Toe yoga     3"x20  3"x20  3"x20  3"x20   3"x20   Gait Training               WBAT with shoe                              Modalities               CP Home  10' to L ankle

## 2023-03-08 ENCOUNTER — TELEPHONE (OUTPATIENT)
Dept: PODIATRY | Facility: CLINIC | Age: 63
End: 2023-03-08

## 2023-03-08 ENCOUNTER — OFFICE VISIT (OUTPATIENT)
Dept: PHYSICAL THERAPY | Facility: CLINIC | Age: 63
End: 2023-03-08

## 2023-03-08 DIAGNOSIS — M79.672 LEFT FOOT PAIN: Primary | ICD-10-CM

## 2023-03-08 NOTE — TELEPHONE ENCOUNTER
Caller: Tino Jennings    Doctor/Office: Dr Sebas Calvert    #: [phone]    Escalation: Appointment Patient said Dr Neli Hindsing see her soon if in pain  Nothing available in [place] tomorrow  Please call patient and advise

## 2023-03-08 NOTE — PROGRESS NOTES
Daily Note     Today's date: 3/8/2023  Patient name: Callie Collins  : 1960  MRN: [id number]  Referring provider: Ramirez Sultana  Dx:   Encounter Diagnosis     ICD-10-CM    1  Left foot pain  M79 672                      Subjective: Patient reports that her L foot pain is 6-7/10 whether she is WB or non WB  She has a hard time ambulating and standing for long periods of time  RTD on   Objective: See treatment diary below  Assessment: Tolerated treatment well  Patient would benefit from continued PT to improve L ankle ROM and stability  Patient is unable to tolerate WB TE due to pain  She is able to tolerate light TE on the mat  Patient has tightness in plantar fascia and her toes are still in flexed position  At this time, pain is limiting function  Plan: Continue per plan of care        Precautions: R THR, OA     Manuals 2/27 3/6 3/8 2/13 2/15 2/21  2/23   L foot/ankle/Toes JM JM CM CM CM MB JM   Incision            Plantar fascia   JM JM-IASTM CM CM  CM MB  JM    IASTM  Plantar fascia- JM    CM Plantar fascia- CM  Plantar fascia- CM Plantar fascia- MB  Plantar fascia-JM   Neuro Re-Ed               Ankle circles           BAPS L3 DF/PF, inv/ever, CW/CCW 20x ea L3 DF/PF, inv/ever, CW/CCW 20x ea L3 DF/PF, inv/ever, CW/CCW 20x ea L3 DF/PF, inv/ever, CW/CCW 20x ea L3 DF/PF, Inv/Ever 20x ea L3 DF/PF, Inv/Ever 20x ea L3 DF/PF, inv/ever, CW/CCW 20x ea   Marbles             Bridges      2x10  2x10 2x10  2x10   Tandem stance               NBOS                              Ther Ex               Nustep for ankle/foot ROM L4 10' L6 10' L6 10' L5 10'  L5 10' L5 10'  L5 10'   AROM            Toe curls            LAQ     2# 3x10 2# 3x10 2# 3x10    SLR-3way  30x ea on mat (flex and abd) Flex/abd 3x10 Flex/abd 3x10 2# flex/abd 3x10 2# flex/abd 3x10 2# flex/abd 3x10 30x ea LLE on mat   Clam  3x10 S/L  3x10  SL 3x10 SL 3x10 SL 3x10 SL 3x10 3x10 BLE   Iso hip add            Heel slides            SAQ      2# 3x10 2# 3x10 2# 3x10    HS curls      L3 2x10 L4 3x10 L4 3x10 L4  3x10   TB ankle 4way L4 20x ea L4 3x10 ea L4 3x10 ea L3 3x10 ea L4 20x ea  L4 20x ea   L4 20x ea ([email or web address])   HR/TR    Seated 2# 2x10 ea  Seated 2# 2x10 ea  Seated 2# 2x10 ea     Gastroc Stretch  Strap 30"x4 Strap 30"x4 Strap 30"x4 Strap 30"x4  Strap 30"x4 Strap 30"x4   Strap 30"x4   Soleus stretch   Strap 30"x4 Strap 30"x4  Strap 30"x4  Strap 30"x4  Strap 30"x4  Strap 30"x4   Strap 30"x4   Ther Activity               Sit to stands               Toe splay          10x5"     Toe yoga     home 3"x20  3"x20  3"x20   3"x20   Gait Training               WBAT with shoe                              Modalities               CP Home  10' to L ankle  10' to L ankle

## 2023-03-10 ENCOUNTER — APPOINTMENT (OUTPATIENT)
Dept: RADIOLOGY | Facility: CLINIC | Age: 63
End: 2023-03-10

## 2023-03-10 ENCOUNTER — OFFICE VISIT (OUTPATIENT)
Dept: PODIATRY | Facility: CLINIC | Age: 63
End: 2023-03-10

## 2023-03-10 VITALS — HEIGHT: 64 IN | WEIGHT: 178 LBS | BODY MASS INDEX: 30.39 KG/M2

## 2023-03-10 DIAGNOSIS — G89.18 POST-OP PAIN: ICD-10-CM

## 2023-03-10 DIAGNOSIS — M79.672 LEFT FOOT PAIN: Primary | ICD-10-CM

## 2023-03-10 DIAGNOSIS — M79.672 LEFT FOOT PAIN: ICD-10-CM

## 2023-03-10 DIAGNOSIS — G62.9 NEUROPATHY: ICD-10-CM

## 2023-03-10 RX ORDER — GABAPENTIN 300 MG/1
300 CAPSULE ORAL 3 TIMES DAILY
Qty: 93 CAPSULE | Refills: 0 | Status: SHIPPED | OUTPATIENT
Start: 2023-03-10 | End: 2023-04-10

## 2023-03-10 RX ORDER — MELOXICAM 15 MG/1
15 TABLET ORAL DAILY
Qty: 30 TABLET | Refills: 1 | Status: SHIPPED | OUTPATIENT
Start: 2023-03-10

## 2023-03-10 NOTE — PROGRESS NOTES
Assessment/Plan:    No problem-specific Assessment & Plan notes found for this encounter  Diagnoses and all orders for this visit:    Left foot pain  -     X-ray foot left 3+ views; Future  -     Ambulatory referral to Physical Therapy; Future  -     meloxicam (Mobic) 15 mg tablet; Take 1 tablet (15 mg total) by mouth daily  -     gabapentin (Neurontin) 300 mg capsule; Take 1 capsule (300 mg total) by mouth 3 (three) times a day    Neuropathy  -     Ambulatory referral to Physical Therapy; Future  -     meloxicam (Mobic) 15 mg tablet; Take 1 tablet (15 mg total) by mouth daily  -     gabapentin (Neurontin) 300 mg capsule; Take 1 capsule (300 mg total) by mouth 3 (three) times a day    Post-op pain  -     Ambulatory referral to Physical Therapy; Future  -     meloxicam (Mobic) 15 mg tablet; Take 1 tablet (15 mg total) by mouth daily  -     gabapentin (Neurontin) 300 mg capsule; Take 1 capsule (300 mg total) by mouth 3 (three) times a day      -We will increase her daily gabapentin dosage to 900 mg daily  -Rx given for Mobic for inflammatory pain, continue ice, continue supportive shoes, continue ambulation with cane  - Continue physical therapy  - She is currently lagging behind the expected postoperative course  We will take her off for another 3 weeks of work  - Advised desensitization with physical therapy, she is having some mottling, hypersensitivity and I am afraid of neuritis type of problems, she is having positive Tinel's on all of the cardinal nerves of the left lower extremity beneath the knee    Subjective:      Patient ID: Nicholas Blizzard is a 63 y o  female  Patient presents for evaluation and management of her left foot pain  Is in PT and having continued pain and severe hypersensitivity on the plantar aspect of the foot and away from the incision  Is taking the gabapentin as directed, does take ibuprofen everyday  Ambulating in shoes and a cane         The following portions of the patient's history were reviewed and updated as appropriate: allergies, current medications, past family history, past medical history, past social history, past surgical history and problem list     Review of Systems   Constitutional: Negative for chills and fever  HENT: Negative for ear pain and sore throat  Eyes: Negative for pain and visual disturbance  Respiratory: Negative for cough and shortness of breath  Cardiovascular: Negative for chest pain and palpitations  Gastrointestinal: Negative for abdominal pain and vomiting  Genitourinary: Negative for dysuria and hematuria  Musculoskeletal: Negative for arthralgias and back pain  Skin: Negative for color change and rash  Neurological: Negative for seizures and syncope  All other systems reviewed and are negative  Objective:      Ht 5' 4" (1 626 m)   Wt 80 7 kg (178 lb)   BMI 30 55 kg/m²          Physical Exam  Vitals reviewed  Constitutional:       Appearance: Normal appearance  HENT:      Head: Normocephalic and atraumatic  Nose: Nose normal       Mouth/Throat:      Mouth: Mucous membranes are moist    Eyes:      Pupils: Pupils are equal, round, and reactive to light  Cardiovascular:      Pulses: Normal pulses  Pulmonary:      Effort: Pulmonary effort is normal    Musculoskeletal:         General: Swelling and tenderness present  Comments: Keloid formation overlying the incision  TTP along the intercuneiform area  The left foot is cool to touch an somewhat mottled when compared to the right foot  Skin:     Capillary Refill: Capillary refill takes less than 2 seconds  Neurological:      General: No focal deficit present  Mental Status: She is alert and oriented to person, place, and time  Mental status is at baseline

## 2023-03-10 NOTE — LETTER
March 10, 2023     Patient: Will Miles  YOB: 1960  Date of Visit: 3/10/2023      To Whom it May Concern:    Ameya Magdaleno is under my professional care  Tasha Parker was seen in my office on 3/10/2023  Tasha Parker is to be out another 3 weeks and is to return to my office for re-evaluation  If you have any questions or concerns, please don't hesitate to call           Sincerely,          Marianna Duran DPM        CC: No Recipients

## 2023-03-14 ENCOUNTER — OFFICE VISIT (OUTPATIENT)
Dept: PHYSICAL THERAPY | Facility: CLINIC | Age: 63
End: 2023-03-14

## 2023-03-14 DIAGNOSIS — M79.672 LEFT FOOT PAIN: Primary | ICD-10-CM

## 2023-03-14 NOTE — PROGRESS NOTES
Daily Note     Today's date: 3/14/2023  Patient name: Tiana Multani  : 1960  MRN: [id number]  Referring provider: Chante Fierro  Dx:   Encounter Diagnosis     ICD-10-CM    1  Left foot pain  M79 672                      Subjective: Patient reports that she is on 900 mg of Gabapentin  She still experiences pain in L foot and has a hard time walking and standing for long periods of time  She has sensitivity in foot and toes  Objective: See treatment diary below  Modified patient's program from MD instruction to focus on desensitization  Assessment: Tolerated treatment well  Patient would benefit from continued PT to improve nerve function and desensitize all aspects of L LE knee to toes  Plan to incorporate temp changes into program with contrast bath  Plan: Continue per plan of care        Precautions: R THR, OA     Manuals 2/27 3/6 3/8 3/14 2/15 2/21  2/23   L foot/ankle/Toes JM JM CM CM CM MB JM   Incision, anterior tib, calf      CM      Plantar fascia   JM JM-IASTM CM CM  CM MB  JM    IASTM  Plantar fascia- JM    CM Held  Plantar fascia- CM Plantar fascia- MB  Plantar fascia-JM   Desensitization    Pillow case/towel from knee to toes-CM      Neuro Re-Ed               Ankle circles           BAPS L3 DF/PF, inv/ever, CW/CCW 20x ea L3 DF/PF, inv/ever, CW/CCW 20x ea L3 DF/PF, inv/ever, CW/CCW 20x ea  L3 DF/PF, Inv/Ever 20x ea L3 DF/PF, Inv/Ever 20x ea L3 DF/PF, inv/ever, CW/CCW 20x ea   Marbles             Bridges        2x10 2x10  2x10   Tandem stance               NBOS               Sand         3'         Cotton balls     3'      Contrast Bath     NV                Ther Ex               Nustep for ankle/foot ROM L4 10' L6 10' L6 10' L6 10'  L5 10' L5 10'  L5 10'   AROM            Toe curls            LAQ      2# 3x10 2# 3x10    SLR-3way  30x ea on mat (flex and abd) Flex/abd 3x10 Flex/abd 3x10  2# flex/abd 3x10 2# flex/abd 3x10 30x ea LLE on mat   Clam  3x10 S/L  3x10  SL 3x10  SL 3x10 SL 3x10 3x10 BLE   Iso hip add            Heel slides            SAQ       2# 3x10 2# 3x10    HS curls       L4 3x10 L4 3x10 L4  3x10   TB ankle 4way L4 20x ea L4 3x10 ea L4 3x10 ea  L4 20x ea  L4 20x ea   L4 20x ea ([email or web address])   HR/TR      Seated 2# 2x10 ea  Seated 2# 2x10 ea     Gastroc Stretch  Strap 30"x4 Strap 30"x4 Strap 30"x4 Strap 30"x4  Strap 30"x4 Strap 30"x4   Strap 30"x4   Soleus stretch   Strap 30"x4 Strap 30"x4  Strap 30"x4  Strap 30"x4  Strap 30"x4  Strap 30"x4   Strap 30"x4   Ther Activity               Sit to stands               Toe splay          10x5"     Toe yoga     home   3"x20  3"x20   3"x20   Gait Training               WBAT with shoe                              Modalities               CP Home  10' to L ankle  10' to L ankle

## 2023-03-16 ENCOUNTER — EVALUATION (OUTPATIENT)
Dept: PHYSICAL THERAPY | Facility: CLINIC | Age: 63
End: 2023-03-16

## 2023-03-16 DIAGNOSIS — M79.672 LEFT FOOT PAIN: Primary | ICD-10-CM

## 2023-03-16 NOTE — PROGRESS NOTES
PT Re-Evaluation     Today's date: 3/16/2023  Patient name: Angela Kim  : 1960  MRN: [id number]  Referring provider: Benito Martinez  Dx:   Encounter Diagnosis     ICD-10-CM    1  Left foot pain  M79 672 PT plan of care cert/re-cert          Start Time: 1300  Stop Time: 1400  Total time in clinic (min): 60 minutes    Assessment  Assessment details: The patient has attended 23 PT visits to date and demonstrates gains in L ankle ROM and strength  She continues to c/o significant nerve pain in her L foot and lower leg  She has been taking gabapentin and her dosage was recently increased  Desensitization techniques have been initiated in her PT sessions and she has been instructed on using contrast baths at home  Deficits remain in L ankle AROM and strength  She walks with an antalgic gait with limited stance time on the LLE  She would benefit from continued therapy to address ongoing deficits  Impairments: abnormal gait, abnormal or restricted ROM, impaired balance, impaired physical strength, lacks appropriate home exercise program and pain with function  Understanding of Dx/Px/POC: good   Prognosis: good    Goals  STG(4 weeks):             1  Independent with HEP  MET            2  Decrease pain to 4/10 on average ONGOING            3   Increase AROM L ankle by 5-10 degrees each plane PARTIALLY MET            4  Increase strength L hip/knee to 4 to 4+/5 ONGOING     LTG(12 weeks):   ONGOING             1  Reduce L foot/ankle pain to 2/10 on average             2  Increase LLE strength to 4+to 5/5             3  L ankle AROM WFL             4  Resume a normal gait pattern without AD             5  Negotiate stairs independently              6  Return to PLOF      Plan  Patient would benefit from: skilled physical therapy  Planned modality interventions: cryotherapy and thermotherapy: hydrocollator packs  Planned therapy interventions: manual therapy, balance, neuromuscular re-education, strengthening, stretching, therapeutic activities, therapeutic exercise, gait training and home exercise program  Frequency: 2x week  Duration in weeks: 6  Plan of Care beginning date: 3/16/2023  Plan of Care expiration date: 4/27/2023  Treatment plan discussed with: patient        Subjective Evaluation    History of Present Illness  Date of onset: 9/10/2021  Date of surgery: 8/11/2022  Mechanism of injury: 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  On 9-10-21, while working a large container fell onto her L foot  She had immediate pain and swelling  Her spouse took her to the ER  Xray was unremarkable, placed in boot and discharged home  Did follow ups with urgent care  The pain continued  She was referred to specialist  She had returned to work 5 hours/day  After 3 months , went FT and was released from workers' comp doctor  The patient reports ongoing pain and swelling  She then saw Dr Pablo Real  An MRI was done and revealed ligament damage  She was placed in boot again  She was then referred to Dr Erlinda Hernandez who diagnosed her with a torn ligament  She underwent surgery to have a screw placed but did not heal  She then underwent a Lisfranc ORIF on 11-17-23  She was NWB until 2 weeks ago  Now bearing weight only on heel using walker or cane  Now referred to PT  5-30-22 was her last day of work  UPDATE 3/16/23: The patient reports 6-/10 pain in the L foot and lower leg  She has been taking gabapentin for the past month and the pain continues  She sees the surgeon again 4-6-23  Desensitization techniques were initiated last PT session and will be progressed as able    Pain  Aggravating factors: standing and walking    Social Support  Steps to enter house: yes  Stairs in house: yes   15  Lives in: multiple-level home  Lives with: significant other    Employment status: not working  Patient Goals  Patient goal: be able to walk        Objective     Neurological Testing     Sensation     Ankle/Foot     Comments   Left light touch: 1st ray       Active Range of Motion   Left Ankle/Foot   Dorsiflexion (ke): 5 degrees   Plantar flexion: 15 degrees   Inversion: 20 degrees   Eversion: 5 degrees with pain    Passive Range of Motion   Left Ankle/Foot    Dorsiflexion (ke): 8 degrees   Plantar flexion: 20 degrees   Inversion: 25 degrees   Eversion: 5 degrees     Strength/Myotome Testing     Left Ankle/Foot   Dorsiflexion: 4  Plantar flexion: 4  Inversion: 4  Eversion: 4-    Additional Strength Details  L hip strength 4-/5  L knee 4/5  L ankle NT this date    UPDATE 2/9/23: L hip 4/5                              L knee 4/5    UPDATE 3/16/23:  L Hip 4/5                                L knee 4/5      Flowsheet Rows    Flowsheet Row Most Recent Value   PT/OT G-Codes    Current Score 36   Projected Score 42             Precautions: R THR, OA    Manuals 2/27 3/6 3/8 3/14  3/16 2/21  2/23   L foot/ankle/Toes JM JM CM CM JM MB JM   Incision, anterior tib, calf      CM  JM     Plantar fascia   JM JM-IASTM CM CM  JM MB  JM    IASTM  Plantar fascia- JM    CM Held   Plantar fascia- MB  Plantar fascia-JM   Desensitization    Pillow case/towel from knee to toes-CM      Neuro Re-Ed               Ankle circles           BAPS L3 DF/PF, inv/ever, CW/CCW 20x ea L3 DF/PF, inv/ever, CW/CCW 20x ea L3 DF/PF, inv/ever, CW/CCW 20x ea   L3 DF/PF, Inv/Ever 20x ea L3 DF/PF, inv/ever, CW/CCW 20x ea   Marbles             Bridges         2x10  2x10   Tandem stance               NBOS               Sand         3'   3'      Cotton balls     3'  3'     Contrast Bath     NV                Ther Ex               Nustep for ankle/foot ROM L4 10' L6 10' L6 10' L6 10'  L6 10' L5 10'  L5 10'   AROM            Toe curls            LAQ       2# 3x10    SLR-3way  30x ea on mat (flex and abd) Flex/abd 3x10 Flex/abd 3x10  2# flex/abd 3x10 2# flex/abd 3x10 30x ea LLE on mat   Clam  3x10 S/L  3x10  SL 3x10  SL 3x10 SL 3x10 3x10 BLE   Iso hip add            Heel slides            SAQ        2# 3x10    HS curls        L4 3x10 L4  3x10   TB ankle 4way L4 20x ea L4 3x10 ea L4 3x10 ea   L4 20x ea   L4 20x ea ([email or web address])   HR/TR        Seated 2# 2x10 ea     Gastroc Stretch  Strap 30"x4 Strap 30"x4 Strap 30"x4 Strap 30"x4  Strap 30"x4 Strap 30"x4   Strap 30"x4   Soleus stretch   Strap 30"x4 Strap 30"x4  Strap 30"x4  Strap 30"x4  Strap 30"x4  Strap 30"x4   Strap 30"x4   Ther Activity               Sit to stands               Toe splay          10x5"     Toe yoga     home    3"x20   3"x20   Gait Training               WBAT with shoe                              Modalities               CP Home  10' to L ankle  10' to L ankle   10' to L ankle

## 2023-03-20 ENCOUNTER — OFFICE VISIT (OUTPATIENT)
Dept: PHYSICAL THERAPY | Facility: CLINIC | Age: 63
End: 2023-03-20

## 2023-03-20 DIAGNOSIS — M79.672 LEFT FOOT PAIN: Primary | ICD-10-CM

## 2023-03-20 NOTE — PROGRESS NOTES
Daily Note     Today's date: 3/20/2023  Patient name: Spike Trevino  : 1960  MRN: [id number]  Referring provider: Radha Goodwin  Dx:   Encounter Diagnosis     ICD-10-CM    1  Left foot pain  M79 672                      Subjective: Patient reports that her L foot still has a lot of sensitivity and soreness  RTD   Objective: See treatment diary below  Assessment: Tolerated treatment well  Patient would benefit from continued PT to reduce her sensitivity and improve ROM in ankle and toes  Her t Patient has more sensitivity with the towel due to rougher surface  She has no changes at this time  Plan: Continue per plan of care        Precautions: R THR, OA    Manuals 2/27 3/6 3/8 3/14  3/16 3/20  2/23   L foot/ankle/Toes JM JM CM CM JM CM JM   Incision, anterior tib, calf      CM  JM CM    Plantar fascia   JM JM-IASTM CM CM  JM CM  JM    IASTM  Plantar fascia- JM    CM Held      Plantar fascia-JM   Desensitization    Pillow case/towel from knee to toes-CM  Pillow case/towel from knee to toes-CM    Neuro Re-Ed               Ankle circles           BAPS L3 DF/PF, inv/ever, CW/CCW 20x ea L3 DF/PF, inv/ever, CW/CCW 20x ea L3 DF/PF, inv/ever, CW/CCW 20x ea    L3 DF/PF, inv/ever, CW/CCW 20x Benetta Oh             Bridges           2x10   Tandem stance               NBOS               Sand         3'   3' 5'     Cotton balls     3'  3' 5'                         Ther Ex               Nustep for ankle/foot ROM L4 10' L6 10' L6 10' L6 10'  L6 10' L6 10'  L5 10'   AROM            Toe curls            LAQ           SLR-3way  30x ea on mat (flex and abd) Flex/abd 3x10 Flex/abd 3x10  2# flex/abd 3x10 2# flex/abd 3x10 30x ea LLE on mat   Clam  3x10 S/L  3x10  SL 3x10  SL 3x10 SL 3x10 3x10 BLE   Iso hip add            Heel slides            SAQ            HS curls         L4  3x10   TB ankle 4way L4 20x ea L4 3x10 ea L4 3x10 ea     L4 20x ea ([email or web address])   HR/TR            Gastroc Stretch  Strap 30"x4 Strap 30"x4 Strap 30"x4 Strap 30"x4 Strap 30"x4 Strap 30"x4  Strap 30"x4   Soleus stretch   Strap 30"x4 Strap 30"x4  Strap 30"x4  Strap 30"x4  Strap 30"x4  Strap 30"x4   Strap 30"x4   Ther Activity               Sit to stands          NV     Toe splay               Toe yoga     home      3"x20   Gait Training               WBAT with shoe                              Modalities               CP Home  10' to L ankle  10' to L ankle   10' to L ankle 10' to L ankle

## 2023-03-22 ENCOUNTER — APPOINTMENT (OUTPATIENT)
Dept: PHYSICAL THERAPY | Facility: CLINIC | Age: 63
End: 2023-03-22

## 2023-03-27 ENCOUNTER — OFFICE VISIT (OUTPATIENT)
Dept: PHYSICAL THERAPY | Facility: CLINIC | Age: 63
End: 2023-03-27

## 2023-03-27 DIAGNOSIS — M79.672 LEFT FOOT PAIN: Primary | ICD-10-CM

## 2023-03-27 DIAGNOSIS — I10 HYPERTENSION, UNSPECIFIED TYPE: ICD-10-CM

## 2023-03-27 RX ORDER — HYDROCHLOROTHIAZIDE 12.5 MG/1
12.5 TABLET ORAL EVERY MORNING
Qty: 90 TABLET | Refills: 1 | Status: SHIPPED | OUTPATIENT
Start: 2023-03-27 | End: 2023-03-29 | Stop reason: SDUPTHER

## 2023-03-27 NOTE — PROGRESS NOTES
Daily Note     Today's date: 3/27/2023  Patient name: Tom Mays  : 1960  MRN: [id number]  Referring provider: Karie John  Dx:   Encounter Diagnosis     ICD-10-CM    1  Left foot pain  M79 672           Start Time: 1300  Stop Time: 1400  Total time in clinic (min): 60 minutes    Subjective: The patient states that she was at a birthday party over the weekend and she purposely stayed off of her feet but her L foot became painful, swollen and discolored  She feels the gabapentin isn't helping  Objective: See treatment diary below      Assessment: Hypersensitivity remains present in L lower and foot  Good tolerance to desensitization techniques  Tolerated treatment well  Patient would benefit from continued PT      Plan: Continue per plan of care        Precautions: R THR, OA    Manuals 2/27 3/6 3/8 3/14  3/16 3/20   3/27   L foot/ankle/Toes JM JM CM CM JM CM JM   Incision, anterior tib, calf      CM  JM CM   JM   Plantar fascia   JM JM-IASTM CM CM  JM CM  JM    IASTM  Plantar fascia- JM    CM Held         Desensitization    Pillow case/towel from knee to toes-CM  Pillow case/towel from knee to toes-CM  Pillowcase/towel from knee to toes-JM   Neuro Re-Ed               Ankle circles           BAPS L3 DF/PF, inv/ever, CW/CCW 20x ea L3 DF/PF, inv/ever, CW/CCW 20x ea L3 DF/PF, inv/ever, CW/CCW 20x ea       Marbles             Bridges              Tandem stance               NBOS               Sand         3'   3' 5'  5'   Cotton balls     3'  3' 5'  5'                        Ther Ex               Nustep for ankle/foot ROM L4 10' L6 10' L6 10' L6 10'  L6 10' L6 10'  L5 10'   AROM            Toe curls            LAQ           SLR-3way  30x ea on mat (flex and abd) Flex/abd 3x10 Flex/abd 3x10  2# flex/abd 3x10 2# flex/abd 3x10 30x ea LLE on mat   Clam  3x10 S/L  3x10  SL 3x10  SL 3x10 SL 3x10 3x10 BLE   Iso hip add            Heel slides            SAQ            HS curls            TB ankle "4way L4 20x ea L4 3x10 ea L4 3x10 ea        HR/TR            Gastroc Stretch  Strap 30\"x4 Strap 30\"x4 Strap 30\"x4 Strap 30\"x4 Strap 30\"x4 Strap 30\"x4  Strap 30\"x4   Soleus stretch   Strap 30\"x4 Strap 30\"x4  Strap 30\"x4  Strap 30\"x4  Strap 30\"x4  Strap 30\"x4   Strap 30\"x4   Ther Activity               Sit to stands          NV  nv   Toe splay               Toe yoga     home         Gait Training               WBAT with shoe                              Modalities               CP Home  10' to L ankle  10' to L ankle   10' to L ankle 10' to L ankle                         "

## 2023-03-28 DIAGNOSIS — E66.3 OVERWEIGHT (BMI 25.0-29.9): ICD-10-CM

## 2023-03-28 DIAGNOSIS — Z13.220 SCREENING FOR HYPERLIPIDEMIA: ICD-10-CM

## 2023-03-28 DIAGNOSIS — I49.3 PVC (PREMATURE VENTRICULAR CONTRACTION): ICD-10-CM

## 2023-03-28 DIAGNOSIS — I10 ESSENTIAL HYPERTENSION: ICD-10-CM

## 2023-03-28 DIAGNOSIS — E78.5 HYPERLIPIDEMIA, UNSPECIFIED HYPERLIPIDEMIA TYPE: ICD-10-CM

## 2023-03-28 DIAGNOSIS — I10 PRIMARY HYPERTENSION: ICD-10-CM

## 2023-03-28 RX ORDER — ATORVASTATIN CALCIUM 10 MG/1
10 TABLET, FILM COATED ORAL DAILY
Qty: 90 TABLET | Refills: 3 | Status: SHIPPED | OUTPATIENT
Start: 2023-03-28

## 2023-03-28 RX ORDER — LOSARTAN POTASSIUM 25 MG/1
12.5 TABLET ORAL DAILY
Qty: 45 TABLET | Refills: 3 | Status: SHIPPED | OUTPATIENT
Start: 2023-03-28

## 2023-03-29 ENCOUNTER — OFFICE VISIT (OUTPATIENT)
Dept: PHYSICAL THERAPY | Facility: CLINIC | Age: 63
End: 2023-03-29

## 2023-03-29 DIAGNOSIS — I10 HYPERTENSION, UNSPECIFIED TYPE: ICD-10-CM

## 2023-03-29 DIAGNOSIS — M79.672 LEFT FOOT PAIN: Primary | ICD-10-CM

## 2023-03-29 RX ORDER — HYDROCHLOROTHIAZIDE 12.5 MG/1
12.5 TABLET ORAL EVERY MORNING
Qty: 90 TABLET | Refills: 1 | Status: SHIPPED | OUTPATIENT
Start: 2023-03-29 | End: 2023-04-03 | Stop reason: SDUPTHER

## 2023-03-29 NOTE — PROGRESS NOTES
Daily Note     Today's date: 3/29/2023  Patient name: Hill Hall  : 1960  MRN: [id number]  Referring provider: Marj Perez  Dx:   Encounter Diagnosis     ICD-10-CM    1  Left foot pain  M79 672                      Subjective: Patient reports that she still has L foot pain and nerve pain  She feels that there is no change at this point  RTD   Objective: See treatment diary below  Assessment: Tolerated treatment well  Patient would benefit from continued PT to improve the desensitization from knee to toes  She still presents with a lot of sensitivity in toes and foot  Does not show improvement at this time  Unable to do standing stability due to pain  Plan: Continue per plan of care        Precautions: R THR, OA    Manuals 3/29 3/6 3/8 3/14  3/16 3/20   3/27   L foot/ankle/Toes CM JM CM CM JM CM JM   Incision, anterior tib, calf CM     CM  JM CM   JM   Plantar fascia  CM JM-IASTM CM CM  JM CM  JM    IASTM     CM Held         Desensitization Pillow case/towel from knee to toes-CM     Pillow case/towel from knee to toes-CM  Pillow case/towel from knee to toes-CM  Pillowcase/towel from knee to toes-JM   Neuro Re-Ed               Ankle circles           BAPS  L3 DF/PF, inv/ever, CW/CCW 20x ea L3 DF/PF, inv/ever, CW/CCW 20x ea       Marbles             Bridges              Tandem stance               NBOS               Sand  5'      3'   3' 5'  5'   Cotton balls 3'    3'  3' 5'  5'                        Ther Ex               Nustep for ankle/foot ROM L5 10' L6 10' L6 10' L6 10'  L6 10' L6 10'  L5 10'   AROM            Toe curls            LAQ           SLR-3way  30x ea on mat (flex and abd) Flex/abd 3x10 Flex/abd 3x10  2# flex/abd 3x10 2# flex/abd 3x10 30x ea LLE on mat   Clam  B LE 3x10 S/L  3x10  SL 3x10  SL 3x10 SL 3x10 3x10 BLE   Iso hip add            Heel slides            SAQ            HS curls            TB ankle 4way  L4 3x10 ea L4 3x10 ea        HR/TR       "     Gastroc Stretch  Strap 30\"x4 Strap 30\"x4 Strap 30\"x4 Strap 30\"x4 Strap 30\"x4 Strap 30\"x4  Strap 30\"x4   Soleus stretch   Strap 30\"x4 Strap 30\"x4  Strap 30\"x4  Strap 30\"x4  Strap 30\"x4  Strap 30\"x4   Strap 30\"x4   Ther Activity               Sit to stands          NV  nv   Toe splay               Toe yoga     home         Gait Training               WBAT with shoe                              Modalities               CP 10' to L ankle   10' to L ankle  10' to L ankle   10' to L ankle 10' to L ankle                         "

## 2023-04-03 ENCOUNTER — APPOINTMENT (OUTPATIENT)
Dept: PHYSICAL THERAPY | Facility: CLINIC | Age: 63
End: 2023-04-03

## 2023-04-03 ENCOUNTER — OFFICE VISIT (OUTPATIENT)
Dept: PODIATRY | Facility: CLINIC | Age: 63
End: 2023-04-03

## 2023-04-03 ENCOUNTER — APPOINTMENT (OUTPATIENT)
Dept: RADIOLOGY | Facility: MEDICAL CENTER | Age: 63
End: 2023-04-03

## 2023-04-03 VITALS — HEIGHT: 64 IN | WEIGHT: 179 LBS | BODY MASS INDEX: 30.56 KG/M2

## 2023-04-03 DIAGNOSIS — M79.672 LEFT FOOT PAIN: Primary | ICD-10-CM

## 2023-04-03 DIAGNOSIS — M79.672 LEFT FOOT PAIN: ICD-10-CM

## 2023-04-03 DIAGNOSIS — I10 HYPERTENSION, UNSPECIFIED TYPE: ICD-10-CM

## 2023-04-03 DIAGNOSIS — G57.72 COMPLEX REGIONAL PAIN SYNDROME TYPE 2 OF LEFT LOWER EXTREMITY: ICD-10-CM

## 2023-04-03 RX ORDER — HYDROCHLOROTHIAZIDE 12.5 MG/1
12.5 TABLET ORAL EVERY MORNING
Qty: 90 TABLET | Refills: 1 | Status: SHIPPED | OUTPATIENT
Start: 2023-04-03

## 2023-04-03 RX ORDER — HYDROCHLOROTHIAZIDE 12.5 MG/1
CAPSULE, GELATIN COATED ORAL
COMMUNITY
Start: 2023-03-27 | End: 2023-04-03 | Stop reason: SDUPTHER

## 2023-04-03 NOTE — PROGRESS NOTES
Assessment/Plan:    No problem-specific Assessment & Plan notes found for this encounter  Diagnoses and all orders for this visit:    Left foot pain  -     X-ray foot left 3+ views; Future  -     NM bone scan 3 phase; Future  -     Ambulatory Referral to Pain Management; Future    Complex regional pain syndrome type 2 of left lower extremity  -     NM bone scan 3 phase; Future  -     Ambulatory Referral to Pain Management; Future      -She unfortunately is further deteriorated, x-rays 3 views weightbearing were taken reviewed of the left foot and do show an new onset diffuse global osteopenia when compared to the previous x-rays from 2 to 3 months ago  This is significantly worse and does not correspond with her weightbearing status  - She is also having continued swelling, pain, atrophy, mottling and with the coolness to her toes I am very fearful for underlying complex regional pain syndrome  - We will keep her out of work until further notice  - I have referred her to pain management for continued work-up, we will also order a nuclear medicine scan to see if there are any findings consistent with complex regional pain syndrome  - She is to follow-up with Dr Yahaira Pizarro in 4 weeks to hopefully review the scan, discussed this and see if there is any further issues that I am missing    Subjective:      Patient ID: Shayne Washburn is a 63 y o  female  Patient presents for evaluation and management of her left foot, she is still having severe pain with ambulation  She is unable to perform any significant activity on the left foot  She notes numbness, burning, she feels as though the foot is in a vice  She has the digits are cool to touch and she notes that she does have some relief with massage but as soon as she finishes the massage the pain comes back to the intensity that it was previously        The following portions of the patient's history were reviewed and updated as appropriate: allergies, current "medications, past family history, past medical history, past social history, past surgical history and problem list     Review of Systems   Constitutional: Negative for chills and fever  HENT: Negative for ear pain and sore throat  Eyes: Negative for pain and visual disturbance  Respiratory: Negative for cough and shortness of breath  Cardiovascular: Negative for chest pain and palpitations  Gastrointestinal: Negative for abdominal pain and vomiting  Genitourinary: Negative for dysuria and hematuria  Musculoskeletal: Negative for arthralgias and back pain  Skin: Negative for color change and rash  Neurological: Negative for seizures and syncope  All other systems reviewed and are negative  Objective:      Ht 5' 4\" (1 626 m)   Wt 81 2 kg (179 lb)   BMI 30 73 kg/m²          Physical Exam  Musculoskeletal:      Comments: There is continued swelling, there is mottling change to the distal aspect of her left foot  The toes are hammered, cool to touch  And there is pain in the intercuneiform area           "

## 2023-04-03 NOTE — TELEPHONE ENCOUNTER
Faxed received questioning which  wants  - Capsules ordered 3/27/23 ([facility])  OR   - Tablets ordered 3/29/23 HCTZ (HYDRODIURIL)    Which do you want - orders can be seen in chart

## 2023-04-03 NOTE — LETTER
April 3, 2023     Patient: Aliya Manzanares  YOB: 1960  Date of Visit: 4/3/2023      To Whom it May Concern:    Di Jean is under my professional care  Roselyn Baez was seen in my office on 4/3/2023  Roselyn Baez is unable to return to work until further notice  If you have any questions or concerns, please don't hesitate to call           Sincerely,          Rina Gutiérrez DPM        CC: No Recipients

## 2023-04-05 ENCOUNTER — OFFICE VISIT (OUTPATIENT)
Dept: PHYSICAL THERAPY | Facility: CLINIC | Age: 63
End: 2023-04-05

## 2023-04-05 DIAGNOSIS — M79.672 LEFT FOOT PAIN: Primary | ICD-10-CM

## 2023-04-05 NOTE — PROGRESS NOTES
"Daily Note     Today's date: 2023  Patient name: Dali Santana  : 1960  MRN: [id number]  Referring provider: Oksana Parker  Dx:   Encounter Diagnosis     ICD-10-CM    1  Left foot pain  M79 672                      Subjective: Patient reports that L foot pain still present with nerve pain  MD sending her for 2nd opinion and more testing, referral for pain management  Unable to walk or stand for long periods of time  Objective: See treatment diary below  IFC to trial for pain  Assessment: Tolerated treatment fair    Trailed numerous techniques and MT for L foot  She has no changes at this time  Discussed with PT further treatment, hold at this time  Plan: Continue per plan of care        Precautions: R THR, OA    Manuals 3/29 4/5 3/8 3/14  3/16 3/20   3/27   L foot/ankle/Toes CM CM CM CM JM CM JM   Incision, anterior tib, calf CM  CM   CM  JM CM   JM   Plantar fascia  CM CM CM CM  JM CM  JM    IASTM     CM Held         Desensitization Pillow case/towel from knee to toes-CM     Pillow case/towel from knee to toes-CM  Pillow case/towel from knee to toes-CM  Pillowcase/towel from knee to toes-JM   Neuro Re-Ed               Ankle circles           BAPS   L3 DF/PF, inv/ever, CW/CCW 20x ea       Marbles             Bridges              Tandem stance              NBOS               Sand  5'     3'   3' 5'  5'   Cotton balls 3'    3'  3' 5'  5'                        Ther Ex               Nustep for ankle/foot ROM L5 10' L6 10' L6 10' L6 10'  L6 10' L6 10'  L5 10'   AROM   20x ea         Toe curls            LAQ           SLR-3way  30x ea on mat (flex and abd)  Flex/abd 3x10  2# flex/abd 3x10 2# flex/abd 3x10 30x ea LLE on mat   Clam  B LE 3x10   SL 3x10  SL 3x10 SL 3x10 3x10 BLE   Iso hip add           Heel slides           SAQ           HS curls            TB ankle 4way   L4 3x10 ea        HR/TR            Gastroc Stretch  Strap 30\"x4  Strap 30\"x4 Strap 30\"x4 Strap 30\"x4 Strap " "30\"x4  Strap 30\"x4   Soleus stretch   Strap 30\"x4  Strap 30\"x4  Strap 30\"x4  Strap 30\"x4  Strap 30\"x4   Strap 30\"x4   Ther Activity              Sit to stands         NV  nv   Toe splay              Toe yoga    home         Gait Training              WBAT with shoe                            Modalities              CP 10' to L ankle    10' to L ankle   10' to L ankle 10' to L ankle    IFC    15'                 "

## 2023-04-06 DIAGNOSIS — G89.4 CHRONIC PAIN SYNDROME: Primary | ICD-10-CM

## 2023-04-10 ENCOUNTER — APPOINTMENT (OUTPATIENT)
Dept: PHYSICAL THERAPY | Facility: CLINIC | Age: 63
End: 2023-04-10

## 2023-04-12 ENCOUNTER — APPOINTMENT (OUTPATIENT)
Dept: PHYSICAL THERAPY | Facility: CLINIC | Age: 63
End: 2023-04-12

## 2023-04-17 ENCOUNTER — APPOINTMENT (OUTPATIENT)
Dept: PHYSICAL THERAPY | Facility: CLINIC | Age: 63
End: 2023-04-17

## 2023-04-19 ENCOUNTER — APPOINTMENT (OUTPATIENT)
Dept: PHYSICAL THERAPY | Facility: CLINIC | Age: 63
End: 2023-04-19

## 2023-04-24 ENCOUNTER — APPOINTMENT (OUTPATIENT)
Dept: PHYSICAL THERAPY | Facility: CLINIC | Age: 63
End: 2023-04-24

## 2023-04-25 ENCOUNTER — OFFICE VISIT (OUTPATIENT)
Dept: PODIATRY | Facility: CLINIC | Age: 63
End: 2023-04-25

## 2023-04-25 VITALS
BODY MASS INDEX: 30.56 KG/M2 | SYSTOLIC BLOOD PRESSURE: 140 MMHG | DIASTOLIC BLOOD PRESSURE: 82 MMHG | RESPIRATION RATE: 18 BRPM | OXYGEN SATURATION: 97 % | HEART RATE: 80 BPM | HEIGHT: 64 IN | WEIGHT: 179 LBS

## 2023-04-25 DIAGNOSIS — G57.72 COMPLEX REGIONAL PAIN SYNDROME TYPE 2 OF LEFT LOWER EXTREMITY: ICD-10-CM

## 2023-04-25 DIAGNOSIS — Z98.890 STATUS POST LEFT FOOT SURGERY: ICD-10-CM

## 2023-04-25 DIAGNOSIS — M79.672 LEFT FOOT PAIN: Primary | ICD-10-CM

## 2023-04-25 NOTE — PATIENT INSTRUCTIONS
Contrast Bath Protocol    Purpose: this will help decrease swelling and edema to the injured body part (including one that has just had surgery)  It will also help decrease hypersensitivities &/or nerve inflammation of the lower extremity which can occur after trauma or surgery  Obtain one bucket of ice water and one bucket of exactly 100-degree bath water  Place foot in ice water for one minute  Place foot in 100-degree bath water for 3 minutes  Perform this for a total of 5 times in the ice water and 4 times in the warm water (start and end in ice water)

## 2023-04-25 NOTE — PROGRESS NOTES
"Assessment/Plan:     Diagnoses and all orders for this visit:    Left foot pain  -     Sedimentation rate, automated; Future  -     C-reactive protein; Future  -     CT lower extremity wo contrast left; Future    Complex regional pain syndrome type 2 of left lower extremity  -     Sedimentation rate, automated; Future  -     C-reactive protein; Future  -     CT lower extremity wo contrast left; Future    Status post left foot surgery          Imaging Reviewed at this visit (I personally reviewed/independently interpreted images and reports in PACS)  · XR left foot WB 3v 4/3/23: stable 1st and 2nd TMTJ arthrodesis  · NM bone scan 4/11/23: \"Findings are nonspecific and differential diagnosis includes infection, postsurgical change, and less likely underlying fracture deformity  Complex regional pain syndrome cannot be excluded but typically complex regional pain syndrome demonstrates polyarticular activity throughout the entire foot as opposed to more focally centered within the midfoot  \"        IMPRESSION:  · Left foot pain most recently s/p 1/2 TMTJ and intercuneiform fusions 11/17/23 due to LisFranc injury sustained 2021  Differential diagnosis include CRPS, surgical site pain (questionable fusion)  · Hx sept 2021 foot injury, surgery Aug 2022 (8/11/22 ORIF left lisfranc) and Nov 2022 (11/17/22 1st and 2nd TMTJ, intercuneiform fusions)  PLAN:  · I reviewed clinical exam and radiographic imaging (XR and bone scan) with patient in detail today  I have discussed with the patient the pathophysiology of this diagnosis and reviewed how the examination correlates with this diagnosis  · I did a thorough chart review involving the current workup with review of Dr Matteo Persaud notes in full  I spent greater than 35 min on this patient encounter today (new to me) including full review of notes, imaging, and full patient exam    · Unfortunately patient is having significant tingling, burning and pain to foot   Unfortunately " the bone scan was inconclusive for CRPS however per XRs and clinical exam, I agree that there is high concern for CRPS  I agree with pain mgnmt, PT, nerve desensitization protocols as recommended prior  In addition, I ordered ESR/CRP to assess for inflammatory/infection cause  I also ordered CT LLE to assess for fusion across surgical site as one possible cause for residual pain  · C/w contrast bath, nerve desensitization, PT  · Report to pain mngmt   · CT LLE ordered to assess osseous fusion of surgical site  · ESR, CRP ordered to assess for inflammation/infection  · F/u 2 weeks with Dr Ricky Glies      Subjective:      Patient ID: Noe Collins is a 63 y o  female  Skye Hays presents to clinic today concerning left foot pain  She has a complicated h/o left foot injury sustained sept 2021  She was treated conservatively with surgical shoe for about 2 months without improvement  She was released to work a few weeks after injury as not fractures were noted on XR  She reported to ortho Feb 2022 for another opinion where MRI was performed showing likely lisfranc injury  She was then placed in a Cam boot for 6 weeks without improvement  She was referred to podiatry (Dr Ricky Giles) from there for surgery  The 1st surgery was performed Aug 2022 (8/11/22 ORIF left lisfranc) and then 2nd surgery Nov 2022 (11/17/22 1st and 2nd TMTJ, intercuneiform fusions) due to continued pain  Since the 2nd surgery, she experiences tingling, burning, constant ache/throb to whole foot from ankle-distal  This is present at all times but the romelia non-nerve related pain worsens with WB  Has tried meloxicam, gabapentin w/o improvement  PT for neuritis, nerve desensitization without improvemen  Pain management referral was recommended and patient has appt tomorrow   Bone scan performed as CRPS was high on diag list        The following portions of the patient's history were reviewed and updated as appropriate: allergies, current medications, past "family history, past medical history, past social history, past surgical history and problem list     Review of Systems   Constitutional: Negative for activity change, chills and fever  HENT: Negative  Respiratory: Negative for cough, chest tightness and shortness of breath  Cardiovascular: Negative for chest pain and leg swelling  Endocrine: Negative  Genitourinary: Negative  Musculoskeletal: Positive for gait problem (Left foot pain)  Neurological:        Tingling and burning left ankle distal to toes   Psychiatric/Behavioral: Negative  Negative for agitation and behavioral problems  Objective:      /82 (BP Location: Left arm, Patient Position: Sitting, Cuff Size: Adult)   Pulse 80   Resp 18   Ht 5' 4\" (1 626 m)   Wt 81 2 kg (179 lb)   SpO2 97%   BMI 30 73 kg/m²          Physical Exam  Constitutional:       General: She is not in acute distress  Appearance: Normal appearance  She is not ill-appearing  Cardiovascular:      Pulses: Normal pulses  Comments: Left DP & PT pulses 2/4  CRT WNL  Pedal hair diminished  Left toes cool to touch with distal skin blueish mottling  Pulmonary:      Effort: No respiratory distress  Musculoskeletal:         General: Tenderness (Left dorsal foot at surgical site) present  Comments: Patient has pain with ROM TMTJs 1-4  Slight hammering of toes 2-5  ROM ankle intact and without significant pain  No pain to PT tendon  No pain to AT tendon  No pain to achilles tendon  There is slight pain posterior to lat mall however no pain with resisted eversion  She has pain (inc tingling/burning) with palpation to the plantar heel and lateral foot, likely nerve related  Skin:     General: Skin is warm  Capillary Refill: Capillary refill takes less than 2 seconds  Findings: No erythema or lesion  Comments: Left dorsal foot hypertrophic scar   Neurological:      General: No focal deficit present        Mental Status: She " is alert and oriented to person, place, and time  Sensory: No sensory deficit  Comments: Gross sensation intact  She has pain (inc tingling/burning) with palpation to the left plantar heel and lateral foot, likely nerve related  There is baseline tingling and burning to left foot from ankle to toes  This increases with palpation/tinel     Psychiatric:         Mood and Affect: Mood normal          Behavior: Behavior normal

## 2023-04-25 NOTE — LETTER
April 25, 2023     Patient: Ganesh Salinas  YOB: 1960  Date of Visit: 4/25/2023      To Whom it May Concern:    Elena Bailey is under my professional care  Yane Treviño was seen in my office on 4/25/2023  Yane Treviño should not return to work until cleared by a physician  If you have any questions or concerns, please don't hesitate to call           Sincerely,          Chalo Wasserman DPM        CC: No Recipients

## 2023-04-26 ENCOUNTER — APPOINTMENT (OUTPATIENT)
Dept: LAB | Facility: MEDICAL CENTER | Age: 63
End: 2023-04-26

## 2023-04-26 ENCOUNTER — APPOINTMENT (OUTPATIENT)
Dept: PHYSICAL THERAPY | Facility: CLINIC | Age: 63
End: 2023-04-26

## 2023-04-26 ENCOUNTER — CONSULT (OUTPATIENT)
Dept: PAIN MEDICINE | Facility: CLINIC | Age: 63
End: 2023-04-26

## 2023-04-26 VITALS
SYSTOLIC BLOOD PRESSURE: 156 MMHG | HEART RATE: 74 BPM | RESPIRATION RATE: 16 BRPM | WEIGHT: 179 LBS | DIASTOLIC BLOOD PRESSURE: 81 MMHG | BODY MASS INDEX: 30.56 KG/M2 | HEIGHT: 64 IN

## 2023-04-26 DIAGNOSIS — M79.672 LEFT FOOT PAIN: ICD-10-CM

## 2023-04-26 DIAGNOSIS — G57.72 COMPLEX REGIONAL PAIN SYNDROME TYPE 2 OF LEFT LOWER EXTREMITY: ICD-10-CM

## 2023-04-26 LAB
CRP SERPL QL: 5.6 MG/L
ERYTHROCYTE [SEDIMENTATION RATE] IN BLOOD: 15 MM/HOUR (ref 0–29)

## 2023-04-26 NOTE — PATIENT INSTRUCTIONS
Sympathetic Block      What is a Sympathetic Block? This is the injection of a local anesthetic around a group of nerves in your neck or low back  The purpose of this injection is to calm down or “block” the hyperactivity of the sympathetic nerves  The goal is for partial or full pain relief after the injection  Relief response may be delayed several hours or days  The long term goal is for reduction and elimination of pain, swelling and temperature change of the extremity as well as to increase dexterity  Why is a Sympathetic Block performed? A lumber sympathetic block may be done if you have Reflex Sympathetic Dystrophy (RSD), Complex Regional Pain Syndrome (CRPS), severe peripheral vascular disease, or neuropathic pain  What happens during the procedure? You will be placed in the prone position (on your stomach, face down) on the table in the fluoroscopy room  Your lower back will be cleansed with an antiseptic solution and the area will be numbed  The doctor will use X-ray to guide the placement of a thin needle to the area of a group of nerves in your lower back  After correct needle placement is confirmed by X-ray, a small amount of local anesthetic will be injected  What should I do after the procedure? A bandage will be applied to the injection site  You will return to the recovery area  A nurse will monitor your vital signs  After 20 minutes you will be able to sit or stand  The nurse will ask you how much pain is relieved prior to discharge  Your discharge instructions will then be reviewed and you will be able to go home  General Pre/Post Instructions    Eating  You may eat a light, but not full meal at least one hour before the procedure, unless receiving intravenous sedation  If you are an insulin dependent diabetic do not alter your normal food intake     Medications  Take your routine medications before the procedure (such as high blood pressure and diabetes medications) except for those that need to be discontinued five days before the procedure such as aspirin and all anti-inflammatory medications (e g  Motrin/Ibuprofen, Aleve, Relafen, Daypro)  These medicines may be re-started the day after the procedure  You may take your regular pain medicine as needed before/after the procedure  If you are taking Coumadin, Heparin, Lovenox, Plavix or Ticlid you must notify the office so that the timing of stopping these medications can be explained  Exercise  You must bring a  with you  You may return to your current level of activities the next day including return to work  Things that may Delay the Procedure  If you are on antibiotics please notify our office; we may delay the procedure  If you have an active infection or fever we will not do the procedure

## 2023-04-26 NOTE — PROGRESS NOTES
Assessment:  1  Left foot pain    2  Complex regional pain syndrome type 2 of left lower extremity        Plan:  Patient is a 72-year-old female complains of left foot pain status post multiple surgeries due to left foot blunt force injury presents to office for initial consultation  Patient notes increased swelling discoloration and increased sensitivity of the left foot  Patient struggles to put socks on and wear sneakers secondary to the severe pain  On examination you can see the mottled discoloration of the left foot with significant purple  Patient reports sometimes the toes swell so they are unrecognizable  Patient presents with complex regional pain syndrome type II of the left foot  1   We will schedule patient for a left lumbar sympathetic nerve block  2  Spinal cord stimulator information provided to the patient  3  Follow-up 1 month after injection      Complete risks and benefits including bleeding, infection, tissue reaction, nerve injury and allergic reaction were discussed  The approach was demonstrated using models and literature was provided  Verbal and written consent was obtained  History of Present Illness: The patient is a 63 y o  female who presents for consultation in regards to Foot Pain (Left sided, injury at work 9/13/2021)  Symptoms have been present for 5 months  Symptoms began following an injury at work  Pain is reported to be 7 on the numeric rating scale  Symptoms are felt constantly and worst in the no typical pattern  Symptoms are characterized as burning, shooting, sharp, dull/aching, tingling, pressure-like, throbbing and stabbing  Symptoms are associated with left leg weakness  Aggravating factors include kneeling, lying down, standing, bending, leaning forward, leaning bckward, sitting, walking and exercise  Relieving factors include nothing  No change in symptoms with turning the head, relaxation, coughing/sneezing and bowel movements    Treatments that have been helpful include nothing  physical therapy and heat/ice have provided no relief  Medications to relieve symptoms include Tylenol, ibuprofen, Celebrex  Review of Systems:    Review of Systems   Musculoskeletal:        Joint pain  Joint swelling   All other systems reviewed and are negative  Past Medical History:   Diagnosis Date   • Arthritis    • GERD (gastroesophageal reflux disease)     rarely   • Hyperlipidemia    • Hypertension    • Intracranial aneurysm     resolved 11/17/17 - possible R P-comm region aneurysm measuring 3mm found on MRI results   • Irregular heart beat    • PONV (postoperative nausea and vomiting)    • PVC (premature ventricular contraction)    • Reactive depression     last assessed 12/15/15       Past Surgical History:   Procedure Laterality Date   • ENDOMETRIAL ABLATION  2012   • EPIDURAL BLOCK INJECTION Right 01/07/2020    Procedure: Right L4-5 transforaminal epidural steroid injection 91514;  Surgeon: Maye Lynch MD;  [facility];  Service: Pain Management    • EPIDURAL BLOCK INJECTION Right 05/19/2020    Procedure: Right L4-L5 Transforaminal Epidural Steroid Injection (19192); Surgeon: Maye Lynch MD;  [facility];  Service: Pain Management    • FL GUIDED NEEDLE PLAC BX/ASP/INJ  01/07/2020   • FL GUIDED NEEDLE PLAC BX/ASP/INJ  05/19/2020   • FL GUIDED NEEDLE PLAC BX/ASP/INJ  08/28/2020   • JOINT REPLACEMENT Right     Hip   • LISFRANC ARTHRODESIS Left 11/17/2022    Procedure: FUSION LIS FRANC L 1,2 TMTJ fusion with intercuneiform ;  Surgeon: Deneen Shane DPM;  [facility];  Service: Podiatry   • [place] ARTHRP INTERPOS INTERCARPAL/METACARPAL JOINTS Left 07/30/2018    Procedure:  Thumb Arthrex mini tight rope suspension arthroplasty with trapezial resection left thumb;  Surgeon: Marco A Grande MD;  [facility];  Service: Orthopedics   • [place] INJECT SI JOINT ARTHRGRPHY&/ANES/STEROID W/ALESIA Right 08/28/2020 Procedure: Sacroiliac joint injection and piriformis muscle steroid Injection;  Surgeon: Ashley Rowe MD;  [facility];  Service: Pain Management    • LA OPEN TREATMENT TARSOMETATARSAL JOINT DISLOCATION Left 8/11/2022    Procedure: OPEN REDUCTION W/ INTERNAL FIXATION (ORIF) FOOT, ORIF of the left lis franc ligament;  Surgeon: Rina Muñoz DPM;  [facility];  Service: Podiatry   • THYROID SURGERY      near total thyroidectomy       Family History   Problem Relation Age of Onset   • Arthritis Mother    • Heart disease Mother         CAD   • Arthritis Father    • Lymphoma Father         Hodgkin's   • Breast cancer Sister 77   • No Known Problems Sister    • No Known Problems Daughter    • No Known Problems Daughter    • No Known Problems Maternal Grandmother    • No Known Problems Maternal Grandfather    • No Known Problems Paternal Grandmother    • No Known Problems Paternal Grandfather    • No Known Problems Son    • No Known Problems Maternal Aunt    • No Known Problems Paternal Aunt        Social History     Occupational History   • Not on file   Tobacco Use   • Smoking status: Never   • Smokeless tobacco: Never   • Tobacco comments:     only smoked as a teenager   Vaping Use   • Vaping Use: Never used   Substance and Sexual Activity   • Alcohol use: Yes     Comment: rare   • Drug use: Never     Comment: CBD   • Sexual activity: Yes     Partners: Male         Current Outpatient Medications:   •  Ascorbic Acid (VITAMIN C) 250 MG CHEW, Chew 1 tablet daily, Disp: , Rfl:   •  atorvastatin (LIPITOR) 10 mg tablet, Take 1 tablet (10 mg total) by mouth daily, Disp: 90 tablet, Rfl: 3  •  Calcium 600 MG tablet, Take 2 tablets by mouth daily, Disp: , Rfl:   •  celecoxib (CeleBREX) 200 mg capsule, Take 200 mg by mouth 2 (two) times daily after meals, Disp: , Rfl:   •  Cholecalciferol (VITAMIN D3) 1000 units CAPS, Take by mouth daily after breakfast, Disp: , Rfl:   •  fluticasone "(FLONASE) 50 mcg/act nasal spray, 1 spray into each nostril in the morning   (Patient taking differently: 1 spray into each nostril as needed), Disp: 16 g, Rfl: 0  •  gabapentin (Neurontin) 300 mg capsule, Take 1 capsule (300 mg total) by mouth 3 (three) times a day, Disp: 93 capsule, Rfl: 0  •  hydrochlorothiazide (HYDRODIURIL) 12 5 mg tablet, Take 1 tablet (12 5 mg total) by mouth every morning, Disp: 90 tablet, Rfl: 1  •  losartan (COZAAR) 25 mg tablet, Take 0 5 tablets (12 5 mg total) by mouth daily, Disp: 45 tablet, Rfl: 3  •  metoprolol tartrate (LOPRESSOR) 25 mg tablet, Take 1 tablet (25 mg total) by mouth every 12 (twelve) hours, Disp: 180 tablet, Rfl: 3  •  Multiple Vitamin (DAILY VALUE MULTIVITAMIN PO), Take 1 tablet by mouth daily, Disp: , Rfl:   •  Multiple Vitamins-Minerals (PRESERVISION AREDS 2 PO), Take by mouth daily, Disp: , Rfl:   •  nystatin (MYCOSTATIN) powder, Apply topically as needed, Disp: , Rfl:   •  Omega-3 Fatty Acids (Fish Oil) 1200 MG CAPS, Take 1 capsule by mouth daily, Disp: , Rfl:   •  aspirin 325 mg tablet, Take 1 tablet (325 mg total) by mouth daily (Patient not taking: Reported on 2/6/2023), Disp: 30 tablet, Rfl: 2  •  gabapentin (Neurontin) 300 mg capsule, Take 1 capsule (300 mg total) by mouth daily at bedtime (Patient not taking: Reported on 4/25/2023), Disp: 30 capsule, Rfl: 1  •  GNP Aspirin 325 MG EC tablet, , Disp: , Rfl:   •  ketotifen (ZADITOR) 0 025 % ophthalmic solution, Administer 1 drop to both eyes 2 (two) times a day as needed (Eye irritation) (Patient not taking: Reported on 4/25/2023), Disp: 5 mL, Rfl: 0  •  meloxicam (Mobic) 15 mg tablet, Take 1 tablet (15 mg total) by mouth daily (Patient not taking: Reported on 4/3/2023), Disp: 30 tablet, Rfl: 1    No Known Allergies    Physical Exam:    /81 (BP Location: Left arm, Patient Position: Sitting, Cuff Size: Large)   Pulse 74   Resp 16   Ht 5' 4\" (1 626 m)   Wt 81 2 kg (179 lb)   BMI 30 73 kg/m² " Constitutional: normal, well developed, well nourished, alert, in no distress and non-toxic and no overt pain behavior  Eyes: anicteric  HEENT: grossly intact  Neck: supple, symmetric, trachea midline and no masses   Pulmonary:even and unlabored  Cardiovascular:No edema or pitting edema present  Skin:Normal without rashes or lesions and well hydrated  Psychiatric:Mood and affect appropriate  Neurologic:Cranial Nerves II-XII grossly intact  Musculoskeletal:antalgic, left foot allodynia, hypersensitivity, discoloration, edema    Imaging    Result Text   THREE PHASE BONE SCAN OF FEET     INDICATION:  [id number]: Pain in left foot  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     The following clinical information was obtained directly from [facility]: hx injured lt foot Sept 2021-surgery Aug 2022 and Nov 2022-still having pain and swelling-r/o complex regional pain syndrome lt foot     PREVIOUS FILM CORRELATION:    X-rays of the left foot dated 4/3/2023     RADIOPHARMACEUTICAL 26 mCi Tc-99m MDP IV     TECHNIQUE:  Perfusion images of feet were acquired in the anterior and posterior projection   Blood pool and 2-3 hour delayed images were acquired of feet in multiple projections       FINDINGS:      PERFUSION:   Asymmetric increased flow to the left midfoot     BLOOD POOL:  Prominent asymmetric patchy tracer activity involving the proximal left mid foot in general region of postsurgical changes seen on x-ray      DELAYED:  Prominent asymmetric patchy tracer activity involving the proximal left mid foot in general region of postsurgical changes seen on x-ray      IMPRESSION:     Positive triple phase bone scan involving the left midfoot in general region of postsurgical changes seen on x-ray      Findings are nonspecific and differential diagnosis includes infection, postsurgical change, and less likely underlying fracture deformity      Complex regional pain syndrome cannot be excluded but typically complex regional pain syndrome demonstrates polyarticular activity throughout the entire foot as opposed to more focally centered within the midfoot                   Narrative & Impression   LEFT FOOT     INDICATION:   [id number]: Pain in left foot        COMPARISON:  Left foot x-ray 3/10/2023     VIEWS:  XR FOOT 3+ VW LEFT   Images: 3     FINDINGS:     There is no acute fracture or dislocation      Stable arthrodesis screws across the 1st and 2nd tarsometatarsal joints      Degenerative changes of the 1st MTP and talonavicular joints      No lytic or blastic osseous lesion      Soft tissues are unremarkable      IMPRESSION:     Stable arthrodesis across 1st and 2nd TMT joint  No orders to display       No orders of the defined types were placed in this encounter

## 2023-04-26 NOTE — H&P (VIEW-ONLY)
Assessment:  1  Left foot pain    2  Complex regional pain syndrome type 2 of left lower extremity        Plan:  Patient is a 33-year-old female complains of left foot pain status post multiple surgeries due to left foot blunt force injury presents to office for initial consultation  Patient notes increased swelling discoloration and increased sensitivity of the left foot  Patient struggles to put socks on and wear sneakers secondary to the severe pain  On examination you can see the mottled discoloration of the left foot with significant purple  Patient reports sometimes the toes swell so they are unrecognizable  Patient presents with complex regional pain syndrome type II of the left foot  1   We will schedule patient for a left lumbar sympathetic nerve block  2  Spinal cord stimulator information provided to the patient  3  Follow-up 1 month after injection      Complete risks and benefits including bleeding, infection, tissue reaction, nerve injury and allergic reaction were discussed  The approach was demonstrated using models and literature was provided  Verbal and written consent was obtained  History of Present Illness: The patient is a 63 y o  female who presents for consultation in regards to Foot Pain (Left sided, injury at work 9/13/2021)  Symptoms have been present for 5 months  Symptoms began following an injury at work  Pain is reported to be 7 on the numeric rating scale  Symptoms are felt constantly and worst in the no typical pattern  Symptoms are characterized as burning, shooting, sharp, dull/aching, tingling, pressure-like, throbbing and stabbing  Symptoms are associated with left leg weakness  Aggravating factors include kneeling, lying down, standing, bending, leaning forward, leaning bckward, sitting, walking and exercise  Relieving factors include nothing  No change in symptoms with turning the head, relaxation, coughing/sneezing and bowel movements    Treatments that have been helpful include nothing  physical therapy and heat/ice have provided no relief  Medications to relieve symptoms include Tylenol, ibuprofen, Celebrex  Review of Systems:    Review of Systems   Musculoskeletal:        Joint pain  Joint swelling   All other systems reviewed and are negative  Past Medical History:   Diagnosis Date   • Arthritis    • GERD (gastroesophageal reflux disease)     rarely   • Hyperlipidemia    • Hypertension    • Intracranial aneurysm     resolved 11/17/17 - possible R P-comm region aneurysm measuring 3mm found on MRI results   • Irregular heart beat    • PONV (postoperative nausea and vomiting)    • PVC (premature ventricular contraction)    • Reactive depression     last assessed 12/15/15       Past Surgical History:   Procedure Laterality Date   • ENDOMETRIAL ABLATION  2012   • EPIDURAL BLOCK INJECTION Right 01/07/2020    Procedure: Right L4-5 transforaminal epidural steroid injection 00634;  Surgeon: Yeni Howard MD;  [facility];  Service: Pain Management    • EPIDURAL BLOCK INJECTION Right 05/19/2020    Procedure: Right L4-L5 Transforaminal Epidural Steroid Injection (56501); Surgeon: Yeni Howard MD;  [facility];  Service: Pain Management    • FL GUIDED NEEDLE PLAC BX/ASP/INJ  01/07/2020   • FL GUIDED NEEDLE PLAC BX/ASP/INJ  05/19/2020   • FL GUIDED NEEDLE PLAC BX/ASP/INJ  08/28/2020   • JOINT REPLACEMENT Right     Hip   • LISFRANC ARTHRODESIS Left 11/17/2022    Procedure: FUSION LIS FRANC L 1,2 TMTJ fusion with intercuneiform ;  Surgeon: Jessika Montilla DPM;  [facility];  Service: Podiatry   • FL ARTHRP INTERPOS INTERCARPAL/METACARPAL JOINTS Left 07/30/2018    Procedure:  Thumb Arthrex mini tight rope suspension arthroplasty with trapezial resection left thumb;  Surgeon: Dot Coyle MD;  [facility];  Service: Orthopedics   • FL INJECT SI JOINT ARTHRGRPHY&/ANES/STEROID W/ALESIA Right 08/28/2020 Procedure: Sacroiliac joint injection and piriformis muscle steroid Injection;  Surgeon: Nicole Purvis MD;  [facility];  Service: Pain Management    • [place] OPEN TREATMENT TARSOMETATARSAL JOINT DISLOCATION Left 8/11/2022    Procedure: OPEN REDUCTION W/ INTERNAL FIXATION (ORIF) FOOT, ORIF of the left lis franc ligament;  Surgeon: Darlyn Castleman, DPM;  [facility];  Service: Podiatry   • THYROID SURGERY      near total thyroidectomy       Family History   Problem Relation Age of Onset   • Arthritis Mother    • Heart disease Mother         CAD   • Arthritis Father    • Lymphoma Father         Hodgkin's   • Breast cancer Sister 77   • No Known Problems Sister    • No Known Problems Daughter    • No Known Problems Daughter    • No Known Problems Maternal Grandmother    • No Known Problems Maternal Grandfather    • No Known Problems Paternal Grandmother    • No Known Problems Paternal Grandfather    • No Known Problems Son    • No Known Problems Maternal Aunt    • No Known Problems Paternal Aunt        Social History     Occupational History   • Not on file   Tobacco Use   • Smoking status: Never   • Smokeless tobacco: Never   • Tobacco comments:     only smoked as a teenager   Vaping Use   • Vaping Use: Never used   Substance and Sexual Activity   • Alcohol use: Yes     Comment: rare   • Drug use: Never     Comment: CBD   • Sexual activity: Yes     Partners: Male         Current Outpatient Medications:   •  Ascorbic Acid (VITAMIN C) 250 MG CHEW, Chew 1 tablet daily, Disp: , Rfl:   •  atorvastatin (LIPITOR) 10 mg tablet, Take 1 tablet (10 mg total) by mouth daily, Disp: 90 tablet, Rfl: 3  •  Calcium 600 MG tablet, Take 2 tablets by mouth daily, Disp: , Rfl:   •  celecoxib (CeleBREX) 200 mg capsule, Take 200 mg by mouth 2 (two) times daily after meals, Disp: , Rfl:   •  Cholecalciferol (VITAMIN D3) 1000 units CAPS, Take by mouth daily after breakfast, Disp: , Rfl:   •  fluticasone "(FLONASE) 50 mcg/act nasal spray, 1 spray into each nostril in the morning   (Patient taking differently: 1 spray into each nostril as needed), Disp: 16 g, Rfl: 0  •  gabapentin (Neurontin) 300 mg capsule, Take 1 capsule (300 mg total) by mouth 3 (three) times a day, Disp: 93 capsule, Rfl: 0  •  hydrochlorothiazide (HYDRODIURIL) 12 5 mg tablet, Take 1 tablet (12 5 mg total) by mouth every morning, Disp: 90 tablet, Rfl: 1  •  losartan (COZAAR) 25 mg tablet, Take 0 5 tablets (12 5 mg total) by mouth daily, Disp: 45 tablet, Rfl: 3  •  metoprolol tartrate (LOPRESSOR) 25 mg tablet, Take 1 tablet (25 mg total) by mouth every 12 (twelve) hours, Disp: 180 tablet, Rfl: 3  •  Multiple Vitamin (DAILY VALUE MULTIVITAMIN PO), Take 1 tablet by mouth daily, Disp: , Rfl:   •  Multiple Vitamins-Minerals (PRESERVISION AREDS 2 PO), Take by mouth daily, Disp: , Rfl:   •  nystatin (MYCOSTATIN) powder, Apply topically as needed, Disp: , Rfl:   •  Omega-3 Fatty Acids (Fish Oil) 1200 MG CAPS, Take 1 capsule by mouth daily, Disp: , Rfl:   •  aspirin 325 mg tablet, Take 1 tablet (325 mg total) by mouth daily (Patient not taking: Reported on 2/6/2023), Disp: 30 tablet, Rfl: 2  •  gabapentin (Neurontin) 300 mg capsule, Take 1 capsule (300 mg total) by mouth daily at bedtime (Patient not taking: Reported on 4/25/2023), Disp: 30 capsule, Rfl: 1  •  GNP Aspirin 325 MG EC tablet, , Disp: , Rfl:   •  ketotifen (ZADITOR) 0 025 % ophthalmic solution, Administer 1 drop to both eyes 2 (two) times a day as needed (Eye irritation) (Patient not taking: Reported on 4/25/2023), Disp: 5 mL, Rfl: 0  •  meloxicam (Mobic) 15 mg tablet, Take 1 tablet (15 mg total) by mouth daily (Patient not taking: Reported on 4/3/2023), Disp: 30 tablet, Rfl: 1    No Known Allergies    Physical Exam:    /81 (BP Location: Left arm, Patient Position: Sitting, Cuff Size: Large)   Pulse 74   Resp 16   Ht 5' 4\" (1 626 m)   Wt 81 2 kg (179 lb)   BMI 30 73 kg/m² " Constitutional: normal, well developed, well nourished, alert, in no distress and non-toxic and no overt pain behavior  Eyes: anicteric  HEENT: grossly intact  Neck: supple, symmetric, trachea midline and no masses   Pulmonary:even and unlabored  Cardiovascular:No edema or pitting edema present  Skin:Normal without rashes or lesions and well hydrated  Psychiatric:Mood and affect appropriate  Neurologic:Cranial Nerves II-XII grossly intact  Musculoskeletal:antalgic, left foot allodynia, hypersensitivity, discoloration, edema    Imaging    Result Text   THREE PHASE BONE SCAN OF FEET     INDICATION:  [id number]: Pain in left foot  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     The following clinical information was obtained directly from [facility]: hx injured lt foot Sept 2021-surgery Aug 2022 and Nov 2022-still having pain and swelling-r/o complex regional pain syndrome lt foot     PREVIOUS FILM CORRELATION:    X-rays of the left foot dated 4/3/2023     RADIOPHARMACEUTICAL 26 mCi Tc-99m MDP IV     TECHNIQUE:  Perfusion images of feet were acquired in the anterior and posterior projection   Blood pool and 2-3 hour delayed images were acquired of feet in multiple projections       FINDINGS:      PERFUSION:   Asymmetric increased flow to the left midfoot     BLOOD POOL:  Prominent asymmetric patchy tracer activity involving the proximal left mid foot in general region of postsurgical changes seen on x-ray      DELAYED:  Prominent asymmetric patchy tracer activity involving the proximal left mid foot in general region of postsurgical changes seen on x-ray      IMPRESSION:     Positive triple phase bone scan involving the left midfoot in general region of postsurgical changes seen on x-ray      Findings are nonspecific and differential diagnosis includes infection, postsurgical change, and less likely underlying fracture deformity      Complex regional pain syndrome cannot be excluded but typically complex regional pain syndrome demonstrates polyarticular activity throughout the entire foot as opposed to more focally centered within the midfoot                   Narrative & Impression   LEFT FOOT     INDICATION:   [id number]: Pain in left foot        COMPARISON:  Left foot x-ray 3/10/2023     VIEWS:  XR FOOT 3+ VW LEFT   Images: 3     FINDINGS:     There is no acute fracture or dislocation      Stable arthrodesis screws across the 1st and 2nd tarsometatarsal joints      Degenerative changes of the 1st MTP and talonavicular joints      No lytic or blastic osseous lesion      Soft tissues are unremarkable      IMPRESSION:     Stable arthrodesis across 1st and 2nd TMT joint  No orders to display       No orders of the defined types were placed in this encounter

## 2023-04-28 ENCOUNTER — TELEPHONE (OUTPATIENT)
Dept: PODIATRY | Facility: CLINIC | Age: 63
End: 2023-04-28

## 2023-04-28 NOTE — TELEPHONE ENCOUNTER
Caller: Allison Gil    Doctor: Devan Nava    Reason for call: Needs a prior auth for a CAT scan for Monday  Faxed to [place] office 2 times  Can someone call Natty back?     Call back#: [phone]

## 2023-05-01 ENCOUNTER — HOSPITAL ENCOUNTER (OUTPATIENT)
Dept: CT IMAGING | Facility: HOSPITAL | Age: 63
Discharge: HOME | End: 2023-05-01
Attending: STUDENT IN AN ORGANIZED HEALTH CARE EDUCATION/TRAINING PROGRAM

## 2023-05-01 DIAGNOSIS — G57.72 COMPLEX REGIONAL PAIN SYNDROME TYPE 2 OF LEFT LOWER EXTREMITY: ICD-10-CM

## 2023-05-01 DIAGNOSIS — M79.672 LEFT FOOT PAIN: ICD-10-CM

## 2023-05-06 ENCOUNTER — APPOINTMENT (OUTPATIENT)
Dept: LAB | Facility: MEDICAL CENTER | Age: 63
End: 2023-05-06

## 2023-05-06 DIAGNOSIS — E78.5 HYPERLIPIDEMIA, UNSPECIFIED HYPERLIPIDEMIA TYPE: ICD-10-CM

## 2023-05-06 LAB
CHOLEST SERPL-MCNC: 171 MG/DL
HDLC SERPL-MCNC: 60 MG/DL
LDLC SERPL CALC-MCNC: 79 MG/DL (ref 0–100)
TRIGL SERPL-MCNC: 162 MG/DL

## 2023-05-08 ENCOUNTER — TELEPHONE (OUTPATIENT)
Dept: PODIATRY | Age: 63
End: 2023-05-08

## 2023-05-08 DIAGNOSIS — G57.72 COMPLEX REGIONAL PAIN SYNDROME TYPE 2 OF LEFT LOWER EXTREMITY: Primary | ICD-10-CM

## 2023-05-08 NOTE — TELEPHONE ENCOUNTER
Caller: Navid Lorenzo    Doctor: Paulo Freeman DPM    Reason for call: Lani Li is going with Dr Sal Vick recommendation for physical therapy  She is requesting that an order be put in [facility] and faxed to [phone]    Call back#: [phone] to let Lani Li know when the order has been faxed

## 2023-05-10 ENCOUNTER — EVALUATION (OUTPATIENT)
Dept: PHYSICAL THERAPY | Facility: CLINIC | Age: 63
End: 2023-05-10

## 2023-05-10 DIAGNOSIS — G57.72 COMPLEX REGIONAL PAIN SYNDROME TYPE 2 OF LEFT LOWER EXTREMITY: ICD-10-CM

## 2023-05-10 NOTE — PROGRESS NOTES
PT Re-Evaluation     Today's date: 5/10/2023  Patient name: Berhane Roberto  : 1960  MRN: [id number]  Referring provider: Mumtaz Carvajal DPM  Dx:   Encounter Diagnosis     ICD-10-CM    1  Complex regional pain syndrome type 2 of left lower extremity  G57 72 Ambulatory referral to Physical Therapy          Start Time: 1400  Stop Time: 1500  Total time in clinic (min): 60 minutes    Assessment  Assessment details: The patient returns to the PT today after last being seen on 23  She was referred for aquatic therapy but was unable to get transportation  She states she was performing contrast baths at home 4x/week  She continues ot have constant pain in her L foot and ankle  She has been diagnosed with chronic regional pain syndrome  She is scheduled for a spinal injection next week  She demonstrates ongoing deficits in L ankle AROM, strength and stability  Her ambulation tolerance is limited to 15 minutes due to pain  She would benefit from continued therapy to address ongoing deficits  Impairments: abnormal gait, abnormal or restricted ROM, impaired balance, impaired physical strength, lacks appropriate home exercise program and pain with function  Understanding of Dx/Px/POC: good   Prognosis: good    Goals  STG(4 weeks):             1  Independent with HEP  MET            2  Decrease pain to 4/10 on average ONGOING            3   Increase AROM L ankle by 5-10 degrees each plane PARTIALLY MET            4  Increase strength L hip/knee to 4 to 4+/5 ONGOING     LTG(12 weeks):   ONGOING             1  Reduce L foot/ankle pain to 2/10 on average             2  Increase LLE strength to 4+to 5/5             3  L ankle AROM WFL             4  Resume a normal gait pattern without AD             5  Negotiate stairs independently              6  Return to PLOF      Plan  Patient would benefit from: skilled physical therapy  Planned modality interventions: cryotherapy and thermotherapy: hydrocollator packs  Planned therapy interventions: manual therapy, balance, neuromuscular re-education, strengthening, stretching, therapeutic activities, therapeutic exercise, gait training and home exercise program  Frequency: 2x week  Duration in weeks: 8  Plan of Care beginning date: 5/10/2023  Plan of Care expiration date: 7/5/2023  Treatment plan discussed with: patient        Subjective Evaluation    History of Present Illness  Date of onset: 9/10/2021  Date of surgery: 8/11/2022  Mechanism of injury: 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  On 9-10-21, while working a large container fell onto her L foot  She had immediate pain and swelling  Her spouse took her to the ER  Xray was unremarkable, placed in boot and discharged home  Did follow ups with urgent care  The pain continued  She was referred to specialist  She had returned to work 5 hours/day  After 3 months , went FT and was released from workers' comp doctor  The patient reports ongoing pain and swelling  She then saw Dr Victoria Avilez  An MRI was done and revealed ligament damage  She was placed in boot again  She was then referred to Dr Jesi Basilio who diagnosed her with a torn ligament  She underwent surgery to have a screw placed but did not heal  She then underwent a Lisfranc ORIF on 11-17-23  She was [facility] until 2 weeks ago  Now bearing weight only on heel using walker or cane  Now referred to PT  5-30-22 was her last day of work  UPDATE 3/16/23: The patient reports 6-/10 pain in the L foot and lower leg  She has been taking gabapentin for the past month and the pain continues  She sees the surgeon again 4-6-23  Desensitization techniques were initiated last PT session and will be progressed as able  UPDATE 5/10/23: The patient was last seen in the PT clinic on 4-5-23  She was referred to aquatic therapy but had difficulty with transportation  She was performing self contrast baths 4x/week for 15-20 minutes  She was referred to pain management   She is scheduled "for an injection on 5-18-23 in her lower back  Dr Kami Murillo ordered a bone scan and CT scan  She continues to have constant burning pain in the R foot with color changes  She is limited to 15 minutes on her feet before the pain increases  Currently her pain level is 7/10  Pain  Aggravating factors: standing and walking    Social Support  Steps to enter house: yes  Stairs in house: yes   15  Lives in: multiple-level home  Lives with: significant other    Employment status: not working  Patient Goals  Patient goal: be able to walk        Objective     Neurological Testing     Sensation     Ankle/Foot     Comments   Left light touch: 1st ray       Active Range of Motion   Left Ankle/Foot   Dorsiflexion (ke): 10 degrees with pain  Plantar flexion: 30 degrees with pain  Inversion: 20 degrees with pain  Eversion: 5 degrees with pain    Strength/Myotome Testing     Left Ankle/Foot   Dorsiflexion: 4  Plantar flexion: 4  Inversion: 4  Eversion: 4    Additional Strength Details  L hip strength 4-/5  L knee 4/5  L ankle NT this date    UPDATE 2/9/23: L hip 4/5                              L knee 4/5    UPDATE 3/16/23:  L Hip 4/5                                L knee 4/5    UPDATE 5/10/23:  L hip 4/5                                L knee 4+/5             Precautions: R THR, OA    Manuals 3/29 4/5 5/10 3/14  3/16 3/20   3/27   L foot/ankle/Toes CM CM  CM JM CM JM   Incision, anterior tib, calf CM  CM   CM  JM CM   JM   Plantar fascia  CM CM  CM  JM CM  JM    IASTM      Held         Desensitization Pillow case/towel from knee to toes-CM     Pillow case/towel from knee to toes-CM  Pillow case/towel from knee to toes-CM  Pillowcase/towel from knee to toes-JM   Neuro Re-Ed               Ankle circles           BAPS            Assessment               JM       Marbles             Bridges              Tandem stance     15\"x2 BLE         NBOS               Sand  5'     3'   3' 5'  5'   Cotton balls 3'    3'  3' 5'  5'                    " "    Ther Ex               Nustep for ankle/foot ROM L5 10' L6 10' L5 10' L6 10'  L6 10' L6 10'  L5 10'   AROM   20x ea         Toe curls            LAQ           SLR-3way  30x ea on mat (flex and abd)  Hip abd and ext in standing 2x10 BLE  2# flex/abd 3x10 2# flex/abd 3x10 30x ea LLE on mat   Clam  B LE 3x10    SL 3x10 SL 3x10 3x10 BLE   Iso hip add           Heel slides           SAQ           HS curls            TB ankle 4way   L3 20x ea        HR/TR            Gastroc Stretch  Strap 30\"x4   Strap 30\"x4 Strap 30\"x4 Strap 30\"x4  Strap 30\"x4   Soleus stretch   Strap 30\"x4   Strap 30\"x4  Strap 30\"x4  Strap 30\"x4   Strap 30\"x4   Ther Activity              Sit to stands         NV  nv   Side stepping     3 laps in //bars                      Gait Training              WBAT with shoe                            Modalities              CP 10' to L ankle       10' to L ankle 10' to L ankle    IFC    15'                 "

## 2023-05-15 ENCOUNTER — OFFICE VISIT (OUTPATIENT)
Dept: PODIATRY | Facility: CLINIC | Age: 63
End: 2023-05-15

## 2023-05-15 VITALS — WEIGHT: 179 LBS | HEIGHT: 64 IN | BODY MASS INDEX: 30.56 KG/M2

## 2023-05-15 DIAGNOSIS — G57.72 COMPLEX REGIONAL PAIN SYNDROME TYPE 2 OF LEFT LOWER EXTREMITY: Primary | ICD-10-CM

## 2023-05-15 DIAGNOSIS — M79.672 LEFT FOOT PAIN: ICD-10-CM

## 2023-05-15 DIAGNOSIS — S92.902K CLOSED FRACTURE OF LEFT FOOT WITH NONUNION, SUBSEQUENT ENCOUNTER: ICD-10-CM

## 2023-05-15 NOTE — PROGRESS NOTES
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, subsequent encounter      -Unfortunately it is likely that she is having a combination of nonunion pain and also complex regional pain syndrome-she is going for a block Dr Leandra Hwang was reviewed and does show selection of the second TMT J 30% of the first MPJ 10% of the intercuneiform area  - Thankfully there has been no loosening of the fixation, we discussed plan for this, she is to purchase a carbon fiber rigid footplate, she is to use over-the-counter anti-inflammatories ice continue physical therapy and she is able to slowly discontinue this if she feels that she has plateaued with relief  - She was to return 3's after her block to further assess her pain  - This will allow us to hopefully characterize how her pain is related to chronic regional pain syndrome versus nonunion, I am incredibly hesitant to recommend reattempting fusion in this area due to her along the postoperative course and complications with hypersensitivity and nerve issues      Subjective:      Patient ID: Maci Corbin is a 63 y o  female  Patient presents for evaluation management of her continued left foot pain, she has CT is being planned for a block  She presents today in news, they are semirigid  And she has minimal swelling and coloration on exam today  She does have continued hypersensitivity outside the area of injury, and also continued pain along the area of the surgery and injury  States that she is unable to stand on her feet for longer 15 minutes at a time    She does report continued color change continued swelling continued pain today      The following portions of the patient's history were reviewed and updated as appropriate: allergies, current "medications, past family history, past medical history, past social history, past surgical history and problem list     Review of Systems   Constitutional: Negative for chills and fever  HENT: Negative for ear pain and sore throat  Eyes: Negative for pain and visual disturbance  Respiratory: Negative for cough and shortness of breath  Cardiovascular: Negative for chest pain and palpitations  Gastrointestinal: Negative for abdominal pain and vomiting  Genitourinary: Negative for dysuria and hematuria  Musculoskeletal: Negative for arthralgias and back pain  Skin: Negative for color change and rash  Neurological: Negative for seizures and syncope  All other systems reviewed and are negative  Objective:      Ht 5' 4\" (1 626 m)   Wt 81 2 kg (179 lb)   BMI 30 73 kg/m²          Physical Exam  Vitals reviewed  Constitutional:       Appearance: Normal appearance  HENT:      Head: Normocephalic and atraumatic  Musculoskeletal:         General: Swelling and tenderness present  Comments: There is pain with range of motion of the first MPJ also at the intercuneiform area  There is minimal mottling on exam today with continued hypersensitivity along the dorsal and plantar aspect of the foot  Cicatrix is improving  Neurological:      Mental Status: She is alert           "

## 2023-05-15 NOTE — LETTER
May 15, 2023     Patient: Krupa Roles  YOB: 1960  Date of Visit: 5/15/2023      To Whom it May Concern:    Graham Maria Antonia is under my professional care  Emelinajaniecarlos Slava was seen in my office on 5/15/2023  Emelinareshma Pedersen is to be out of work until she is seem back by the dr     If you have any questions or concerns, please don't hesitate to call           Sincerely,          Nely Perla DPM        CC: Krupa Simms

## 2023-05-16 ENCOUNTER — APPOINTMENT (OUTPATIENT)
Dept: PHYSICAL THERAPY | Facility: CLINIC | Age: 63
End: 2023-05-16
Payer: OTHER MISCELLANEOUS

## 2023-05-18 ENCOUNTER — HOSPITAL ENCOUNTER (OUTPATIENT)
Facility: HOSPITAL | Age: 63
Setting detail: OUTPATIENT SURGERY
Discharge: HOME | End: 2023-05-18
Attending: ANESTHESIOLOGY | Admitting: ANESTHESIOLOGY

## 2023-05-18 ENCOUNTER — APPOINTMENT (OUTPATIENT)
Dept: RADIOLOGY | Facility: HOSPITAL | Age: 63
End: 2023-05-18

## 2023-05-18 VITALS
BODY MASS INDEX: 30.56 KG/M2 | RESPIRATION RATE: 18 BRPM | HEIGHT: 64 IN | DIASTOLIC BLOOD PRESSURE: 88 MMHG | HEART RATE: 70 BPM | SYSTOLIC BLOOD PRESSURE: 187 MMHG | OXYGEN SATURATION: 97 % | TEMPERATURE: 97.8 F | WEIGHT: 179 LBS

## 2023-05-18 DIAGNOSIS — G57.72 COMPLEX REGIONAL PAIN SYNDROME TYPE 2 OF LEFT LOWER EXTREMITY: ICD-10-CM

## 2023-05-18 RX ORDER — DEXAMETHASONE SODIUM PHOSPHATE 10 MG/ML
INJECTION, SOLUTION INTRAMUSCULAR; INTRAVENOUS AS NEEDED
Status: DISCONTINUED | OUTPATIENT
Start: 2023-05-18 | End: 2023-05-18 | Stop reason: HOSPADM

## 2023-05-18 RX ORDER — BUPIVACAINE HYDROCHLORIDE 2.5 MG/ML
INJECTION, SOLUTION EPIDURAL; INFILTRATION; INTRACAUDAL AS NEEDED
Status: DISCONTINUED | OUTPATIENT
Start: 2023-05-18 | End: 2023-05-18 | Stop reason: HOSPADM

## 2023-05-18 RX ORDER — LIDOCAINE HYDROCHLORIDE 10 MG/ML
INJECTION, SOLUTION EPIDURAL; INFILTRATION; INTRACAUDAL; PERINEURAL AS NEEDED
Status: DISCONTINUED | OUTPATIENT
Start: 2023-05-18 | End: 2023-05-18 | Stop reason: HOSPADM

## 2023-05-18 NOTE — OP NOTE
OPERATIVE REPORT  PATIENT NAME: Destiny Vicente    :  1960  MRN: [id number]  Pt Location: [facility] OR ROOM 01    SURGERY DATE: 2023    Surgeon(s) and Role:     * Nicole Puvris MD - Primary    Preop Diagnosis:  Complex regional pain syndrome type 2 of left lower extremity [G57 72]    Post-Op Diagnosis Codes:     * Complex regional pain syndrome type 2 of left lower extremity [G57 72]    Procedure(s):  Left - Left Lumbar Sympathetic Nerve Block    Specimen(s):  * No specimens in log *    Estimated Blood Loss:   Minimal    Drains:  * No LDAs found *    Anesthesia Type:   Local    Operative Indications:  Complex regional pain syndrome type 2 of left lower extremity [G57 72]      Operative Findings:  same    Complications:   None    Procedure and Technique:  Fluoroscopically-guided left lumbar sympathetic nerve      After discussing the risks, benefits, and alternatives to the procedure, the patient expressed understanding and wished to proceed  The patient was brought to the fluoroscopy suite and placed in the prone position  A procedural pause was conducted to verify:  correct patient identity, procedure to be performed and as applicable, correct side and site, correct patient position, and availability of implants, special equipment and special requirements  After identifying the left L3 pedicle fluoroscopically with an coaxial view, the skin was sterilely prepped and draped in the usual fashion using Chloraprep skin prep  The skin and subcutaneous tissue were anesthetized with 0 5% lidocaine  A 5 inch 22 gauge spinal needle was then advanced under fluoroscopic guidance to the posterior aspect of the left anterior lateral surface of the L3 vertebral body  The needle tip was then advanced kept over the lateral margin of the vertebral body until the needle contacted bone    An additional 1 mL of 1% lidocaine was placed on the vertebral body, and the needle was then walked laterally off the bony margin  The C-arm is rotated to a lateral view, and the needle is advanced onto the tip lies over the anterior 1/3 of the vertebral body  Proper needle position was then verified in the anterior posterior projection, with the needle tip lies medial to the lateral margin of the vertebral body  Aspiration to detect intravascular needle placement was performed and negative  0 5 mL of Omnipaque 300 was then injected with a contrast pattern shows a spread both superiorly and inferiorly correlate with ideal needle position without any evidence of vascular uptake  Incremental injection of local anesthetic and steroid containing 15 mL of 0 25% bupivacaine and 7 5 mg of dexamethasone was then injected incrementally  Following the injection the needle was withdrawn slightly and flushed with lidocaine as it was fully extracted  The patient tolerated the procedure well and there were no apparent complications  The patient did not develop any new neurologic deficits  After appropriate observation, the patient was dismissed from the clinic in good condition under their own power  COMMENTS  The patient received a total steroid dose of 15 mg of dexamethasone  I was present for the entire procedure      Patient Disposition:  hemodynamically stable        SIGNATURE: Houston Nyhan, MD  DATE: May 18, 2023  TIME: 1:39 PM

## 2023-05-18 NOTE — DISCHARGE INSTR - AVS FIRST PAGE
ACTIVITY    Attend therapy or home therapy as prescribed  Please call our office afterwards with a progress report  DO NOT DRIVE or operate machinery today  You may resume normal activities tomorrow as tolerated  CARE OF THE INJECTION SITE    For soreness or pain, you may apply ice today  (20 minutes on/20 minutes off)  Notify the [facility] if you have any of the following: redness, drainage, swelling, chills or fever above 100*F degrees  SPECIAL INSTRUCTIONS    You may experience the following for up to 6 hours after the procedure:   Sensation of warmth or tingling in the leg or foot  Dizziness  Lying down usually helps  Sit up and then stand gradually, with assistance if needed  2  Notify the [facility] or go to the nearest emergency room if you have severe groin pain, bloody urine, or inability to pass urine  Be sure to let medical personnel know that you have had a lumbar sympathetic nerve block  MEDICATIONS    Continue to take all routine medications  Our office may have instructed you to hold some medications  You may resume __________________  As no general anesthesia was used in today's procedure, you should not experience any side effects related to anesthesia

## 2023-05-18 NOTE — INTERVAL H&P NOTE
H&P reviewed  After examining the patient I find no changes in the patients condition since the H&P had been written      Vitals:    05/18/23 1251   BP: (!) 191/96   Pulse: 76   Resp: 18   Temp: 98 1 °F (36 7 °C)   SpO2: 97%

## 2023-05-23 ENCOUNTER — OFFICE VISIT (OUTPATIENT)
Dept: PHYSICAL THERAPY | Facility: CLINIC | Age: 63
End: 2023-05-23

## 2023-05-23 DIAGNOSIS — G57.72 COMPLEX REGIONAL PAIN SYNDROME TYPE 2 OF LEFT LOWER EXTREMITY: Primary | ICD-10-CM

## 2023-05-23 NOTE — PROGRESS NOTES
"Daily Note     Today's date: 2023  Patient name: Jose J Hayes  : 1960  MRN: [id number]  Referring provider: Reba Farrell DPM  Dx:   Encounter Diagnosis     ICD-10-CM    1  Complex regional pain syndrome type 2 of left lower extremity  G57 72           Start Time: 1300  Stop Time: 1400  Total time in clinic (min): 60 minutes    Subjective: The patient received an injection in her lumbar spine which helped with her L foot pain for 1 day  She describes a constant burning pain in her L foot and feels like the skin is stretching  She follows up with her surgeon in  to discuss options  Objective: See treatment diary below      Assessment: Patient reports 7/10 pain throughout session  Patient able to maintain tandem balance for 30\" B  Tolerated treatment well  Patient would benefit from continued PT      Plan: Continue per plan of care        Precautions: R THR, OA    Manuals 3/29 4/5 5/10  5/23  3/16 3/20   3/27   L foot/ankle/Toes CM CM  JM JM CM JM   Incision, anterior tib, calf CM  CM     JM CM   JM   Plantar fascia  CM CM    JM CM  JM    IASTM               Desensitization Pillow case/towel from knee to toes-CM       Pillow case/towel from knee to toes-CM  Pillowcase/towel from knee to toes-JM   Neuro Re-Ed               Ankle circles           BAPS            Assessment               JM  L3 20x ea dir      Marbles             Bridges              Tandem stance     15\"x2 BLE 30\"x2 BLE        NBOS               Sand  5'       3' 5'  5'   Cotton balls 3'      3' 5'  5'                        Ther Ex               Nustep for ankle/foot ROM L5 10' L6 10' L5 10' L5 10'  L6 10' L6 10'  L5 10'   AROM   20x ea   20x ea      Toe curls            LAQ           SLR-3way  30x ea on mat (flex and abd)  Hip abd and ext in standing 2x10 BLE Hip abd and ext in standing 2x10 BLE 2# flex/abd 3x10 2# flex/abd 3x10 30x ea LLE on mat   Clam  B LE 3x10    SL 3x10 SL 3x10 3x10 BLE   Iso hip add           Heel " "slides           SAQ           HS curls            TB ankle 4way   L3 20x ea L3 20x ea       HR/TR            Gastroc Stretch  Strap 30\"x4    Strap 30\"x4 Strap 30\"x4  Strap 30\"x4   Soleus stretch   Strap 30\"x4     Strap 30\"x4  Strap 30\"x4   Strap 30\"x4   Ther Activity              Sit to stands         NV  nv   Side stepping     3 laps in //bars 3 laps in //bars                     Gait Training              WBAT with shoe                            Modalities              CP 10' to L ankle       10' to L ankle 10' to L ankle    IFC    15'                 "

## 2023-05-25 ENCOUNTER — APPOINTMENT (OUTPATIENT)
Dept: PHYSICAL THERAPY | Facility: CLINIC | Age: 63
End: 2023-05-25
Payer: OTHER MISCELLANEOUS

## 2023-05-30 ENCOUNTER — APPOINTMENT (OUTPATIENT)
Dept: PHYSICAL THERAPY | Facility: CLINIC | Age: 63
End: 2023-05-30
Payer: OTHER MISCELLANEOUS

## 2023-06-19 ENCOUNTER — OFFICE VISIT (OUTPATIENT)
Dept: PODIATRY | Facility: CLINIC | Age: 63
End: 2023-06-19
Payer: OTHER MISCELLANEOUS

## 2023-06-19 ENCOUNTER — OFFICE VISIT (OUTPATIENT)
Dept: PAIN MEDICINE | Facility: CLINIC | Age: 63
End: 2023-06-19
Payer: OTHER MISCELLANEOUS

## 2023-06-19 ENCOUNTER — TELEPHONE (OUTPATIENT)
Dept: OBGYN CLINIC | Facility: OTHER | Age: 63
End: 2023-06-19

## 2023-06-19 VITALS
WEIGHT: 179 LBS | DIASTOLIC BLOOD PRESSURE: 94 MMHG | RESPIRATION RATE: 16 BRPM | SYSTOLIC BLOOD PRESSURE: 152 MMHG | HEIGHT: 64 IN | BODY MASS INDEX: 30.56 KG/M2 | HEART RATE: 72 BPM

## 2023-06-19 VITALS — WEIGHT: 179 LBS | BODY MASS INDEX: 30.56 KG/M2 | HEIGHT: 64 IN

## 2023-06-19 DIAGNOSIS — S92.902K CLOSED FRACTURE OF LEFT FOOT WITH NONUNION, SUBSEQUENT ENCOUNTER: Primary | ICD-10-CM

## 2023-06-19 DIAGNOSIS — M54.41 CHRONIC BILATERAL LOW BACK PAIN WITH RIGHT-SIDED SCIATICA: Primary | ICD-10-CM

## 2023-06-19 DIAGNOSIS — Z79.891 LONG-TERM CURRENT USE OF OPIATE ANALGESIC: ICD-10-CM

## 2023-06-19 DIAGNOSIS — M54.16 LUMBAR RADICULOPATHY: ICD-10-CM

## 2023-06-19 DIAGNOSIS — F11.20 UNCOMPLICATED OPIOID DEPENDENCE (HCC): ICD-10-CM

## 2023-06-19 DIAGNOSIS — G89.29 CHRONIC BILATERAL LOW BACK PAIN WITH RIGHT-SIDED SCIATICA: Primary | ICD-10-CM

## 2023-06-19 DIAGNOSIS — G89.4 CHRONIC PAIN SYNDROME: ICD-10-CM

## 2023-06-19 PROCEDURE — 80305 DRUG TEST PRSMV DIR OPT OBS: CPT | Performed by: ANESTHESIOLOGY

## 2023-06-19 PROCEDURE — 99214 OFFICE O/P EST MOD 30 MIN: CPT | Performed by: PODIATRIST

## 2023-06-19 PROCEDURE — 99214 OFFICE O/P EST MOD 30 MIN: CPT | Performed by: ANESTHESIOLOGY

## 2023-06-19 NOTE — PROGRESS NOTES
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, subsequent encounter      -PT notes reviewed, she feels as though she has plateaued with physical therapy, she may discontinue physical therapy  - CT was reviewed and does show 10% osseous union in the intercuneiform area, this is likely due to the significant source of her pain, there is likely micromotion that is present without failure of the hardware in this area that is likely causing continued pain  - I have placed the appropriate referral for consideration of the revisional surgery on the left foot  - She reports that she only had 1 day of the family status post injection, it is likely that the vast majority of her pain is likely caused by her nonunion    Subjective:      Patient ID: Sarah Sandhu is a 66 y o  female  Patient presents for evaluation management of her left foot pain, she is complaining of continued pain with weightbearing, throughout the day  She are color changes, burning, stabbing, aching of the entire foot  This does worsen with activity, on presentation today her swelling is minimal but this is likely due to the time of day  She reports that she did have only 1 day relief status post injection  And then after the 1 day lapse that her pain came back totally      The following portions of the patient's history were reviewed and updated as appropriate: allergies, current medications, past family history, past medical history, past social history, past surgical history and problem list     Review of Systems   Constitutional: Negative for chills and fever  HENT: Negative for ear pain and sore throat  Eyes: Negative for pain and visual disturbance  Respiratory: Negative for cough and shortness of breath  Cardiovascular: Negative for chest pain and palpitations     Gastrointestinal: Negative for "abdominal pain and vomiting  Genitourinary: Negative for dysuria and hematuria  Musculoskeletal: Negative for arthralgias and back pain  Skin: Negative for color change and rash  Neurological: Negative for seizures and syncope  All other systems reviewed and are negative  Objective:      Ht 5' 4\" (1 626 m)   Wt 81 2 kg (179 lb)   BMI 30 73 kg/m²          Physical Exam  Musculoskeletal:      Comments: Cicatrix overlying the first MPJ is somewhat exuberant  There is pain with range of motion of the intercuneiform area  There is continued swelling and hypersensitivity to the foot  Continued Tinel's along the tarsal tunnel and also along the superficial peroneal nerve    Minimal pain with range of motion of the first TMT J when isolated, minimal pain with isolation of the second TMT J but there intercuneiform area appears to be very painful in spite of lack of clinical movement         "

## 2023-06-19 NOTE — ADDENDUM NOTE
Addended by: Harmony Lam on: 6/19/2023 11:14 AM     Modules accepted: Orders Post-Care Instructions: I reviewed with the patient in detail post-care instructions. The patient understands that the treated areas should be washed off in 2-4 hours depending on the type of cantharone used. Curette Text: Prior to application of cantharidin the lesions were lightly pared with a curette. Canthacur Ps Duration Text (Please Remove Duration From Postcare): The patient was instructed to leave the Canthacur PS on for 6-8 hours and then wash the area well with soap and water. Include Z78.9 (Other Specified Conditions Influencing Health Status) As An Associated Diagnosis?: No Cantharone Plus Duration Text (Please Remove Duration From Postcare): The patient was instructed to leave the Cantharone Plus on for 2 hours and then wash the area well with soap and water. Canthacur Duration Text (Please Remove Duration From Postcare): The patient was instructed to leave the Canthacur on for 6-8 hours and then wash the area well with soap and water. Medical Necessity Information: It is in your best interest to select a reason for this procedure from the list below. All of these items fulfill various CMS LCD requirements except the new and changing color options. Cantharone Forte Duration Text (Please Remove Duration From Postcare): The patient was instructed to leave the Cantharone Forte on for 6-8 hours and then wash the area well with soap and water. Cantharone Duration Text (Please Remove Duration From Postcare): The patient/parent were instructed if catharone plus (red) is used to wash off in 2 hours and if cantharone regular (green) is used to wash off in 4 hours. Medical Necessity Clause: This procedure was medically necessary because the lesions that were treated were: Detail Level: Detailed Consent: The patient's consent was obtained including but not limited to risks of crusting, scabbing, scarring, blistering, darker or lighter pigmentary change, recurrence, incomplete removal and infection. Strength: [facility] plus

## 2023-06-19 NOTE — PROGRESS NOTES
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 post surgical intervention  Patient is status post left lumbar sympathetic nerve block which provided her with 0% relief  Patient will see a secondary surgical interventionalist for further management  At this time we will try to control the pain with use of low-dose opioid pain regimen in the abdomen surgical intervention  1   We will obtain a urine drug screen at this office visit  2  We will consider low-dose opioid pain management at next office visit as needed for surgical management    There are risks associated with opioid medications, including dependence, addiction and tolerance  The patient understands and agrees to use these medications only as prescribed  Potential side effects of the medications include, but are not limited to, constipation, drowsiness, addiction, impaired judgment and risk of fatal overdose if not taken as prescribed  The patient was warned against driving while taking sedation medications  Sharing medications is a felony  At this point in time, the patient is showing no signs of addiction, abuse, diversion or suicidal ideation  A urine drug screen was collected at today's office visit as part of our medication management protocol  The point of care testing results were appropriate for what was being prescribed  The specimen will be sent for confirmatory testing  The drug screen is medically necessary because the patient is either dependent on opioid medication or is being considered for opioid medication therapy and the results could impact ongoing or future treatment   The drug screen is to evaluate for the presences or absence of prescribed, non-prescribed, and/or illicit drugs/substances  [place] Prescription Drug Monitoring Program report was reviewed and was appropriate       History of Present Illness: The patient is a 66 y o  female who presents for a follow up office visit in regards to Back Pain  The patient’s current symptoms include 6 burning, doses in, sharp, pressure-like, shooting, numbness, pins-and-needles left foot  Current pain medications includes: none    I have personally reviewed and/or updated the patient's past medical history, past surgical history, family history, social history, current medications, allergies, and vital signs today  Review of Systems  Review of Systems   Musculoskeletal: Positive for back pain, gait problem and myalgias  Decreased ROM  Joint stiffness  Swelling  pain   All other systems reviewed and are negative  Past Medical History:   Diagnosis Date   • Arthritis    • GERD (gastroesophageal reflux disease)     rarely   • Hyperlipidemia    • Hypertension    • Intracranial aneurysm     resolved 11/17/17 - possible R P-comm region aneurysm measuring 3mm found on MRI results   • Irregular heart beat    • PONV (postoperative nausea and vomiting)    • PVC (premature ventricular contraction)    • Reactive depression     last assessed 12/15/15       Past Surgical History:   Procedure Laterality Date   • ENDOMETRIAL ABLATION  2012   • EPIDURAL BLOCK INJECTION Right 01/07/2020    Procedure: Right L4-5 transforaminal epidural steroid injection 47830;  Surgeon: Shelley Vuong MD;  [facility]: [facility];  Service: Pain Management    • EPIDURAL BLOCK INJECTION Right 05/19/2020    Procedure: Right L4-L5 Transforaminal Epidural Steroid Injection (94449);   Surgeon: Shelley Vuong MD;  Location: [facility];  Service: Pain Management    • FL GUIDED NEEDLE PLAC BX/ASP/INJ  01/07/2020   • FL GUIDED NEEDLE PLAC BX/ASP/INJ  05/19/2020   • FL GUIDED NEEDLE PLAC BX/ASP/INJ  08/28/2020 • JOINT REPLACEMENT Right     Hip   • LISFRANC ARTHRODESIS Left 11/17/2022    Procedure: FUSION LIS FRANC L 1,2 TMTJ fusion with intercuneiform ;  Surgeon: Lelo Multani DPM;  Location: [facility];  Service: Podiatry   • NERVE BLOCK Left 5/18/2023    Procedure: Left Lumbar Sympathetic Nerve Block;  Surgeon: Emmanuelle German MD;  [facility]: [facility];  Service: Pain Management    • WY ARTHRP INTERPOS INTERCARPAL/METACARPAL JOINTS Left 07/30/2018    Procedure:  Thumb Arthrex mini tight rope suspension arthroplasty with trapezial resection left thumb;  Surgeon: Jaimie Jennings MD;  Location: [facility];  Service: Orthopedics   • WY INJECT SI JOINT ARTHRGRPHY&/ANES/STEROID W/ALESIA Right 08/28/2020    Procedure: Sacroiliac joint injection and piriformis muscle steroid Injection;  Surgeon: Emmanuelle German MD;  [facility]: [facility];  Service: Pain Management    • WY OPEN TREATMENT TARSOMETATARSAL JOINT DISLOCATION Left 8/11/2022    Procedure: OPEN REDUCTION W/ INTERNAL FIXATION (ORIF) FOOT, ORIF of the left lis franc ligament;  Surgeon: Lelo Multani DPM;  Location: [facility];  Service: Podiatry   • THYROID SURGERY      near total thyroidectomy       Family History   Problem Relation Age of Onset   • Arthritis Mother    • Heart disease Mother         CAD   • Arthritis Father    • Lymphoma Father         Hodgkin's   • Breast cancer Sister 77   • No Known Problems Sister    • No Known Problems Daughter    • No Known Problems Daughter    • No Known Problems Maternal Grandmother    • No Known Problems Maternal Grandfather    • No Known Problems Paternal Grandmother    • No Known Problems Paternal Grandfather    • No Known Problems Son    • No Known Problems Maternal Aunt    • No Known Problems Paternal Aunt        Social History     Occupational History   • Not on file   Tobacco Use   • Smoking status: Never   • Smokeless tobacco: Never   • Tobacco comments:     only smoked as a teenager "  Vaping Use   • Vaping Use: Never used   Substance and Sexual Activity   • Alcohol use: Yes     Comment: rare   • Drug use: Never     Comment: CBD   • Sexual activity: Yes     Partners: Male         Current Outpatient Medications:   •  Ascorbic Acid (VITAMIN C) 250 MG CHEW, Chew 1 tablet daily, Disp: , Rfl:   •  atorvastatin (LIPITOR) 10 mg tablet, Take 1 tablet (10 mg total) by mouth daily, Disp: 90 tablet, Rfl: 3  •  Calcium 600 MG tablet, Take 2 tablets by mouth daily, Disp: , Rfl:   •  celecoxib (CeleBREX) 200 mg capsule, Take 200 mg by mouth 2 (two) times daily after meals, Disp: , Rfl:   •  Cholecalciferol (VITAMIN D3) 1000 units CAPS, Take by mouth daily after breakfast, Disp: , Rfl:   •  fluticasone (FLONASE) 50 mcg/act nasal spray, 1 spray into each nostril in the morning  (Patient taking differently: 1 spray into each nostril as needed), Disp: 16 g, Rfl: 0  •  hydrochlorothiazide (HYDRODIURIL) 12 5 mg tablet, Take 1 tablet (12 5 mg total) by mouth every morning, Disp: 90 tablet, Rfl: 1  •  losartan (COZAAR) 25 mg tablet, Take 0 5 tablets (12 5 mg total) by mouth daily, Disp: 45 tablet, Rfl: 3  •  metoprolol tartrate (LOPRESSOR) 25 mg tablet, Take 1 tablet (25 mg total) by mouth every 12 (twelve) hours, Disp: 180 tablet, Rfl: 3  •  Multiple Vitamin (DAILY VALUE MULTIVITAMIN PO), Take 1 tablet by mouth daily, Disp: , Rfl:   •  Multiple Vitamins-Minerals (PRESERVISION AREDS 2 PO), Take by mouth daily, Disp: , Rfl:   •  nystatin (MYCOSTATIN) powder, Apply topically as needed, Disp: , Rfl:   •  Omega-3 Fatty Acids (Fish Oil) 1200 MG CAPS, Take 1 capsule by mouth daily, Disp: , Rfl:     No Known Allergies    Physical Exam:    /94 (BP Location: Right arm, Patient Position: Sitting, Cuff Size: Large)   Pulse 72   Resp 16   Ht 5' 4\" (1 626 m)   Wt 81 2 kg (179 lb)   BMI 30 73 kg/m²     Constitutional:normal, well developed, well nourished, alert, in no distress and non-toxic and no overt pain behavior   " and overweight  Eyes:anicteric  HEENT:grossly intact  Neck:supple, symmetric, trachea midline and no masses   Pulmonary:even and unlabored  Cardiovascular:No edema or pitting edema present  Skin:Normal without rashes or lesions and well hydrated  Psychiatric:Mood and affect appropriate  Neurologic:Cranial Nerves II-XII grossly intact  Musculoskeletal:antalgic    Imaging      Study Result    Narrative & Impression   CT left foot and ankle without IV contrast     INDICATION: E01 328: Pain in left foot  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  Dr Soriano Mon note from 4/25/2023 was reviewed  Patient had left foot injury in September 2021  MR in 2022 demonstrated likely Lisfranc injury  Patient subsequently had two left foot surgeries, second surgery in   November 2022, with fusion of the first and second tarsometatarsal joints, and intercuneiform joints      Patient has had tingling, burning, constant ache in the entire foot and ankle      This CT was performed to evaluate for status of the surgical fusions      COMPARISON: Bone scan from 4/11/2023  Multiple left foot plain films, most recent 4/11/2023  Left foot MR 4/8/2022      TECHNIQUE: CT examination of the above was performed  This examination, like all CT scans performed in the [facility], was performed utilizing techniques to minimize radiation dose exposure, including the use of iterative reconstruction   and automated exposure control software  Multiplanar 2D reformatted images were created from the source data      Rad dose  632 37 mGy-cm     FINDINGS:     OSSEOUS STRUCTURES: Patient is status post surgical fusion at the first tarsometatarsal joint, second tarsometatarsal joint, and the medial-middle intercuneiform joint      There is a screw traversing the base of the first metatarsal and medial cuneiform  There is another screw traversing the base of the second metatarsal and the middle cuneiform   There is another screw traversing the base of the first metatarsal and the   middle cuneiform  There is another screw traversing the medial and middle cuneiforms  All of the screws are intact without evidence of loosening      There is 33% osseous fusion of the first tarsometatarsal joint, involving its dorsal lateral aspect (series 401 images 33-41 )     There is solid osseous fusion across the entire second tarsometatarsal joint  (Series 401 images 28-34 )     There is only minimal (approximately 10%) osseous fusion across the medial-middle intercuneiform joint (series 2 images 63-76 )     Moderate third tarsometatarsal joint osteoarthritis  (Series 401 image 23 ) This is evidenced by joint space narrowing and marginal osteophytes      VISUALIZED MUSCULATURE:  Unremarkable      SOFT TISSUES:  Unremarkable      OTHER PERTINENT FINDINGS:  None      IMPRESSION:     Patient is status post surgical fusion at the first tarsometatarsal joint, second tarsometatarsal joint, and the medial-middle intercuneiform joint  All of the hardware is intact without evidence of loosening      There is 33% osseous fusion of the first tarsometatarsal joint, involving its dorsal lateral aspect (series 401 images 33-41 )     There is solid osseous fusion across the entire second tarsometatarsal joint  (Series 401 images 28-34 )     There is only minimal (approximately 10%) osseous fusion across the medial-middle intercuneiform joint (series 2 images 63-76 )     Moderate third tarsometatarsal joint osteoarthritis  (Series 401 image 23 )     Workstation performed: [id number]         No orders to display       No orders of the defined types were placed in this encounter

## 2023-06-19 NOTE — TELEPHONE ENCOUNTER
Patient is being referred to a orthopedics  Please schedule accordingly      [facility] [address]   [phone]

## 2023-06-19 NOTE — LETTER
June 21, 2023     Patient: Jose Guadalupe Diaz  YOB: 1960  Date of Visit: 6/19/2023      To Whom it May Concern:    Aurora Kennedy is under my professional care  Gabe Vasquez was seen in my office on 6/19/2023  Gabe Vasquez is to be out of work until further notice  If you have any questions or concerns, please don't hesitate to call           Sincerely,          Bucky Saleh DPM

## 2023-06-21 LAB
6MAM UR QL CFM: NEGATIVE NG/ML
7AMINOCLONAZEPAM UR QL CFM: NEGATIVE NG/ML
A-OH ALPRAZ UR QL CFM: NEGATIVE NG/ML
ACCEPTABLE CREAT UR QL: NORMAL MG/DL
ACCEPTIBLE SP GR UR QL: NORMAL
AMITRIP UR QL CFM: NEGATIVE NG/ML
AMPHET UR QL CFM: NEGATIVE NG/ML
AMPHET UR QL CFM: NEGATIVE NG/ML
BUPRENORPHINE UR QL CFM: NEGATIVE NG/ML
BZE UR QL CFM: NEGATIVE NG/ML
DESIPRAMINE UR QL CFM: NEGATIVE NG/ML
EDDP UR QL CFM: NEGATIVE NG/ML
ETHYL GLUCURONIDE UR QL CFM: NEGATIVE NG/ML
ETHYL SULFATE UR QL SCN: NEGATIVE NG/ML
FENTANYL UR QL CFM: NEGATIVE NG/ML
FLUOXETINE UR QL CFM: NEGATIVE NG/ML
GLIADIN IGG SER IA-ACNC: NEGATIVE NG/ML
GLUCOSE 30M P 50 G LAC PO SERPL-MCNC: NEGATIVE NG/ML
HYDROCODONE UR QL CFM: NEGATIVE NG/ML
HYDROMORPHONE UR QL CFM: NEGATIVE NG/ML
LORAZEPAM UR QL CFM: NEGATIVE NG/ML
MDMA UR QL CFM: NEGATIVE NG/ML
ME-PHENIDATE UR QL CFM: NEGATIVE NG/ML
MEPERIDINE UR QL CFM: NEGATIVE NG/ML
METHADONE UR QL CFM: NEGATIVE NG/ML
METHAMPHET UR QL CFM: NEGATIVE NG/ML
MORPHINE UR QL CFM: NEGATIVE NG/ML
NITRITE UR QL: NORMAL UG/ML
NORBUPRENORPHINE UR QL CFM: NEGATIVE NG/ML
NORDIAZEPAM UR QL CFM: NEGATIVE NG/ML
NORFENTANYL UR QL CFM: NEGATIVE NG/ML
NORFLUOXETINE UR QL CFM: NEGATIVE NG/ML
NORHYDROCODONE UR QL CFM: NEGATIVE NG/ML
NORMEPERIDINE UR QL CFM: NEGATIVE NG/ML
NOROXYCODONE UR QL CFM: NEGATIVE NG/ML
NORTRIP UR QL CFM: NEGATIVE NG/ML
OLANZAPINE QUANTIFICATION: NEGATIVE NG/ML
OPC-3373 QUANTIFICATION: NEGATIVE
OXAZEPAM UR QL CFM: NEGATIVE NG/ML
OXYCODONE UR QL CFM: NEGATIVE NG/ML
OXYMORPHONE UR QL CFM: NEGATIVE NG/ML
OXYMORPHONE UR QL CFM: NEGATIVE NG/ML
PARA-FLUOROFENTANYL QUANTIFICATION: NORMAL NG/ML
PROLACTIN SERPL-MCNC: NEGATIVE NG/ML
RESULT ALL_PRESCRIBED MEDS AND SPECIAL INSTRUCTIONS: NORMAL
SECOBARBITAL UR QL CFM: NEGATIVE NG/ML
SL AMB 4-ANPP QUANTIFICATION: NORMAL NG/ML
SL AMB 7-OH-MITRAGYNINE (KRATOM ALKALOID) QUANTIFICATION: NEGATIVE NG/ML
SL AMB ACETYL FENTANYL QUANTIFICATION: NORMAL NG/ML
SL AMB ACETYL NORFENTANYL QUANTIFICATION: NORMAL NG/ML
SL AMB ACRYL FENTANYL QUANTIFICATION: NORMAL NG/ML
SL AMB CARFENTANIL QUANTIFICATION: NORMAL NG/ML
SL AMB CITALOPRAM/ESCITALOPRAM QUANTIFICATION: NEGATIVE NG/ML
SL AMB CITALOPRAM/ESCITALOPRAM QUANTIFICATION: NEGATIVE NG/ML
SL AMB CLOZAPINE QUANTIFICATION: NEGATIVE NG/ML
SL AMB CTHC (MARIJUANA METABOLITE) QUANTIFICATION: NEGATIVE NG/ML
SL AMB DEXTRORPHAN (DEXTROMETHORPHAN METABOLITE) QUANT: NEGATIVE NG/ML
SL AMB HALOPERIDOL  QUANTIFICATION: NEGATIVE NG/ML
SL AMB HALOPERIDOL METABOLITE QUANTIFICATION: NEGATIVE NG/ML
SL AMB HYDROXYBUPROPION QUANTIFICATION: NEGATIVE NG/ML
SL AMB HYDROXYRISPERIDONE QUANTIFICATION: NEGATIVE NG/ML
SL AMB N-DESMETHYL-TRAMADOL QUANTIFICATION: NEGATIVE NG/ML
SL AMB N-DESMETHYLCLOZAPINE QUANTIFICATION: NEGATIVE NG/ML
SL AMB NORQUETIAPINE QUANTIFICATION: NEGATIVE NG/ML
SL AMB PHENTERMINE QUANTIFICATION: NEGATIVE NG/ML
SL AMB PREGABALIN QUANTIFICATION: NEGATIVE
SL AMB QUETIAPINE QUANTIFICATION: NEGATIVE NG/ML
SL AMB RISPERIDONE QUANTIFICATION: NEGATIVE NG/ML
SL AMB RITALINIC ACID QUANTIFICATION: NEGATIVE NG/ML
SPECIMEN PH ACCEPTABLE UR: NORMAL
TAPENTADOL UR QL CFM: NEGATIVE NG/ML
TEMAZEPAM UR QL CFM: NEGATIVE NG/ML
TEMAZEPAM UR QL CFM: NEGATIVE NG/ML
TRAMADOL UR QL CFM: NEGATIVE NG/ML
URATE/CREAT 24H UR: NEGATIVE NG/ML

## 2023-06-26 ENCOUNTER — OFFICE VISIT (OUTPATIENT)
Dept: CARDIOLOGY CLINIC | Facility: CLINIC | Age: 63
End: 2023-06-26
Payer: COMMERCIAL

## 2023-06-26 VITALS
WEIGHT: 174 LBS | RESPIRATION RATE: 18 BRPM | HEART RATE: 78 BPM | OXYGEN SATURATION: 97 % | SYSTOLIC BLOOD PRESSURE: 132 MMHG | DIASTOLIC BLOOD PRESSURE: 78 MMHG | BODY MASS INDEX: 29.71 KG/M2 | HEIGHT: 64 IN

## 2023-06-26 DIAGNOSIS — E66.3 OVERWEIGHT (BMI 25.0-29.9): ICD-10-CM

## 2023-06-26 DIAGNOSIS — I10 PRIMARY HYPERTENSION: ICD-10-CM

## 2023-06-26 DIAGNOSIS — E78.5 HYPERLIPIDEMIA, UNSPECIFIED HYPERLIPIDEMIA TYPE: ICD-10-CM

## 2023-06-26 DIAGNOSIS — I49.3 FREQUENT PVCS: Primary | ICD-10-CM

## 2023-06-26 PROCEDURE — 99214 OFFICE O/P EST MOD 30 MIN: CPT | Performed by: INTERNAL MEDICINE

## 2023-06-26 NOTE — PROGRESS NOTES
Cardiology Follow up  Carol Vuong  [id number]  1960  [facility] Renetta Viraj  [facility], [facility] [address] [address]      1  Frequent PVCs  Echo complete w/ contrast if indicated      2  Primary hypertension        3  Hyperlipidemia, unspecified hyperlipidemia type        4  Overweight (BMI 25 0-29  9)            Discussion/Summary:  1  Frequent PVCs  2  Hypertension  3  Hyperlipidemia  4  Mitral regurgitation  5  Overweight    -Pharmacologic nuclear stress test 7/25/2022 showing no diagnostic evidence of ischemia on stress testing with frequent PVCs  -Transthoracic echocardiogram 6/30/2022 showing left ventricular systolic function normal estimated LVEF 32% with diastolic parameters normal for age with mild to moderate mitral regurgitation  -As patient notes blood pressures at home are well controlled can continue current medical therapy with losartan 12 5 mg daily, Toprol tartrate 25 mg twice daily, HCTZ 12 5 mg daily and atorvastatin 10 mg daily   -Lipid panel 5/6/2023 showing total cholesterol 177, triglyceride 162, HDL 60, LDL 79  -Patient counseled on dietary and lifestyle modifications including following a low-salt, low-fat, heart healthy diet with continued physical activity as she tolerates   -We will repeat transthoracic echocardiogram in 3 months prior to her next office visit to monitor overall function medical therapy in the setting of PVCs  -Holter monitor 2/9/2023 with frequent PVCs ventricular ectopic burden down to 12 2% from 24 1%   -We will continue to monitor at this time and will see patient in 3 months for follow-up or sooner if necessary  -Patient counseled if she were to have any warning or alarm type symptoms she is to seek emergency medical care immediately            History of Present Illness:  -Patient is a 63-year-old female with hypertension, bilateral chronic knee osteoarthritis, overweight and she initially presented to the office in June 2022 after being seen in emergency department in May 2022 for abnormal ECG after upper respiratory tract infection  She was found to have significant PVCs on monitoring and medical therapy was uptitrated with improvement in symptoms  She had undergone surgery in August for left foot issues and then unfortunately had to have redo surgery in November 2022 for ongoing symptoms and presents to the office today for scheduled follow-up   -Unfortunately again it appears that her left foot surgery did not completely heal patient continues to have significant excruciating ambulatory pain in the left foot and will be undergoing repeat evaluation on 7/12/2023 for potential alternatives and second opinion   -She denies any chest pain, palpitations, lightness dizziness, loss conscious, shortness of breath, lower extremity, orthopnea or bendopnea  She notes her blood pressures at home are very well controlled in the 634-991 mmHg systolic range and overall apart from pain in her left foot feels incredibly well  She notes the ambulatory pain in her left foot is so excruciating however it is limiting her quality of life  She is not able to go for long walks which she regularly enjoyed and overall has significant pain due to this continued issue      Patient Active Problem List   Diagnosis   • Hypertension   • Chronic bilateral low back pain with right-sided sciatica   • Primary osteoarthritis of both knees   • Primary osteoarthritis of first carpometacarpal joint of left hand   • Trigger middle finger of right hand   • CMC DJD(carpometacarpal degenerative joint disease), localized primary, left   • Chronic pain syndrome   • Lumbar disc herniation   • Lumbar radiculopathy   • Piriformis muscle pain     Past Medical History:   Diagnosis Date   • Arthritis    • GERD (gastroesophageal reflux disease)     rarely   • Hyperlipidemia    • Hypertension    • Intracranial aneurysm     resolved 11/17/17 - possible R P-comm region aneurysm measuring 3mm found on MRI results   • Irregular heart beat    • PONV (postoperative nausea and vomiting)    • PVC (premature ventricular contraction)    • Reactive depression     last assessed 12/15/15     Social History     Socioeconomic History   • Marital status: /Civil Union     Spouse name: Not on file   • Number of children: Not on file   • Years of education: Not on file   • Highest education level: Not on file   Occupational History   • Not on file   Tobacco Use   • Smoking status: Never   • Smokeless tobacco: Never   • Tobacco comments:     only smoked as a teenager   Vaping Use   • Vaping Use: Never used   Substance and Sexual Activity   • Alcohol use: Yes     Comment: rare   • Drug use: Never     Comment: CBD   • Sexual activity: Yes     Partners: Male   Other Topics Concern   • Not on file   Social History Narrative    No living will     Social Determinants of Health     Financial Resource Strain: Not on file   Food Insecurity: Not on file   Transportation Needs: Not on file   Physical Activity: Not on file   Stress: Not on file   Social Connections: Not on file   Intimate Partner Violence: Not on file   Housing Stability: Not on file      Family History   Problem Relation Age of Onset   • Arthritis Mother    • Heart disease Mother         CAD   • Arthritis Father    • Lymphoma Father         Hodgkin's   • Breast cancer Sister 77   • No Known Problems Sister    • No Known Problems Daughter    • No Known Problems Daughter    • No Known Problems Maternal Grandmother    • No Known Problems Maternal Grandfather    • No Known Problems Paternal Grandmother    • No Known Problems Paternal Grandfather    • No Known Problems Son    • No Known Problems Maternal Aunt    • No Known Problems Paternal Aunt      Past Surgical History:   Procedure Laterality Date   • ENDOMETRIAL ABLATION  2012   • EPIDURAL BLOCK INJECTION Right 01/07/2020    Procedure: Right L4-5 transforaminal epidural steroid injection 14091;  Surgeon: Eddie Ziegler MD;  Location: [facility];  Service: Pain Management    • EPIDURAL BLOCK INJECTION Right 05/19/2020    Procedure: Right L4-L5 Transforaminal Epidural Steroid Injection (97052); Surgeon: Eddie Ziegler MD;  Location: [facility];  Service: Pain Management    • FL GUIDED NEEDLE PLAC BX/ASP/INJ  01/07/2020   • FL GUIDED NEEDLE PLAC BX/ASP/INJ  05/19/2020   • FL GUIDED NEEDLE PLAC BX/ASP/INJ  08/28/2020   • JOINT REPLACEMENT Right     Hip   • LISFRANC ARTHRODESIS Left 11/17/2022    Procedure: FUSION LIS FRANC L 1,2 TMTJ fusion with intercuneiform ;  Surgeon: Josue Ortiz DPM;  Location: [facility];  Service: Podiatry   • NERVE BLOCK Left 5/18/2023    Procedure: Left Lumbar Sympathetic Nerve Block;  Surgeon: Eddie Ziegler MD;  Location: [facility];  Service: Pain Management    • AR ARTHRP INTERPOS INTERCARPAL/METACARPAL JOINTS Left 07/30/2018    Procedure:  Thumb Arthrex mini tight rope suspension arthroplasty with trapezial resection left thumb;  Surgeon: Sakshi Zepeda MD;  Location: [facility];  Service: Orthopedics   • AR INJECT SI JOINT ARTHRGRPHY&/ANES/STEROID W/ALESIA Right 08/28/2020    Procedure: Sacroiliac joint injection and piriformis muscle steroid Injection;  Surgeon: Eddie Ziegler MD;  [facility]: [facility];  Service: Pain Management    • AR OPEN TREATMENT TARSOMETATARSAL JOINT DISLOCATION Left 8/11/2022    Procedure: OPEN REDUCTION W/ INTERNAL FIXATION (ORIF) FOOT, ORIF of the left lis franc ligament;  Surgeon: Josue Ortiz DPM;  Location: [facility];  Service: Podiatry   • THYROID SURGERY      near total thyroidectomy       Current Outpatient Medications:   •  Ascorbic Acid (VITAMIN C) 250 MG CHEW, Chew 1 tablet daily, Disp: , Rfl:   •  atorvastatin (LIPITOR) 10 mg tablet, Take 1 tablet (10 mg total) by mouth daily, Disp: 90 tablet, Rfl: 3  •  Calcium 600 MG tablet, Take 2 tablets by mouth daily, Disp: , Rfl:   •  celecoxib (CeleBREX) 200 mg capsule, Take 200 mg by mouth 2 (two) times daily after meals, Disp: , Rfl:   •  Cholecalciferol (VITAMIN D3) 1000 units CAPS, Take by mouth daily after breakfast, Disp: , Rfl:   •  fluticasone (FLONASE) 50 mcg/act nasal spray, 1 spray into each nostril in the morning   (Patient taking differently: 1 spray into each nostril as needed), Disp: 16 g, Rfl: 0  •  hydrochlorothiazide (HYDRODIURIL) 12 5 mg tablet, Take 1 tablet (12 5 mg total) by mouth every morning, Disp: 90 tablet, Rfl: 1  •  losartan (COZAAR) 25 mg tablet, Take 0 5 tablets (12 5 mg total) by mouth daily, Disp: 45 tablet, Rfl: 3  •  metoprolol tartrate (LOPRESSOR) 25 mg tablet, Take 1 tablet (25 mg total) by mouth every 12 (twelve) hours, Disp: 180 tablet, Rfl: 3  •  Multiple Vitamin (DAILY VALUE MULTIVITAMIN PO), Take 1 tablet by mouth daily, Disp: , Rfl:   •  Multiple Vitamins-Minerals (PRESERVISION AREDS 2 PO), Take by mouth daily, Disp: , Rfl:   •  nystatin (MYCOSTATIN) powder, Apply topically as needed, Disp: , Rfl:   •  Omega-3 Fatty Acids (Fish Oil) 1200 MG CAPS, Take 1 capsule by mouth daily, Disp: , Rfl:   No Known Allergies      Labs:  Office Visit on 06/19/2023   Component Date Value   • RESULT ALL_PRES MEDS SP* 58/24/2292 Not Applicable    • Alpha-Hydroxyalprazolam * 06/19/2023 negative    • Amitriptyline Quantifica* 06/19/2023 negative    • Nortriptyline Quantifica* 06/19/2023 negative    • Amphetamine Quantificati* 06/19/2023 negative    • Aripiprazole Quantificat* 06/19/2023 negative    • [id number] QUANTIFICATION 06/19/2023 negative    • Buprenorphine Quantifica* 06/19/2023 negative    • Norbuprenorphine Quantif* 06/19/2023 negative    • Hydroxybupropion Quantif* 06/19/2023 negative    • Citalopram/Escitalopram * 06/19/2023 negative    • CITALOPRAM/ESCITALOPRAM * 06/19/2023 negative    • 7-Amino-Clonazepam Quant* 06/19/2023 negative    • Clozapine Quantification 06/19/2023 negative    • N-desmethylclozapine Jae* 06/19/2023 negative    • Cocaine metabolite Quant* 06/19/2023 negative    • Desipramine Quantificati* 06/19/2023 negative    • Nordiazepam Quantificati* 06/19/2023 negative    • Temazepam Quantification 06/19/2023 negative    • Oxazepam Quantification 06/19/2023 negative    • Ethyl Glucuronide Quanti* 06/19/2023 negative    • Ethyl Sulfate Quantifica* 06/19/2023 negative    • Fentanyl Quantification 06/19/2023 negative    • Norfentanyl Quantificati* 06/19/2023 negative    • 4-ANPP Quantification 06/19/2023 Fen Neg    • Acetyl fentanyl Quantifi* 06/19/2023 Fen Neg    • Acetyl norfentanyl Quant* 06/19/2023 Fen Neg    • Acryl fentanyl Quantific* 06/19/2023 Fen Neg    • Carfentanil Quantificati* 06/19/2023 Fen Neg    • Para-fluorofentanyl Giles* 06/19/2023 Fen Neg    • Fluoxetine Quantification 06/19/2023 negative    • Norfluoxetine Quantifica* 06/19/2023 negative    • Haloperidol  Quantificat* 06/19/2023 negative    • Haloperidol metabolite Q* 06/19/2023 negative    • 6-MEKA (Heroin metabolite* 06/19/2023 negative    • Hydrocodone Quantificati* 06/19/2023 negative    • Norhydrocodone Quantific* 06/19/2023 negative    • Hydromorphone Quantifica* 06/19/2023 negative    • Hydromorphone Quantifica* 06/19/2023 negative    • Mitragynine (Kratom kevin* 06/19/2023 negative    • 7-WW-Aonyziteupo (Kratom* 06/19/2023 negative    • Dextrorphan (Dextrometho* 06/19/2023 negative    • Lorazepam Quantification 06/19/2023 negative    • MDMA Quantification 06/19/2023 negative    • Meperidine Quantification 06/19/2023 negative    • Normeperidine Quantifica* 06/19/2023 negative    • Methadone Quantification 06/19/2023 negative    • EDDP (Methadone metaboli* 06/19/2023 negative    • Amphetamine Quantificati* 06/19/2023 negative    • Methamphetamine Quantifi* 06/19/2023 negative    • Methylphenidate Quantifi* 06/19/2023 negative    • RITALINIC ACID QUANTIFIC* 06/19/2023 negative    • Morphine Quantification 06/19/2023 negative    • Hydromorphone Quantifica* 06/19/2023 negative    • OLANZAPINE QUANTIFICATION 06/19/2023 negative    • Oxazepam Quantification 06/19/2023 negative    • Oxycodone Quantification 06/19/2023 negative    • Noroxycodone Quantificat* 06/19/2023 negative    • Oxymorphone Quantificati* 06/19/2023 negative    • Oxymorphone Quantificati* 06/19/2023 negative    • Paroxetine Quantification 06/19/2023 negative    • PREGABALIN QUANTIFICATION 06/19/2023 negative    • PHENTERMINE QUANTIFICATI* 06/19/2023 negative    • QUETIAPINE QUANTIFICATION 06/19/2023 negative    • NORQUETIAPINE QUANTIFICA* 06/19/2023 negative    • Risperidone Quantificati* 06/19/2023 negative    • Hydroxyrisperidone Quant* 06/19/2023 negative    • Secobarbital Quantificat* 06/19/2023 negative    • Tapentadol Quantification 06/19/2023 negative    • Temazepam Quantification 06/19/2023 negative    • Oxazepam Quantification 06/19/2023 negative    • cTHC (Marijuana metaboli* 06/19/2023 negative    • Tramadol Quantification 06/19/2023 negative    • O-desmethyl-tramadol Jae* 06/19/2023 negative    • N-DESMETHYL-TRAMADOL JAE* 06/19/2023 negative    • CREATININE 06/19/2023 normal-48 7    • OXIDANT 06/19/2023 normal-0    • pH 06/19/2023 normal-5 8    • SPECIFIC GRAVITY URINE 06/19/2023 normal-1 008    Appointment on 05/06/2023   Component Date Value   • Cholesterol 05/06/2023 171    • Triglycerides 05/06/2023 162 (H)    • HDL, Direct 05/06/2023 60    • LDL Calculated 05/06/2023 79         Imaging: No results found  Review of Systems:  Review of Systems   Constitutional: Negative for chills, diaphoresis, fatigue and fever  HENT: Negative for trouble swallowing and voice change  Eyes: Negative for pain and redness  Respiratory: Negative for shortness of breath and wheezing  Cardiovascular: Negative for chest pain, palpitations and leg swelling     Gastrointestinal: Negative for "abdominal pain, constipation, diarrhea, nausea and vomiting  Genitourinary: Negative for dysuria  Musculoskeletal: Positive for arthralgias  Negative for neck pain and neck stiffness  Skin: Negative for rash  Neurological: Negative for dizziness, syncope, light-headedness and headaches  Psychiatric/Behavioral: Negative for agitation and confusion  All other systems reviewed and are negative  Vitals:    06/26/23 0836   BP: 132/78   BP Location: Left arm   Patient Position: Sitting   Cuff Size: Standard   Pulse: 78   Resp: 18   SpO2: 97%   Weight: 78 9 kg (174 lb)   Height: 5' 4\" (1 626 m)     Vitals:    06/26/23 0836   Weight: 78 9 kg (174 lb)     Height: 5' 4\" (162 6 cm)     Physical Exam:  Physical Exam  Vitals reviewed  Constitutional:       General: She is not in acute distress  Appearance: Normal appearance  She is not diaphoretic  HENT:      Head: Normocephalic and atraumatic  Eyes:      General:         Right eye: No discharge  Left eye: No discharge  Neck:      Comments: Trachea midline, no JVD present  Cardiovascular:      Rate and Rhythm: Normal rate and regular rhythm  Heart sounds: No friction rub  Pulmonary:      Effort: Pulmonary effort is normal  No respiratory distress  Breath sounds: No wheezing  Chest:      Chest wall: No tenderness  Abdominal:      General: Bowel sounds are normal       Palpations: Abdomen is soft  Tenderness: There is no abdominal tenderness  There is no rebound  Musculoskeletal:      Right lower leg: No edema  Left lower leg: No edema  Skin:     General: Skin is warm and dry  Neurological:      Mental Status: She is alert  Comments: Awake, alert, able to answer questions appropriately, able to move extremities bilaterally     Psychiatric:         Mood and Affect: Mood normal          Behavior: Behavior normal        "

## 2023-07-19 ENCOUNTER — VBI (OUTPATIENT)
Dept: ADMINISTRATIVE | Facility: OTHER | Age: 63
End: 2023-07-19

## 2023-09-28 ENCOUNTER — HOSPITAL ENCOUNTER (OUTPATIENT)
Dept: NON INVASIVE DIAGNOSTICS | Facility: CLINIC | Age: 63
Discharge: HOME | End: 2023-09-28
Payer: COMMERCIAL

## 2023-09-28 VITALS
DIASTOLIC BLOOD PRESSURE: 70 MMHG | BODY MASS INDEX: 29.71 KG/M2 | HEIGHT: 64 IN | SYSTOLIC BLOOD PRESSURE: 120 MMHG | WEIGHT: 174 LBS | HEART RATE: 78 BPM

## 2023-09-28 DIAGNOSIS — I49.3 FREQUENT PVCS: ICD-10-CM

## 2023-09-28 LAB
AORTIC ROOT: 2.1 CM
APICAL FOUR CHAMBER EJECTION FRACTION: 67 %
E WAVE DECELERATION TIME: 184 MS
FRACTIONAL SHORTENING: 38 % (ref 28–44)
INTERVENTRICULAR SEPTUM IN DIASTOLE (PARASTERNAL SHORT AXIS VIEW): 0.9 CM
INTERVENTRICULAR SEPTUM: 0.9 CM (ref 0.6–1.1)
LAAS-AP2: 13.1 CM2
LAAS-AP4: 11.1 CM2
LEFT ATRIUM SIZE: 4.5 CM
LEFT ATRIUM VOLUME (MOD BIPLANE): 29 ML
LEFT INTERNAL DIMENSION IN SYSTOLE: 3.3 CM (ref 2.1–4)
LEFT VENTRICULAR INTERNAL DIMENSION IN DIASTOLE: 5.3 CM (ref 3.5–6)
LEFT VENTRICULAR POSTERIOR WALL IN END DIASTOLE: 0.8 CM
LEFT VENTRICULAR STROKE VOLUME: 91 ML
LVSV (TEICH): 91 ML
MITRAL REGURGITATION PEAK VELOCITY: 6.35 M/S
MITRAL VALVE MEAN INFLOW VELOCITY: 4.89 M/S
MITRAL VALVE REGURGITANT PEAK GRADIENT: 161 MMHG
MV PEAK A VEL: 0.79 M/S
MV PEAK E VEL: 81 CM/S
RIGHT ATRIUM AREA SYSTOLE A4C: 5.8 CM2
RIGHT VENTRICLE ID DIMENSION: 1.9 CM
SL CV DOP CALC MV VTI RETROGRADE: 223.2 CM
SL CV LEFT ATRIUM LENGTH A2C: 4.8 CM
SL CV LV EF: 65
SL CV MV MEAN GRADIENT RETROGRADE: 104 MMHG
SL CV PED ECHO LEFT VENTRICLE DIASTOLIC VOLUME (MOD BIPLANE) 2D: 135 ML
SL CV PED ECHO LEFT VENTRICLE SYSTOLIC VOLUME (MOD BIPLANE) 2D: 44 ML
TRICUSPID ANNULAR PLANE SYSTOLIC EXCURSION: 1.7 CM

## 2023-09-28 PROCEDURE — 93306 TTE W/DOPPLER COMPLETE: CPT | Performed by: INTERNAL MEDICINE

## 2023-09-28 PROCEDURE — 93306 TTE W/DOPPLER COMPLETE: CPT

## 2023-10-02 DIAGNOSIS — I10 HYPERTENSION, UNSPECIFIED TYPE: ICD-10-CM

## 2023-10-02 RX ORDER — HYDROCHLOROTHIAZIDE 12.5 MG/1
12.5 TABLET ORAL EVERY MORNING
Qty: 90 TABLET | Refills: 3 | Status: SHIPPED | OUTPATIENT
Start: 2023-10-02

## 2023-10-20 ENCOUNTER — OFFICE VISIT (OUTPATIENT)
Dept: CARDIOLOGY CLINIC | Facility: CLINIC | Age: 63
End: 2023-10-20
Payer: COMMERCIAL

## 2023-10-20 VITALS
SYSTOLIC BLOOD PRESSURE: 126 MMHG | DIASTOLIC BLOOD PRESSURE: 74 MMHG | RESPIRATION RATE: 18 BRPM | HEIGHT: 64 IN | WEIGHT: 175 LBS | BODY MASS INDEX: 29.88 KG/M2 | HEART RATE: 72 BPM | OXYGEN SATURATION: 98 %

## 2023-10-20 DIAGNOSIS — I49.3 FREQUENT PVCS: Primary | ICD-10-CM

## 2023-10-20 DIAGNOSIS — E66.09 CLASS 1 OBESITY DUE TO EXCESS CALORIES WITH BODY MASS INDEX (BMI) OF 30.0 TO 30.9 IN ADULT, UNSPECIFIED WHETHER SERIOUS COMORBIDITY PRESENT: ICD-10-CM

## 2023-10-20 DIAGNOSIS — I49.9 CARDIAC ARRHYTHMIA, UNSPECIFIED CARDIAC ARRHYTHMIA TYPE: ICD-10-CM

## 2023-10-20 DIAGNOSIS — I10 PRIMARY HYPERTENSION: ICD-10-CM

## 2023-10-20 DIAGNOSIS — I34.0 MITRAL VALVE INSUFFICIENCY, UNSPECIFIED ETIOLOGY: ICD-10-CM

## 2023-10-20 PROCEDURE — 93000 ELECTROCARDIOGRAM COMPLETE: CPT | Performed by: INTERNAL MEDICINE

## 2023-10-20 PROCEDURE — 99214 OFFICE O/P EST MOD 30 MIN: CPT | Performed by: INTERNAL MEDICINE

## 2023-10-20 NOTE — PROGRESS NOTES
Cardiology Follow up     Los Neil  [id number]  1960  [facility] Maricarmen Faye  [facility], [facility] [id number]      1. Frequent PVCs  POCT ECG      2. Cardiac arrhythmia, unspecified cardiac arrhythmia type  POCT ECG      3. Primary hypertension  POCT ECG      4. Mitral valve insufficiency, unspecified etiology        5. Class 1 obesity due to excess calories with body mass index (BMI) of 30.0 to 30.9 in adult, unspecified whether serious comorbidity present            Discussion/Summary:  1. Frequent PVCs  2. Hypertension  3. Hyperlipidemia  4. Mitral regurgitation  5.   Obesity      -ECG in office today shows sinus rhythm with isolated PVC and nonspecific ST abnormalities with  LVH  -Pharmacologic nuclear stress test 7/25/2022 showing no diagnostic evidence of ischemia on stress testing with frequent PVCs  -Transthoracic echocardiogram 9/20/2023 showing left-ventricular systolic function normal submitted LVEF 60% with normal right ventricular systolic function, mildly dilated left atrium, systolic bowing of the posterior leaflet of mitral valve with mild-moderate mitral regurgitation.  -Holter monitor 2/9/2023 showing predominantly sinus rhythm average heart rate 76 bpm with frequent ventricular ectopic activity accounting for 12.2% of total monitored beats with no sustained ventricular tachycardia present.  -As patient notes blood pressures at home are well controlled we will continue atorvastatin 10 mg daily, hydrochlorothiazide 12.5 mg daily, losartan 12.5 mg daily, metoprolol tartrate 25 mg twice daily  -Patient counseled on dietary and lifestyle modifications including following a low-salt, low-fat, heart healthy diet with physical activity as tolerated and weight reduction goal BMI less than 29  -Patient will be seen in 6 months or sooner if necessary  -Patient counseled if she were to have any warning or alarm type symptoms she is to seek emergency medical care immediately. History of Present Illness:  -Patient is a 61-year-old female with hypertension, bilateral chronic knee osteoarthritis, overweight, who initially presented to the office in June 2022 after being seen in emergency department in May 2022 for abnormal ECG after upper respiratory tract infection. Patient was found to have significant PVCs on monitoring and medical therapy was uptitrated with improvement in symptoms. She had undergone surgery in August 2022 for left foot issues and then unfortunately had to have redo surgery in November 2022 as well and presents to office today for scheduled follow-up.  -Patient denies any chest pain, palpitations, lightness or dizziness, loss of consciousness, shortness of breath, lower extremity edema, orthopnea or bendopnea. She notes blood pressures at home are well controlled and has no significant complaints apart from her continued left foot issues.     Patient Active Problem List   Diagnosis    Hypertension    Chronic bilateral low back pain with right-sided sciatica    Primary osteoarthritis of both knees    Primary osteoarthritis of first carpometacarpal joint of left hand    Trigger middle finger of right hand    CMC DJD(carpometacarpal degenerative joint disease), localized primary, left    Chronic pain syndrome    Lumbar disc herniation    Lumbar radiculopathy    Piriformis muscle pain     Past Medical History:   Diagnosis Date    Arthritis     GERD (gastroesophageal reflux disease)     rarely    Hyperlipidemia     Hypertension     Intracranial aneurysm     resolved 11/17/17 - possible R P-comm region aneurysm measuring 3mm found on MRI results    Irregular heart beat     PONV (postoperative nausea and vomiting)     PVC (premature ventricular contraction)     Reactive depression     last assessed 12/15/15     Social History     Socioeconomic History Marital status: /Civil Union     Spouse name: Not on file    Number of children: Not on file    Years of education: Not on file    Highest education level: Not on file   Occupational History    Not on file   Tobacco Use    Smoking status: Never    Smokeless tobacco: Never    Tobacco comments:     only smoked as a teenager   Vaping Use    Vaping Use: Never used   Substance and Sexual Activity    Alcohol use: Yes     Comment: rare    Drug use: Never     Comment: CBD    Sexual activity: Yes     Partners: Male   Other Topics Concern    Not on file   Social History Narrative    No living will     Social Determinants of Health     Financial Resource Strain: Not on file   Food Insecurity: Not on file   Transportation Needs: Not on file   Physical Activity: Not on file   Stress: Not on file   Social Connections: Not on file   Intimate Partner Violence: Not on file   Housing Stability: Not on file      Family History   Problem Relation Age of Onset    Arthritis Mother     Heart disease Mother         CAD    Arthritis Father     Lymphoma Father         Hodgkin's    Breast cancer Sister 77    No Known Problems Sister     No Known Problems Daughter     No Known Problems Daughter     No Known Problems Maternal Grandmother     No Known Problems Maternal Grandfather     No Known Problems Paternal Grandmother     No Known Problems Paternal Grandfather     No Known Problems Son     No Known Problems Maternal Aunt     No Known Problems Paternal Aunt      Past Surgical History:   Procedure Laterality Date    ENDOMETRIAL ABLATION  2012    EPIDURAL BLOCK INJECTION Right 01/07/2020    Procedure: Right L4-5 transforaminal epidural steroid injection [id number];  Surgeon: Kaur Benton MD;  Location: [facility];  Service: Pain Management     EPIDURAL BLOCK INJECTION Right 05/19/2020    Procedure: Right L4-L5 Transforaminal Epidural Steroid Injection (24744);   Surgeon: Kaur Benton MD;  Location: [facility];  Service: Pain Management     FL GUIDED NEEDLE PLAC BX/ASP/INJ  01/07/2020    FL GUIDED NEEDLE PLAC BX/ASP/INJ  05/19/2020    FL GUIDED NEEDLE PLAC BX/ASP/INJ  08/28/2020    JOINT REPLACEMENT Right     Hip    LISFRANC ARTHRODESIS Left 11/17/2022    Procedure: FUSION LIS FRANC L 1,2 TMTJ fusion with intercuneiform.;  Surgeon: Jenn Benites DPM;  Location: [facility];  Service: Podiatry    NERVE BLOCK Left 5/18/2023    Procedure: Left Lumbar Sympathetic Nerve Block;  Surgeon: Girma Camacho MD;  [facility]: [facility];  Service: Pain Management     MO ARTHRP INTERPOS INTERCARPAL/METACARPAL JOINTS Left 07/30/2018    Procedure:  Thumb Arthrex mini tight rope suspension arthroplasty with trapezial resection left thumb;  Surgeon: Steve Villa MD;  Location: [facility];  Service: Orthopedics    MO INJECT SI JOINT ARTHRGRPHY&/ANES/STEROID W/ALESIA Right 08/28/2020    Procedure: Sacroiliac joint injection and piriformis muscle steroid Injection;  Surgeon: Girma Camacho MD;  Location: [facility];  Service: Pain Management     MO OPEN TREATMENT TARSOMETATARSAL JOINT DISLOCATION Left 8/11/2022    Procedure: OPEN REDUCTION W/ INTERNAL FIXATION (ORIF) FOOT, ORIF of the left lis franc ligament;  Surgeon: Jenn Benites DPM;  Location: [facility];  Service: Podiatry    THYROID SURGERY      near total thyroidectomy       Current Outpatient Medications:     Ascorbic Acid (VITAMIN C) 250 MG CHEW, Chew 1 tablet daily, Disp: , Rfl:     atorvastatin (LIPITOR) 10 mg tablet, Take 1 tablet (10 mg total) by mouth daily, Disp: 90 tablet, Rfl: 3    Calcium 600 MG tablet, Take 2 tablets by mouth daily, Disp: , Rfl:     celecoxib (CeleBREX) 200 mg capsule, Take 200 mg by mouth 2 (two) times daily after meals, Disp: , Rfl:     Cholecalciferol (VITAMIN D3) 1000 units CAPS, Take by mouth daily after breakfast, Disp: , Rfl:     hydrochlorothiazide (HYDRODIURIL) 12.5 mg tablet, TAKE 1 TABLET EVERY MORNING, Disp: 90 tablet, Rfl: 3 losartan (COZAAR) 25 mg tablet, Take 0.5 tablets (12.5 mg total) by mouth daily, Disp: 45 tablet, Rfl: 3    metoprolol tartrate (LOPRESSOR) 25 mg tablet, Take 1 tablet (25 mg total) by mouth every 12 (twelve) hours, Disp: 180 tablet, Rfl: 3    Multiple Vitamin (DAILY VALUE MULTIVITAMIN PO), Take 1 tablet by mouth daily, Disp: , Rfl:     Multiple Vitamins-Minerals (PRESERVISION AREDS 2 PO), Take by mouth daily, Disp: , Rfl:     nystatin (MYCOSTATIN) powder, Apply topically as needed, Disp: , Rfl:     Omega-3 Fatty Acids (Fish Oil) 1200 MG CAPS, Take 1 capsule by mouth daily, Disp: , Rfl:     fluticasone (FLONASE) 50 mcg/act nasal spray, 1 spray into each nostril in the morning.  (Patient taking differently: 1 spray into each nostril as needed), Disp: 16 g, Rfl: 0  No Known Allergies      Labs:  Hospital Outpatient Visit on 09/28/2023   Component Date Value    MV mean gradient retrogr* 09/28/2023 104     LA size 09/28/2023 4.5     Mitral regurgitation pea* 09/28/2023 6.35     Mitral valve mean inflow* 09/28/2023 4.89     LVPWd 09/28/2023 0.80     Left Atrium Area-systoli* 09/28/2023 13.1     Left Atrium Area-systoli* 09/28/2023 11.1     Tricuspid annular plane * 09/28/2023 1.70     IVSd 09/28/2023 9.60     LV DIASTOLIC VOLUME (MOD* 42/91/5624 135     LEFT VENTRICLE SYSTOLIC * 47/58/1423 44     Left ventricular stroke * 09/28/2023 91.00     MV VTI RETROGRADE 09/28/2023 223.2     A4C EF 09/28/2023 67     LA length (A2C) 09/28/2023 4.80     LVIDd 09/28/2023 5.30     IVS 09/28/2023 0.9     LVIDS 09/28/2023 3.30     FS 09/28/2023 38     LA volume (BP) 09/28/2023 29     Ao root 09/28/2023 2.10     RVID d 09/28/2023 1.9     E wave deceleration time 09/28/2023 184     MV Peak E Art 09/28/2023 81     MV Peak A Art 09/28/2023 0.79     MR PG 09/28/2023 161     RAA A4C 09/28/2023 5.8     LVSV, 2D 09/28/2023 91     LV EF 09/28/2023 65         Imaging: Echo complete w/ contrast if indicated    Result Date: 9/28/2023  Narrative: Left Ventricle: Left ventricular cavity size is normal. Wall thickness is normal. The left ventricular ejection fraction is 65%. Systolic function is normal. Wall motion is normal.   Right Ventricle: Systolic function is normal. Normal tricuspid annular plane systolic excursion (TAPSE) > 1.7 cm. Left Atrium: The atrium is mildly dilated. Mitral Valve: There is mild thickening. The leaflets exhibit normal mobility. There is mild annular calcification. There is systolic bowing of the posterior leaflet. There is mild to moderate regurgitation. Pericardium: There is no pericardial effusion. Review of Systems:  Review of Systems   Constitutional:  Negative for chills, diaphoresis, fatigue and fever. HENT:  Negative for trouble swallowing and voice change. Respiratory:  Negative for shortness of breath and wheezing. Cardiovascular:  Negative for chest pain, palpitations and leg swelling. Gastrointestinal:  Negative for abdominal pain, constipation, diarrhea, nausea and vomiting. Genitourinary:  Negative for dysuria. Musculoskeletal:  Positive for arthralgias. Skin:  Negative for rash. Neurological:  Negative for dizziness, light-headedness and headaches. Psychiatric/Behavioral:  Negative for agitation and confusion. All other systems reviewed and are negative. Vitals:    10/20/23 1452   BP: 126/74   BP Location: Left arm   Patient Position: Sitting   Cuff Size: Standard   Pulse: 72   Resp: 18   SpO2: 98%   Weight: 79.4 kg (175 lb)   Height: 5' 4" (1.626 m)     Vitals:    10/20/23 1452   Weight: 79.4 kg (175 lb)     Height: 5' 4" (162.6 cm)     Physical Exam:  Physical Exam  Vitals reviewed. Constitutional:       General: She is not in acute distress. Appearance: She is obese. She is not diaphoretic. HENT:      Head: Normocephalic and atraumatic. Eyes:      General:         Right eye: No discharge. Left eye: No discharge.    Neck:      Comments: Trachea midline, no JVD present  Cardiovascular:      Rate and Rhythm: Normal rate and regular rhythm. Heart sounds: No friction rub. Pulmonary:      Effort: Pulmonary effort is normal. No respiratory distress. Breath sounds: No wheezing. Chest:      Chest wall: No tenderness. Abdominal:      General: Bowel sounds are normal.      Palpations: Abdomen is soft. Tenderness: There is no abdominal tenderness. There is no rebound. Musculoskeletal:      Right lower leg: No edema. Left lower leg: No edema. Skin:     General: Skin is warm and dry. Neurological:      Mental Status: She is alert. Comments: Awake, alert, able to answer questions appropriately, able to move extremities bilaterally.    Psychiatric:         Mood and Affect: Mood normal.         Behavior: Behavior normal.

## 2023-10-24 DIAGNOSIS — I10 HYPERTENSION, UNSPECIFIED TYPE: ICD-10-CM

## 2023-10-24 RX ORDER — HYDROCHLOROTHIAZIDE 12.5 MG/1
12.5 TABLET ORAL EVERY MORNING
Qty: 90 TABLET | Refills: 3 | Status: SHIPPED | OUTPATIENT
Start: 2023-10-24

## 2024-01-03 DIAGNOSIS — I10 PRIMARY HYPERTENSION: ICD-10-CM

## 2024-01-03 DIAGNOSIS — Z13.220 SCREENING FOR HYPERLIPIDEMIA: ICD-10-CM

## 2024-01-03 DIAGNOSIS — E66.3 OVERWEIGHT (BMI 25.0-29.9): ICD-10-CM

## 2024-01-03 DIAGNOSIS — I49.3 PVC (PREMATURE VENTRICULAR CONTRACTION): ICD-10-CM

## 2024-01-03 DIAGNOSIS — I10 ESSENTIAL HYPERTENSION: ICD-10-CM

## 2024-01-03 DIAGNOSIS — E78.5 HYPERLIPIDEMIA, UNSPECIFIED HYPERLIPIDEMIA TYPE: ICD-10-CM

## 2024-01-03 RX ORDER — ATORVASTATIN CALCIUM 10 MG/1
10 TABLET, FILM COATED ORAL DAILY
Qty: 90 TABLET | Refills: 3 | Status: SHIPPED | OUTPATIENT
Start: 2024-01-03

## 2024-01-03 RX ORDER — LOSARTAN POTASSIUM 25 MG/1
12.5 TABLET ORAL DAILY
Qty: 45 TABLET | Refills: 3 | Status: SHIPPED | OUTPATIENT
Start: 2024-01-03

## 2024-02-01 ENCOUNTER — OFFICE VISIT (OUTPATIENT)
Dept: FAMILY MEDICINE CLINIC | Facility: CLINIC | Age: 64
End: 2024-02-01
Payer: COMMERCIAL

## 2024-02-01 VITALS
HEIGHT: 64 IN | SYSTOLIC BLOOD PRESSURE: 138 MMHG | TEMPERATURE: 96.5 F | HEART RATE: 74 BPM | DIASTOLIC BLOOD PRESSURE: 88 MMHG | WEIGHT: 174 LBS | OXYGEN SATURATION: 98 % | BODY MASS INDEX: 29.71 KG/M2

## 2024-02-01 DIAGNOSIS — B34.9 VIRAL INFECTION, UNSPECIFIED: ICD-10-CM

## 2024-02-01 DIAGNOSIS — U07.1 UPPER RESPIRATORY TRACT INFECTION DUE TO SEVERE ACUTE RESPIRATORY SYNDROME CORONAVIRUS 2 (SARS-COV-2): Primary | ICD-10-CM

## 2024-02-01 DIAGNOSIS — Z20.822 EXPOSURE TO COVID-19 VIRUS: ICD-10-CM

## 2024-02-01 DIAGNOSIS — J06.9 UPPER RESPIRATORY TRACT INFECTION DUE TO SEVERE ACUTE RESPIRATORY SYNDROME CORONAVIRUS 2 (SARS-COV-2): Primary | ICD-10-CM

## 2024-02-01 PROCEDURE — 87635 SARS-COV-2 COVID-19 AMP PRB: CPT | Performed by: FAMILY MEDICINE

## 2024-02-01 PROCEDURE — 99213 OFFICE O/P EST LOW 20 MIN: CPT | Performed by: FAMILY MEDICINE

## 2024-02-01 RX ORDER — CEFUROXIME AXETIL 500 MG/1
500 TABLET ORAL EVERY 12 HOURS SCHEDULED
Qty: 14 TABLET | Refills: 0 | Status: SHIPPED | OUTPATIENT
Start: 2024-02-01 | End: 2024-02-08

## 2024-02-01 NOTE — PROGRESS NOTES
Depression Screening and Follow-up Plan: Patient was screened for depression during today's encounter. They screened negative with a PHQ-2 score of 0.    Assessment/Plan:    Problem List Items Addressed This Visit    None  Visit Diagnoses       Upper respiratory tract infection due to severe acute respiratory syndrome coronavirus 2 (SARS-CoV-2)    -  Primary             Diagnoses and all orders for this visit:    Upper respiratory tract infection due to severe acute respiratory syndrome coronavirus 2 (SARS-CoV-2)    Other orders  -     psyllium (METAMUCIL) 58.6 % powder; Take 1 packet by mouth daily        No problem-specific Assessment & Plan notes found for this encounter.        Subjective:      Patient ID: Deanna Sheriff is a 63 y.o. female.    Mrs. Mikel mathur is here for respiratory infection sinus postnasal drip sinus congestion cough presently not febrile    Cough  Associated symptoms include a sore throat. Pertinent negatives include no chest pain, fever, headaches, myalgias, rash, shortness of breath or wheezing. There is no history of environmental allergies.       The following portions of the patient's history were reviewed and updated as appropriate:   She has a past medical history of Arthritis, GERD (gastroesophageal reflux disease), Hyperlipidemia, Hypertension, Intracranial aneurysm, Irregular heart beat, PONV (postoperative nausea and vomiting), PVC (premature ventricular contraction), and Reactive depression.,  does not have any pertinent problems on file.,   has a past surgical history that includes Thyroid surgery; pr arthrp interpos intercarpal/metacarpal joints (Left, 07/30/2018); Epidural block injection (Right, 01/07/2020); FL guided needle plac bx/asp/inj (01/07/2020); Endometrial ablation (2012); FL guided needle plac bx/asp/inj (05/19/2020); Epidural block injection (Right, 05/19/2020); FL guided needle plac bx/asp/inj (08/28/2020); pr inject si joint arthrgrphy&/anes/steroid w/shayna  (Right, 08/28/2020); Joint replacement (Right); pr open treatment tarsometatarsal joint dislocation (Left, 8/11/2022); Lisfranc arthrodesis (Left, 11/17/2022); and NERVE BLOCK (Left, 5/18/2023).,  family history includes Arthritis in her father and mother; Breast cancer (age of onset: 66) in her sister; Heart disease in her mother; Lymphoma in her father; No Known Problems in her daughter, daughter, maternal aunt, maternal grandfather, maternal grandmother, paternal aunt, paternal grandfather, paternal grandmother, sister, and son.,   reports that she has never smoked. She has never used smokeless tobacco. She reports current alcohol use. She reports that she does not use drugs.,  has No Known Allergies..  Current Outpatient Medications   Medication Sig Dispense Refill    Ascorbic Acid (VITAMIN C) 250 MG CHEW Chew 1 tablet daily      atorvastatin (LIPITOR) 10 mg tablet Take 1 tablet (10 mg total) by mouth daily 90 tablet 3    Calcium 600 MG tablet Take 2 tablets by mouth daily      celecoxib (CeleBREX) 200 mg capsule Take 200 mg by mouth 2 (two) times daily after meals      Cholecalciferol (VITAMIN D3) 1000 units CAPS Take by mouth daily after breakfast      fluticasone (FLONASE) 50 mcg/act nasal spray 1 spray into each nostril in the morning. (Patient taking differently: 1 spray into each nostril as needed) 16 g 0    hydrochlorothiazide (HYDRODIURIL) 12.5 mg tablet Take 1 tablet (12.5 mg total) by mouth every morning 90 tablet 3    losartan (COZAAR) 25 mg tablet Take 0.5 tablets (12.5 mg total) by mouth daily 45 tablet 3    metoprolol tartrate (LOPRESSOR) 25 mg tablet Take 1 tablet (25 mg total) by mouth every 12 (twelve) hours 180 tablet 3    Multiple Vitamin (DAILY VALUE MULTIVITAMIN PO) Take 1 tablet by mouth daily      Multiple Vitamins-Minerals (PRESERVISION AREDS 2 PO) Take by mouth daily      nystatin (MYCOSTATIN) powder Apply topically as needed      Omega-3 Fatty Acids (Fish Oil) 1200 MG CAPS Take 1  "capsule by mouth daily      psyllium (METAMUCIL) 58.6 % powder Take 1 packet by mouth daily      Cymbalta 30 MG delayed release capsule Take 30 mg by mouth daily (Patient not taking: Reported on 2/1/2024)      hydrocortisone 2.5 % ointment Apply topically (Patient not taking: Reported on 2/1/2024)       No current facility-administered medications for this visit.       Review of Systems   Constitutional:  Positive for fatigue. Negative for activity change, appetite change, diaphoresis and fever.   HENT:  Positive for sinus pressure and sore throat.    Eyes: Negative.  Negative for visual disturbance.   Respiratory:  Positive for cough. Negative for apnea, chest tightness, shortness of breath and wheezing.    Cardiovascular:  Negative for chest pain, palpitations and leg swelling.   Gastrointestinal:  Negative for abdominal distention, abdominal pain, anal bleeding, constipation, diarrhea, nausea and vomiting.   Endocrine: Negative for cold intolerance, heat intolerance, polydipsia, polyphagia and polyuria.   Genitourinary:  Negative for difficulty urinating, dysuria, flank pain, hematuria and urgency.   Musculoskeletal:  Negative for arthralgias, back pain, gait problem, joint swelling and myalgias.   Skin:  Negative for color change, rash and wound.   Allergic/Immunologic: Negative for environmental allergies, food allergies and immunocompromised state.   Neurological:  Negative for dizziness, seizures, syncope, speech difficulty, numbness and headaches.   Hematological:  Negative for adenopathy. Does not bruise/bleed easily.   Psychiatric/Behavioral:  Negative for agitation, behavioral problems, hallucinations, sleep disturbance and suicidal ideas.          Objective:  Vitals:    02/01/24 1446   BP: 138/88   BP Location: Left arm   Patient Position: Sitting   Cuff Size: Large   Pulse: 74   Temp: (!) 96.5 °F (35.8 °C)   TempSrc: Temporal   SpO2: 98%   Weight: 78.9 kg (174 lb)   Height: 5' 4\" (1.626 m)     Body mass " index is 29.87 kg/m².     Physical Exam  Constitutional:       General: She is not in acute distress.     Appearance: She is well-developed. She is obese. She is not diaphoretic.   HENT:      Head: Normocephalic.      Right Ear: External ear normal.      Left Ear: External ear normal.      Nose: Nose normal.   Eyes:      General: No scleral icterus.        Right eye: No discharge.         Left eye: No discharge.      Conjunctiva/sclera: Conjunctivae normal.      Pupils: Pupils are equal, round, and reactive to light.   Neck:      Thyroid: No thyromegaly.      Trachea: No tracheal deviation.   Cardiovascular:      Rate and Rhythm: Normal rate and regular rhythm.      Heart sounds: Normal heart sounds. No murmur heard.     No friction rub. No gallop.   Pulmonary:      Effort: Pulmonary effort is normal. No respiratory distress.      Breath sounds: Wheezing present.   Abdominal:      General: Bowel sounds are normal.      Palpations: Abdomen is soft. There is no mass.      Tenderness: There is no abdominal tenderness. There is no guarding.   Musculoskeletal:         General: No deformity.      Cervical back: Normal range of motion.   Lymphadenopathy:      Cervical: No cervical adenopathy.   Skin:     General: Skin is warm and dry.      Findings: No erythema or rash.   Neurological:      Mental Status: She is alert and oriented to person, place, and time.      Cranial Nerves: No cranial nerve deficit.   Psychiatric:         Thought Content: Thought content normal.

## 2024-02-02 LAB — SARS-COV-2 RNA RESP QL NAA+PROBE: NEGATIVE

## 2024-02-06 ENCOUNTER — TELEPHONE (OUTPATIENT)
Dept: FAMILY MEDICINE CLINIC | Facility: CLINIC | Age: 64
End: 2024-02-06

## 2024-02-06 NOTE — TELEPHONE ENCOUNTER
Patient states she is on Ceftin but she is no better and would like something else called in. States has enough to last her until tomorrow. Please advise.

## 2024-02-08 DIAGNOSIS — J01.01 ACUTE RECURRENT MAXILLARY SINUSITIS: Primary | ICD-10-CM

## 2024-02-08 RX ORDER — AZITHROMYCIN 250 MG/1
TABLET, FILM COATED ORAL
Qty: 6 TABLET | Refills: 0 | Status: SHIPPED | OUTPATIENT
Start: 2024-02-08 | End: 2024-02-12

## 2024-02-12 ENCOUNTER — OFFICE VISIT (OUTPATIENT)
Dept: FAMILY MEDICINE CLINIC | Facility: CLINIC | Age: 64
End: 2024-02-12
Payer: COMMERCIAL

## 2024-02-12 ENCOUNTER — APPOINTMENT (OUTPATIENT)
Dept: RADIOLOGY | Facility: MEDICAL CENTER | Age: 64
End: 2024-02-12
Payer: COMMERCIAL

## 2024-02-12 VITALS
HEART RATE: 70 BPM | BODY MASS INDEX: 29.71 KG/M2 | WEIGHT: 174 LBS | OXYGEN SATURATION: 98 % | SYSTOLIC BLOOD PRESSURE: 142 MMHG | HEIGHT: 64 IN | TEMPERATURE: 97.1 F | DIASTOLIC BLOOD PRESSURE: 86 MMHG

## 2024-02-12 DIAGNOSIS — J22 ACUTE LOWER RESPIRATORY TRACT INFECTION: Primary | ICD-10-CM

## 2024-02-12 DIAGNOSIS — J22 ACUTE LOWER RESPIRATORY TRACT INFECTION: ICD-10-CM

## 2024-02-12 PROCEDURE — 99213 OFFICE O/P EST LOW 20 MIN: CPT | Performed by: FAMILY MEDICINE

## 2024-02-12 PROCEDURE — 71046 X-RAY EXAM CHEST 2 VIEWS: CPT

## 2024-02-12 NOTE — PROGRESS NOTES
Assessment/Plan:    Problem List Items Addressed This Visit    None  Visit Diagnoses       Acute lower respiratory tract infection    -  Primary             Diagnoses and all orders for this visit:    Acute lower respiratory tract infection        No problem-specific Assessment & Plan notes found for this encounter.        Subjective:      Patient ID: Deanna Sheriff is a 63 y.o. female.    Deanna is here still sick cough congestion on exam her lower lung fields seem to be congested with some rhonchi she was tested for COVID and flu which was negative she was not tested for RSV because it is not possible to do in the outpatient setting she has not yet had a chest x-ray she completed therapy with Zithromax and Ceftin and still is no better    URI   Associated symptoms include coughing, rhinorrhea, sinus pain, a sore throat and wheezing. Pertinent negatives include no abdominal pain, chest pain, diarrhea, dysuria, headaches, nausea, rash or vomiting.       The following portions of the patient's history were reviewed and updated as appropriate:   She has a past medical history of Arthritis, GERD (gastroesophageal reflux disease), Hyperlipidemia, Hypertension, Intracranial aneurysm, Irregular heart beat, PONV (postoperative nausea and vomiting), PVC (premature ventricular contraction), and Reactive depression.,  does not have any pertinent problems on file.,   has a past surgical history that includes Thyroid surgery; pr arthrp interpos intercarpal/metacarpal joints (Left, 07/30/2018); Epidural block injection (Right, 01/07/2020); FL guided needle plac bx/asp/inj (01/07/2020); Endometrial ablation (2012); FL guided needle plac bx/asp/inj (05/19/2020); Epidural block injection (Right, 05/19/2020); FL guided needle plac bx/asp/inj (08/28/2020); pr inject si joint arthrgrphy&/anes/steroid w/shayna (Right, 08/28/2020); Joint replacement (Right); pr open treatment tarsometatarsal joint dislocation (Left, 8/11/2022); Lisfranc  arthrodesis (Left, 11/17/2022); and NERVE BLOCK (Left, 5/18/2023).,  family history includes Arthritis in her father and mother; Breast cancer (age of onset: 66) in her sister; Heart disease in her mother; Lymphoma in her father; No Known Problems in her daughter, daughter, maternal aunt, maternal grandfather, maternal grandmother, paternal aunt, paternal grandfather, paternal grandmother, sister, and son.,   reports that she has never smoked. She has never used smokeless tobacco. She reports current alcohol use. She reports that she does not use drugs.,  has No Known Allergies..  Current Outpatient Medications   Medication Sig Dispense Refill    Ascorbic Acid (VITAMIN C) 250 MG CHEW Chew 1 tablet daily      atorvastatin (LIPITOR) 10 mg tablet Take 1 tablet (10 mg total) by mouth daily 90 tablet 3    Calcium 600 MG tablet Take 2 tablets by mouth daily      celecoxib (CeleBREX) 200 mg capsule Take 200 mg by mouth 2 (two) times daily after meals      Cholecalciferol (VITAMIN D3) 1000 units CAPS Take by mouth daily after breakfast      fluticasone (FLONASE) 50 mcg/act nasal spray 1 spray into each nostril in the morning. (Patient taking differently: 1 spray into each nostril as needed) 16 g 0    hydrochlorothiazide (HYDRODIURIL) 12.5 mg tablet Take 1 tablet (12.5 mg total) by mouth every morning 90 tablet 3    hydrocortisone 2.5 % ointment Apply topically      losartan (COZAAR) 25 mg tablet Take 0.5 tablets (12.5 mg total) by mouth daily 45 tablet 3    metoprolol tartrate (LOPRESSOR) 25 mg tablet Take 1 tablet (25 mg total) by mouth every 12 (twelve) hours 180 tablet 3    Multiple Vitamin (DAILY VALUE MULTIVITAMIN PO) Take 1 tablet by mouth daily      Multiple Vitamins-Minerals (PRESERVISION AREDS 2 PO) Take by mouth daily      nystatin (MYCOSTATIN) powder Apply topically as needed      Omega-3 Fatty Acids (Fish Oil) 1200 MG CAPS Take 1 capsule by mouth daily      psyllium (METAMUCIL) 58.6 % powder Take 1 packet by  "mouth daily      azithromycin (ZITHROMAX) 250 mg tablet Take 2 tablets on 1st day then 1 tablet daily for 4 days (Patient not taking: Reported on 2/12/2024) 6 tablet 0    Cymbalta 30 MG delayed release capsule Take 30 mg by mouth daily (Patient not taking: Reported on 2/1/2024)       No current facility-administered medications for this visit.       Review of Systems   Constitutional:  Positive for activity change and fatigue. Negative for appetite change, diaphoresis and fever.   HENT:  Positive for rhinorrhea, sinus pain and sore throat.    Eyes: Negative.    Respiratory:  Positive for cough and wheezing. Negative for apnea, chest tightness and shortness of breath.    Cardiovascular:  Negative for chest pain, palpitations and leg swelling.   Gastrointestinal:  Negative for abdominal distention, abdominal pain, anal bleeding, constipation, diarrhea, nausea and vomiting.   Endocrine: Negative for cold intolerance, heat intolerance, polydipsia, polyphagia and polyuria.   Genitourinary:  Negative for difficulty urinating, dysuria, flank pain, hematuria and urgency.   Musculoskeletal:  Negative for arthralgias, back pain, gait problem, joint swelling and myalgias.   Skin:  Negative for color change, rash and wound.   Allergic/Immunologic: Negative for environmental allergies, food allergies and immunocompromised state.   Neurological:  Negative for dizziness, seizures, syncope, speech difficulty, numbness and headaches.   Hematological:  Negative for adenopathy. Does not bruise/bleed easily.   Psychiatric/Behavioral:  Negative for agitation, behavioral problems, hallucinations, sleep disturbance and suicidal ideas.          Objective:  Vitals:    02/12/24 1059   BP: 142/86   BP Location: Left arm   Patient Position: Sitting   Cuff Size: Large   Pulse: 70   Temp: (!) 97.1 °F (36.2 °C)   TempSrc: Temporal   SpO2: 98%   Weight: 78.9 kg (174 lb)   Height: 5' 4\" (1.626 m)     Body mass index is 29.87 kg/m².     Physical " Exam  Constitutional:       General: She is not in acute distress.     Appearance: She is well-developed. She is not diaphoretic.   HENT:      Head: Normocephalic.      Right Ear: External ear normal.      Left Ear: External ear normal.      Nose: Congestion and rhinorrhea present.      Mouth/Throat:      Pharynx: Oropharyngeal exudate and posterior oropharyngeal erythema present.   Eyes:      General: No scleral icterus.        Right eye: No discharge.         Left eye: No discharge.      Conjunctiva/sclera: Conjunctivae normal.      Pupils: Pupils are equal, round, and reactive to light.   Neck:      Thyroid: No thyromegaly.      Trachea: No tracheal deviation.   Cardiovascular:      Rate and Rhythm: Normal rate and regular rhythm.      Heart sounds: Normal heart sounds. No murmur heard.     No friction rub. No gallop.   Pulmonary:      Effort: Pulmonary effort is normal. No respiratory distress.      Breath sounds: Rhonchi present. No wheezing.   Abdominal:      General: Bowel sounds are normal.      Palpations: Abdomen is soft. There is no mass.      Tenderness: There is no abdominal tenderness. There is no guarding.   Musculoskeletal:         General: No deformity.      Cervical back: Normal range of motion.   Lymphadenopathy:      Cervical: No cervical adenopathy.   Skin:     General: Skin is warm and dry.      Findings: No erythema or rash.   Neurological:      Mental Status: She is alert and oriented to person, place, and time.      Cranial Nerves: No cranial nerve deficit.   Psychiatric:         Thought Content: Thought content normal.

## 2024-05-02 ENCOUNTER — OFFICE VISIT (OUTPATIENT)
Dept: CARDIOLOGY CLINIC | Facility: CLINIC | Age: 64
End: 2024-05-02
Payer: COMMERCIAL

## 2024-05-02 VITALS
BODY MASS INDEX: 29.71 KG/M2 | HEART RATE: 72 BPM | DIASTOLIC BLOOD PRESSURE: 80 MMHG | HEIGHT: 64 IN | SYSTOLIC BLOOD PRESSURE: 130 MMHG | WEIGHT: 174 LBS

## 2024-05-02 DIAGNOSIS — E66.3 OVERWEIGHT (BMI 25.0-29.9): ICD-10-CM

## 2024-05-02 DIAGNOSIS — I49.3 FREQUENT PVCS: ICD-10-CM

## 2024-05-02 DIAGNOSIS — I34.0 MITRAL VALVE INSUFFICIENCY, UNSPECIFIED ETIOLOGY: ICD-10-CM

## 2024-05-02 DIAGNOSIS — I10 PRIMARY HYPERTENSION: Primary | ICD-10-CM

## 2024-05-02 DIAGNOSIS — M79.605 PAIN OF LEFT LOWER EXTREMITY: ICD-10-CM

## 2024-05-02 DIAGNOSIS — E78.5 HYPERLIPIDEMIA, UNSPECIFIED HYPERLIPIDEMIA TYPE: ICD-10-CM

## 2024-05-02 PROCEDURE — 99214 OFFICE O/P EST MOD 30 MIN: CPT | Performed by: INTERNAL MEDICINE

## 2024-05-02 NOTE — PROGRESS NOTES
Cardiology Follow up    Deanna Sheriff  [id number]  1960  [facility] [address] [address]      1. Primary hypertension        2. Frequent PVCs  Holter monitor    Echo complete w/ contrast if indicated      3. Mitral valve insufficiency, unspecified etiology  Echo complete w/ contrast if indicated      4. Overweight (BMI 25.0-29.9)        5. Hyperlipidemia, unspecified hyperlipidemia type  Comprehensive metabolic panel    Lipid Panel with Direct LDL reflex      6. Pain of left lower extremity  VAS ARTERIAL DUPLEX-LOWER LIMB UNILATERAL          Discussion/Summary:  1.  Frequent PVCs  2.  Hypertension  3.  Hyperlipidemia  4.  Mitral regurgitation  5.  Overweight    -Pharmacologic nuclear stress testing 7/25/2022 showing no diagnostic evidence of ischemia on stress testing with frequent PVCs  -Transthoracic echocardiogram 9/20/2023 showing left ventricular systolic function normal estimated LVEF 60% with normal right ventricular systolic function, mildly dilated left atrium with systolic bowing of the posterior leaflet of the mitral valve with mild to moderate mitral regurgitation  -Will have patient undergo repeat Holter monitor and echocardiogram in 6 months to monitor overall cardiac function and ectopic burden  -Will repeat fasting lipid panel and CMP at that time  -Patient can continue atorvastatin 10 mg daily, hydrochlorothiazide 12.5 mg daily, losartan 12.5 mg daily, metoprolol tartrate 25 mg twice daily  -Patient counseled on dietary and lifestyle modifications including following a low-salt, low-fat, heart healthy diet with continued physical activity  -Patient will be seen in 6 months or sooner if necessary once testing is completed  -Patient counseled if she were to have any warning or alarm type symptoms she is to seek emergency medical care  immediately.  -Given persistence of leg pain and issues at times with color change will have patient undergo lower extremity arterial Doppler for evaluation of PAD.      History of Present Illness:  -Patient is a 63-year-old female with complex regional pain syndrome, hypertension, overweight, bilateral chronic knee osteoarthritis who initially presented to the office in June 2022 after being seen in the emergency department in May 2022 for abnormal ECG.  Patient was found to have significant PVC burden on monitoring and medical therapy was uptitrated with improvement in symptoms.  She had undergone surgery on multiple occasions to assist with work-related injury to left foot and presents to the office today for follow-up.  -Currently in the office today patient denies any chest pain, palpitations, lightness or dizziness, loss conscious, shortness of breath, lower EXTR edema, orthopnea or bendopnea.  She notes blood pressures at home are well-controlled and despite persistent left foot pain and multiple attempts at surgical intervention this continues to bother her.  She states that even with this she is still physically active able to walk 4 city blocks on flat surface or 2 flights of stairs however with this while she has no chest pain, anginal symptoms or shortness of breath she does have significant left foot pain.    Patient Active Problem List   Diagnosis    Hypertension    Chronic bilateral low back pain with right-sided sciatica    Primary osteoarthritis of both knees    Primary osteoarthritis of first carpometacarpal joint of left hand    Trigger middle finger of right hand    CMC DJD(carpometacarpal degenerative joint disease), localized primary, left    Chronic pain syndrome    Lumbar disc herniation    Lumbar radiculopathy    Piriformis muscle pain     Past Medical History:   Diagnosis Date    Arthritis     GERD (gastroesophageal reflux disease)     rarely    Hyperlipidemia     Hypertension      Intracranial aneurysm     resolved 11/17/17 - possible R P-comm region aneurysm measuring 3mm found on MRI results    Irregular heart beat     PONV (postoperative nausea and vomiting)     PVC (premature ventricular contraction)     Reactive depression     last assessed 12/15/15     Social History     Socioeconomic History    Marital status: /Civil Union     Spouse name: Not on file    Number of children: Not on file    Years of education: Not on file    Highest education level: Not on file   Occupational History    Not on file   Tobacco Use    Smoking status: Never    Smokeless tobacco: Never    Tobacco comments:     only smoked as a teenager   Vaping Use    Vaping status: Never Used   Substance and Sexual Activity    Alcohol use: Yes     Comment: rare    Drug use: Never     Comment: CBD    Sexual activity: Yes     Partners: Male   Other Topics Concern    Not on file   Social History Narrative    No living will     Social Determinants of Health     Financial Resource Strain: Not on file   Food Insecurity: Not on file   Transportation Needs: Not on file   Physical Activity: Not on file   Stress: Not on file   Social Connections: Not on file   Intimate Partner Violence: Not on file   Housing Stability: Not on file      Family History   Problem Relation Age of Onset    Arthritis Mother     Heart disease Mother         CAD    Arthritis Father     Lymphoma Father         Hodgkin's    Breast cancer Sister 66    No Known Problems Sister     No Known Problems Daughter     No Known Problems Daughter     No Known Problems Maternal Grandmother     No Known Problems Maternal Grandfather     No Known Problems Paternal Grandmother     No Known Problems Paternal Grandfather     No Known Problems Son     No Known Problems Maternal Aunt     No Known Problems Paternal Aunt      Past Surgical History:   Procedure Laterality Date    ENDOMETRIAL ABLATION  2012    EPIDURAL BLOCK INJECTION Right 01/07/2020    Procedure: Right L4-5  transforaminal epidural steroid injection 80533;  Surgeon: Pérez Espitia MD;  Location: [facility];  Service: Pain Management     EPIDURAL BLOCK INJECTION Right 05/19/2020    Procedure: Right L4-L5 Transforaminal Epidural Steroid Injection (23905);  Surgeon: Pérez Espitia MD;  Location: [facility];  Service: Pain Management     FL GUIDED NEEDLE PLAC BX/ASP/INJ  01/07/2020    FL GUIDED NEEDLE PLAC BX/ASP/INJ  05/19/2020    FL GUIDED NEEDLE PLAC BX/ASP/INJ  08/28/2020    JOINT REPLACEMENT Right     Hip    LISFRANC ARTHRODESIS Left 11/17/2022    Procedure: FUSION LIS FRANC L 1,2 TMTJ fusion with intercuneiform.;  Surgeon: Jun Antony DPM;  Location: [facility];  Service: Podiatry    NERVE BLOCK Left 5/18/2023    Procedure: Left Lumbar Sympathetic Nerve Block;  Surgeon: Pérez Espitia MD;  Location: [facility];  Service: Pain Management     VA ARTHRP INTERPOS INTERCARPAL/METACARPAL JOINTS Left 07/30/2018    Procedure: Thumb Arthrex mini tight rope suspension arthroplasty with trapezial resection left thumb;  Surgeon: Nimesh Wallis MD;  Location: [facility];  Service: Orthopedics    VA INJECT SI JOINT ARTHRGRPHY&/ANES/STEROID W/ALESIA Right 08/28/2020    Procedure: Sacroiliac joint injection and piriformis muscle steroid Injection;  Surgeon: Pérez Espitia MD;  Location: [facility];  Service: Pain Management     VA OPEN TREATMENT TARSOMETATARSAL JOINT DISLOCATION Left 8/11/2022    Procedure: OPEN REDUCTION W/ INTERNAL FIXATION (ORIF) FOOT, ORIF of the left lis franc ligament;  Surgeon: Jun Antony DPM;  Location: [facility];  Service: Podiatry    THYROID SURGERY      near total thyroidectomy       Current Outpatient Medications:     Ascorbic Acid (VITAMIN C) 250 MG CHEW, Chew 1 tablet daily, Disp: , Rfl:     atorvastatin (LIPITOR) 10 mg tablet, Take 1 tablet (10 mg total) by mouth daily, Disp: 90 tablet, Rfl: 3    Calcium 600 MG tablet, Take 2 tablets by mouth daily,  Disp: , Rfl:     celecoxib (CeleBREX) 200 mg capsule, Take 200 mg by mouth 2 (two) times daily after meals, Disp: , Rfl:     Cholecalciferol (VITAMIN D3) 1000 units CAPS, Take by mouth daily after breakfast, Disp: , Rfl:     fluticasone (FLONASE) 50 mcg/act nasal spray, 1 spray into each nostril in the morning. (Patient taking differently: 1 spray into each nostril as needed), Disp: 16 g, Rfl: 0    hydrochlorothiazide (HYDRODIURIL) 12.5 mg tablet, Take 1 tablet (12.5 mg total) by mouth every morning, Disp: 90 tablet, Rfl: 3    hydrocortisone 2.5 % ointment, Apply topically, Disp: , Rfl:     losartan (COZAAR) 25 mg tablet, Take 0.5 tablets (12.5 mg total) by mouth daily, Disp: 45 tablet, Rfl: 3    metoprolol tartrate (LOPRESSOR) 25 mg tablet, Take 1 tablet (25 mg total) by mouth every 12 (twelve) hours, Disp: 180 tablet, Rfl: 3    Multiple Vitamin (DAILY VALUE MULTIVITAMIN PO), Take 1 tablet by mouth daily, Disp: , Rfl:     Multiple Vitamins-Minerals (PRESERVISION AREDS 2 PO), Take by mouth daily, Disp: , Rfl:     nystatin (MYCOSTATIN) powder, Apply topically as needed, Disp: , Rfl:     Omega-3 Fatty Acids (Fish Oil) 1200 MG CAPS, Take 1 capsule by mouth daily, Disp: , Rfl:     psyllium (METAMUCIL) 58.6 % powder, Take 1 packet by mouth daily, Disp: , Rfl:   No Known Allergies      Labs:  No visits with results within 2 Month(s) from this visit.   Latest known visit with results is:   Office Visit on 02/01/2024   Component Date Value    SARS-CoV-2 02/01/2024 Negative         Imaging: No results found.    Review of Systems:  Review of Systems   Constitutional:  Negative for chills, diaphoresis, fatigue and fever.   HENT:  Negative for trouble swallowing and voice change.    Eyes:  Negative for pain and redness.   Respiratory:  Negative for shortness of breath and wheezing.    Cardiovascular:  Negative for chest pain, palpitations and leg swelling.   Gastrointestinal:  Negative for abdominal pain, constipation,  "diarrhea, nausea and vomiting.   Genitourinary:  Negative for dysuria.   Musculoskeletal:  Positive for arthralgias. Negative for neck pain and neck stiffness.   Skin:  Negative for rash.   Neurological:  Negative for dizziness, syncope, light-headedness and headaches.   Psychiatric/Behavioral:  Negative for agitation and confusion.    All other systems reviewed and are negative.        Vitals:    05/02/24 1447   BP: 130/80   BP Location: Left arm   Patient Position: Sitting   Pulse: 72   Weight: 78.9 kg (174 lb)   Height: 5' 4\" (1.626 m)     Vitals:    05/02/24 1447   Weight: 78.9 kg (174 lb)     Height: 5' 4\" (162.6 cm)     Physical Exam:   Physical Exam  Vitals reviewed.   Constitutional:       General: She is not in acute distress.     Appearance: Normal appearance. She is not diaphoretic.   HENT:      Head: Normocephalic and atraumatic.   Eyes:      General:         Right eye: No discharge.         Left eye: No discharge.   Neck:      Comments: Trachea midline, no JVD present  Cardiovascular:      Rate and Rhythm: Normal rate and regular rhythm.      Heart sounds: Murmur (OMER) heard.      No friction rub.   Pulmonary:      Effort: Pulmonary effort is normal. No respiratory distress.      Breath sounds: No wheezing.   Chest:      Chest wall: No tenderness.   Abdominal:      General: Bowel sounds are normal.      Palpations: Abdomen is soft.      Tenderness: There is no abdominal tenderness. There is no rebound.   Musculoskeletal:      Right lower leg: No edema.      Left lower leg: No edema.   Skin:     General: Skin is warm and dry.   Neurological:      Mental Status: She is alert.      Comments: Awake, alert, able to answer questions appropriately, able to move extremities bilaterally.   Psychiatric:         Mood and Affect: Mood normal.         Behavior: Behavior normal.        "

## 2024-10-03 ENCOUNTER — HOSPITAL ENCOUNTER (OUTPATIENT)
Dept: NON INVASIVE DIAGNOSTICS | Facility: CLINIC | Age: 64
Discharge: HOME | End: 2024-10-03
Payer: COMMERCIAL

## 2024-10-03 VITALS
BODY MASS INDEX: 29.71 KG/M2 | WEIGHT: 174 LBS | DIASTOLIC BLOOD PRESSURE: 80 MMHG | SYSTOLIC BLOOD PRESSURE: 120 MMHG | HEART RATE: 73 BPM | HEIGHT: 64 IN

## 2024-10-03 DIAGNOSIS — I34.0 MITRAL VALVE INSUFFICIENCY, UNSPECIFIED ETIOLOGY: ICD-10-CM

## 2024-10-03 DIAGNOSIS — I49.3 FREQUENT PVCS: ICD-10-CM

## 2024-10-03 LAB
AORTIC ROOT: 3.2 CM
APICAL FOUR CHAMBER EJECTION FRACTION: 74 %
BSA FOR ECHO PROCEDURE: 1.84 M2
E WAVE DECELERATION TIME: 198 MS
E/A RATIO: 0.99
FRACTIONAL SHORTENING: 36 (ref 28–44)
INTERVENTRICULAR SEPTUM IN DIASTOLE (PARASTERNAL SHORT AXIS VIEW): 1 CM
INTERVENTRICULAR SEPTUM: 1 CM (ref 0.6–1.1)
LAAS-AP2: 15 CM2
LAAS-AP4: 22.9 CM2
LEFT ATRIUM SIZE: 4.2 CM
LEFT ATRIUM VOLUME (MOD BIPLANE): 57 ML
LEFT ATRIUM VOLUME INDEX (MOD BIPLANE): 31 ML/M2
LEFT INTERNAL DIMENSION IN SYSTOLE: 3 CM (ref 2.1–4)
LEFT VENTRICULAR INTERNAL DIMENSION IN DIASTOLE: 4.7 CM (ref 3.5–6)
LEFT VENTRICULAR POSTERIOR WALL IN END DIASTOLE: 1 CM
LEFT VENTRICULAR STROKE VOLUME: 70 ML
LVSV (TEICH): 70 ML
MV E'TISSUE VEL-SEP: 8 CM/S
MV PEAK A VEL: 0.72 M/S
MV PEAK E VEL: 71 CM/S
RA PRESSURE ESTIMATED: 8 MMHG
RIGHT ATRIUM AREA SYSTOLE A4C: 11.6 CM2
RIGHT VENTRICLE ID DIMENSION: 2.1 CM
RV PSP: 30 MMHG
SL CV LEFT ATRIUM LENGTH A2C: 4.9 CM
SL CV LV EF: 65
SL CV PED ECHO LEFT VENTRICLE DIASTOLIC VOLUME (MOD BIPLANE) 2D: 105 ML
SL CV PED ECHO LEFT VENTRICLE SYSTOLIC VOLUME (MOD BIPLANE) 2D: 35 ML
TR MAX PG: 22 MMHG
TR PEAK VELOCITY: 2.3 M/S
TRICUSPID ANNULAR PLANE SYSTOLIC EXCURSION: 2.1 CM
TRICUSPID VALVE PEAK REGURGITATION VELOCITY: 2.33 M/S

## 2024-10-03 PROCEDURE — 93306 TTE W/DOPPLER COMPLETE: CPT | Performed by: INTERNAL MEDICINE

## 2024-10-03 PROCEDURE — 93225 XTRNL ECG REC<48 HRS REC: CPT

## 2024-10-03 PROCEDURE — 93226 XTRNL ECG REC<48 HR SCAN A/R: CPT

## 2024-10-03 PROCEDURE — 93306 TTE W/DOPPLER COMPLETE: CPT

## 2024-10-23 ENCOUNTER — APPOINTMENT (OUTPATIENT)
Dept: LAB | Facility: MEDICAL CENTER | Age: 64
End: 2024-10-23
Payer: COMMERCIAL

## 2024-10-23 DIAGNOSIS — E78.5 HYPERLIPIDEMIA, UNSPECIFIED HYPERLIPIDEMIA TYPE: ICD-10-CM

## 2024-10-23 DIAGNOSIS — I10 HYPERTENSION, UNSPECIFIED TYPE: ICD-10-CM

## 2024-10-23 LAB
ALBUMIN SERPL BCG-MCNC: 4.5 G/DL (ref 3.5–5)
ALP SERPL-CCNC: 84 U/L (ref 34–104)
ALT SERPL W P-5'-P-CCNC: 18 U/L (ref 7–52)
ANION GAP SERPL CALCULATED.3IONS-SCNC: 11 MMOL/L (ref 4–13)
AST SERPL W P-5'-P-CCNC: 16 U/L (ref 13–39)
BILIRUB SERPL-MCNC: 0.56 MG/DL (ref 0.2–1)
BUN SERPL-MCNC: 19 MG/DL (ref 5–25)
CALCIUM SERPL-MCNC: 9.5 MG/DL (ref 8.4–10.2)
CHLORIDE SERPL-SCNC: 103 MMOL/L (ref 96–108)
CHOLEST SERPL-MCNC: 176 MG/DL
CO2 SERPL-SCNC: 28 MMOL/L (ref 21–32)
CREAT SERPL-MCNC: 0.89 MG/DL (ref 0.6–1.3)
GFR SERPL CREATININE-BSD FRML MDRD: 69 ML/MIN/1.73SQ M
GLUCOSE P FAST SERPL-MCNC: 90 MG/DL (ref 65–99)
HDLC SERPL-MCNC: 54 MG/DL
LDLC SERPL CALC-MCNC: 95 MG/DL (ref 0–100)
POTASSIUM SERPL-SCNC: 3.8 MMOL/L (ref 3.5–5.3)
PROT SERPL-MCNC: 7.4 G/DL (ref 6.4–8.4)
SODIUM SERPL-SCNC: 142 MMOL/L (ref 135–147)
TRIGL SERPL-MCNC: 134 MG/DL

## 2024-10-23 PROCEDURE — 80061 LIPID PANEL: CPT

## 2024-10-23 PROCEDURE — 36415 COLL VENOUS BLD VENIPUNCTURE: CPT

## 2024-10-23 PROCEDURE — 80053 COMPREHEN METABOLIC PANEL: CPT

## 2024-10-23 NOTE — TELEPHONE ENCOUNTER
Reason for call:   [x] Refill   [] Prior Auth  [] Other:     Office:   [x] PCP/Provider -   [] Specialty/Provider -     Medication: hydrochlorothiazide 12.5 mg, take 1 tablet by mouth every morning      Pharmacy: [facility]    Does the patient have enough for 3 days?   [x] Yes   [] No - Send as HP to POD

## 2024-10-24 RX ORDER — HYDROCHLOROTHIAZIDE 12.5 MG/1
12.5 TABLET ORAL EVERY MORNING
Qty: 90 TABLET | Refills: 0 | Status: SHIPPED | OUTPATIENT
Start: 2024-10-24

## 2024-10-25 NOTE — TELEPHONE ENCOUNTER
I spoke to patient, she had surgery recently and needs to check husbands schedule. She will schedule appt via my chart with Dr. Tilley.

## 2024-11-04 ENCOUNTER — OFFICE VISIT (OUTPATIENT)
Dept: CARDIOLOGY CLINIC | Facility: CLINIC | Age: 64
End: 2024-11-04
Payer: COMMERCIAL

## 2024-11-04 VITALS
HEIGHT: 64 IN | DIASTOLIC BLOOD PRESSURE: 88 MMHG | WEIGHT: 166 LBS | BODY MASS INDEX: 28.34 KG/M2 | HEART RATE: 63 BPM | SYSTOLIC BLOOD PRESSURE: 138 MMHG

## 2024-11-04 DIAGNOSIS — I10 ESSENTIAL HYPERTENSION: ICD-10-CM

## 2024-11-04 DIAGNOSIS — Z13.220 SCREENING FOR HYPERLIPIDEMIA: ICD-10-CM

## 2024-11-04 DIAGNOSIS — I10 HYPERTENSION, UNSPECIFIED TYPE: ICD-10-CM

## 2024-11-04 DIAGNOSIS — I49.3 FREQUENT PVCS: Primary | ICD-10-CM

## 2024-11-04 DIAGNOSIS — E66.3 OVERWEIGHT (BMI 25.0-29.9): ICD-10-CM

## 2024-11-04 DIAGNOSIS — I49.3 PVC (PREMATURE VENTRICULAR CONTRACTION): ICD-10-CM

## 2024-11-04 DIAGNOSIS — I10 PRIMARY HYPERTENSION: ICD-10-CM

## 2024-11-04 DIAGNOSIS — E78.5 HYPERLIPIDEMIA, UNSPECIFIED HYPERLIPIDEMIA TYPE: ICD-10-CM

## 2024-11-04 DIAGNOSIS — I34.0 NONRHEUMATIC MITRAL VALVE REGURGITATION: ICD-10-CM

## 2024-11-04 PROCEDURE — 99214 OFFICE O/P EST MOD 30 MIN: CPT | Performed by: INTERNAL MEDICINE

## 2024-11-04 PROCEDURE — 93000 ELECTROCARDIOGRAM COMPLETE: CPT | Performed by: INTERNAL MEDICINE

## 2024-11-04 RX ORDER — ATORVASTATIN CALCIUM 10 MG/1
10 TABLET, FILM COATED ORAL DAILY
Qty: 90 TABLET | Refills: 3 | Status: SHIPPED | OUTPATIENT
Start: 2024-11-04

## 2024-11-04 RX ORDER — HYDROCHLOROTHIAZIDE 12.5 MG/1
12.5 TABLET ORAL EVERY MORNING
Qty: 90 TABLET | Refills: 3 | Status: SHIPPED | OUTPATIENT
Start: 2024-11-04

## 2024-11-04 RX ORDER — LOSARTAN POTASSIUM 25 MG/1
12.5 TABLET ORAL DAILY
Qty: 45 TABLET | Refills: 3 | Status: SHIPPED | OUTPATIENT
Start: 2024-11-04

## 2024-11-04 RX ORDER — METOPROLOL TARTRATE 25 MG/1
25 TABLET, FILM COATED ORAL EVERY 12 HOURS SCHEDULED
Qty: 180 TABLET | Refills: 3 | Status: SHIPPED | OUTPATIENT
Start: 2024-11-04

## 2024-11-04 NOTE — ASSESSMENT & PLAN NOTE
-Mild-moderate transthoracic echocardiogram October 2024  -Patient denies significant symptoms  -Continue to monitor

## 2024-11-04 NOTE — PROGRESS NOTES
Patient ID: Deanna Sheriff is a 64 y.o. female.        Plan:      Assessment & Plan  Frequent PVCs  -Seen on Holter monitor overall ectopic burden 11.1% October 2024  -Patient denies significant symptoms at this time  -Will continue to monitor  -Continue metoprolol to tartrate 25 mg twice daily  Essential hypertension  -Patient notes blood pressures at home very well-controlled we will continue hydrochlorothiazide 12.5 mg daily, losartan 12.5 mg daily, metoprolol tartrate 25 mg twice daily  -Counseled patient on dietary and lifestyle modifications including following a low-salt, low-fat, heart healthy diet sodium restriction to less than 1800 mg of sodium daily, continue physical activity and monitoring  -Patient counseled on NSAID avoidance  -Patient will monitor home blood pressure readings let our office know significantly elevated greater than 130/80's MHG for up titration of medical therapy.  Hyperlipidemia, unspecified hyperlipidemia type  -Counseled patient on dietary and lifestyle modifications  -Continue atorvastatin 10 mg daily  -Continue to monitor  Overweight (BMI 25.0-29.9)  -Counseled on dietary and lifestyle modifications  -Continue to monitor  Nonrheumatic mitral valve regurgitation  -Mild-moderate transthoracic echocardiogram October 2024  -Patient denies significant symptoms  -Continue to monitor      Follow up Plan/Other summary comments:  -Lipid panel 10/23/2024 showing total cholesterol 176, triglyceride 134, HDL 54, LDL 95  -Comprehensive metabolic panel 10/23/2024 stable from previous  -Counseled patient on dietary and lifestyle modifications including following a low-salt, low-fat, heart healthy diet with continued physical activity and weight reduction along with monitoring home blood pressure readings and letting our office know significantly elevated greater than 130/80's MHG for up titration of medical therapy.  -Continue atorvastatin 10 mg daily, hydrochlorothiazide 12.5 mg daily,  losartan 12.5 mg daily, metoprolol tartrate 25 mg twice daily  -Follow-up lower extremity arterial Doppler which is pending  -Will see patient in 6 months or sooner if necessary  -ECG performed in the office today shows sinus rhythm with occasional PVCs criteria for LVH heart rate 63 bpm  -Patient counseled if she were to have any warning or alarm type symptoms she is to seek emergency medical care immediately.    HPI:   -Patient is a 64-year-old female with complex regional pain syndrome, hypertension, overweight, bilateral chronic knee osteoarthritis who initially presented to the office in June 2022 after being seen and evaluated in emergency department in May of that year for abnormal ECG.  Patient was found to have significant PVC burden of monitoring and medical therapy was uptitrated with improvement in symptoms.  She had undergone surgery on multiple occasions to assist with work-related injury to left foot which continue to be an issue for her and presents to the office today for scheduled follow-up.  -Currently in the office today patient denies any chest pain, palpitations, lightheadedness, loss of consciousness, shortness of breath, lower extremity edema, orthopnea or bendopnea.  She notes again her only significant issue remaining is left foot injury which continues to cause her significant discomfort.  She has blood pressures at home are well-controlled and has no other significant symptoms.    Most recent or relevant cardiac/vascular testing:    -Transthoracic echocardiogram 10/3/2024 showing left ventricular systolic function normal.  LVEF 60% with diastolic parameters normal for age, normal right ventricular systolic function with mild to moderate mitral regurgitation, mild tricuspid regurgitation and IVC normal in size with estimated RVSP 30 mmHg    -Holter monitor 10/3/2024 showing predominantly sinus rhythm average heart rate 71 bpm with frequent ventricular ectopic activity coming for 11.1% of  total monitored beats with no sustained ventricular tachycardia present and rare supraventricular ectopic activity with no significant symptoms listed.      -Nuclear stress test 7/25/2022 showing no diagnostic evidence of ischemia on perfusion imaging with likely breast attenuation image artifact.    -Holter monitor 6/16/2022 showing predominantly sinus rhythm average heart rate 72 bpm with frequent ventricular ectopic activity accounting for 24.1% of blood heartbeats with no sustained ventricular tachycardia present and accompanying patient diary noting symptoms correlating with sinus rhythm with PVCs.      Past Surgical History:   Procedure Laterality Date    ENDOMETRIAL ABLATION  2012    EPIDURAL BLOCK INJECTION Right 01/07/2020    Procedure: Right L4-5 transforaminal epidural steroid injection 45698;  Surgeon: Pérez Espitia MD;  Location: [facility];  Service: Pain Management     EPIDURAL BLOCK INJECTION Right 05/19/2020    Procedure: Right L4-L5 Transforaminal Epidural Steroid Injection (10248);  Surgeon: Pérez Espitia MD;  Location: [facility];  Service: Pain Management     FL GUIDED NEEDLE PLAC BX/ASP/INJ  01/07/2020    FL GUIDED NEEDLE PLAC BX/ASP/INJ  05/19/2020    FL GUIDED NEEDLE PLAC BX/ASP/INJ  08/28/2020    JOINT REPLACEMENT Right     Hip    LISFRANC ARTHRODESIS Left 11/17/2022    Procedure: FUSION LIS FRANC L 1,2 TMTJ fusion with intercuneiform.;  Surgeon: Jun Antony DPM;  Location: [facility];  Service: Podiatry    NERVE BLOCK Left 5/18/2023    Procedure: Left Lumbar Sympathetic Nerve Block;  Surgeon: Pérez Espitia MD;  Location: [facility];  Service: Pain Management     DE ARTHRP INTERPOS INTERCARPAL/METACARPAL JOINTS Left 07/30/2018    Procedure: Thumb Arthrex mini tight rope suspension arthroplasty with trapezial resection left thumb;  Surgeon: Nimesh Wallis MD;  Location: [facility];  Service: Orthopedics    DE INJECT SI JOINT ARTHRGRPHY&/ANES/STEROID W/ALESIA  "Right 08/28/2020    Procedure: Sacroiliac joint injection and piriformis muscle steroid Injection;  Surgeon: Pérez Espitia MD;  Location: [facility];  Service: Pain Management     KY OPEN TREATMENT TARSOMETATARSAL JOINT DISLOCATION Left 8/11/2022    Procedure: OPEN REDUCTION W/ INTERNAL FIXATION (ORIF) FOOT, ORIF of the left lis franc ligament;  Surgeon: Jun Antony DPM;  Location: [facility];  Service: Podiatry    THYROID SURGERY      near total thyroidectomy         Review of Systems   Review of Systems   Constitutional:  Negative for chills, diaphoresis, fatigue and fever.   HENT:  Negative for trouble swallowing and voice change.    Eyes:  Negative for pain and redness.   Respiratory:  Negative for shortness of breath and wheezing.    Cardiovascular:  Negative for chest pain, palpitations and leg swelling.   Gastrointestinal:  Negative for abdominal pain, constipation, diarrhea, nausea and vomiting.   Genitourinary:  Negative for dysuria.   Musculoskeletal:  Positive for arthralgias. Negative for neck pain and neck stiffness.   Skin:  Negative for rash.   Neurological:  Negative for dizziness, syncope, light-headedness and headaches.   Psychiatric/Behavioral:  Negative for agitation and confusion.    All other systems reviewed and are negative.         Objective:     /88   Pulse 63   Ht 5' 4\" (1.626 m)   Wt 75.3 kg (166 lb)   BMI 28.49 kg/m²     PHYSICAL EXAM:  Physical Exam  Vitals reviewed.   Constitutional:       General: She is not in acute distress.     Appearance: Normal appearance. She is not diaphoretic.   HENT:      Head: Normocephalic and atraumatic.   Eyes:      General:         Right eye: No discharge.         Left eye: No discharge.   Neck:      Comments: Trachea midline, no significant JVD appreciated  Cardiovascular:      Rate and Rhythm: Normal rate and regular rhythm.      Heart sounds: Murmur (OMER) heard.      No friction rub.   Pulmonary:      Effort: Pulmonary " effort is normal. No respiratory distress.      Breath sounds: No wheezing.   Chest:      Chest wall: No tenderness.   Abdominal:      General: Bowel sounds are normal.      Palpations: Abdomen is soft.      Tenderness: There is no abdominal tenderness. There is no rebound.   Musculoskeletal:      Right lower leg: No edema.      Left lower leg: No edema.   Skin:     General: Skin is warm and dry.   Neurological:      Mental Status: She is alert.      Comments: Awake, alert, able to answer questions appropriately, able to move extremities bilaterally.   Psychiatric:         Mood and Affect: Mood normal.         Behavior: Behavior normal.        Meds reviewed.  Current Outpatient Medications on File Prior to Visit   Medication Sig Dispense Refill    Ascorbic Acid (VITAMIN C) 250 MG CHEW Chew 1 tablet daily      Calcium 600 MG tablet Take 2 tablets by mouth daily      celecoxib (CeleBREX) 200 mg capsule Take 200 mg by mouth 2 (two) times daily after meals      Cholecalciferol (VITAMIN D3) 1000 units CAPS Take by mouth daily after breakfast      fluticasone (FLONASE) 50 mcg/act nasal spray 1 spray into each nostril in the morning. (Patient taking differently: 1 spray into each nostril as needed) 16 g 0    hydrocortisone 2.5 % ointment Apply topically      Multiple Vitamin (DAILY VALUE MULTIVITAMIN PO) Take 1 tablet by mouth daily      Multiple Vitamins-Minerals (PRESERVISION AREDS 2 PO) Take by mouth daily      nystatin (MYCOSTATIN) powder Apply topically as needed      Omega-3 Fatty Acids (Fish Oil) 1200 MG CAPS Take 1 capsule by mouth daily      psyllium (METAMUCIL) 58.6 % powder Take 1 packet by mouth daily      [DISCONTINUED] atorvastatin (LIPITOR) 10 mg tablet Take 1 tablet (10 mg total) by mouth daily 90 tablet 3    [DISCONTINUED] hydroCHLOROthiazide 12.5 mg tablet Take 1 tablet (12.5 mg total) by mouth every morning 90 tablet 0    [DISCONTINUED] losartan (COZAAR) 25 mg tablet Take 0.5 tablets (12.5 mg total) by  mouth daily 45 tablet 3    [DISCONTINUED] metoprolol tartrate (LOPRESSOR) 25 mg tablet Take 1 tablet (25 mg total) by mouth every 12 (twelve) hours 180 tablet 3     No current facility-administered medications on file prior to visit.      Past Medical History:   Diagnosis Date    Arthritis     GERD (gastroesophageal reflux disease)     rarely    Hyperlipidemia     Hypertension     Intracranial aneurysm     resolved 11/17/17 - possible R P-comm region aneurysm measuring 3mm found on MRI results    Irregular heart beat     Mitral valve insufficiency, unspecified etiology     PONV (postoperative nausea and vomiting)     PVC (premature ventricular contraction)     Reactive depression     last assessed 12/15/15           Social History     Tobacco Use   Smoking Status Never   Smokeless Tobacco Never   Tobacco Comments    only smoked as a teenager       Family History   Problem Relation Age of Onset    Arthritis Mother     Heart disease Mother         CAD    Arthritis Father     Lymphoma Father         Hodgkin's    Breast cancer Sister 66    No Known Problems Sister     No Known Problems Daughter     No Known Problems Daughter     No Known Problems Maternal Grandmother     No Known Problems Maternal Grandfather     No Known Problems Paternal Grandmother     No Known Problems Paternal Grandfather     No Known Problems Son     No Known Problems Maternal Aunt     No Known Problems Paternal Aunt

## 2024-11-20 ENCOUNTER — OFFICE VISIT (OUTPATIENT)
Dept: FAMILY MEDICINE CLINIC | Facility: CLINIC | Age: 64
End: 2024-11-20
Payer: COMMERCIAL

## 2024-11-20 VITALS
WEIGHT: 167 LBS | DIASTOLIC BLOOD PRESSURE: 80 MMHG | OXYGEN SATURATION: 99 % | BODY MASS INDEX: 28.51 KG/M2 | HEIGHT: 64 IN | SYSTOLIC BLOOD PRESSURE: 128 MMHG | RESPIRATION RATE: 17 BRPM | TEMPERATURE: 98.1 F | HEART RATE: 63 BPM

## 2024-11-20 DIAGNOSIS — Z12.31 BREAST CANCER SCREENING BY MAMMOGRAM: ICD-10-CM

## 2024-11-20 DIAGNOSIS — Z00.00 ANNUAL PHYSICAL EXAM: Primary | ICD-10-CM

## 2024-11-20 DIAGNOSIS — Z12.11 COLON CANCER SCREENING: ICD-10-CM

## 2024-11-20 PROCEDURE — 99396 PREV VISIT EST AGE 40-64: CPT | Performed by: PHYSICIAN ASSISTANT

## 2024-11-20 NOTE — PATIENT INSTRUCTIONS
"Patient Education     Routine physical for adults   The Basics   Written by the doctors and editors at [facility]   What is a physical? -- A physical is a routine visit, or \"check-up,\" with your doctor. You might also hear it called a \"wellness visit\" or \"preventive visit.\"  During each visit, the doctor will:   Ask about your physical and mental health   Ask about your habits, behaviors, and lifestyle   Do an exam   Give you vaccines if needed   Talk to you about any medicines you take   Give advice about your health   Answer your questions  Getting regular check-ups is an important part of taking care of your health. It can help your doctor find and treat any problems you have. But it's also important for preventing health problems.  A routine physical is different from a \"sick visit.\" A sick visit is when you see a doctor because of a health concern or problem. Since physicals are scheduled ahead of time, you can think about what you want to ask the doctor.  How often should I get a physical? -- It depends on your age and health. In general, for people age 21 years and older:   If you are younger than 50 years, you might be able to get a physical every 3 years.   If you are 50 years or older, your doctor might recommend a physical every year.  If you have an ongoing health condition, like diabetes or high blood pressure, your doctor will probably want to see you more often.  What happens during a physical? -- In general, each visit will include:   Physical exam - The doctor or nurse will check your height, weight, heart rate, and blood pressure. They will also look at your eyes and ears. They will ask about how you are feeling and whether you have any symptoms that bother you.   Medicines - It's a good idea to bring a list of all the medicines you take to each doctor visit. Your doctor will talk to you about your medicines and answer any questions. Tell them if you are having any side effects that bother you. You " "should also tell them if you are having trouble paying for any of your medicines.   Habits and behaviors - This includes:   Your diet   Your exercise habits   Whether you smoke, drink alcohol, or use drugs   Whether you are sexually active   Whether you feel safe at home  Your doctor will talk to you about things you can do to improve your health and lower your risk of health problems. They will also offer help and support. For example, if you want to quit smoking, they can give you advice and might prescribe medicines. If you want to improve your diet or get more physical activity, they can help you with this, too.   Lab tests, if needed - The tests you get will depend on your age and situation. For example, your doctor might want to check your:   Cholesterol   Blood sugar   Iron level   Vaccines - The recommended vaccines will depend on your age, health, and what vaccines you already had. Vaccines are very important because they can prevent certain serious or deadly infections.   Discussion of screening - \"Screening\" means checking for diseases or other health problems before they cause symptoms. Your doctor can recommend screening based on your age, risk, and preferences. This might include tests to check for:   Cancer, such as breast, prostate, cervical, ovarian, colorectal, prostate, lung, or skin cancer   Sexually transmitted infections, such as chlamydia and gonorrhea   Mental health conditions like depression and anxiety  Your doctor will talk to you about the different types of screening tests. They can help you decide which screenings to have. They can also explain what the results might mean.   Answering questions - The physical is a good time to ask the doctor or nurse questions about your health. If needed, they can refer you to other doctors or specialists, too.  Adults older than 65 years often need other care, too. As you get older, your doctor will talk to you about:   How to prevent falling at " home   Hearing or vision tests   Memory testing   How to take your medicines safely   Making sure that you have the help and support you need at home  All topics are updated as new evidence becomes available and our peer review process is complete.  This topic retrieved from [employer] on: May 02, 2024.  Topic 695471 Version 1.0  Release: 32.4.3 - C32.122  © 2024 UpToDate, Inc. and/or its affiliates. All rights reserved.  Consumer Information Use and Disclaimer   Disclaimer: This generalized information is a limited summary of diagnosis, treatment, and/or medication information. It is not meant to be comprehensive and should be used as a tool to help the user understand and/or assess potential diagnostic and treatment options. It does NOT include all information about conditions, treatments, medications, side effects, or risks that may apply to a specific patient. It is not intended to be medical advice or a substitute for the medical advice, diagnosis, or treatment of a health care provider based on the health care provider's examination and assessment of a patient's specific and unique circumstances. Patients must speak with a health care provider for complete information about their health, medical questions, and treatment options, including any risks or benefits regarding use of medications. This information does not endorse any treatments or medications as safe, effective, or approved for treating a specific patient. UpToDate, Inc. and its affiliates disclaim any warranty or liability relating to this information or the use thereof.The use of this information is governed by the Terms of Use, available at https://www.wolters[employer]WN[employer] ADVANTAGEuwer.com/en/know/clinical-effectiveness-terms. 2024© UpToDate, Inc. and its affiliates and/or licensors. All rights reserved.  Copyright   © 2024 UpToDate, Inc. and/or its affiliates. All rights reserved.

## 2024-11-20 NOTE — PROGRESS NOTES
Adult Annual Physical  Name: Deanna Sheriff      : 1960      MRN: [id number]  Encounter Provider: Ashley Aquino PA-C  Encounter Date: 2024   Encounter department: [facility]    Assessment & Plan  Annual physical exam         Breast cancer screening by mammogram    Orders:    Mammo screening bilateral w 3d and cad; Future    Colon cancer screening    Orders:    Ambulatory Referral to Gastroenterology; Future      Immunizations and preventive care screenings were discussed with patient today. Appropriate education was printed on patient's after visit summary.    Counseling:  Dental Health: discussed importance of regular tooth brushing, flossing, and dental visits.      Depression Screening and Follow-up Plan: Patient was screened for depression during today's encounter. They screened negative with a PHQ-2 score of 0.        History of Present Illness     Adult Annual Physical:  Patient presents for annual physical. Deanna is here today for annual visit. No complaints at this time..     Diet and Physical Activity:  - Diet/Nutrition: well balanced diet.  - Exercise: no formal exercise.    Depression Screening:  - PHQ-2 Score: 0    General Health:    - Hearing: normal hearing right ear and normal hearing left ear.  - Vision: no vision problems and goes for regular eye exams.  - Dental: regular dental visits.    Review of Systems   Constitutional:  Negative for chills and fever.   HENT:  Negative for ear pain and sore throat.    Eyes:  Negative for pain and visual disturbance.   Respiratory:  Negative for cough and shortness of breath.    Cardiovascular:  Negative for chest pain and palpitations.   Gastrointestinal:  Negative for abdominal pain and vomiting.   Genitourinary:  Negative for dysuria and hematuria.   Musculoskeletal:  Positive for gait problem. Negative for arthralgias and back pain.   Skin:  Negative for color change and rash.   Neurological:  Negative for seizures and syncope.  "  All other systems reviewed and are negative.        Objective   /80 (BP Location: Left arm, Patient Position: Sitting, Cuff Size: Standard)   Pulse 63   Temp 98.1 °F (36.7 °C) (Temporal)   Resp 17   Ht 5' 4\" (1.626 m)   Wt 75.8 kg (167 lb)   SpO2 99%   BMI 28.67 kg/m²     Physical Exam  Vitals reviewed.   Constitutional:       General: She is not in acute distress.     Appearance: She is well-developed. She is not diaphoretic.   HENT:      Head: Normocephalic and atraumatic.      Right Ear: Hearing, tympanic membrane, ear canal and external ear normal.      Left Ear: Hearing, tympanic membrane, ear canal and external ear normal.      Nose: Nose normal.      Mouth/Throat:      Mouth: Mucous membranes are moist.      Pharynx: Oropharynx is clear. Uvula midline. No oropharyngeal exudate.   Eyes:      General: No scleral icterus.        Right eye: No discharge.         Left eye: No discharge.      Conjunctiva/sclera: Conjunctivae normal.   Neck:      Thyroid: No thyromegaly.      Vascular: No carotid bruit.   Cardiovascular:      Rate and Rhythm: Normal rate and regular rhythm.      Heart sounds: Normal heart sounds. No murmur heard.  Pulmonary:      Effort: Pulmonary effort is normal. No respiratory distress.      Breath sounds: Normal breath sounds. No wheezing.   Abdominal:      General: Bowel sounds are normal. There is no distension.      Palpations: Abdomen is soft. There is no mass.      Tenderness: There is no abdominal tenderness. There is no guarding or rebound.   Musculoskeletal:         General: No tenderness. Normal range of motion.      Cervical back: Neck supple.   Lymphadenopathy:      Cervical: No cervical adenopathy.   Skin:     General: Skin is warm and dry.      Findings: No erythema or rash.   Neurological:      Mental Status: She is alert and oriented to person, place, and time.   Psychiatric:         Behavior: Behavior normal.         Thought Content: Thought content normal.         " Judgment: Judgment normal.

## 2024-12-06 ENCOUNTER — HOSPITAL ENCOUNTER (OUTPATIENT)
Dept: MAMMOGRAPHY | Facility: HOSPITAL | Age: 64
Discharge: HOME | End: 2024-12-06
Payer: COMMERCIAL

## 2024-12-06 VITALS — WEIGHT: 167 LBS | HEIGHT: 64 IN | BODY MASS INDEX: 28.51 KG/M2

## 2024-12-06 DIAGNOSIS — Z12.31 BREAST CANCER SCREENING BY MAMMOGRAM: ICD-10-CM

## 2024-12-06 PROCEDURE — 77067 SCR MAMMO BI INCL CAD: CPT

## 2024-12-06 PROCEDURE — 77063 BREAST TOMOSYNTHESIS BI: CPT

## 2024-12-09 ENCOUNTER — RESULTS FOLLOW-UP (OUTPATIENT)
Dept: FAMILY MEDICINE CLINIC | Facility: CLINIC | Age: 64
End: 2024-12-09

## 2025-01-22 ENCOUNTER — OFFICE VISIT (OUTPATIENT)
Dept: FAMILY MEDICINE CLINIC | Facility: CLINIC | Age: 65
End: 2025-01-22
Payer: COMMERCIAL

## 2025-01-22 VITALS
BODY MASS INDEX: 27.98 KG/M2 | WEIGHT: 163 LBS | OXYGEN SATURATION: 99 % | DIASTOLIC BLOOD PRESSURE: 108 MMHG | SYSTOLIC BLOOD PRESSURE: 180 MMHG | TEMPERATURE: 96.3 F | HEART RATE: 82 BPM

## 2025-01-22 DIAGNOSIS — J06.9 UPPER RESPIRATORY TRACT INFECTION, UNSPECIFIED TYPE: Primary | ICD-10-CM

## 2025-01-22 PROCEDURE — 99213 OFFICE O/P EST LOW 20 MIN: CPT | Performed by: FAMILY MEDICINE

## 2025-01-22 RX ORDER — CHOLECALCIFEROL (VITAMIN D3) 50 MCG
1 TABLET ORAL EVERY 24 HOURS
COMMUNITY

## 2025-01-22 RX ORDER — AZITHROMYCIN 250 MG/1
TABLET, FILM COATED ORAL
Qty: 6 TABLET | Refills: 0 | Status: SHIPPED | OUTPATIENT
Start: 2025-01-22 | End: 2025-01-26

## 2025-01-22 NOTE — PROGRESS NOTES
Name: Deanna Sheriff      : 1960      MRN: [id number]  Encounter Provider: Colton Tilley DO  Encounter Date: 2025   Encounter department: [facility]  :  Assessment & Plan  Upper respiratory tract infection, unspecified type                History of Present Illness   His present care with that upper respiratory tract infection she probably caught this from her son she has sinus congestion redness of the nasal mucosa postnasal drip redness of the throat she does not have any symptoms suggestive of COVID she can taste her food the    Cough  Pertinent negatives include no chest pain, fever, headaches, myalgias, rash, shortness of breath or wheezing. There is no history of environmental allergies.     Review of Systems   Constitutional:  Positive for activity change. Negative for appetite change, diaphoresis, fatigue and fever.   HENT:  Positive for sinus pressure and sinus pain.    Eyes: Negative.    Respiratory:  Positive for cough. Negative for apnea, chest tightness, shortness of breath and wheezing.    Cardiovascular:  Negative for chest pain, palpitations and leg swelling.   Gastrointestinal:  Negative for abdominal distention, abdominal pain, anal bleeding, constipation, diarrhea, nausea and vomiting.   Endocrine: Negative for cold intolerance, heat intolerance, polydipsia, polyphagia and polyuria.   Genitourinary:  Negative for difficulty urinating, dysuria, flank pain, hematuria and urgency.   Musculoskeletal:  Negative for arthralgias, back pain, gait problem, joint swelling and myalgias.   Skin:  Negative for color change, rash and wound.   Allergic/Immunologic: Negative for environmental allergies, food allergies and immunocompromised state.   Neurological:  Negative for dizziness, seizures, syncope, speech difficulty, numbness and headaches.   Hematological:  Negative for adenopathy. Does not bruise/bleed easily.   Psychiatric/Behavioral:  Negative for agitation, behavioral  problems, hallucinations, sleep disturbance and suicidal ideas.        Objective   BP (!) 180/108 (BP Location: Left arm, Patient Position: Sitting, Cuff Size: Standard)   Pulse 82   Temp (!) 96.3 °F (35.7 °C) (Temporal)   Wt 73.9 kg (163 lb)   SpO2 99%   BMI 27.98 kg/m²      Physical Exam  Constitutional:       Appearance: She is obese. She is ill-appearing.   Cardiovascular:      Rate and Rhythm: Normal rate and regular rhythm.   Pulmonary:      Effort: Pulmonary effort is normal.      Breath sounds: Normal breath sounds.   Neurological:      Mental Status: She is alert.

## 2025-01-30 ENCOUNTER — OFFICE VISIT (OUTPATIENT)
Dept: PODIATRY | Facility: CLINIC | Age: 65
End: 2025-01-30
Payer: OTHER MISCELLANEOUS

## 2025-01-30 ENCOUNTER — APPOINTMENT (OUTPATIENT)
Dept: RADIOLOGY | Facility: CLINIC | Age: 65
End: 2025-01-30
Payer: COMMERCIAL

## 2025-01-30 VITALS — WEIGHT: 163 LBS | HEIGHT: 64 IN | BODY MASS INDEX: 27.83 KG/M2

## 2025-01-30 DIAGNOSIS — G57.72 COMPLEX REGIONAL PAIN SYNDROME TYPE 2 OF LEFT LOWER EXTREMITY: ICD-10-CM

## 2025-01-30 DIAGNOSIS — S92.902K CLOSED FRACTURE OF LEFT FOOT WITH NONUNION, SUBSEQUENT ENCOUNTER: Primary | ICD-10-CM

## 2025-01-30 DIAGNOSIS — S92.902K CLOSED FRACTURE OF LEFT FOOT WITH NONUNION, SUBSEQUENT ENCOUNTER: ICD-10-CM

## 2025-01-30 PROCEDURE — 73630 X-RAY EXAM OF FOOT: CPT

## 2025-01-30 PROCEDURE — 99214 OFFICE O/P EST MOD 30 MIN: CPT | Performed by: PODIATRIST

## 2025-01-30 NOTE — PROGRESS NOTES
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 Hampton, the Worker's Comp. physician in the spine and pain doctors  - Most of the physicians here are in agreement that she does have complex regional pain syndrome  - I continue to believe that she does have complex regional pain syndrome that was related to her crush injury that she experienced at work.  With review of the chart I believe that she initially had a crush injury with a Lisfranc injury, yes, she does have a nonunion in the intercuneiform area with review of the x-ray, but the x-rays do show good osseous bridging and fusion at the first TMT J and second TMT J.  - With the minimal amount of motion across the intercuneiform area, I think that her pain is being substantially amplified by some neuritic issues here  - She continues to be disabled, this is not an ideal outcome by any means  - She cannot tolerate her activities of daily living and both the patient and her  are tearful during the examination and discussing the continued pain and how this is completely impacting her life  - I do believe that Deanna would like to return to work and live a normal life, but I do also believe that with her current status of her foot and the severe limitations right now that she cannot  - Furthermore, I am incredibly apprehensive about removing her hardware with the severe hypersensitivity that she does have  - On examination I disagree with the Worker's Comp. physician that she does not have mottling, she has delayed capillary fill time and she has  "difference in pigmentation bilaterally, her left hallux especially is much more purplish in color than the right.  Does not change during the entirety of our examination.  - I will remove the hardware in the future if there are no other further options available for treatment, but I am incredibly apprehensive about doing this and worsening her overall clinical situation  - I am thankful that this is not her right foot because at least she can drive.  -She also has substantial pain and hypersensitivity at night which I think is important here for diagnosis.     History of Present Illness   HPI  Deanna Sheriff is a 64 y.o. female who presents evaluation and management of her left foot.  She has been dealing with this since the accident in September 2021.  They are both visibly agitated, she reports continued inability to go up and down the stairs, they have installed a chairlift in her house, she uses an assistive device to walk she cannot tolerate her activities of daily living at home.  She states that she cannot carry a laundry basket up and down the stairs.  She cannot stand on her feet without experiencing substantial swelling worsening pain for longer than 15 minutes.  At night, she notes that even the sheets bother her, and she feels the need to expose her foot to the air to allow her to sleep.  Just putting on a shoe is difficult, at the beginning of the visit her  had to help her remove the shoe very gingerly.  She has had multiple spinal stimulators and she is still having substantial debilitating pain.  Her initial injury was a crush injury.  She states that she is even considered having this cut off, multiple times after a long days and severely painful days \"I wish this was just cut off.\"          Review of Systems   Constitutional:  Negative for chills and fever.   HENT:  Negative for ear pain and sore throat.    Eyes:  Negative for pain and visual disturbance.   Respiratory:  Negative for " "cough and shortness of breath.    Cardiovascular:  Negative for chest pain and palpitations.   Gastrointestinal:  Negative for abdominal pain and vomiting.   Genitourinary:  Negative for dysuria and hematuria.   Musculoskeletal:  Negative for arthralgias and back pain.   Skin:  Negative for color change and rash.   Neurological:  Negative for seizures and syncope.   All other systems reviewed and are negative.         Objective   Ht 5' 4\" (1.626 m)   Wt 73.9 kg (163 lb)   BMI 27.98 kg/m²      Physical Exam  Vitals reviewed.   Constitutional:       Appearance: Normal appearance.   HENT:      Head: Normocephalic and atraumatic.      Nose: Nose normal.      Mouth/Throat:      Mouth: Mucous membranes are moist.   Eyes:      Pupils: Pupils are equal, round, and reactive to light.   Genitourinary:     Rectum: Normal.   Musculoskeletal:      Comments: There is asymmetric discoloration to the bilateral feet, she has delayed capillary fill time on the left when compared to the right.  She does have hammertoes bilaterally but they are now substantially worse and more rigid on the left.  There is mild asymmetric swelling but especially overlying the incision sites remain healed and her overall clinical appearance of the foot remains acceptable from a postsurgical standpoint.   Skin:     Capillary Refill: Capillary refill takes more than 3 seconds.   Neurological:      General: No focal deficit present.      Mental Status: She is alert and oriented to person, place, and time. Mental status is at baseline.           "

## 2025-03-05 ENCOUNTER — TELEPHONE (OUTPATIENT)
Dept: CARDIOLOGY CLINIC | Facility: CLINIC | Age: 65
End: 2025-03-05

## 2025-03-05 NOTE — TELEPHONE ENCOUNTER
Left a message on patients phone that her 5/12/25 appt with Dr Godoy in the [place] office needs to be rescheduled due to schedule changes.  Told her to call [phone] to reschedule.

## 2025-03-10 ENCOUNTER — OFFICE VISIT (OUTPATIENT)
Dept: PODIATRY | Facility: CLINIC | Age: 65
End: 2025-03-10
Payer: OTHER MISCELLANEOUS

## 2025-03-10 VITALS — WEIGHT: 163 LBS | HEIGHT: 64 IN | BODY MASS INDEX: 27.83 KG/M2

## 2025-03-10 DIAGNOSIS — S92.902K CLOSED FRACTURE OF LEFT FOOT WITH NONUNION, SUBSEQUENT ENCOUNTER: Primary | ICD-10-CM

## 2025-03-10 DIAGNOSIS — G57.72 COMPLEX REGIONAL PAIN SYNDROME TYPE 2 OF LEFT LOWER EXTREMITY: ICD-10-CM

## 2025-03-10 PROCEDURE — 99213 OFFICE O/P EST LOW 20 MIN: CPT | Performed by: PODIATRIST

## 2025-03-10 NOTE — PROGRESS NOTES
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  She has difficulty with stairs that she has difficulty standing on 1 feet she has a lot of difficulty picking up heavy objects.  She also cannot carry a laundry basket up and down the stairs by herself, she cannot carry a heavy stack of dishes from the  and place it away  -She \"states that if she did not have her  she would be on sure what she would do.\"  She states that her  helps out a lot around the house with the cleaning lower actives of daily living that she used to be able to fill herself.   -They furthermore note as a couple that their life has changed, they find themselves going to places that have a easier injury and exits to go out to date  - They do not necessarily do dates that are active anymore and they do not really go around and hike they do not really go around and walk but they more go out to dinner and basically sit  -She states that she misses doing a lot of things with her granddaughter, she states that she has difficulty getting up and down off the floor with her she cannot run after her she is about to turn 11.  She can jump up and down, she cannot spend she cannot really dance with her.  She feels as though her foot continues to limit her in a lot of ways.  Although she is try to be very compliant with all the treatment that has provided she feels disabled, she feels limited.  She feels older than she should be at this point in time.  -She also put a stair lift in to allow her to go up the stairs a lot more effectively, " "without having to use the stairs  - They have also remodeled their house to put a second bathroom and the bottom floor, all of these above action should indicate that she has really tried to be compliant and also go back to work but she is still having severe difficulties limiting her due to the extremity itself    History of Present Illness   HPI  Deanna Sheriff is a 64 y.o. female who presents  for evaluation and management of her continued left foot pain. She is going to have a hearing on 4/17. She had a preliminary hearing and they are arguing that she does not have CRPS and really only has \"inflammation.\" They are visibly frustrated with this process. She also had issue with her last pain stimulator as well.             Review of Systems   Constitutional:  Negative for chills and fever.   HENT:  Negative for ear pain and sore throat.    Eyes:  Negative for pain and visual disturbance.   Respiratory:  Negative for cough and shortness of breath.    Cardiovascular:  Negative for chest pain and palpitations.   Gastrointestinal:  Negative for abdominal pain and vomiting.   Genitourinary:  Negative for dysuria and hematuria.   Musculoskeletal:  Negative for arthralgias and back pain.   Skin:  Negative for color change and rash.   Neurological:  Negative for seizures and syncope.   All other systems reviewed and are negative.         Objective   Ht 5' 4\" (1.626 m)   Wt 73.9 kg (163 lb)   BMI 27.98 kg/m²      Physical Exam  Vitals reviewed.   Constitutional:       Appearance: Normal appearance.   HENT:      Head: Normocephalic and atraumatic.      Nose: Nose normal.      Mouth/Throat:      Mouth: Mucous membranes are moist.   Eyes:      Pupils: Pupils are equal, round, and reactive to light.   Pulmonary:      Effort: Pulmonary effort is normal.   Abdominal:      General: Abdomen is flat.   Skin:     Comments: No improvement or change in her physical exam since the previous visit. Continue pain, continued " parathesias. Continued pain with ROM.    Neurological:      Mental Status: She is alert.

## 2025-03-28 ENCOUNTER — PREP FOR PROCEDURE (OUTPATIENT)
Age: 65
End: 2025-03-28

## 2025-03-28 ENCOUNTER — TELEPHONE (OUTPATIENT)
Age: 65
End: 2025-03-28

## 2025-03-28 DIAGNOSIS — Z12.11 SCREENING FOR COLON CANCER: Primary | ICD-10-CM

## 2025-03-28 NOTE — TELEPHONE ENCOUNTER
03/28/25  Screened by: Milton Barrett    Referring Provider ANNIE Aquino    Pre- Screening:     There is no height or weight on file to calculate BMI.  Has patient been referred for a routine screening Colonoscopy? yes  Is the patient between 45-75 years old? yes      Previous Colonoscopy no   If yes:       Does the patient want to see a Gastroenterologist prior to their procedure OR are they having any GI symptoms? no    Has the patient been hospitalized or had abdominal surgery in the past 6 months? no    Does the patient use supplemental oxygen? no    Does the patient take Coumadin, Lovenox, Plavix, Elliquis, Xarelto, or other blood thinning medication? no    Has the patient had a stroke, cardiac event, or stent placed in the past year? no    If patient is between 45yrs - 49yrs, please advise patient that we will have to confirm benefits & coverage with their insurance company for a routine screening colonoscopy.

## 2025-03-28 NOTE — TELEPHONE ENCOUNTER
Scheduled date of colonoscopy (as of today):04.11.25    Physician performing colonoscopy:Ohm    Location of colonoscopy:[facility]    Bowel prep reviewed with patient:Amilcar/Radha    Instructions reviewed with patient by:Lsims and sent to chart

## 2025-04-10 RX ORDER — LIDOCAINE HYDROCHLORIDE 10 MG/ML
0.5 INJECTION, SOLUTION EPIDURAL; INFILTRATION; INTRACAUDAL; PERINEURAL ONCE AS NEEDED
Status: CANCELLED | OUTPATIENT
Start: 2025-04-10

## 2025-04-10 RX ORDER — SODIUM CHLORIDE, SODIUM LACTATE, POTASSIUM CHLORIDE, CALCIUM CHLORIDE 600; 310; 30; 20 MG/100ML; MG/100ML; MG/100ML; MG/100ML
125 INJECTION, SOLUTION INTRAVENOUS CONTINUOUS
Status: CANCELLED | OUTPATIENT
Start: 2025-04-10

## 2025-04-11 ENCOUNTER — HOSPITAL ENCOUNTER (OUTPATIENT)
Dept: PERIOP | Facility: HOSPITAL | Age: 65
Setting detail: OUTPATIENT SURGERY
End: 2025-04-11
Attending: STUDENT IN AN ORGANIZED HEALTH CARE EDUCATION/TRAINING PROGRAM
Payer: COMMERCIAL

## 2025-04-11 ENCOUNTER — ANESTHESIA (OUTPATIENT)
Dept: PERIOP | Facility: HOSPITAL | Age: 65
End: 2025-04-11
Payer: COMMERCIAL

## 2025-04-11 ENCOUNTER — ANESTHESIA EVENT (OUTPATIENT)
Dept: PERIOP | Facility: HOSPITAL | Age: 65
End: 2025-04-11
Payer: COMMERCIAL

## 2025-04-11 VITALS
OXYGEN SATURATION: 100 % | RESPIRATION RATE: 18 BRPM | WEIGHT: 164 LBS | HEART RATE: 66 BPM | HEIGHT: 64 IN | TEMPERATURE: 97.9 F | BODY MASS INDEX: 28 KG/M2 | DIASTOLIC BLOOD PRESSURE: 70 MMHG | SYSTOLIC BLOOD PRESSURE: 139 MMHG

## 2025-04-11 DIAGNOSIS — Z12.11 SCREENING FOR COLON CANCER: ICD-10-CM

## 2025-04-11 PROCEDURE — 88305 TISSUE EXAM BY PATHOLOGIST: CPT | Performed by: PATHOLOGY

## 2025-04-11 PROCEDURE — 45385 COLONOSCOPY W/LESION REMOVAL: CPT | Performed by: STUDENT IN AN ORGANIZED HEALTH CARE EDUCATION/TRAINING PROGRAM

## 2025-04-11 RX ORDER — LIDOCAINE HYDROCHLORIDE 10 MG/ML
0.5 INJECTION, SOLUTION EPIDURAL; INFILTRATION; INTRACAUDAL; PERINEURAL ONCE AS NEEDED
Status: DISCONTINUED | OUTPATIENT
Start: 2025-04-11 | End: 2025-04-15 | Stop reason: HOSPADM

## 2025-04-11 RX ORDER — PROPOFOL 10 MG/ML
INJECTION, EMULSION INTRAVENOUS AS NEEDED
Status: DISCONTINUED | OUTPATIENT
Start: 2025-04-11 | End: 2025-04-11

## 2025-04-11 RX ORDER — SODIUM CHLORIDE, SODIUM LACTATE, POTASSIUM CHLORIDE, CALCIUM CHLORIDE 600; 310; 30; 20 MG/100ML; MG/100ML; MG/100ML; MG/100ML
125 INJECTION, SOLUTION INTRAVENOUS CONTINUOUS
Status: DISCONTINUED | OUTPATIENT
Start: 2025-04-11 | End: 2025-04-15 | Stop reason: HOSPADM

## 2025-04-11 RX ORDER — LIDOCAINE HYDROCHLORIDE 10 MG/ML
INJECTION, SOLUTION EPIDURAL; INFILTRATION; INTRACAUDAL; PERINEURAL AS NEEDED
Status: DISCONTINUED | OUTPATIENT
Start: 2025-04-11 | End: 2025-04-11

## 2025-04-11 RX ADMIN — LIDOCAINE HYDROCHLORIDE 50 MG: 10 INJECTION, SOLUTION EPIDURAL; INFILTRATION; INTRACAUDAL; PERINEURAL at 10:49

## 2025-04-11 RX ADMIN — PROPOFOL 150 MG: 10 INJECTION, EMULSION INTRAVENOUS at 10:49

## 2025-04-11 RX ADMIN — PROPOFOL 150 MCG/KG/MIN: 10 INJECTION, EMULSION INTRAVENOUS at 10:50

## 2025-04-11 RX ADMIN — SODIUM CHLORIDE, SODIUM LACTATE, POTASSIUM CHLORIDE, AND CALCIUM CHLORIDE 125 ML/HR: .6; .31; .03; .02 INJECTION, SOLUTION INTRAVENOUS at 09:54

## 2025-04-11 NOTE — H&P
History and Physical - [facility]  Deanna Sheriff 64 y.o. female MRN: [id number]          HPI: Deanna Sheriff is a 64 y.o. year old female who presents for screening colonoscopy.      REVIEW OF SYSTEMS: Per the HPI, and otherwise unremarkable.    Historical Information   Past Medical History:   Diagnosis Date    Arthritis     GERD (gastroesophageal reflux disease)     rarely    Hyperlipidemia     Hypertension     Intracranial aneurysm     resolved 11/17/17 - possible R P-comm region aneurysm measuring 3mm found on MRI results    Irregular heart beat     Mitral valve insufficiency, unspecified etiology     PONV (postoperative nausea and vomiting)     PVC (premature ventricular contraction)     Reactive depression     last assessed 12/15/15     Past Surgical History:   Procedure Laterality Date    ENDOMETRIAL ABLATION  2012    EPIDURAL BLOCK INJECTION Right 01/07/2020    Procedure: Right L4-5 transforaminal epidural steroid injection 03313;  Surgeon: Pérez Espitia MD;  Location: [facility];  Service: Pain Management     EPIDURAL BLOCK INJECTION Right 05/19/2020    Procedure: Right L4-L5 Transforaminal Epidural Steroid Injection (33127);  Surgeon: Pérez Espitia MD;  Location: [facility];  Service: Pain Management     FL GUIDED NEEDLE PLAC BX/ASP/INJ  01/07/2020    FL GUIDED NEEDLE PLAC BX/ASP/INJ  05/19/2020    FL GUIDED NEEDLE PLAC BX/ASP/INJ  08/28/2020    JOINT REPLACEMENT Right     Hip    LISFRANC ARTHRODESIS Left 11/17/2022    Procedure: FUSION LIS FRANC L 1,2 TMTJ fusion with intercuneiform.;  Surgeon: Jun Antony DPM;  Location: [facility];  Service: Podiatry    NERVE BLOCK Left 05/18/2023    Procedure: Left Lumbar Sympathetic Nerve Block;  Surgeon: Pérez Espitia MD;  Location: [facility];  Service: Pain Management     FL ARTHRP INTERCARPAL/CARP/MTCRPL JT INTERPOSITION Left 07/30/2018    Procedure: Thumb Arthrex mini tight rope suspension  arthroplasty with trapezial resection left thumb;  Surgeon: Nimesh Wallis MD;  Location: [facility];  Service: Orthopedics    SD INJECT SI JOINT ARTHRGRPHY&/ANES/STEROID W/ALESIA Right 08/28/2020    Procedure: Sacroiliac joint injection and piriformis muscle steroid Injection;  Surgeon: Pérez Espitia MD;  Location: [facility];  Service: Pain Management     SD OPEN TREATMENT TARSOMETATARSAL JOINT DISLOCATION Left 08/11/2022    Procedure: OPEN REDUCTION W/ INTERNAL FIXATION (ORIF) FOOT, ORIF of the left lis franc ligament;  Surgeon: Jun Antony DPM;  Location: [facility];  Service: Podiatry    SPINAL CORD STIMULATOR TRIAL W/ LAMINOTOMY Left 11/14/2023    THYROID SURGERY      near total thyroidectomy     Social History   Social History     Substance and Sexual Activity   Alcohol Use Yes    Comment: rare     Social History     Substance and Sexual Activity   Drug Use Never    Comment: CBD     Social History     Tobacco Use   Smoking Status Never   Smokeless Tobacco Never   Tobacco Comments    only smoked as a teenager     Family History   Problem Relation Age of Onset    Arthritis Mother     Heart disease Mother         CAD    Arthritis Father     Lymphoma Father         Hodgkin's    Breast cancer Sister 66    No Known Problems Sister     No Known Problems Daughter     No Known Problems Daughter     No Known Problems Maternal Grandmother     No Known Problems Maternal Grandfather     No Known Problems Paternal Grandmother     No Known Problems Paternal Grandfather     No Known Problems Son     No Known Problems Maternal Aunt     No Known Problems Paternal Aunt        Meds/Allergies       Current Outpatient Medications:     Ascorbic Acid (VITAMIN C) 250 MG CHEW    atorvastatin (LIPITOR) 10 mg tablet    Calcium 600 MG tablet    celecoxib (CeleBREX) 200 mg capsule    Cholecalciferol (Vitamin D3) 50 MCG (2000 UT) TABS    hydroCHLOROthiazide 12.5 mg tablet    losartan (COZAAR) 25 mg tablet    metoprolol  "tartrate (LOPRESSOR) 25 mg tablet    Multiple Vitamin (DAILY VALUE MULTIVITAMIN PO)    Multiple Vitamins-Minerals (PRESERVISION AREDS 2 PO)    Omega-3 Fatty Acids (Fish Oil) 1200 MG CAPS    fluticasone (FLONASE) 50 mcg/act nasal spray    hydrocortisone 2.5 % ointment    nystatin (MYCOSTATIN) powder    psyllium (METAMUCIL) 58.6 % powder    Current Facility-Administered Medications:     lactated ringers infusion, 125 mL/hr, Intravenous, Continuous, 125 mL/hr at 04/11/25 0954    lidocaine (PF) (XYLOCAINE-MPF) 1 % injection 0.5 mL, 0.5 mL, Infiltration, Once PRN    No Known Allergies    Objective     /77   Pulse 74   Temp 98 °F (36.7 °C) (Temporal)   Resp 20   Ht 5' 4\" (1.626 m)   Wt 74.4 kg (164 lb)   SpO2 100%   BMI 28.15 kg/m²       PHYSICAL EXAM    GEN: NAD  CARDIO: RRR  PULM: CTA bilaterally  ABD: soft, non-tender, non-distended  EXT: no lower extremity edema  NEURO: AAOx3      ASSESSMENT/PLAN:  64 y.o. year old female here for colonoscopy; she is stable and optimized for her procedure.        "

## 2025-04-11 NOTE — ANESTHESIA PREPROCEDURE EVALUATION
Procedure:  COLONOSCOPY    EF65%, mod MR    Relevant Problems   CARDIO   (+) Hypertension   (+) Nonrheumatic mitral valve regurgitation      MUSCULOSKELETAL   (+) CMC DJD(carpometacarpal degenerative joint disease), localized primary, left   (+) Chronic bilateral low back pain with right-sided sciatica   (+) Primary osteoarthritis of both knees   (+) Primary osteoarthritis of first carpometacarpal joint of left hand      NEURO/PSYCH   (+) Chronic bilateral low back pain with right-sided sciatica   (+) Chronic pain syndrome        Physical Exam    Airway    Mallampati score: II  TM Distance: >3 FB  Neck ROM: full     Dental   No notable dental hx     Cardiovascular  Rhythm: regular, Rate: normal, Cardiovascular exam normal    Pulmonary  Pulmonary exam normal Breath sounds clear to auscultation    Other Findings  post-pubertal.      Anesthesia Plan  ASA Score- 3     Anesthesia Type- IV sedation with anesthesia with ASA Monitors.         Additional Monitors:     Airway Plan:     Comment: Discussed risks/benefits, including medication reactions, awareness, aspiration, and serious/life threatening complications. Plan to maintain native airway with IVGA, monitored with EtCO2.       Plan Factors-Exercise tolerance (METS): >4 METS.    Chart reviewed.    Patient summary reviewed.      Patient instructed to abstain from smoking on day of procedure. Patient did not smoke on day of surgery.            Induction- intravenous.    Postoperative Plan-         Informed Consent- Anesthetic plan and risks discussed with patient.  I personally reviewed this patient with the CRNA. Discussed and agreed on the Anesthesia Plan with the CRNA..      NPO Status:  Vitals Value Taken Time   Date of last liquid 04/11/25 04/11/25 0952   Time of last liquid 0530 04/11/25 0952   Date of last solid 04/09/25 04/11/25 0952   Time of last solid 1800 04/11/25 0952

## 2025-04-11 NOTE — ANESTHESIA POSTPROCEDURE EVALUATION
Post-Op Assessment Note    CV Status:  Stable    Pain management: adequate       Mental Status:  Sleepy   Hydration Status:  Euvolemic   PONV Controlled:  Controlled   Airway Patency:  Patent  Two or more mitigation strategies used for obstructive sleep apnea   Post Op Vitals Reviewed: Yes    No anethesia notable event occurred.    Staff: CRNA           Last Filed PACU Vitals:  Vitals Value Taken Time   Temp 97.3    Pulse 69 04/11/25 1111   /54    Resp 22 04/11/25 1111   SpO2 98 % 04/11/25 1111   Vitals shown include unfiled device data.

## 2025-04-14 PROCEDURE — 88305 TISSUE EXAM BY PATHOLOGIST: CPT | Performed by: PATHOLOGY

## 2025-05-09 ENCOUNTER — RESULTS FOLLOW-UP (OUTPATIENT)
Dept: GASTROENTEROLOGY | Facility: MEDICAL CENTER | Age: 65
End: 2025-05-09

## 2025-05-27 NOTE — INTERVAL H&P NOTE
H&P reviewed  After examining the patient I find no changes in the patients condition since the H&P had been written   Please see anesthesia pre-evaluation note for additional relevant details    Vitals:    11/17/22 1107   BP: (!) 181/88   Pulse: 75   Resp: 16   Temp: 98 °F (36 7 °C)   SpO2: 99% No

## 2025-06-09 ENCOUNTER — OFFICE VISIT (OUTPATIENT)
Dept: URGENT CARE | Facility: MEDICAL CENTER | Age: 65
End: 2025-06-09
Payer: COMMERCIAL

## 2025-06-09 VITALS
WEIGHT: 167.1 LBS | OXYGEN SATURATION: 97 % | RESPIRATION RATE: 16 BRPM | HEIGHT: 64 IN | TEMPERATURE: 98.6 F | DIASTOLIC BLOOD PRESSURE: 66 MMHG | BODY MASS INDEX: 28.53 KG/M2 | HEART RATE: 76 BPM | SYSTOLIC BLOOD PRESSURE: 138 MMHG

## 2025-06-09 DIAGNOSIS — S46.811A STRAIN OF RIGHT TRAPEZIUS MUSCLE, INITIAL ENCOUNTER: Primary | ICD-10-CM

## 2025-06-09 DIAGNOSIS — S46.911A STRAIN OF RIGHT SHOULDER, INITIAL ENCOUNTER: ICD-10-CM

## 2025-06-09 DIAGNOSIS — S43.401A SPRAIN OF RIGHT SHOULDER, UNSPECIFIED SHOULDER SPRAIN TYPE, INITIAL ENCOUNTER: ICD-10-CM

## 2025-06-09 PROCEDURE — G0382 LEV 3 HOSP TYPE B ED VISIT: HCPCS

## 2025-06-09 PROCEDURE — S9083 URGENT CARE CENTER GLOBAL: HCPCS

## 2025-06-09 RX ORDER — METHOCARBAMOL 500 MG/1
500 TABLET, FILM COATED ORAL 4 TIMES DAILY
Qty: 16 TABLET | Refills: 0 | Status: SHIPPED | OUTPATIENT
Start: 2025-06-09

## 2025-06-09 NOTE — PATIENT INSTRUCTIONS
Tylenol/Ibuprofen for pain  Wear sling for comfort as needed  Ice 20 minutes 3-4 times per day for 3 days  Insulate the skin from the ice to prevent frostbite  Rest and Elevate  Follow up with orthopedic if symptoms do not improve

## 2025-06-09 NOTE — PROGRESS NOTES
Orthopedic injury treatment    Date/Time: 2025 3:30 PM    Performed by: SABRINA Hernandez  Authorized by: SABRINA Hernandez    Universal Protocol:  Consent given by: patient  Patient understanding: patient states understanding of the procedure being performed    Injury location:  Shoulder  Location details:  Right shoulder  Injury type:  Soft tissue  Neurovascular status: Neurovascularly intact    Immobilization:  Sling  Neurovascular status: Neurovascularly intact      [facility]  Name: Deanna Sheriff      : 1960      MRN: [id number]  Encounter Provider: SABRINA Hernandez  Encounter Date: 2025   Enc[facility]  :  Assessment & Plan  Sprain of right shoulder, unspecified shoulder sprain type, initial encounter    Orders:    methocarbamol (ROBAXIN) 500 mg tablet; Take 1 tablet (500 mg total) by mouth 4 (four) times a day    Strain of right trapezius muscle, initial encounter         Strain of right shoulder, initial encounter             Patient Instructions  Tylenol/Ibuprofen for pain  Wear sling for comfort as needed  Ice 20 minutes 3-4 times per day for 3 days  Insulate the skin from the ice to prevent frostbite  Rest and Elevate  Follow up with orthopedic if symptoms do not improve  Follow up with PCP in 3-5 days.  Proceed to  ER if symptoms worsen.    If tests are performed, our office will contact you with results only if changes need to made to the care plan discussed with you at the visit. You can review your full results on [facility].    Chief Complaint:   Chief Complaint   Patient presents with    Arm Pain     Started 5 days ago  Right upper arm pain  Pain is achy   Pain is 8/10 with rest, and 8/10 with movement  OTC ibuprofen   Denies any fall, trauma, or MVA     History of Present Illness   Arm Pain   Pertinent negatives include no chest pain.   Shoulder Pain   The pain is present in the neck, back and right shoulder. This is  a new problem. The current episode started in the past 7 days. There has been no history of extremity trauma. The problem occurs constantly. The problem has been unchanged. The pain is at a severity of 7/10. Pertinent negatives include no fever. The symptoms are aggravated by activity. She has tried NSAIDS for the symptoms.         Review of Systems   Constitutional:  Negative for chills and fever.   HENT:  Negative for ear pain and sore throat.    Eyes:  Negative for pain and visual disturbance.   Respiratory:  Negative for cough and shortness of breath.    Cardiovascular:  Negative for chest pain and palpitations.   Gastrointestinal:  Negative for abdominal pain and vomiting.   Genitourinary:  Negative for dysuria and hematuria.   Musculoskeletal:  Positive for arthralgias and myalgias. Negative for back pain.   Skin:  Negative for color change and rash.   Neurological:  Negative for seizures and syncope.   All other systems reviewed and are negative.    Past Medical History   Past Medical History[1]  Past Surgical History[2]  Family History[3]  she reports that she has never smoked. She has never used smokeless tobacco. She reports current alcohol use. She reports that she does not use drugs.  Current Outpatient Medications   Medication Instructions    Ascorbic Acid (VITAMIN C) 250 MG CHEW 1 tablet, Daily    atorvastatin (LIPITOR) 10 mg, Oral, Daily    Calcium 600 MG tablet 2 tablets, Daily    celecoxib (CELEBREX) 200 mg, 2 times daily after meals    Cholecalciferol (Vitamin D3) 50 MCG (2000 UT) TABS 1 capsule, Every 24 hours    fluticasone (FLONASE) 50 mcg/act nasal spray 1 spray, Nasal, Daily    hydroCHLOROthiazide 12.5 mg, Oral, Every morning    hydrocortisone 2.5 % ointment Apply topically    losartan (COZAAR) 12.5 mg, Oral, Daily    methocarbamol (ROBAXIN) 500 mg, Oral, 4 times daily    metoprolol tartrate (LOPRESSOR) 25 mg, Oral, Every 12 hours scheduled    Multiple Vitamin (DAILY VALUE MULTIVITAMIN PO) 1  "tablet, Daily    Multiple Vitamins-Minerals (PRESERVISION AREDS 2 PO) Daily    nystatin (MYCOSTATIN) powder As needed    Omega-3 Fatty Acids (Fish Oil) 1200 MG CAPS 1 capsule, Daily    psyllium (METAMUCIL) 58.6 % powder 1 packet, Daily   Allergies[4]     Objective   /66   Pulse 76   Temp 98.6 °F (37 °C)   Resp 16   Ht 5' 4\" (1.626 m)   Wt 75.8 kg (167 lb 1.6 oz)   SpO2 97%   BMI 28.68 kg/m²      Physical Exam  Vitals and nursing note reviewed.   Constitutional:       General: She is not in acute distress.     Appearance: She is well-developed.   HENT:      Head: Normocephalic and atraumatic.     Eyes:      Conjunctiva/sclera: Conjunctivae normal.       Cardiovascular:      Rate and Rhythm: Normal rate and regular rhythm.      Heart sounds: No murmur heard.  Pulmonary:      Effort: Pulmonary effort is normal. No respiratory distress.      Breath sounds: Normal breath sounds.   Abdominal:      Palpations: Abdomen is soft.      Tenderness: There is no abdominal tenderness.     Musculoskeletal:         General: No swelling.      Right shoulder: Tenderness present.      Cervical back: Neck supple.        Back:       Comments: Tenderness over trapezius muscle      Skin:     General: Skin is warm and dry.      Capillary Refill: Capillary refill takes less than 2 seconds.     Neurological:      Mental Status: She is alert.     Psychiatric:         Mood and Affect: Mood normal.         Portions of the record may have been created with voice recognition software.  Occasional wrong word or \"sound a like\" substitutions may have occurred due to the inherent limitations of voice recognition software.  Read the chart carefully and recognize, using context, where substitutions have occurred.         [1]   Past Medical History:  Diagnosis Date    Arthritis     GERD (gastroesophageal reflux disease)     rarely    Hyperlipidemia     Hypertension     Intracranial aneurysm     resolved 11/17/17 - possible R P-comm region " aneurysm measuring 3mm found on MRI results    Irregular heart beat     Mitral valve insufficiency, unspecified etiology     PONV (postoperative nausea and vomiting)     PVC (premature ventricular contraction)     Reactive depression     last assessed 12/15/15   [2]   Past Surgical History:  Procedure Laterality Date    ENDOMETRIAL ABLATION  2012    EPIDURAL BLOCK INJECTION Right 01/07/2020    Procedure: Right L4-5 transforaminal epidural steroid injection 36173;  Surgeon: Pérez Espitia MD;  [facility]: [facility];  Service: Pain Management     EPIDURAL BLOCK INJECTION Right 05/19/2020    Procedure: Right L4-L5 Transforaminal Epidural Steroid Injection (65200);  Surgeon: Pérez Espitia MD;  Location: [facility];  Service: Pain Management     FL GUIDED NEEDLE PLAC BX/ASP/INJ  01/07/2020    FL GUIDED NEEDLE PLAC BX/ASP/INJ  05/19/2020    FL GUIDED NEEDLE PLAC BX/ASP/INJ  08/28/2020    JOINT REPLACEMENT Right     Hip    LISFRANC ARTHRODESIS Left 11/17/2022    Procedure: FUSION LIS FRANC L 1,2 TMTJ fusion with intercuneiform.;  Surgeon: Jun Antony DPM;  Location: [facility];  Service: Podiatry    NERVE BLOCK Left 05/18/2023    Procedure: Left Lumbar Sympathetic Nerve Block;  Surgeon: Pérez Espitia MD;  [facility]: [facility];  Service: Pain Management     NM ARTHRP INTERCARPAL/CARP/MTCRPL JT INTERPOSITION Left 07/30/2018    Procedure: Thumb Arthrex mini tight rope suspension arthroplasty with trapezial resection left thumb;  Surgeon: Nimesh Wallis MD;  Location: [facility];  Service: Orthopedics    NM INJECT SI JOINT ARTHRGRPHY&/ANES/STEROID W/ALESIA Right 08/28/2020    Procedure: Sacroiliac joint injection and piriformis muscle steroid Injection;  Surgeon: Pérez Espitia MD;  Location: [facility];  Service: Pain Management     NM OPEN TREATMENT TARSOMETATARSAL JOINT DISLOCATION Left 08/11/2022    Procedure: OPEN REDUCTION W/ INTERNAL FIXATION (ORIF) FOOT, ORIF of the left lis franc  ligament;  Surgeon: Jun Antony DPM;  Location: [facility];  Service: Podiatry    SPINAL CORD STIMULATOR TRIAL W/ LAMINOTOMY Left 11/14/2023    THYROID SURGERY      near total thyroidectomy   [3]   Family History  Problem Relation Name Age of Onset    Arthritis Mother      Heart disease Mother          CAD    Arthritis Father      Lymphoma Father          Hodgkin's    Breast cancer Sister ashley 66    No Known Problems Sister      No Known Problems Daughter      No Known Problems Daughter      No Known Problems Maternal Grandmother      No Known Problems Maternal Grandfather      No Known Problems Paternal Grandmother      No Known Problems Paternal Grandfather      No Known Problems Son      No Known Problems Maternal Aunt      No Known Problems Paternal Aunt     [4] No Known Allergies

## 2025-06-24 ENCOUNTER — OFFICE VISIT (OUTPATIENT)
Dept: CARDIOLOGY CLINIC | Facility: CLINIC | Age: 65
End: 2025-06-24
Payer: COMMERCIAL

## 2025-06-24 VITALS
SYSTOLIC BLOOD PRESSURE: 142 MMHG | HEART RATE: 72 BPM | WEIGHT: 166 LBS | BODY MASS INDEX: 28.34 KG/M2 | HEIGHT: 64 IN | DIASTOLIC BLOOD PRESSURE: 82 MMHG

## 2025-06-24 DIAGNOSIS — E78.49 OTHER HYPERLIPIDEMIA: ICD-10-CM

## 2025-06-24 DIAGNOSIS — E66.3 OVERWEIGHT (BMI 25.0-29.9): ICD-10-CM

## 2025-06-24 DIAGNOSIS — I49.3 PVC (PREMATURE VENTRICULAR CONTRACTION): Primary | ICD-10-CM

## 2025-06-24 DIAGNOSIS — I34.0 NONRHEUMATIC MITRAL VALVE REGURGITATION: ICD-10-CM

## 2025-06-24 DIAGNOSIS — I10 PRIMARY HYPERTENSION: ICD-10-CM

## 2025-06-24 PROCEDURE — 99214 OFFICE O/P EST MOD 30 MIN: CPT | Performed by: INTERNAL MEDICINE

## 2025-06-24 NOTE — ASSESSMENT & PLAN NOTE
- Patient denies significant symptoms at this time  -Overall ectopic burden 11.1% on Holter monitor October 2024  -Will repeat 24-hour Holter monitor prior to next office visit along with echocardiographic imaging for monitoring  -Continue metoprolol tartrate 25 mg twice daily

## 2025-06-24 NOTE — ASSESSMENT & PLAN NOTE
- Patient notes blood pressures at home are very well-controlled  -Continue HCTZ 12.5 mg daily, losartan 12.5 mg daily, metoprolol tartrate 25 mg twice daily  -Patient will monitor home blood pressure readings and let our office know if significantly elevated greater than 130/80's mmHg for up titration of medical therapy  -Counseled patient on NSAID avoidance  -Continue to monitor

## 2025-06-24 NOTE — PROGRESS NOTES
Patient ID: Deanna Sheriff is a 65 y.o. female.        Plan:      Assessment & Plan  Nonrheumatic mitral valve regurgitation  - Mild to moderate on transthoracic echocardiogram October 2024  -Patient denies significant symptoms  -Continue to monitor  Primary hypertension  - Patient notes blood pressures at home are very well-controlled  -Continue HCTZ 12.5 mg daily, losartan 12.5 mg daily, metoprolol tartrate 25 mg twice daily  -Patient will monitor home blood pressure readings and let our office know if significantly elevated greater than 130/80's mmHg for up titration of medical therapy  -Counseled patient on NSAID avoidance  -Continue to monitor  PVC (premature ventricular contraction)  - Patient denies significant symptoms at this time  -Overall ectopic burden 11.1% on Holter monitor October 2024  -Will repeat 24-hour Holter monitor prior to next office visit along with echocardiographic imaging for monitoring  -Continue metoprolol tartrate 25 mg twice daily  Other hyperlipidemia  - Counseled patient on dietary and lifestyle modifications  -Continue atorvastatin 10 mg daily  -Continue to monitor  Overweight (BMI 25.0-29.9)  - Counseled patient on dietary and lifestyle modifications  - Continue to monitor      Follow up Plan/Other summary comments:  -Lipid panel 10/23/2024 showing total cholesterol 176, triglyceride 134, HDL 54, LDL 95  - Prior to up titration of medical therapy patient wishes to trial dietary lifestyle modifications  - Will repeat laboratory studies prior to next office visit including CMP, CBC and fasting lipid panel  - Patient will continue current medical regimen with atorvastatin 10 mg daily, hydrochlorothiazide 12.5 mg daily, losartan 12.5 mg daily, metoprolol tartrate 25 mg twice daily  - Will check 24-hour Holter monitor and transthoracic echocardiogram prior to next office visit for monitoring of ectopic activity and cardiac function as well  - Patient counseled on dietary lifestyle  modifications including following a low-salt, low-fat, heart healthy diet with sodium restriction to less than 1800 mg of sodium daily, DASH diet, NSAID avoidance and need for continued physical activity  - Patient will monitor home blood pressure readings let office know if significantly elevated greater than 130/80's MHG for up titration of medical therapy  - I will see patient in 6 months or sooner if necessary  - Patient counseled if she were to have any warning or alarm type symptoms she is to seek emergency medical care immediately.    HPI:   - Patient is a 65-year-old female with complex regional pain syndrome, hypertension, overweight, chronic bilateral knee osteoarthritis who originally presented to the office in June 2022 after being seen and evaluated in emergency department of that year for abnormal ECG.  Patient was found to have significant PVC burden on monitoring and medical therapy was uptitrated with improvement in symptomatology.  She had undergone surgical intervention on multiple occasions to assist with work-related injury to left foot which continued to be an issue.  She presents to the office today for scheduled follow-up.  - Currently in the office patient denies any chest pain, palpitations, lightheadedness or dizziness, loss conscious, shortness of breath, lower extremity edema, orthopnea or [place].  She still does note some ankle issues but is improving on these and was able to be playing in the pool with her granddaughter with no exertional symptoms.  She notes blood pressures at home are well-controlled      Most recent or relevant cardiac/vascular testing:    -Transthoracic echocardiogram 10/3/2024 showing left ventricular systolic function normal estimated LVEF 60% with diastolic parameters normal for age, normal right ventricular systolic function with mild to moderate mitral regurgitation, mild tricuspid regurgitation with IVC normal in size and estimated RVSP 30 mmHg    -Holter  "monitor 10/3/2024 showing predominantly sinus rhythm average heart rate 71 bpm with frequent PVCs accounting for 11.1% of total monitored beats with no sustained ventricular tachycardia present and rare supraventricular ectopic activity with no significant symptoms listed.    -Nuclear stress test 7/25/2022 showing no diagnostic evidence of ischemia on perfusion imaging with likely breast attenuation image artifact.    -Holter monitor 6/16/2022 showing predominantly sinus rhythm average heart rate 72 bpm with frequent ventricular ectopic activity clinic for 24.1 percent of monitored beats with no sustained ventricular tachycardia present and accompanying patient diary noting symptoms correlating with sinus rhythm with PVCs.      Past Surgical History[1]      Review of Systems   Review of Systems   Constitutional:  Negative for chills, diaphoresis, fatigue and fever.   HENT:  Negative for trouble swallowing and voice change.    Eyes:  Negative for pain and redness.   Respiratory:  Negative for shortness of breath and wheezing.    Cardiovascular:  Negative for chest pain, palpitations and leg swelling.   Gastrointestinal:  Negative for abdominal pain, blood in stool, constipation, diarrhea, nausea and vomiting.   Genitourinary:  Negative for dysuria.   Musculoskeletal:  Positive for arthralgias. Negative for neck pain and neck stiffness.   Skin:  Negative for rash.   Neurological:  Negative for dizziness, syncope, light-headedness and headaches.   Psychiatric/Behavioral:  Negative for agitation and confusion.    All other systems reviewed and are negative.       Objective:     /82   Pulse 72   Ht 5' 4\" (1.626 m)   Wt 75.3 kg (166 lb)   BMI 28.49 kg/m²     PHYSICAL EXAM:  Physical Exam  Vitals reviewed.   Constitutional:       General: She is not in acute distress.     Appearance: Normal appearance. She is not diaphoretic.   HENT:      Head: Normocephalic and atraumatic.     Eyes:      General:         Right " eye: No discharge.         Left eye: No discharge.     Neck:      Comments: Trachea midline, no significant JVD appreciated  Cardiovascular:      Rate and Rhythm: Normal rate and regular rhythm.      Heart sounds: Murmur (OMER) heard.      No friction rub.   Pulmonary:      Effort: Pulmonary effort is normal. No respiratory distress.      Breath sounds: No wheezing.   Chest:      Chest wall: No tenderness.   Abdominal:      General: There is no distension.      Palpations: Abdomen is soft.      Tenderness: There is no abdominal tenderness. There is no rebound.     Musculoskeletal:      Right lower leg: No edema.      Left lower leg: No edema.     Skin:     General: Skin is warm and dry.     Neurological:      Mental Status: She is alert.      Comments: Awake, alert, able to answer questions appropriately, able to move extremities bilaterally.   Psychiatric:         Mood and Affect: Mood normal.         Behavior: Behavior normal.            Meds reviewed.  Medications Ordered Prior to Encounter[2]   Past Medical History[3]        Tobacco Use History[4]    Family History[5]               [1]   Past Surgical History:  Procedure Laterality Date    ENDOMETRIAL ABLATION  2012    EPIDURAL BLOCK INJECTION Right 01/07/2020    Procedure: Right L4-5 transforaminal epidural steroid injection 95625;  Surgeon: Pérez Espitia MD;  Location: [facility] OR;  Service: Pain Management     EPIDURAL BLOCK INJECTION Right 05/19/2020    Procedure: Right L4-L5 Transforaminal Epidural Steroid Injection (50581);  Surgeon: Pérez Espitia MD;  [facility]: [facility];  Service: Pain Management     FL GUIDED NEEDLE PLAC BX/ASP/INJ  01/07/2020    FL GUIDED NEEDLE PLAC BX/ASP/INJ  05/19/2020    FL GUIDED NEEDLE PLAC BX/ASP/INJ  08/28/2020    JOINT REPLACEMENT Right     Hip    LISFRANC ARTHRODESIS Left 11/17/2022    Procedure: FUSION LIS FRANC L 1,2 TMTJ fusion with intercuneiform.;  Surgeon: Jun Antony DPM;  Location: [facility]  OR;  Service: Podiatry    NERVE BLOCK Left 05/18/2023    Procedure: Left Lumbar Sympathetic Nerve Block;  Surgeon: Pérez Espitia MD;  [facility]: [facility];  Service: Pain Management     AR ARTHRP INTERCARPAL/CARP/MTCRPL JT INTERPOSITION Left 07/30/2018    Procedure: Thumb Arthrex mini tight rope suspension arthroplasty with trapezial resection left thumb;  Surgeon: Nimesh Wallis MD;  [facility]: [facility];  Service: Orthopedics    AR INJECT SI JOINT ARTHRGRPHY&/ANES/STEROID W/ALESIA Right 08/28/2020    Procedure: Sacroiliac joint injection and piriformis muscle steroid Injection;  Surgeon: Pérez Espitia MD;  [facility]: [facility];  Service: Pain Management     AR OPEN TREATMENT TARSOMETATARSAL JOINT DISLOCATION Left 08/11/2022    Procedure: OPEN REDUCTION W/ INTERNAL FIXATION (ORIF) FOOT, ORIF of the left lis franc ligament;  Surgeon: Jun Antony DPM;  [facility]: [facility];  Service: Podiatry    SPINAL CORD STIMULATOR TRIAL W/ LAMINOTOMY Left 11/14/2023    THYROID SURGERY      near total thyroidectomy   [2]   Current Outpatient Medications on File Prior to Visit   Medication Sig Dispense Refill    Ascorbic Acid (VITAMIN C) 250 MG CHEW Chew 1 tablet in the morning.      atorvastatin (LIPITOR) 10 mg tablet Take 1 tablet (10 mg total) by mouth daily 90 tablet 3    Calcium 600 MG tablet Take 2 tablets by mouth in the morning.      celecoxib (CeleBREX) 200 mg capsule Take 200 mg by mouth in the morning and 200 mg in the evening. Take after meals.      Cholecalciferol (Vitamin D3) 50 MCG (2000 UT) TABS 1 capsule every 24 hours      fluticasone (FLONASE) 50 mcg/act nasal spray 1 spray into each nostril in the morning. 16 g 0    hydroCHLOROthiazide 12.5 mg tablet Take 1 tablet (12.5 mg total) by mouth every morning 90 tablet 3    hydrocortisone 2.5 % ointment Apply topically      losartan (COZAAR) 25 mg tablet Take 0.5 tablets (12.5 mg total) by mouth daily 45 tablet 3    methocarbamol (ROBAXIN) 500  mg tablet Take 1 tablet (500 mg total) by mouth 4 (four) times a day 16 tablet 0    metoprolol tartrate (LOPRESSOR) 25 mg tablet Take 1 tablet (25 mg total) by mouth every 12 (twelve) hours 180 tablet 3    Multiple Vitamin (DAILY VALUE MULTIVITAMIN PO) Take 1 tablet by mouth in the morning.      Multiple Vitamins-Minerals (PRESERVISION AREDS 2 PO) Take by mouth in the morning.      Omega-3 Fatty Acids (Fish Oil) 1200 MG CAPS Take 1 capsule by mouth in the morning.      psyllium (METAMUCIL) 58.6 % powder Take 1 packet by mouth in the morning.      nystatin (MYCOSTATIN) powder Apply topically as needed (Patient not taking: Reported on 6/9/2025)       No current facility-administered medications on file prior to visit.   [3]   Past Medical History:  Diagnosis Date    Arthritis     GERD (gastroesophageal reflux disease)     rarely    Hyperlipidemia     Hypertension     Intracranial aneurysm     resolved 11/17/17 - possible R P-comm region aneurysm measuring 3mm found on MRI results    Irregular heart beat     Mitral valve insufficiency, unspecified etiology     PONV (postoperative nausea and vomiting)     PVC (premature ventricular contraction)     Reactive depression     last assessed 12/15/15   [4]   Social History  Tobacco Use   Smoking Status Never   Smokeless Tobacco Never   Tobacco Comments    only smoked as a teenager   [5]   Family History  Problem Relation Name Age of Onset    Arthritis Mother      Heart disease Mother          CAD    Arthritis Father      Lymphoma Father          Hodgkin's    Breast cancer Sister ashley 66    No Known Problems Sister      No Known Problems Daughter      No Known Problems Daughter      No Known Problems Maternal Grandmother      No Known Problems Maternal Grandfather      No Known Problems Paternal Grandmother      No Known Problems Paternal Grandfather      No Known Problems Son      No Known Problems Maternal Aunt      No Known Problems Paternal Aunt

## 2025-06-24 NOTE — ASSESSMENT & PLAN NOTE
- Mild to moderate on transthoracic echocardiogram October 2024  -Patient denies significant symptoms  -Continue to monitor   - direct vs indirect  - RUQ US with gall bladder wall thickening; no evidence of obstruction  - CT abdomen wit no structural issues, c/f liver abscesses

## 2025-06-24 NOTE — ASSESSMENT & PLAN NOTE
- Counseled patient on dietary and lifestyle modifications  -Continue atorvastatin 10 mg daily  -Continue to monitor

## (undated) DEVICE — BANDAID SHEER SPOT

## (undated) DEVICE — SYRINGE 5ML LL

## (undated) DEVICE — ADHESIVE SKN CLSR HISTOACRYL FLEX 0.5ML LF

## (undated) DEVICE — NEEDLE 18 G X 1 1/2

## (undated) DEVICE — SYRINGE 3ML LL

## (undated) DEVICE — GLOVE SRG BIOGEL 7

## (undated) DEVICE — TRANSPOSAL ULTRAFLEX DUO/QUAD ULTRA CART MANIFOLD

## (undated) DEVICE — BIPOLAR FORCEPS CORD,BANANA LEADS: Brand: VALLEYLAB

## (undated) DEVICE — CYSTO TUBING SINGLE IRRIGATION

## (undated) DEVICE — ACE WRAP 4 IN STERILE

## (undated) DEVICE — INTENDED FOR TISSUE SEPARATION, AND OTHER PROCEDURES THAT REQUIRE A SHARP SURGICAL BLADE TO PUNCTURE OR CUT.: Brand: BARD-PARKER ® CARBON RIB-BACK BLADES

## (undated) DEVICE — KERLIX BANDAGE ROLL: Brand: KERLIX

## (undated) DEVICE — TUBING SUCTION 5MM X 12 FT

## (undated) DEVICE — CHLORAPREP HI-LITE 26ML ORANGE

## (undated) DEVICE — TRAY EPIDURAL PERIFIX 20GA X 3.5IN TUOHY 8ML

## (undated) DEVICE — SUT PROLENE 3-0 FS-2 18 IN 8665G

## (undated) DEVICE — PLUMEPEN PRO 10FT

## (undated) DEVICE — SCD SEQUENTIAL COMPRESSION COMFORT SLEEVE MEDIUM KNEE LENGTH: Brand: KENDALL SCD

## (undated) DEVICE — POV-IOD SOLUTION 4OZ BT

## (undated) DEVICE — NON-STERILE REUSABLE TOURNIQUET CUFF SINGLE BLADDER, DUAL PORT AND QUICK CONNECT CONNECTOR: Brand: COLOR CUFF

## (undated) DEVICE — NEEDLE SPINAL 22G X 3.5IN  QUINCKE

## (undated) DEVICE — GLOVE INDICATOR PI UNDERGLOVE SZ 7 BLUE

## (undated) DEVICE — FLEXIBLE ADHESIVE BANDAGE,X-LARGE: Brand: CURITY

## (undated) DEVICE — GLOVE SRG BIOGEL 8

## (undated) DEVICE — CURITY NON-ADHERENT STRIPS: Brand: CURITY

## (undated) DEVICE — IMPERVIOUS STOCKINETTE: Brand: DEROYAL

## (undated) DEVICE — ZIMMER® STERILE DISPOSABLE TOURNIQUET CUFF, DUAL PORT, SINGLE BLADDER, 18 IN. (46 CM)

## (undated) DEVICE — SPLINT 4 X 15 IN PRECUT SYNTHETIC

## (undated) DEVICE — OCCLUSIVE GAUZE STRIP,3% BISMUTH TRIBROMOPHENATE IN PETROLATUM BLEND: Brand: XEROFORM

## (undated) DEVICE — CHLORAPREP HI-LITE 10.5ML ORANGE

## (undated) DEVICE — CANNULATED DRILL: Brand: FIXOS

## (undated) DEVICE — UNIVERSAL  MINOR EXTREMITY PK: Brand: CARDINAL HEALTH

## (undated) DEVICE — NEEDLE SPINAL  22GA X 3.5IN QUINCKE POINT

## (undated) DEVICE — SINGLE PORT MANIFOLD: Brand: NEPTUNE 2

## (undated) DEVICE — GLOVE SRG BIOGEL 7.5

## (undated) DEVICE — GAUZE SPONGES,16 PLY: Brand: CURITY

## (undated) DEVICE — GLOVE INDICATOR PI UNDERGLOVE SZ 8 BLUE

## (undated) DEVICE — CAST PADDING 4 IN SYNTHETIC NON-STRL

## (undated) DEVICE — LISFRANC DRILL BIT: Brand: CHARLOTTE

## (undated) DEVICE — DRAPE C-ARMOUR

## (undated) DEVICE — SUT VICRYL 4-0 PS-2 27 IN J426H

## (undated) DEVICE — 10FR FRAZIER SUCTION HANDLE: Brand: CARDINAL HEALTH

## (undated) DEVICE — ACE WRAP 3 IN STERILE

## (undated) DEVICE — ABDOMINAL PAD: Brand: DERMACEA

## (undated) DEVICE — BETHLEHEM UNIVERSAL  MIONR EXT: Brand: CARDINAL HEALTH

## (undated) DEVICE — REM POLYHESIVE ADULT PATIENT RETURN ELECTRODE: Brand: VALLEYLAB

## (undated) DEVICE — PADDING,UNDERCAST,COTTON, 4"X4YD STERILE: Brand: MEDLINE

## (undated) DEVICE — BLADE SAGITTAL 25.6 X 9.5MM

## (undated) DEVICE — JOINT PREP INSTRUMENT KIT: Brand: ORTHOLOC™ 2

## (undated) DEVICE — SPONGE LAP 18 X 18 IN STRL RFD

## (undated) DEVICE — 3M™ IOBAN™ 2 ANTIMICROBIAL INCISE DRAPE 6651EZ: Brand: IOBAN™ 2

## (undated) DEVICE — SUT ETHILON 4-0 PS-2 18 IN 1667H

## (undated) DEVICE — U-DRAPE: Brand: CONVERTORS

## (undated) DEVICE — COBAN 4 IN STERILE

## (undated) DEVICE — ACE WRAP 6 IN UNSTERILE

## (undated) DEVICE — PLASTIC ADHESIVE BANDAGE: Brand: CURITY

## (undated) DEVICE — PADDING CAST 4 IN  COTTON STRL

## (undated) DEVICE — SPONGE LAP 18 X 18 IN

## (undated) DEVICE — GAUZE SPONGES,USP TYPE VII GAUZE, 12 PLY: Brand: CURITY

## (undated) DEVICE — NEEDLE 25G X 1 1/2

## (undated) DEVICE — GLOVE SRG LF STRL BGL SKNSNS 7 PF

## (undated) DEVICE — UNDYED BRAIDED (POLYGLACTIN 910), SYNTHETIC ABSORBABLE SUTURE: Brand: COATED VICRYL

## (undated) DEVICE — ACE WRAP 4 IN UNSTERILE

## (undated) DEVICE — CURITY STRETCH BANDAGE: Brand: CURITY

## (undated) DEVICE — CAST PADDING 4 IN UNSTERILE

## (undated) DEVICE — DRAPE C-ARM X-RAY

## (undated) DEVICE — SUT MONOCRYL 4-0 PS-2 27 IN Y426H

## (undated) DEVICE — ARM SLING: Brand: DEROYAL

## (undated) DEVICE — SYRINGE 10ML LL

## (undated) DEVICE — SUT VICRYL 3-0 RB-1 27 IN J215H

## (undated) DEVICE — STEINMANN PIN, SMOOTH
Type: IMPLANTABLE DEVICE | Site: FOOT | Status: NON-FUNCTIONAL
Brand: VARIAX
Removed: 2022-11-17

## (undated) DEVICE — GLOVE SRG BIOGEL ECLIPSE 8

## (undated) DEVICE — GLOVE INDICATOR PI UNDERGLOVE SZ 8.5 BLUE

## (undated) DEVICE — PAD CAST 4 IN COTTON NON STERILE

## (undated) DEVICE — SPONGE PVP SCRUB WING STERILE

## (undated) DEVICE — SUT ETHILON 3-0 PS-1 18 IN 1663G

## (undated) DEVICE — 3M™ STERI-DRAPE™ U-DRAPE 1015: Brand: STERI-DRAPE™

## (undated) DEVICE — 4-PORT MANIFOLD: Brand: NEPTUNE 2

## (undated) DEVICE — ACE WRAP 6 IN STERILE

## (undated) DEVICE — COBAN 6 IN STERILE

## (undated) DEVICE — PREMIUM DRY TRAY LF: Brand: MEDLINE INDUSTRIES, INC.

## (undated) DEVICE — IV EXTENSION TUBING 33 IN

## (undated) DEVICE — PADDING CAST 6IN COTTON STRL

## (undated) DEVICE — BLADE BEAVER 6900

## (undated) DEVICE — CUFF TOURNIQUET 18 X 4 IN QUICK CONNECT DISP 1 BLADDER

## (undated) DEVICE — PADDING CAST 3IN COTTON STRL

## (undated) DEVICE — NEEDLE 21 G X 1

## (undated) DEVICE — UNTHREADED GUIDE WIRE
Type: IMPLANTABLE DEVICE | Site: FOOT | Status: NON-FUNCTIONAL
Brand: FIXOS
Removed: 2022-11-17

## (undated) DEVICE — TRAY EPIDURAL SINGLE SHOT

## (undated) DEVICE — 3M™ STERI-STRIP™ REINFORCED ADHESIVE SKIN CLOSURES, R1547, 1/2 IN X 4 IN (12 MM X 100 MM), 6 STRIPS/ENVELOPE: Brand: 3M™ STERI-STRIP™

## (undated) DEVICE — MEDI-VAC YANKAUER SUCTION HANDLE W/STRAIGHT TIP & CONTROL VENT: Brand: CARDINAL HEALTH

## (undated) DEVICE — INTENDED FOR TISSUE SEPARATION, AND OTHER PROCEDURES THAT REQUIRE A SHARP SURGICAL BLADE TO PUNCTURE OR CUT.: Brand: BARD-PARKER SAFETY BLADES SIZE 15, STERILE

## (undated) DEVICE — SINGLE TROCAR WIRE
Type: IMPLANTABLE DEVICE | Site: FOOT | Status: NON-FUNCTIONAL
Brand: CHARLOTTE
Removed: 2022-08-11